# Patient Record
Sex: MALE | Race: WHITE | NOT HISPANIC OR LATINO | Employment: UNEMPLOYED | URBAN - NONMETROPOLITAN AREA
[De-identification: names, ages, dates, MRNs, and addresses within clinical notes are randomized per-mention and may not be internally consistent; named-entity substitution may affect disease eponyms.]

---

## 2017-01-05 ENCOUNTER — LAB (OUTPATIENT)
Dept: LAB | Facility: HOSPITAL | Age: 52
End: 2017-01-05
Attending: FAMILY MEDICINE

## 2017-01-05 ENCOUNTER — TRANSCRIBE ORDERS (OUTPATIENT)
Dept: ADMINISTRATIVE | Facility: HOSPITAL | Age: 52
End: 2017-01-05

## 2017-01-05 DIAGNOSIS — K57.92 DIVERTICULITIS WITH OBSTRUCTION (HCC): ICD-10-CM

## 2017-01-05 DIAGNOSIS — M54.40 ACUTE LOW BACK PAIN WITH SCIATICA, SCIATICA LATERALITY UNSPECIFIED, UNSPECIFIED BACK PAIN LATERALITY: ICD-10-CM

## 2017-01-05 DIAGNOSIS — K56.609 DIVERTICULITIS WITH OBSTRUCTION (HCC): ICD-10-CM

## 2017-01-05 DIAGNOSIS — R73.9 ELEVATED BLOOD SUGAR: ICD-10-CM

## 2017-01-05 DIAGNOSIS — K57.92 DIVERTICULITIS WITH OBSTRUCTION (HCC): Primary | ICD-10-CM

## 2017-01-05 DIAGNOSIS — K56.609 DIVERTICULITIS WITH OBSTRUCTION (HCC): Primary | ICD-10-CM

## 2017-01-05 LAB
ALBUMIN SERPL-MCNC: 4.5 G/DL (ref 3.5–5)
ALBUMIN/GLOB SERPL: 1.4 G/DL (ref 1.5–2.5)
ALP SERPL-CCNC: 79 U/L (ref 46–116)
ALT SERPL W P-5'-P-CCNC: 52 U/L (ref 10–44)
ANION GAP SERPL CALCULATED.3IONS-SCNC: 3.4 MMOL/L (ref 3.6–11.2)
AST SERPL-CCNC: 32 U/L (ref 10–34)
BASOPHILS # BLD AUTO: 0.03 10*3/MM3 (ref 0–0.3)
BASOPHILS NFR BLD AUTO: 0.5 % (ref 0–2)
BILIRUB SERPL-MCNC: 0.6 MG/DL (ref 0.2–1.8)
BUN BLD-MCNC: 10 MG/DL (ref 7–21)
BUN/CREAT SERPL: 9.3 (ref 7–25)
CALCIUM SPEC-SCNC: 9.8 MG/DL (ref 7.7–10)
CHLORIDE SERPL-SCNC: 103 MMOL/L (ref 99–112)
CO2 SERPL-SCNC: 32.6 MMOL/L (ref 24.3–31.9)
CREAT BLD-MCNC: 1.08 MG/DL (ref 0.43–1.29)
DEPRECATED RDW RBC AUTO: 41.2 FL (ref 37–54)
EOSINOPHIL # BLD AUTO: 0.17 10*3/MM3 (ref 0–0.7)
EOSINOPHIL NFR BLD AUTO: 2.7 % (ref 0–5)
ERYTHROCYTE [DISTWIDTH] IN BLOOD BY AUTOMATED COUNT: 13 % (ref 11.5–14.5)
GFR SERPL CREATININE-BSD FRML MDRD: 72 ML/MIN/1.73
GLOBULIN UR ELPH-MCNC: 3.2 GM/DL
GLUCOSE BLD-MCNC: 97 MG/DL (ref 70–110)
HBA1C MFR BLD: 5.3 % (ref 4.5–5.7)
HCT VFR BLD AUTO: 47.2 % (ref 42–52)
HGB BLD-MCNC: 16 G/DL (ref 14–18)
IMM GRANULOCYTES # BLD: 0.01 10*3/MM3 (ref 0–0.03)
IMM GRANULOCYTES NFR BLD: 0.2 % (ref 0–0.5)
LYMPHOCYTES # BLD AUTO: 1.93 10*3/MM3 (ref 1–3)
LYMPHOCYTES NFR BLD AUTO: 30.7 % (ref 21–51)
MCH RBC QN AUTO: 29.3 PG (ref 27–33)
MCHC RBC AUTO-ENTMCNC: 33.9 G/DL (ref 33–37)
MCV RBC AUTO: 86.4 FL (ref 80–94)
MONOCYTES # BLD AUTO: 0.63 10*3/MM3 (ref 0.1–0.9)
MONOCYTES NFR BLD AUTO: 10 % (ref 0–10)
NEUTROPHILS # BLD AUTO: 3.51 10*3/MM3 (ref 1.4–6.5)
NEUTROPHILS NFR BLD AUTO: 55.9 % (ref 30–70)
OSMOLALITY SERPL CALC.SUM OF ELEC: 276.5 MOSM/KG (ref 273–305)
PLATELET # BLD AUTO: 195 10*3/MM3 (ref 130–400)
PMV BLD AUTO: 9.1 FL (ref 6–10)
POTASSIUM BLD-SCNC: 4.3 MMOL/L (ref 3.5–5.3)
PROT SERPL-MCNC: 7.7 G/DL (ref 6–8)
RBC # BLD AUTO: 5.46 10*6/MM3 (ref 4.7–6.1)
SODIUM BLD-SCNC: 139 MMOL/L (ref 135–153)
WBC NRBC COR # BLD: 6.28 10*3/MM3 (ref 4.5–12.5)

## 2017-01-05 PROCEDURE — 80053 COMPREHEN METABOLIC PANEL: CPT | Performed by: FAMILY MEDICINE

## 2017-01-05 PROCEDURE — 85025 COMPLETE CBC W/AUTO DIFF WBC: CPT | Performed by: FAMILY MEDICINE

## 2017-01-05 PROCEDURE — 36415 COLL VENOUS BLD VENIPUNCTURE: CPT

## 2017-01-05 PROCEDURE — 83036 HEMOGLOBIN GLYCOSYLATED A1C: CPT | Performed by: FAMILY MEDICINE

## 2017-01-31 ENCOUNTER — TRANSCRIBE ORDERS (OUTPATIENT)
Dept: ADMINISTRATIVE | Facility: HOSPITAL | Age: 52
End: 2017-01-31

## 2017-01-31 ENCOUNTER — LAB (OUTPATIENT)
Dept: LAB | Facility: HOSPITAL | Age: 52
End: 2017-01-31

## 2017-01-31 DIAGNOSIS — R10.30 LOWER ABDOMINAL PAIN: ICD-10-CM

## 2017-01-31 DIAGNOSIS — R10.30 LOWER ABDOMINAL PAIN: Primary | ICD-10-CM

## 2017-01-31 LAB
ANION GAP SERPL CALCULATED.3IONS-SCNC: 2.2 MMOL/L (ref 3.6–11.2)
BASOPHILS # BLD AUTO: 0.02 10*3/MM3 (ref 0–0.3)
BASOPHILS NFR BLD AUTO: 0.4 % (ref 0–2)
BUN BLD-MCNC: 14 MG/DL (ref 7–21)
BUN/CREAT SERPL: 16.3 (ref 7–25)
CALCIUM SPEC-SCNC: 9.5 MG/DL (ref 7.7–10)
CHLORIDE SERPL-SCNC: 109 MMOL/L (ref 99–112)
CO2 SERPL-SCNC: 29.8 MMOL/L (ref 24.3–31.9)
CREAT BLD-MCNC: 0.86 MG/DL (ref 0.43–1.29)
CRP SERPL-MCNC: <0.5 MG/DL (ref 0–0.99)
DEPRECATED RDW RBC AUTO: 41.1 FL (ref 37–54)
EOSINOPHIL # BLD AUTO: 0.17 10*3/MM3 (ref 0–0.7)
EOSINOPHIL NFR BLD AUTO: 3.5 % (ref 0–5)
ERYTHROCYTE [DISTWIDTH] IN BLOOD BY AUTOMATED COUNT: 13.4 % (ref 11.5–14.5)
GFR SERPL CREATININE-BSD FRML MDRD: 94 ML/MIN/1.73
GLUCOSE BLD-MCNC: 98 MG/DL (ref 70–110)
HCT VFR BLD AUTO: 45.1 % (ref 42–52)
HGB BLD-MCNC: 15.2 G/DL (ref 14–18)
IMM GRANULOCYTES # BLD: 0 10*3/MM3 (ref 0–0.03)
IMM GRANULOCYTES NFR BLD: 0 % (ref 0–0.5)
LYMPHOCYTES # BLD AUTO: 1.74 10*3/MM3 (ref 1–3)
LYMPHOCYTES NFR BLD AUTO: 35.4 % (ref 21–51)
MCH RBC QN AUTO: 28.4 PG (ref 27–33)
MCHC RBC AUTO-ENTMCNC: 33.7 G/DL (ref 33–37)
MCV RBC AUTO: 84.3 FL (ref 80–94)
MONOCYTES # BLD AUTO: 0.52 10*3/MM3 (ref 0.1–0.9)
MONOCYTES NFR BLD AUTO: 10.6 % (ref 0–10)
NEUTROPHILS # BLD AUTO: 2.46 10*3/MM3 (ref 1.4–6.5)
NEUTROPHILS NFR BLD AUTO: 50.1 % (ref 30–70)
OSMOLALITY SERPL CALC.SUM OF ELEC: 281.7 MOSM/KG (ref 273–305)
PLATELET # BLD AUTO: 216 10*3/MM3 (ref 130–400)
PMV BLD AUTO: 9 FL (ref 6–10)
POTASSIUM BLD-SCNC: 4.4 MMOL/L (ref 3.5–5.3)
RBC # BLD AUTO: 5.35 10*6/MM3 (ref 4.7–6.1)
SODIUM BLD-SCNC: 141 MMOL/L (ref 135–153)
WBC NRBC COR # BLD: 4.91 10*3/MM3 (ref 4.5–12.5)

## 2017-01-31 PROCEDURE — 85025 COMPLETE CBC W/AUTO DIFF WBC: CPT | Performed by: NURSE PRACTITIONER

## 2017-01-31 PROCEDURE — 80048 BASIC METABOLIC PNL TOTAL CA: CPT | Performed by: NURSE PRACTITIONER

## 2017-01-31 PROCEDURE — 36415 COLL VENOUS BLD VENIPUNCTURE: CPT

## 2017-01-31 PROCEDURE — 86140 C-REACTIVE PROTEIN: CPT | Performed by: NURSE PRACTITIONER

## 2017-07-22 ENCOUNTER — TRANSCRIBE ORDERS (OUTPATIENT)
Dept: ADMINISTRATIVE | Facility: HOSPITAL | Age: 52
End: 2017-07-22

## 2017-07-22 ENCOUNTER — LAB (OUTPATIENT)
Dept: LAB | Facility: HOSPITAL | Age: 52
End: 2017-07-22
Attending: FAMILY MEDICINE

## 2017-07-22 DIAGNOSIS — M79.10 MUSCLE PAIN: ICD-10-CM

## 2017-07-22 DIAGNOSIS — M79.10 MUSCLE PAIN: Primary | ICD-10-CM

## 2017-07-22 LAB
ALBUMIN SERPL-MCNC: 4.7 G/DL (ref 3.5–5)
ALBUMIN/GLOB SERPL: 1.7 G/DL (ref 1.5–2.5)
ALP SERPL-CCNC: 83 U/L (ref 40–129)
ALT SERPL W P-5'-P-CCNC: 32 U/L (ref 10–44)
ANION GAP SERPL CALCULATED.3IONS-SCNC: 4.9 MMOL/L (ref 3.6–11.2)
AST SERPL-CCNC: 26 U/L (ref 10–34)
BASOPHILS # BLD AUTO: 0.02 10*3/MM3 (ref 0–0.3)
BASOPHILS NFR BLD AUTO: 0.3 % (ref 0–2)
BILIRUB SERPL-MCNC: 0.4 MG/DL (ref 0.2–1.8)
BUN BLD-MCNC: 12 MG/DL (ref 7–21)
BUN/CREAT SERPL: 12.8 (ref 7–25)
CALCIUM SPEC-SCNC: 9.2 MG/DL (ref 7.7–10)
CHLORIDE SERPL-SCNC: 110 MMOL/L (ref 99–112)
CK MB SERPL-CCNC: 2.92 NG/ML (ref 0–5)
CK MB SERPL-RTO: 1.3 % (ref 0–3)
CK SERPL-CCNC: 227 U/L (ref 24–204)
CO2 SERPL-SCNC: 25.1 MMOL/L (ref 24.3–31.9)
CREAT BLD-MCNC: 0.94 MG/DL (ref 0.43–1.29)
CRP SERPL-MCNC: <0.5 MG/DL (ref 0–0.99)
DEPRECATED RDW RBC AUTO: 40 FL (ref 37–54)
EOSINOPHIL # BLD AUTO: 0.2 10*3/MM3 (ref 0–0.7)
EOSINOPHIL NFR BLD AUTO: 3 % (ref 0–5)
ERYTHROCYTE [DISTWIDTH] IN BLOOD BY AUTOMATED COUNT: 13.3 % (ref 11.5–14.5)
ERYTHROCYTE [SEDIMENTATION RATE] IN BLOOD: 7 MM/HR (ref 0–20)
GFR SERPL CREATININE-BSD FRML MDRD: 85 ML/MIN/1.73
GLOBULIN UR ELPH-MCNC: 2.7 GM/DL
GLUCOSE BLD-MCNC: 118 MG/DL (ref 70–110)
HCT VFR BLD AUTO: 42.5 % (ref 42–52)
HGB BLD-MCNC: 14.7 G/DL (ref 14–18)
IMM GRANULOCYTES # BLD: 0.01 10*3/MM3 (ref 0–0.03)
IMM GRANULOCYTES NFR BLD: 0.2 % (ref 0–0.5)
LYMPHOCYTES # BLD AUTO: 1.88 10*3/MM3 (ref 1–3)
LYMPHOCYTES NFR BLD AUTO: 28.6 % (ref 21–51)
MCH RBC QN AUTO: 28.9 PG (ref 27–33)
MCHC RBC AUTO-ENTMCNC: 34.6 G/DL (ref 33–37)
MCV RBC AUTO: 83.5 FL (ref 80–94)
MONOCYTES # BLD AUTO: 0.53 10*3/MM3 (ref 0.1–0.9)
MONOCYTES NFR BLD AUTO: 8.1 % (ref 0–10)
NEUTROPHILS # BLD AUTO: 3.93 10*3/MM3 (ref 1.4–6.5)
NEUTROPHILS NFR BLD AUTO: 59.8 % (ref 30–70)
OSMOLALITY SERPL CALC.SUM OF ELEC: 280.2 MOSM/KG (ref 273–305)
PLATELET # BLD AUTO: 210 10*3/MM3 (ref 130–400)
PMV BLD AUTO: 9 FL (ref 6–10)
POTASSIUM BLD-SCNC: 3.8 MMOL/L (ref 3.5–5.3)
PROT SERPL-MCNC: 7.4 G/DL (ref 6–8)
RBC # BLD AUTO: 5.09 10*6/MM3 (ref 4.7–6.1)
SODIUM BLD-SCNC: 140 MMOL/L (ref 135–153)
WBC NRBC COR # BLD: 6.57 10*3/MM3 (ref 4.5–12.5)

## 2017-07-22 PROCEDURE — 86140 C-REACTIVE PROTEIN: CPT | Performed by: FAMILY MEDICINE

## 2017-07-22 PROCEDURE — 82553 CREATINE MB FRACTION: CPT | Performed by: FAMILY MEDICINE

## 2017-07-22 PROCEDURE — 85652 RBC SED RATE AUTOMATED: CPT | Performed by: FAMILY MEDICINE

## 2017-07-22 PROCEDURE — 82550 ASSAY OF CK (CPK): CPT | Performed by: FAMILY MEDICINE

## 2017-07-22 PROCEDURE — 85025 COMPLETE CBC W/AUTO DIFF WBC: CPT | Performed by: FAMILY MEDICINE

## 2017-07-22 PROCEDURE — 86038 ANTINUCLEAR ANTIBODIES: CPT | Performed by: FAMILY MEDICINE

## 2017-07-22 PROCEDURE — 36415 COLL VENOUS BLD VENIPUNCTURE: CPT

## 2017-07-22 PROCEDURE — 80053 COMPREHEN METABOLIC PANEL: CPT | Performed by: FAMILY MEDICINE

## 2017-07-24 LAB — ANA SER QL: NEGATIVE

## 2017-08-07 ENCOUNTER — LAB (OUTPATIENT)
Dept: LAB | Facility: HOSPITAL | Age: 52
End: 2017-08-07
Attending: FAMILY MEDICINE

## 2017-08-07 ENCOUNTER — TRANSCRIBE ORDERS (OUTPATIENT)
Dept: ADMINISTRATIVE | Facility: HOSPITAL | Age: 52
End: 2017-08-07

## 2017-08-07 DIAGNOSIS — M79.10 MUSCLE PAIN: ICD-10-CM

## 2017-08-07 DIAGNOSIS — M79.10 MUSCLE PAIN: Primary | ICD-10-CM

## 2017-08-07 LAB
ANION GAP SERPL CALCULATED.3IONS-SCNC: 2.9 MMOL/L (ref 3.6–11.2)
BASOPHILS # BLD AUTO: 0.03 10*3/MM3 (ref 0–0.3)
BASOPHILS NFR BLD AUTO: 0.4 % (ref 0–2)
BUN BLD-MCNC: 17 MG/DL (ref 7–21)
BUN/CREAT SERPL: 17 (ref 7–25)
CALCIUM SPEC-SCNC: 9.8 MG/DL (ref 7.7–10)
CHLORIDE SERPL-SCNC: 108 MMOL/L (ref 99–112)
CO2 SERPL-SCNC: 30.1 MMOL/L (ref 24.3–31.9)
CREAT BLD-MCNC: 1 MG/DL (ref 0.43–1.29)
DEPRECATED RDW RBC AUTO: 39.9 FL (ref 37–54)
EOSINOPHIL # BLD AUTO: 0.22 10*3/MM3 (ref 0–0.7)
EOSINOPHIL NFR BLD AUTO: 3.1 % (ref 0–5)
ERYTHROCYTE [DISTWIDTH] IN BLOOD BY AUTOMATED COUNT: 13.2 % (ref 11.5–14.5)
GFR SERPL CREATININE-BSD FRML MDRD: 78 ML/MIN/1.73
GLUCOSE BLD-MCNC: 103 MG/DL (ref 70–110)
HCT VFR BLD AUTO: 42.9 % (ref 42–52)
HGB BLD-MCNC: 14.6 G/DL (ref 14–18)
IMM GRANULOCYTES # BLD: 0.01 10*3/MM3 (ref 0–0.03)
IMM GRANULOCYTES NFR BLD: 0.1 % (ref 0–0.5)
LYMPHOCYTES # BLD AUTO: 2.6 10*3/MM3 (ref 1–3)
LYMPHOCYTES NFR BLD AUTO: 37.2 % (ref 21–51)
MCH RBC QN AUTO: 28.6 PG (ref 27–33)
MCHC RBC AUTO-ENTMCNC: 34 G/DL (ref 33–37)
MCV RBC AUTO: 84 FL (ref 80–94)
MONOCYTES # BLD AUTO: 0.67 10*3/MM3 (ref 0.1–0.9)
MONOCYTES NFR BLD AUTO: 9.6 % (ref 0–10)
NEUTROPHILS # BLD AUTO: 3.46 10*3/MM3 (ref 1.4–6.5)
NEUTROPHILS NFR BLD AUTO: 49.6 % (ref 30–70)
OSMOLALITY SERPL CALC.SUM OF ELEC: 283.1 MOSM/KG (ref 273–305)
PLATELET # BLD AUTO: 215 10*3/MM3 (ref 130–400)
PMV BLD AUTO: 9 FL (ref 6–10)
POTASSIUM BLD-SCNC: 4 MMOL/L (ref 3.5–5.3)
RBC # BLD AUTO: 5.11 10*6/MM3 (ref 4.7–6.1)
SODIUM BLD-SCNC: 141 MMOL/L (ref 135–153)
WBC NRBC COR # BLD: 6.99 10*3/MM3 (ref 4.5–12.5)

## 2017-08-07 PROCEDURE — 82550 ASSAY OF CK (CPK): CPT | Performed by: FAMILY MEDICINE

## 2017-08-07 PROCEDURE — 80048 BASIC METABOLIC PNL TOTAL CA: CPT | Performed by: FAMILY MEDICINE

## 2017-08-07 PROCEDURE — 85025 COMPLETE CBC W/AUTO DIFF WBC: CPT | Performed by: FAMILY MEDICINE

## 2017-08-07 PROCEDURE — 36415 COLL VENOUS BLD VENIPUNCTURE: CPT

## 2017-08-08 LAB — CK SERPL-CCNC: 256 U/L (ref 24–204)

## 2017-08-09 ENCOUNTER — TRANSCRIBE ORDERS (OUTPATIENT)
Dept: ADMINISTRATIVE | Facility: HOSPITAL | Age: 52
End: 2017-08-09

## 2017-08-09 ENCOUNTER — LAB (OUTPATIENT)
Dept: LAB | Facility: HOSPITAL | Age: 52
End: 2017-08-09
Attending: FAMILY MEDICINE

## 2017-08-09 ENCOUNTER — HOSPITAL ENCOUNTER (OUTPATIENT)
Dept: GENERAL RADIOLOGY | Facility: HOSPITAL | Age: 52
Discharge: HOME | End: 2017-08-09
Attending: FAMILY MEDICINE | Admitting: FAMILY MEDICINE

## 2017-08-09 DIAGNOSIS — R74.8 ELEVATED LIVER ENZYMES: ICD-10-CM

## 2017-08-09 DIAGNOSIS — M79.10 MUSCLE PAIN: Primary | ICD-10-CM

## 2017-08-09 DIAGNOSIS — M79.10 MUSCLE PAIN: ICD-10-CM

## 2017-08-09 DIAGNOSIS — R53.1 WEAKNESS: ICD-10-CM

## 2017-08-09 LAB
CHROMATIN AB SERPL-ACNC: 2 IU/ML (ref 0–14)
CRP SERPL-MCNC: <0.5 MG/DL (ref 0–0.99)
ERYTHROCYTE [SEDIMENTATION RATE] IN BLOOD: 8 MM/HR (ref 0–20)
URATE SERPL-MCNC: 5.6 MG/DL (ref 3.7–7)
VIT B12 BLD-MCNC: 445 PG/ML (ref 211–911)

## 2017-08-09 PROCEDURE — 85652 RBC SED RATE AUTOMATED: CPT | Performed by: FAMILY MEDICINE

## 2017-08-09 PROCEDURE — 86200 CCP ANTIBODY: CPT | Performed by: FAMILY MEDICINE

## 2017-08-09 PROCEDURE — 84550 ASSAY OF BLOOD/URIC ACID: CPT | Performed by: FAMILY MEDICINE

## 2017-08-09 PROCEDURE — 82085 ASSAY OF ALDOLASE: CPT | Performed by: FAMILY MEDICINE

## 2017-08-09 PROCEDURE — 86140 C-REACTIVE PROTEIN: CPT | Performed by: FAMILY MEDICINE

## 2017-08-09 PROCEDURE — 86431 RHEUMATOID FACTOR QUANT: CPT | Performed by: FAMILY MEDICINE

## 2017-08-09 PROCEDURE — 73030 X-RAY EXAM OF SHOULDER: CPT

## 2017-08-09 PROCEDURE — 86225 DNA ANTIBODY NATIVE: CPT | Performed by: FAMILY MEDICINE

## 2017-08-09 PROCEDURE — 86038 ANTINUCLEAR ANTIBODIES: CPT | Performed by: FAMILY MEDICINE

## 2017-08-09 PROCEDURE — 73030 X-RAY EXAM OF SHOULDER: CPT | Performed by: RADIOLOGY

## 2017-08-09 PROCEDURE — 82607 VITAMIN B-12: CPT | Performed by: FAMILY MEDICINE

## 2017-08-09 PROCEDURE — 36415 COLL VENOUS BLD VENIPUNCTURE: CPT

## 2017-08-11 LAB
ANA SER QL: NEGATIVE
DSDNA AB SER-ACNC: <1 IU/ML (ref 0–9)

## 2017-08-13 LAB
ALDOLASE SERPL-CCNC: 4.5 U/L (ref 3.3–10.3)
CCP IGA+IGG SERPL IA-ACNC: 7 UNITS (ref 0–19)

## 2017-10-30 ENCOUNTER — OFFICE VISIT (OUTPATIENT)
Dept: ORTHOPEDIC SURGERY | Facility: CLINIC | Age: 52
End: 2017-10-30

## 2017-10-30 ENCOUNTER — PREP FOR SURGERY (OUTPATIENT)
Dept: OTHER | Facility: HOSPITAL | Age: 52
End: 2017-10-30

## 2017-10-30 VITALS
HEIGHT: 72 IN | SYSTOLIC BLOOD PRESSURE: 130 MMHG | WEIGHT: 225 LBS | BODY MASS INDEX: 30.48 KG/M2 | HEART RATE: 94 BPM | DIASTOLIC BLOOD PRESSURE: 88 MMHG

## 2017-10-30 DIAGNOSIS — G56.02 CARPAL TUNNEL SYNDROME, LEFT: Primary | ICD-10-CM

## 2017-10-30 PROCEDURE — 99214 OFFICE O/P EST MOD 30 MIN: CPT | Performed by: ORTHOPAEDIC SURGERY

## 2017-10-30 NOTE — PROGRESS NOTES
History and Physical    Patient: Avery Watson  YOB: 1965  Date of encounter: 10/30/2017      History of Present Illness:   Avery Watson is a 52 y.o. male who presents today for evaluation of left hand and wrist pain.  He has had carpal tunnel release right hand August 2016.  He's had a good result with complete relief of his symptoms.  He is requesting surgery to his left hand.  He has pain with driving and pain at night.  He has treated with splints in the past without significant improvement.    PMH:   Patient Active Problem List   Diagnosis   • Benign prostatic hyperplasia with urinary obstruction   • Fatigue   • Multilevel degenerative disc disease   • Migraine   • Shoulder pain   • Lumbar facet arthropathy/lumbar spondylosis without myelopathy   • DDD (degenerative disc disease), lumbar   • Myofascial pain   • Bilateral carpal tunnel syndrome   • Displacement of intervertebral disc of mid-cervical region   • Neuroforaminal stenosis of spine, right C6-C7   • Carpal tunnel syndrome, left     Past Medical History:   Diagnosis Date   • Acid reflux    • Chronic pain disorder    • Extremity pain    • Joint pain    • Low back pain    • Lumbosacral disc disease    • Migraine    • Muscle spasm    • Neck pain    • Osteoarthritis    • PONV (postoperative nausea and vomiting)          PSH:  Past Surgical History:   Procedure Laterality Date   • CARPAL TUNNEL RELEASE Right 8/4/2016    Procedure: CARPAL TUNNEL RELEASE;  Surgeon: Yoel Lua MD;  Location: [facility];  Service:    • TOE SURGERY Right 03/04/2015       Allergies:   No Known Allergies    Medications:     Current Outpatient Prescriptions:   •  celecoxib (CELEBREX) 200 MG capsule, Take 1 capsule by mouth Daily., Disp: 30 capsule, Rfl: 3  •  escitalopram (LEXAPRO) 10 MG tablet, Take 1 tablet by mouth Daily., Disp: 30 tablet, Rfl: 2  •  pantoprazole (PROTONIX) 40 MG EC tablet, Take 1 tablet by mouth 2 (Two) Times a Day 15 minutes Before  "breakfast and Supper., Disp: 180 tablet, Rfl: 3  •  tamsulosin (FLOMAX) 0.4 MG capsule 24 hr capsule, Take 1 capsule by mouth daily., Disp: 30 capsule, Rfl: 11    Social History:     Social History     Occupational History   • Not on file.     Social History Main Topics   • Smoking status: Never Smoker   • Smokeless tobacco: Current User     Types: Chew      Comment: uses chewing tobacco   • Alcohol use No   • Drug use: No   • Sexual activity: Defer      Social History     Social History Narrative    ** Merged History Encounter **            Family History:     Family History   Problem Relation Age of Onset   • Lung cancer Mother    • Cancer Mother      ovarian   • Diabetes Sister    • Rheum arthritis Brother    • Cancer Sister      breast       Review of Systems:   Review of Systems   Constitutional: Negative.    Eyes: Negative.    Respiratory: Negative.    Cardiovascular: Negative.    Gastrointestinal: Negative.    Endocrine: Negative.    Genitourinary: Negative.    Musculoskeletal: Positive for arthralgias and myalgias.   Allergic/Immunologic: Negative.    Neurological: Positive for numbness (left hand).   Hematological: Negative.    Psychiatric/Behavioral: Negative.        Physical Exam:   General Appearance:    52 y.o. male  cooperative, in no acute distress.  Alert and oriented x 3,                   Vitals:    10/30/17 1301   BP: 130/88   Pulse: 94   Weight: 225 lb (102 kg)   Height: 72\" (182.9 cm)          HEENT: Unremarkable      Neck: Supple without lymphadenopathy.      Chest: Clear to ascultation bilaterally. Normal respiratory effort.      Heart: Regular rate and rhythm. No murmurs noted.      Skin:  Warm and dry without any rash.      Musculoskeletal:  Upper Extremities: Bilateral hand evaluation reveals minimal decrease in thenar tone compared to the right.  There is decreased sensation thumb, index, middle fingers on the left.  Phalen's sign negative, Tinel sign negative, good strength with pinch and " .  Lower Extremities: Unremarkable.    Assessment:   52-year-old male with long-standing carpal tunnel syndrome left hand and nerve conduction studies from 2016 confirming moderate to severe carpal tunnel syndrome left hand.  Discussed his options today and he wishes to proceed with carpal tunnel release left hand.    ICD-10-CM ICD-9-CM   1. Carpal tunnel syndrome, left G56.02 354.0       Plan:   Carpal tunnel release left hand scheduled [facility].    This document was signed by Yoel Lua MD.  10/30/26882:15 PM    Cc: Shamar Levine MD

## 2017-10-30 NOTE — H&P
History and Physical    Patient: Avery Watson  YOB: 1965  Date of encounter: 10/30/2017      History of Present Illness:   Avery Watson is a 52 y.o. male who presents today for evaluation of left hand and wrist pain.  He has had carpal tunnel release right hand August 2016.  He's had a good result with complete relief of his symptoms.  He is requesting surgery to his left hand.  He has pain with driving and pain at night.  He has treated with splints in the past without significant improvement.    PMH:   Patient Active Problem List   Diagnosis   • Benign prostatic hyperplasia with urinary obstruction   • Fatigue   • Multilevel degenerative disc disease   • Migraine   • Shoulder pain   • Lumbar facet arthropathy/lumbar spondylosis without myelopathy   • DDD (degenerative disc disease), lumbar   • Myofascial pain   • Bilateral carpal tunnel syndrome   • Displacement of intervertebral disc of mid-cervical region   • Neuroforaminal stenosis of spine, right C6-C7   • Carpal tunnel syndrome, left     Past Medical History:   Diagnosis Date   • Acid reflux    • Chronic pain disorder    • Extremity pain    • Joint pain    • Low back pain    • Lumbosacral disc disease    • Migraine    • Muscle spasm    • Neck pain    • Osteoarthritis    • PONV (postoperative nausea and vomiting)          PSH:  Past Surgical History:   Procedure Laterality Date   • CARPAL TUNNEL RELEASE Right 8/4/2016    Procedure: CARPAL TUNNEL RELEASE;  Surgeon: Yoel Lua MD;  Location: [facility];  Service:    • TOE SURGERY Right 03/04/2015       Allergies:   No Known Allergies    Medications:     Current Outpatient Prescriptions:   •  celecoxib (CELEBREX) 200 MG capsule, Take 1 capsule by mouth Daily., Disp: 30 capsule, Rfl: 3  •  escitalopram (LEXAPRO) 10 MG tablet, Take 1 tablet by mouth Daily., Disp: 30 tablet, Rfl: 2  •  pantoprazole (PROTONIX) 40 MG EC tablet, Take 1 tablet by mouth 2 (Two) Times a Day 15 minutes Before  "breakfast and Supper., Disp: 180 tablet, Rfl: 3  •  tamsulosin (FLOMAX) 0.4 MG capsule 24 hr capsule, Take 1 capsule by mouth daily., Disp: 30 capsule, Rfl: 11    Social History:     Social History     Occupational History   • Not on file.     Social History Main Topics   • Smoking status: Never Smoker   • Smokeless tobacco: Current User     Types: Chew      Comment: uses chewing tobacco   • Alcohol use No   • Drug use: No   • Sexual activity: Defer      Social History     Social History Narrative    ** Merged History Encounter **            Family History:     Family History   Problem Relation Age of Onset   • Lung cancer Mother    • Cancer Mother      ovarian   • Diabetes Sister    • Rheum arthritis Brother    • Cancer Sister      breast       Review of Systems:   Review of Systems   Constitutional: Negative.    Eyes: Negative.    Respiratory: Negative.    Cardiovascular: Negative.    Gastrointestinal: Negative.    Endocrine: Negative.    Genitourinary: Negative.    Musculoskeletal: Positive for arthralgias and myalgias.   Allergic/Immunologic: Negative.    Neurological: Positive for numbness (left hand).   Hematological: Negative.    Psychiatric/Behavioral: Negative.        Physical Exam:   General Appearance:    52 y.o. male  cooperative, in no acute distress.  Alert and oriented x 3,                   Vitals:    10/30/17 1301   BP: 130/88   Pulse: 94   Weight: 225 lb (102 kg)   Height: 72\" (182.9 cm)          HEENT: Unremarkable      Neck: Supple without lymphadenopathy.      Chest: Clear to ascultation bilaterally. Normal respiratory effort.      Heart: Regular rate and rhythm. No murmurs noted.      Skin:  Warm and dry without any rash.      Musculoskeletal:  Upper Extremities: Bilateral hand evaluation reveals minimal decrease in thenar tone compared to the right.  There is decreased sensation thumb, index, middle fingers on the left.  Phalen's sign negative, Tinel sign negative, good strength with pinch and " .  Lower Extremities: Unremarkable.    Assessment:   52-year-old male with long-standing carpal tunnel syndrome left hand and nerve conduction studies from 2016 confirming moderate to severe carpal tunnel syndrome left hand.  Discussed his options today and he wishes to proceed with carpal tunnel release left hand.    ICD-10-CM ICD-9-CM   1. Carpal tunnel syndrome, left G56.02 354.0       Plan:   Carpal tunnel release left hand scheduled [facility].    This document was signed by Yoel Lua MD.  10/30/27193:15 PM    Cc: Shamar Levine MD

## 2017-11-07 ENCOUNTER — APPOINTMENT (OUTPATIENT)
Dept: PREADMISSION TESTING | Facility: HOSPITAL | Age: 52
End: 2017-11-07

## 2017-11-07 LAB
DEPRECATED RDW RBC AUTO: 41.1 FL (ref 37–54)
ERYTHROCYTE [DISTWIDTH] IN BLOOD BY AUTOMATED COUNT: 13.4 % (ref 11.5–14.5)
HCT VFR BLD AUTO: 42.3 % (ref 42–52)
HGB BLD-MCNC: 14.6 G/DL (ref 14–18)
MCH RBC QN AUTO: 29.3 PG (ref 27–33)
MCHC RBC AUTO-ENTMCNC: 34.5 G/DL (ref 33–37)
MCV RBC AUTO: 84.9 FL (ref 80–94)
PLATELET # BLD AUTO: 214 10*3/MM3 (ref 130–400)
PMV BLD AUTO: 9.4 FL (ref 6–10)
RBC # BLD AUTO: 4.98 10*6/MM3 (ref 4.7–6.1)
WBC NRBC COR # BLD: 5.86 10*3/MM3 (ref 4.5–12.5)

## 2017-11-07 NOTE — DISCHARGE INSTRUCTIONS
TAKE the following medications the morning of surgery:  All heart or blood pressure medications    Please discontinue all blood thinners and anticoagulants (except aspirin) prior to surgery as per your surgeon and cardiologist instructions.  Aspirin may be continued up to the day prior to surgery.    HOLD all diabetic medications the morning of surgery as order by physician.    Please follow instructions on use of prep cloths provided by nurse. Return instruction sheet with stickers attached to pre-op nurse on day of surgery.    Arrival time for surgery on 11/9/2017 is 0630 am    General Instructions:  • Do NOT eat or drink after midnight 11/8/2017 which includes water, mints, or gum.  • You may brush your teeth. Dental appliances that are removable must be taken out day of surgery.  • Do NOT smoke, chew tobacco, or drink alcohol within 24 hours prior to surgery.  • Bring medications in original bottles, any inhalers and if applicable your C-PAP/BI-PAP machine  • Bring any papers given to you in the doctor’s office  • Wear clean, comfortable clothes and socks  • Do NOT wear contact lenses or make-up or dark nail polish.  Bring a case for your glasses if applicable.  • Bring crutches or walker if applicable  • Leave all other valuables and jewelry at home  • If you were given a blood bank armband, continue to wear it until discharged.    Preventing a Surgical Site Infection:  • Shower on the morning of surgery using a fresh bar of anti-bacterial soap (such as Dial) and clean washcloth.  Dry with a clean towel and dress in clean clothing.  • For 2 to 3 days before surgery, avoid shaving with a razor near where you will have surgery because the razor can irritate skin and make it easier to develop an infection.  Ask your surgeon if you will be receiving antibiotics prior to surgery.  • Make sure you, your family, and all healthcare providers clean their hands with soap and water or an alcohol-based hand   before caring for you or your wound.  • If at all possible, quit smoking as many days before surgery as you can.    Day of Surgery:  Upon arrival, a pre-op nurse and anesthesiologist will review your health history, obtain vital signs, and answer questions you may have.  The only belongings needed at this time will be your home medications and if applicable you C-PAP/BI-PAP machine.  If you are staying overnight, your family can leave the rest of your belongings in the car and bring them to your room later.  A pre-op nurse will start an IV and you may receive medication in preparation for surgery.  Due to patient privacy and limited space, only one member of your family will be able to accompany you in the pre-op area.  While you are in surgery your family should notify the waiting room  if they leave the waiting room area and provide a contact number.  Please be aware that surgery does come with discomfort.  We want to make every effort to control your discomfort so please discuss any uncontrolled symptoms with your nurse.  Your doctor will most likely have prescribed pain medications.  If you are going home after surgery you will receive individualized written care instructions before being discharged.  A responsible adult must drive you to and from the hospital on the day of surgery and stay with you for 24 hours.  If you are staying overnight following surgery, you will be transported to your hospital room following the recovery period.

## 2017-11-08 ENCOUNTER — TELEPHONE (OUTPATIENT)
Dept: ORTHOPEDIC SURGERY | Facility: CLINIC | Age: 52
End: 2017-11-08

## 2017-11-08 NOTE — TELEPHONE ENCOUNTER
Patient has been notified concerning surgery arrival time of 8:00a.m.11-9-17. Patient verbalized understanding.

## 2017-11-09 ENCOUNTER — HOSPITAL ENCOUNTER (OUTPATIENT)
Facility: HOSPITAL | Age: 52
Setting detail: HOSPITAL OUTPATIENT SURGERY
Discharge: HOME | End: 2017-11-09
Attending: ORTHOPAEDIC SURGERY | Admitting: ORTHOPAEDIC SURGERY

## 2017-11-09 ENCOUNTER — ANESTHESIA (OUTPATIENT)
Dept: PERIOP | Facility: HOSPITAL | Age: 52
End: 2017-11-09

## 2017-11-09 ENCOUNTER — ANESTHESIA EVENT (OUTPATIENT)
Dept: PERIOP | Facility: HOSPITAL | Age: 52
End: 2017-11-09

## 2017-11-09 VITALS
OXYGEN SATURATION: 98 % | DIASTOLIC BLOOD PRESSURE: 86 MMHG | HEART RATE: 76 BPM | RESPIRATION RATE: 16 BRPM | WEIGHT: 225 LBS | TEMPERATURE: 98.1 F | BODY MASS INDEX: 30.48 KG/M2 | HEIGHT: 72 IN | SYSTOLIC BLOOD PRESSURE: 122 MMHG

## 2017-11-09 PROCEDURE — 64721 CARPAL TUNNEL SURGERY: CPT | Performed by: ORTHOPAEDIC SURGERY

## 2017-11-09 PROCEDURE — 25010000002 PROPOFOL 10 MG/ML EMULSION: Performed by: NURSE ANESTHETIST, CERTIFIED REGISTERED

## 2017-11-09 PROCEDURE — 25010000002 FENTANYL CITRATE (PF) 100 MCG/2ML SOLUTION: Performed by: NURSE ANESTHETIST, CERTIFIED REGISTERED

## 2017-11-09 PROCEDURE — 25010000002 MIDAZOLAM PER 1 MG: Performed by: NURSE ANESTHETIST, CERTIFIED REGISTERED

## 2017-11-09 RX ORDER — MIDAZOLAM HYDROCHLORIDE 1 MG/ML
INJECTION INTRAMUSCULAR; INTRAVENOUS AS NEEDED
Status: DISCONTINUED | OUTPATIENT
Start: 2017-11-09 | End: 2017-11-09 | Stop reason: SURG

## 2017-11-09 RX ORDER — ONDANSETRON 2 MG/ML
4 INJECTION INTRAMUSCULAR; INTRAVENOUS ONCE AS NEEDED
Status: DISCONTINUED | OUTPATIENT
Start: 2017-11-09 | End: 2017-11-09 | Stop reason: HOSPADM

## 2017-11-09 RX ORDER — SODIUM CHLORIDE, SODIUM LACTATE, POTASSIUM CHLORIDE, CALCIUM CHLORIDE 600; 310; 30; 20 MG/100ML; MG/100ML; MG/100ML; MG/100ML
125 INJECTION, SOLUTION INTRAVENOUS CONTINUOUS
Status: DISCONTINUED | OUTPATIENT
Start: 2017-11-09 | End: 2017-11-09 | Stop reason: HOSPADM

## 2017-11-09 RX ORDER — BUPIVACAINE HYDROCHLORIDE 5 MG/ML
INJECTION, SOLUTION PERINEURAL AS NEEDED
Status: DISCONTINUED | OUTPATIENT
Start: 2017-11-09 | End: 2017-11-09 | Stop reason: HOSPADM

## 2017-11-09 RX ORDER — IPRATROPIUM BROMIDE AND ALBUTEROL SULFATE 2.5; .5 MG/3ML; MG/3ML
3 SOLUTION RESPIRATORY (INHALATION) ONCE AS NEEDED
Status: DISCONTINUED | OUTPATIENT
Start: 2017-11-09 | End: 2017-11-09 | Stop reason: HOSPADM

## 2017-11-09 RX ORDER — LIDOCAINE HYDROCHLORIDE 10 MG/ML
INJECTION, SOLUTION INFILTRATION; PERINEURAL AS NEEDED
Status: DISCONTINUED | OUTPATIENT
Start: 2017-11-09 | End: 2017-11-09 | Stop reason: HOSPADM

## 2017-11-09 RX ORDER — OXYCODONE HYDROCHLORIDE AND ACETAMINOPHEN 5; 325 MG/1; MG/1
1 TABLET ORAL ONCE AS NEEDED
Status: DISCONTINUED | OUTPATIENT
Start: 2017-11-09 | End: 2017-11-09 | Stop reason: HOSPADM

## 2017-11-09 RX ORDER — FENTANYL CITRATE 50 UG/ML
INJECTION, SOLUTION INTRAMUSCULAR; INTRAVENOUS AS NEEDED
Status: DISCONTINUED | OUTPATIENT
Start: 2017-11-09 | End: 2017-11-09 | Stop reason: SURG

## 2017-11-09 RX ORDER — OXYCODONE HYDROCHLORIDE AND ACETAMINOPHEN 5; 325 MG/1; MG/1
1-2 TABLET ORAL EVERY 4 HOURS PRN
Qty: 20 TABLET | Refills: 0 | Status: SHIPPED | OUTPATIENT
Start: 2017-11-09 | End: 2020-08-17

## 2017-11-09 RX ORDER — FENTANYL CITRATE 50 UG/ML
50 INJECTION, SOLUTION INTRAMUSCULAR; INTRAVENOUS
Status: DISCONTINUED | OUTPATIENT
Start: 2017-11-09 | End: 2017-11-09 | Stop reason: HOSPADM

## 2017-11-09 RX ORDER — PROPOFOL 10 MG/ML
VIAL (ML) INTRAVENOUS CONTINUOUS PRN
Status: DISCONTINUED | OUTPATIENT
Start: 2017-11-09 | End: 2017-11-09 | Stop reason: SURG

## 2017-11-09 RX ORDER — MAGNESIUM HYDROXIDE 1200 MG/15ML
LIQUID ORAL AS NEEDED
Status: DISCONTINUED | OUTPATIENT
Start: 2017-11-09 | End: 2017-11-09 | Stop reason: HOSPADM

## 2017-11-09 RX ORDER — SODIUM CHLORIDE 0.9 % (FLUSH) 0.9 %
1-10 SYRINGE (ML) INJECTION AS NEEDED
Status: DISCONTINUED | OUTPATIENT
Start: 2017-11-09 | End: 2017-11-09 | Stop reason: HOSPADM

## 2017-11-09 RX ORDER — MEPERIDINE HYDROCHLORIDE 25 MG/ML
12.5 INJECTION INTRAMUSCULAR; INTRAVENOUS; SUBCUTANEOUS
Status: DISCONTINUED | OUTPATIENT
Start: 2017-11-09 | End: 2017-11-09 | Stop reason: HOSPADM

## 2017-11-09 RX ADMIN — SODIUM CHLORIDE, POTASSIUM CHLORIDE, SODIUM LACTATE AND CALCIUM CHLORIDE: 600; 310; 30; 20 INJECTION, SOLUTION INTRAVENOUS at 10:45

## 2017-11-09 RX ADMIN — SODIUM CHLORIDE, POTASSIUM CHLORIDE, SODIUM LACTATE AND CALCIUM CHLORIDE: 600; 310; 30; 20 INJECTION, SOLUTION INTRAVENOUS at 10:08

## 2017-11-09 RX ADMIN — FENTANYL CITRATE 100 MCG: 50 INJECTION INTRAMUSCULAR; INTRAVENOUS at 10:08

## 2017-11-09 RX ADMIN — FENTANYL CITRATE 100 MCG: 50 INJECTION INTRAMUSCULAR; INTRAVENOUS at 10:13

## 2017-11-09 RX ADMIN — SODIUM CHLORIDE, POTASSIUM CHLORIDE, SODIUM LACTATE AND CALCIUM CHLORIDE 125 ML/HR: 600; 310; 30; 20 INJECTION, SOLUTION INTRAVENOUS at 08:37

## 2017-11-09 RX ADMIN — MIDAZOLAM HYDROCHLORIDE 2 MG: 1 INJECTION, SOLUTION INTRAMUSCULAR; INTRAVENOUS at 10:08

## 2017-11-09 RX ADMIN — PROPOFOL 100 MCG/KG/MIN: 10 INJECTION, EMULSION INTRAVENOUS at 10:16

## 2017-11-09 NOTE — OP NOTE
DATE OF SURGERY: 11/09/17    PREOPERATIVE DIAGNOSIS: Carpal tunnel syndrome left hand     POSTOPERATIVE DIAGNOSIS: Same      OPERATIONS PERFORMED: Carpal tunnel release left hand      SURGEON: Yoel Lua MD      ASSISTANT: Lisa Weiner     ANESTHESIA: Gen. plus local     ESTIMATED BLOOD LOSS: None     ANTIBIOTICS: None     DESCRIPTION OF PROCEDURE: With patient in operating theater once IV sedation was administered patient position supine on the operating table left hand and arm sterilely prepped and draped in usual manner with extremity exsanguinated tourniquet inflated to 250 mmHg.,  Aspect left hand infiltrated with 5 cc of 0.5 Marcaine.  Longitudinal incision made 5 cm in length directed toward fourth finger carried through skin sharply.  Palmar fascia was exposed and divided longitudinally exposing the underlying transverse carpal ligament.  Ligament was divided from distal proximal protecting the underlying median nerve.  With transverse carpal ligament completely divided the tourniquet was deflated median nerve noted to be severely hyperemic.  Tourniquet was deflated hemostasis obtained with electrocautery wound closed in a single layer 3-0 nylon vertical mattress suture sterile dressing applied and patient taken to recovery room in stable condition.       Dictator Signature:___________________________  RAMY Baer M.D.

## 2017-11-09 NOTE — H&P (VIEW-ONLY)
History and Physical    Patient: Avery Watson  YOB: 1965  Date of encounter: 10/30/2017      History of Present Illness:   Avery Watson is a 52 y.o. male who presents today for evaluation of left hand and wrist pain.  He has had carpal tunnel release right hand August 2016.  He's had a good result with complete relief of his symptoms.  He is requesting surgery to his left hand.  He has pain with driving and pain at night.  He has treated with splints in the past without significant improvement.    PMH:   Patient Active Problem List   Diagnosis   • Benign prostatic hyperplasia with urinary obstruction   • Fatigue   • Multilevel degenerative disc disease   • Migraine   • Shoulder pain   • Lumbar facet arthropathy/lumbar spondylosis without myelopathy   • DDD (degenerative disc disease), lumbar   • Myofascial pain   • Bilateral carpal tunnel syndrome   • Displacement of intervertebral disc of mid-cervical region   • Neuroforaminal stenosis of spine, right C6-C7   • Carpal tunnel syndrome, left     Past Medical History:   Diagnosis Date   • Acid reflux    • Chronic pain disorder    • Extremity pain    • Joint pain    • Low back pain    • Lumbosacral disc disease    • Migraine    • Muscle spasm    • Neck pain    • Osteoarthritis    • PONV (postoperative nausea and vomiting)          PSH:  Past Surgical History:   Procedure Laterality Date   • CARPAL TUNNEL RELEASE Right 8/4/2016    Procedure: CARPAL TUNNEL RELEASE;  Surgeon: Yoel Lua MD;  Location: [facility];  Service:    • TOE SURGERY Right 03/04/2015       Allergies:   No Known Allergies    Medications:     Current Outpatient Prescriptions:   •  celecoxib (CELEBREX) 200 MG capsule, Take 1 capsule by mouth Daily., Disp: 30 capsule, Rfl: 3  •  escitalopram (LEXAPRO) 10 MG tablet, Take 1 tablet by mouth Daily., Disp: 30 tablet, Rfl: 2  •  pantoprazole (PROTONIX) 40 MG EC tablet, Take 1 tablet by mouth 2 (Two) Times a Day 15 minutes Before  "breakfast and Supper., Disp: 180 tablet, Rfl: 3  •  tamsulosin (FLOMAX) 0.4 MG capsule 24 hr capsule, Take 1 capsule by mouth daily., Disp: 30 capsule, Rfl: 11    Social History:     Social History     Occupational History   • Not on file.     Social History Main Topics   • Smoking status: Never Smoker   • Smokeless tobacco: Current User     Types: Chew      Comment: uses chewing tobacco   • Alcohol use No   • Drug use: No   • Sexual activity: Defer      Social History     Social History Narrative    ** Merged History Encounter **            Family History:     Family History   Problem Relation Age of Onset   • Lung cancer Mother    • Cancer Mother      ovarian   • Diabetes Sister    • Rheum arthritis Brother    • Cancer Sister      breast       Review of Systems:   Review of Systems   Constitutional: Negative.    Eyes: Negative.    Respiratory: Negative.    Cardiovascular: Negative.    Gastrointestinal: Negative.    Endocrine: Negative.    Genitourinary: Negative.    Musculoskeletal: Positive for arthralgias and myalgias.   Allergic/Immunologic: Negative.    Neurological: Positive for numbness (left hand).   Hematological: Negative.    Psychiatric/Behavioral: Negative.        Physical Exam:   General Appearance:    52 y.o. male  cooperative, in no acute distress.  Alert and oriented x 3,                   Vitals:    10/30/17 1301   BP: 130/88   Pulse: 94   Weight: 225 lb (102 kg)   Height: 72\" (182.9 cm)          HEENT: Unremarkable      Neck: Supple without lymphadenopathy.      Chest: Clear to ascultation bilaterally. Normal respiratory effort.      Heart: Regular rate and rhythm. No murmurs noted.      Skin:  Warm and dry without any rash.      Musculoskeletal:  Upper Extremities: Bilateral hand evaluation reveals minimal decrease in thenar tone compared to the right.  There is decreased sensation thumb, index, middle fingers on the left.  Phalen's sign negative, Tinel sign negative, good strength with pinch and " .  Lower Extremities: Unremarkable.    Assessment:   52-year-old male with long-standing carpal tunnel syndrome left hand and nerve conduction studies from 2016 confirming moderate to severe carpal tunnel syndrome left hand.  Discussed his options today and he wishes to proceed with carpal tunnel release left hand.    ICD-10-CM ICD-9-CM   1. Carpal tunnel syndrome, left G56.02 354.0       Plan:   Carpal tunnel release left hand scheduled [facility].    This document was signed by Yoel Lua MD.  10/30/82519:15 PM    Cc: Shamar Levine MD

## 2017-11-09 NOTE — PLAN OF CARE
Problem: Patient Care Overview (Adult)  Goal: Discharge Needs Assessment  Outcome: Ongoing (interventions implemented as appropriate)    11/09/17 4849   Discharge Needs Assessment   Concerns To Be Addressed no discharge needs identified   Readmission Within The Last 30 Days no previous admission in last 30 days

## 2017-11-09 NOTE — BRIEF OP NOTE
CARPAL TUNNEL RELEASE  Progress Note    Avery JACKSON Walter  11/9/2017    Pre-op Diagnosis:   Carpal tunnel syndrome, left [G56.02]       Post-Op Diagnosis Codes:     * Carpal tunnel syndrome, left [G56.02]    Procedure/CPT® Codes:      Procedure(s):  CARPAL TUNNEL RELEASE LEFT    Surgeon(s):  Yoel Lua MD    Anesthesia: Monitor Anesthesia Care with LOCAL    Staff:   Circulator: sOwald Gresham RN  Scrub Person: Carrie Wagoner  Assistant: Lisa Weiner    Estimated Blood Loss:     Urine Voided: * No values recorded between 11/9/2017 10:08 AM and 11/9/2017 10:50 AM *    Specimens:                      Drains:           Findings:     Complications:       Yoel Lua MD     Date: 11/9/2017  Time: 10:51 AM

## 2017-11-09 NOTE — PLAN OF CARE
Problem: Patient Care Overview (Adult)  Goal: Plan of Care Review  Outcome: Ongoing (interventions implemented as appropriate)    11/09/17 0825   Coping/Psychosocial Response Interventions   Plan Of Care Reviewed With patient   Patient Care Overview   Progress progress toward functional goals as expected

## 2017-11-09 NOTE — ANESTHESIA POSTPROCEDURE EVALUATION
Patient: Avery Watson    Procedure Summary     Date Anesthesia Start Anesthesia Stop Room / Location    11/09/17 1009 1050 [facility] OR 03 / [facility]       Procedure Diagnosis Surgeon Provider    CARPAL TUNNEL RELEASE (Left Wrist) Carpal tunnel syndrome, left  (Carpal tunnel syndrome, left [G56.02]) MD Harmeet Baer MD          Anesthesia Type: MAC  Last vitals  BP   118/82 (11/09/17 1105)   Temp   98.1 °F (36.7 °C) (11/09/17 1051)   Pulse   70 (11/09/17 1105)   Resp   16 (11/09/17 1105)     SpO2   96 % (11/09/17 1105)     Post Anesthesia Care and Evaluation    Patient location during evaluation: bedside  Patient participation: complete - patient participated  Level of consciousness: awake and alert  Pain score: 1  Pain management: adequate  Airway patency: patent  Anesthetic complications: No anesthetic complications  PONV Status: none  Cardiovascular status: acceptable  Respiratory status: acceptable  Hydration status: acceptable

## 2017-11-09 NOTE — ANESTHESIA PREPROCEDURE EVALUATION
Anesthesia Evaluation     Patient summary reviewed and Nursing notes reviewed   history of anesthetic complications:  NPO Solid Status: > 8 hours  NPO Liquid Status: > 8 hours     Airway   Mallampati: I  TM distance: >3 FB  Neck ROM: full  no difficulty expected  Dental - normal exam     Pulmonary - negative pulmonary ROS and normal exam   (-) asthma, not a smoker  Cardiovascular - negative cardio ROS and normal exam  Exercise tolerance: good (4-7 METS)    NYHA Classification: I    (-) hypertension, past MI, dysrhythmias, angina, CHF, hyperlipidemia      Neuro/Psych  (+) headaches, numbness, psychiatric history Depression,    (-) seizures, CVA  GI/Hepatic/Renal/Endo    (+) obesity,  GERD,   (-) liver disease, no renal disease, diabetes, hypothyroidism    Musculoskeletal     (+) back pain, neck pain,   Abdominal  - normal exam    Bowel sounds: normal.   Substance History - negative use     OB/GYN negative ob/gyn ROS   (-) Preeclampsia        Other   (+) arthritis                                     Anesthesia Plan    ASA 2     MAC     intravenous induction   Anesthetic plan and risks discussed with patient and spouse/significant other.  Use of blood products discussed with patient and spouse/significant other  Consented to blood products.

## 2017-11-15 ENCOUNTER — OFFICE VISIT (OUTPATIENT)
Dept: ORTHOPEDIC SURGERY | Facility: CLINIC | Age: 52
End: 2017-11-15

## 2017-11-15 VITALS — BODY MASS INDEX: 30.48 KG/M2 | HEIGHT: 72 IN | WEIGHT: 225 LBS

## 2017-11-15 DIAGNOSIS — G56.02 CARPAL TUNNEL SYNDROME, LEFT: Primary | ICD-10-CM

## 2017-11-15 PROCEDURE — 99024 POSTOP FOLLOW-UP VISIT: CPT | Performed by: ORTHOPAEDIC SURGERY

## 2017-11-15 NOTE — PROGRESS NOTES
"Postoperative Follow Up Visit    Avery Watson   1965  11/15/2017  Date of Surgery: November 9, 2017.    Subjective: Avery Watson is a 52 y.o. male Follow-up status post left Carpal tunnel release. Patient returns to clinic today now 1 week out from surgery.  Doing well with wrist pain controlled. Improvement of overall numbness and tingling. Denies any other complications.     Objective:  Physical Exam: 52 y.o. male .  General Appearance:    Well appearing, cooperative, in no acute distress.       Patient is alert and oriented x 3.       Vitals:    11/15/17 1459   Weight: 225 lb (102 kg)   Height: 72\" (182.9 cm)            Musculoskeletal: left wrist- incision clean, dry, intact, healing well. Mild resolving ecchymosis no erythema or significant effusion Flexs and extends wrist with no complication. Full range of motion of the digits. Capillary refill intact. No tenderness upon palpation. Neurovascular status grossly intact     Assessment:       ICD-10-CM ICD-9-CM   1. Carpal tunnel syndrome, left G56.02 354.0       Plan   1.Progress activity as tolerated, return to work full duty no restrictions   2. Removal of Sutures Monday in office.    Discussion/Summary:    Written by Alvaro Thompson PA-C, acting as scribe for Dr. Lua      CC: Shamar Levine MD    "

## 2017-11-20 ENCOUNTER — OFFICE VISIT (OUTPATIENT)
Dept: ORTHOPEDIC SURGERY | Facility: CLINIC | Age: 52
End: 2017-11-20

## 2017-11-20 VITALS — WEIGHT: 225 LBS | HEIGHT: 72 IN | BODY MASS INDEX: 30.48 KG/M2

## 2017-11-20 DIAGNOSIS — G56.02 CARPAL TUNNEL SYNDROME, LEFT: Primary | ICD-10-CM

## 2017-11-20 PROCEDURE — 99024 POSTOP FOLLOW-UP VISIT: CPT | Performed by: ORTHOPAEDIC SURGERY

## 2017-11-20 NOTE — PROGRESS NOTES
"Avery Watson   :1965    Date of encounter:2017        HPI:  Avery Watson is a 52 y.o. male seen back in follow-up left carpal tunnel release.  He is 10 days postop and doing well.  His sensation is already improved.    PMH:   Patient Active Problem List   Diagnosis   • Benign prostatic hyperplasia with urinary obstruction   • Fatigue   • Multilevel degenerative disc disease   • Migraine   • Shoulder pain   • Lumbar facet arthropathy/lumbar spondylosis without myelopathy   • DDD (degenerative disc disease), lumbar   • Myofascial pain   • Bilateral carpal tunnel syndrome   • Displacement of intervertebral disc of mid-cervical region   • Neuroforaminal stenosis of spine, right C6-C7   • Carpal tunnel syndrome, left       Exam:  General Appearance:    52 y.o. male  cooperative, in no acute distress.  Alert and oriented x 3,     Left hand evaluation shows incision to be intact.  Neurovascular is grossly intact.                  Vitals:    17 1308   Weight: 225 lb (102 kg)   Height: 72\" (182.9 cm)            Assessment:   52-year-old male doing well with left carpal tunnel release.  He is instructed to return in 6 weeks if he is having symptoms otherwise he may follow up as needed.    ICD-10-CM ICD-9-CM   1. Carpal tunnel syndrome, left G56.02 354.0       Plan: Follow up in 6 weeks.     This document was signed by Yoel Lua MD.  20172:07 PM    Cc: Shamar Levine MD                             "

## 2018-03-26 ENCOUNTER — TRANSCRIBE ORDERS (OUTPATIENT)
Dept: ADMINISTRATIVE | Facility: HOSPITAL | Age: 53
End: 2018-03-26

## 2018-03-26 ENCOUNTER — HOSPITAL ENCOUNTER (OUTPATIENT)
Dept: GENERAL RADIOLOGY | Facility: HOSPITAL | Age: 53
Discharge: HOME | End: 2018-03-26
Attending: FAMILY MEDICINE | Admitting: FAMILY MEDICINE

## 2018-03-26 DIAGNOSIS — M54.16 LUMBAR RADICULOPATHY: Primary | ICD-10-CM

## 2018-03-26 DIAGNOSIS — M54.16 LUMBAR RADICULOPATHY: ICD-10-CM

## 2018-03-26 PROCEDURE — 72110 X-RAY EXAM L-2 SPINE 4/>VWS: CPT | Performed by: RADIOLOGY

## 2018-03-26 PROCEDURE — 72110 X-RAY EXAM L-2 SPINE 4/>VWS: CPT

## 2018-05-03 RX ORDER — ESCITALOPRAM OXALATE 10 MG/1
10 TABLET ORAL DAILY
Qty: 30 TABLET | Refills: 2 | Status: CANCELLED | OUTPATIENT
Start: 2018-05-03

## 2018-05-28 ENCOUNTER — APPOINTMENT (OUTPATIENT)
Dept: LAB | Facility: HOSPITAL | Age: 53
End: 2018-05-28

## 2018-05-28 ENCOUNTER — LAB (OUTPATIENT)
Dept: LAB | Facility: HOSPITAL | Age: 53
End: 2018-05-28
Attending: FAMILY MEDICINE

## 2018-05-28 ENCOUNTER — TRANSCRIBE ORDERS (OUTPATIENT)
Dept: ADMINISTRATIVE | Facility: HOSPITAL | Age: 53
End: 2018-05-28

## 2018-05-28 DIAGNOSIS — F42.9 OBSESSIVE-COMPULSIVE DISORDER, UNSPECIFIED TYPE: Primary | ICD-10-CM

## 2018-05-28 DIAGNOSIS — F42.9 OBSESSIVE-COMPULSIVE DISORDER, UNSPECIFIED TYPE: ICD-10-CM

## 2018-05-28 LAB
ALBUMIN SERPL-MCNC: 4.5 G/DL (ref 3.5–5)
ALBUMIN/GLOB SERPL: 1.5 G/DL (ref 1.5–2.5)
ALP SERPL-CCNC: 74 U/L (ref 40–129)
ALT SERPL W P-5'-P-CCNC: 35 U/L (ref 10–44)
ANION GAP SERPL CALCULATED.3IONS-SCNC: 1.5 MMOL/L (ref 3.6–11.2)
AST SERPL-CCNC: 21 U/L (ref 10–34)
BILIRUB SERPL-MCNC: 0.6 MG/DL (ref 0.2–1.8)
BUN BLD-MCNC: 16 MG/DL (ref 7–21)
BUN/CREAT SERPL: 16.5 (ref 7–25)
CALCIUM SPEC-SCNC: 9 MG/DL (ref 7.7–10)
CHLORIDE SERPL-SCNC: 107 MMOL/L (ref 99–112)
CK SERPL-CCNC: 116 U/L (ref 24–204)
CO2 SERPL-SCNC: 30.5 MMOL/L (ref 24.3–31.9)
CREAT BLD-MCNC: 0.97 MG/DL (ref 0.43–1.29)
CRP SERPL-MCNC: <0.5 MG/DL (ref 0–0.99)
DEPRECATED RDW RBC AUTO: 42.7 FL (ref 37–54)
ERYTHROCYTE [DISTWIDTH] IN BLOOD BY AUTOMATED COUNT: 14 % (ref 11.5–14.5)
GFR SERPL CREATININE-BSD FRML MDRD: 81 ML/MIN/1.73
GLOBULIN UR ELPH-MCNC: 3.1 GM/DL
GLUCOSE BLD-MCNC: 87 MG/DL (ref 70–110)
HCT VFR BLD AUTO: 46.6 % (ref 42–52)
HGB BLD-MCNC: 15.8 G/DL (ref 14–18)
MCH RBC QN AUTO: 28.6 PG (ref 27–33)
MCHC RBC AUTO-ENTMCNC: 33.9 G/DL (ref 33–37)
MCV RBC AUTO: 84.4 FL (ref 80–94)
OSMOLALITY SERPL CALC.SUM OF ELEC: 278.1 MOSM/KG (ref 273–305)
PLATELET # BLD AUTO: 223 10*3/MM3 (ref 130–400)
PMV BLD AUTO: 9.1 FL (ref 6–10)
POTASSIUM BLD-SCNC: 3.8 MMOL/L (ref 3.5–5.3)
PROT SERPL-MCNC: 7.6 G/DL (ref 6–8)
RBC # BLD AUTO: 5.52 10*6/MM3 (ref 4.7–6.1)
SODIUM BLD-SCNC: 139 MMOL/L (ref 135–153)
WBC NRBC COR # BLD: 9.39 10*3/MM3 (ref 4.5–12.5)

## 2018-05-28 PROCEDURE — 36415 COLL VENOUS BLD VENIPUNCTURE: CPT

## 2018-05-28 PROCEDURE — 85027 COMPLETE CBC AUTOMATED: CPT

## 2018-05-28 PROCEDURE — 86140 C-REACTIVE PROTEIN: CPT

## 2018-05-28 PROCEDURE — 82550 ASSAY OF CK (CPK): CPT

## 2018-05-28 PROCEDURE — 80053 COMPREHEN METABOLIC PANEL: CPT

## 2018-06-11 ENCOUNTER — HOSPITAL ENCOUNTER (OUTPATIENT)
Dept: GENERAL RADIOLOGY | Facility: HOSPITAL | Age: 53
Discharge: HOME | End: 2018-06-11
Attending: FAMILY MEDICINE | Admitting: FAMILY MEDICINE

## 2018-06-11 ENCOUNTER — TRANSCRIBE ORDERS (OUTPATIENT)
Dept: ADMINISTRATIVE | Facility: HOSPITAL | Age: 53
End: 2018-06-11

## 2018-06-11 DIAGNOSIS — R53.83 FATIGUE, UNSPECIFIED TYPE: ICD-10-CM

## 2018-06-11 DIAGNOSIS — R53.83 FATIGUE, UNSPECIFIED TYPE: Primary | ICD-10-CM

## 2018-06-11 PROCEDURE — 71046 X-RAY EXAM CHEST 2 VIEWS: CPT | Performed by: RADIOLOGY

## 2018-06-11 PROCEDURE — 71046 X-RAY EXAM CHEST 2 VIEWS: CPT

## 2018-06-14 ENCOUNTER — OFFICE VISIT (OUTPATIENT)
Dept: UROLOGY | Facility: CLINIC | Age: 53
End: 2018-06-14

## 2018-06-14 VITALS
HEIGHT: 72 IN | HEART RATE: 87 BPM | WEIGHT: 230 LBS | BODY MASS INDEX: 31.15 KG/M2 | DIASTOLIC BLOOD PRESSURE: 84 MMHG | SYSTOLIC BLOOD PRESSURE: 137 MMHG

## 2018-06-14 DIAGNOSIS — R35.0 FREQUENCY OF URINATION: Primary | ICD-10-CM

## 2018-06-14 DIAGNOSIS — N52.9 ERECTILE DYSFUNCTION, UNSPECIFIED ERECTILE DYSFUNCTION TYPE: ICD-10-CM

## 2018-06-14 DIAGNOSIS — N42.9 DISORDER OF PROSTATE: ICD-10-CM

## 2018-06-14 LAB
BILIRUB BLD-MCNC: NEGATIVE MG/DL
CLARITY, POC: CLEAR
COLOR UR: YELLOW
GLUCOSE UR STRIP-MCNC: NEGATIVE MG/DL
KETONES UR QL: NEGATIVE
LEUKOCYTE EST, POC: NEGATIVE
NITRITE UR-MCNC: NEGATIVE MG/ML
PH UR: 5.5 [PH] (ref 5–8)
PROT UR STRIP-MCNC: NEGATIVE MG/DL
PSA SERPL-MCNC: 1.67 NG/ML (ref 0–4)
RBC # UR STRIP: NEGATIVE /UL
SP GR UR: 1 (ref 1–1.03)
UROBILINOGEN UR QL: NORMAL

## 2018-06-14 PROCEDURE — 84153 ASSAY OF PSA TOTAL: CPT | Performed by: UROLOGY

## 2018-06-14 PROCEDURE — 99214 OFFICE O/P EST MOD 30 MIN: CPT | Performed by: UROLOGY

## 2018-06-14 PROCEDURE — 36415 COLL VENOUS BLD VENIPUNCTURE: CPT | Performed by: UROLOGY

## 2018-06-14 PROCEDURE — 81002 URINALYSIS NONAUTO W/O SCOPE: CPT | Performed by: UROLOGY

## 2018-06-14 PROCEDURE — 99406 BEHAV CHNG SMOKING 3-10 MIN: CPT | Performed by: UROLOGY

## 2018-06-14 RX ORDER — VENLAFAXINE 37.5 MG/1
37.5 TABLET ORAL 2 TIMES DAILY
COMMUNITY
End: 2020-08-17

## 2018-06-14 RX ORDER — SILDENAFIL CITRATE 20 MG/1
20 TABLET ORAL 3 TIMES DAILY
Qty: 50 TABLET | Refills: 11 | Status: SHIPPED | OUTPATIENT
Start: 2018-06-14 | End: 2020-08-17

## 2018-06-14 NOTE — PROGRESS NOTES
Chief Complaint:          Prostate check up.    HPI:   52 y.o. male.  Pt voids well.  Pt does have some problem with early detumesence.  Pt does not take any nitrogycerin.  He does not have any cardiac history.  HPI      Past Medical History:        Past Medical History:   Diagnosis Date   • Acid reflux    • Chronic pain disorder    • Extremity pain    • Joint pain    • Low back pain    • Lumbosacral disc disease    • Migraine    • Muscle spasm    • Neck pain    • Osteoarthritis    • PONV (postoperative nausea and vomiting)          Current Meds:     Current Outpatient Prescriptions   Medication Sig Dispense Refill   • celecoxib (CELEBREX) 200 MG capsule Take 1 capsule by mouth Daily. 30 capsule 3   • dexlansoprazole (DEXILANT) 60 MG capsule 1 capsule by mouth daily 30 capsule 5   • escitalopram (LEXAPRO) 10 MG tablet Take 1 tablet by mouth Daily. 30 tablet 3   • tamsulosin (FLOMAX) 0.4 MG capsule 24 hr capsule Take 1 capsule by mouth daily. 30 capsule 11   • venlafaxine (EFFEXOR) 37.5 MG tablet Take 37.5 mg by mouth 2 (Two) Times a Day.     • Blood Glucose Monitoring Suppl (FREESTYLE LITE) device Use as directed. 1 each 0   • doxycycline (MONODOX) 100 MG capsule Take 1 capsule by mouth Daily. 30 capsule 0   • glucose blood test strip Use to test once daily as directed. 100 each 0   • Lancets (FREESTYLE) lancets Use to test once daily as directed. 100 each 0   • MethylPREDNISolone (MEDROL, JUANA,) 4 MG tablet Take by mouth as directed on package 21 tablet 0   • oxyCODONE-acetaminophen (PERCOCET) 5-325 MG per tablet Take 1-2 tablets by mouth Every 4 (Four) Hours As Needed (Pain). 20 tablet 0   • pantoprazole (PROTONIX) 40 MG EC tablet Take 1 tablet by mouth 2 (Two) Times a Day 15 minutes Before breakfast and Supper. 180 tablet 3   • rifaximin (XIFAXAN) 550 MG tablet Take 1 tablet by mouth 3 (Three) Times a Day. 42 tablet 0   • tamsulosin (FLOMAX) 0.4 MG capsule 24 hr capsule Take 2 capsules by mouth daily 60 capsule 6    • tamsulosin (FLOMAX) 0.4 MG capsule 24 hr capsule Take 2 capsules by mouth daily 60 capsule 6   • venlafaxine XR (EFFEXOR-XR) 37.5 MG 24 hr capsule Take 1 capsule by mouth Daily. 30 capsule 3     No current facility-administered medications for this visit.         Allergies:      No Known Allergies     Past Surgical History:     Past Surgical History:   Procedure Laterality Date   • CARPAL TUNNEL RELEASE Right 8/4/2016    Procedure: CARPAL TUNNEL RELEASE;  Surgeon: Yoel Lua MD;  Location: [facility] OR;  Service:    • CARPAL TUNNEL RELEASE Left 11/9/2017    Procedure: CARPAL TUNNEL RELEASE;  Surgeon: Yoel Lua MD;  Location: [facility] OR;  Service:    • COLONOSCOPY     • LUMBAR FACET INJECTION     • TOE SURGERY Right 03/04/2015         Social History:     Social History     Social History   • Marital status:      Spouse name: N/A   • Number of children: N/A   • Years of education: N/A     Occupational History   • Not on file.     Social History Main Topics   • Smoking status: Never Smoker   • Smokeless tobacco: Current User     Types: Chew      Comment: uses chewing tobacco   • Alcohol use No   • Drug use: No   • Sexual activity: Defer     Other Topics Concern   • Not on file     Social History Narrative    ** Merged History Encounter **            Family History:     Family History   Problem Relation Age of Onset   • Lung cancer Mother    • Cancer Mother         ovarian   • Diabetes Sister    • Rheum arthritis Brother    • Cancer Sister         breast       Review of Systems:     Review of Systems   Constitutional: Positive for fatigue.   HENT: Negative for sinus pain.    Eyes: Positive for pain.   Respiratory: Negative for chest tightness.    Cardiovascular: Positive for chest pain.   Gastrointestinal: Negative for abdominal pain and constipation.   Endocrine: Negative for heat intolerance.   Genitourinary: Negative for frequency.   Musculoskeletal: Negative for back pain.   Skin:  "Negative for rash.   Allergic/Immunologic: Negative for food allergies.   Neurological: Positive for headaches.   Hematological: Bruises/bleeds easily.   Psychiatric/Behavioral: The patient is nervous/anxious.          I have reviewed the follow portions of the patient's history and confirmed they are accurate today:  allergies, current medications, past family history, past medical history, past social history, past surgical history, problem list and ROS  Physical Exam:     Physical Exam    /84   Pulse 87   Ht 182.9 cm (72\")   Wt 104 kg (230 lb)   BMI 31.19 kg/m²    Procedure:         Assessment:     Encounter Diagnosis   Name Primary?   • Frequency of urination Yes     Orders Placed This Encounter   Procedures   • POC Urinalysis Dipstick       Plan:   Discussed psa and sildenafil    Patient's Body mass index is 31.19 kg/m². BMI is within normal parameters. No follow-up required.    I advised Avery of the risks of continuing to use tobacco, and I provided him with tobacco cessation educational materials in the After Visit Summary.     During this visit, I spent 10 minutes counseling the patient regarding tobacco cessation.  Counseling was given to patient for the following topics risks and benefits of treatment options including: viagra and chewing tobacco. The interim medical history and current results were reviewed.  A treatment plan with follow-up was made for Frequency of urination [R35.0].  This document has been electronically signed by Cristobal Cortes MD June 14, 2018 3:26 PM  "

## 2018-06-20 ENCOUNTER — TRANSCRIBE ORDERS (OUTPATIENT)
Dept: ADMINISTRATIVE | Facility: HOSPITAL | Age: 53
End: 2018-06-20

## 2018-06-20 DIAGNOSIS — R07.9 CHEST PAIN, UNSPECIFIED TYPE: Primary | ICD-10-CM

## 2018-07-16 ENCOUNTER — HOSPITAL ENCOUNTER (OUTPATIENT)
Dept: NUCLEAR MEDICINE | Facility: HOSPITAL | Age: 53
Discharge: HOME | End: 2018-07-16
Attending: FAMILY MEDICINE

## 2018-07-16 ENCOUNTER — HOSPITAL ENCOUNTER (OUTPATIENT)
Dept: CARDIOLOGY | Facility: HOSPITAL | Age: 53
Discharge: HOME | End: 2018-07-16
Attending: FAMILY MEDICINE

## 2018-07-16 DIAGNOSIS — R07.9 CHEST PAIN, UNSPECIFIED TYPE: ICD-10-CM

## 2018-07-16 LAB
BH CV NUCLEAR PRIOR STUDY: 3
BH CV STRESS BP STAGE 1: NORMAL
BH CV STRESS BP STAGE 2: NORMAL
BH CV STRESS COMMENTS STAGE 1: NORMAL
BH CV STRESS COMMENTS STAGE 2: NORMAL
BH CV STRESS DOSE REGADENOSON STAGE 1: 0.4
BH CV STRESS DURATION MIN STAGE 1: 0
BH CV STRESS DURATION MIN STAGE 2: 4
BH CV STRESS DURATION SEC STAGE 1: 10
BH CV STRESS DURATION SEC STAGE 2: 0
BH CV STRESS HR STAGE 1: 78
BH CV STRESS HR STAGE 2: 79
BH CV STRESS PROTOCOL 1: NORMAL
BH CV STRESS RECOVERY BP: NORMAL MMHG
BH CV STRESS RECOVERY HR: 68 BPM
BH CV STRESS STAGE 1: 1
BH CV STRESS STAGE 2: 2
LV EF NUC BP: 60 %
MAXIMAL PREDICTED HEART RATE: 168 BPM
PERCENT MAX PREDICTED HR: 48.81 %
STRESS BASELINE BP: NORMAL MMHG
STRESS BASELINE HR: 58 BPM
STRESS PERCENT HR: 57 %
STRESS POST PEAK BP: NORMAL MMHG
STRESS POST PEAK HR: 82 BPM
STRESS TARGET HR: 143 BPM

## 2018-07-16 PROCEDURE — 78452 HT MUSCLE IMAGE SPECT MULT: CPT | Performed by: INTERNAL MEDICINE

## 2018-07-16 PROCEDURE — 93018 CV STRESS TEST I&R ONLY: CPT | Performed by: INTERNAL MEDICINE

## 2018-07-16 PROCEDURE — A9500 TC99M SESTAMIBI: HCPCS | Performed by: FAMILY MEDICINE

## 2018-07-16 PROCEDURE — 0 TECHNETIUM SESTAMIBI: Performed by: FAMILY MEDICINE

## 2018-07-16 PROCEDURE — 93017 CV STRESS TEST TRACING ONLY: CPT

## 2018-07-16 PROCEDURE — 25010000002 REGADENOSON 0.4 MG/5ML SOLUTION: Performed by: FAMILY MEDICINE

## 2018-07-16 PROCEDURE — 78452 HT MUSCLE IMAGE SPECT MULT: CPT

## 2018-07-16 RX ADMIN — REGADENOSON 0.4 MG: 0.08 INJECTION, SOLUTION INTRAVENOUS at 09:30

## 2018-07-16 RX ADMIN — TECHNETIUM TC 99M SESTAMIBI 1 DOSE: 1 INJECTION INTRAVENOUS at 09:30

## 2018-07-16 RX ADMIN — TECHNETIUM TC 99M SESTAMIBI 1 DOSE: 1 INJECTION INTRAVENOUS at 08:15

## 2018-12-01 ENCOUNTER — TRANSCRIBE ORDERS (OUTPATIENT)
Dept: ADMINISTRATIVE | Facility: HOSPITAL | Age: 53
End: 2018-12-01

## 2018-12-01 ENCOUNTER — APPOINTMENT (OUTPATIENT)
Dept: LAB | Facility: HOSPITAL | Age: 53
End: 2018-12-01

## 2018-12-01 DIAGNOSIS — Z13.220 SCREENING FOR LIPOID DISORDERS: ICD-10-CM

## 2018-12-01 DIAGNOSIS — Z13.1 SCREENING FOR DIABETES MELLITUS: Primary | ICD-10-CM

## 2018-12-01 DIAGNOSIS — Z13.9 SCREENING FOR CONDITION: ICD-10-CM

## 2018-12-01 LAB
ALBUMIN SERPL-MCNC: 4.4 G/DL (ref 3.5–5)
ALBUMIN/GLOB SERPL: 1.6 G/DL (ref 1.5–2.5)
ALP SERPL-CCNC: 89 U/L (ref 40–129)
ALT SERPL W P-5'-P-CCNC: 37 U/L (ref 10–44)
ANION GAP SERPL CALCULATED.3IONS-SCNC: 5.2 MMOL/L (ref 3.6–11.2)
AST SERPL-CCNC: 22 U/L (ref 10–34)
BASOPHILS # BLD AUTO: 0.02 10*3/MM3 (ref 0–0.3)
BASOPHILS NFR BLD AUTO: 0.3 % (ref 0–2)
BILIRUB SERPL-MCNC: 0.5 MG/DL (ref 0.2–1.8)
BUN BLD-MCNC: 15 MG/DL (ref 7–21)
BUN/CREAT SERPL: 15.8 (ref 7–25)
CALCIUM SPEC-SCNC: 9.2 MG/DL (ref 7.7–10)
CHLORIDE SERPL-SCNC: 105 MMOL/L (ref 99–112)
CHOLEST SERPL-MCNC: 217 MG/DL (ref 0–200)
CO2 SERPL-SCNC: 27.8 MMOL/L (ref 24.3–31.9)
CREAT BLD-MCNC: 0.95 MG/DL (ref 0.43–1.29)
DEPRECATED RDW RBC AUTO: 40.1 FL (ref 37–54)
EOSINOPHIL # BLD AUTO: 0.2 10*3/MM3 (ref 0–0.7)
EOSINOPHIL NFR BLD AUTO: 3.4 % (ref 0–5)
ERYTHROCYTE [DISTWIDTH] IN BLOOD BY AUTOMATED COUNT: 13 % (ref 11.5–14.5)
GFR SERPL CREATININE-BSD FRML MDRD: 83 ML/MIN/1.73
GLOBULIN UR ELPH-MCNC: 2.8 GM/DL
GLUCOSE BLD-MCNC: 95 MG/DL (ref 70–110)
HCT VFR BLD AUTO: 45 % (ref 42–52)
HDLC SERPL-MCNC: 34 MG/DL (ref 60–100)
HGB BLD-MCNC: 15.3 G/DL (ref 14–18)
IMM GRANULOCYTES # BLD: 0.02 10*3/MM3 (ref 0–0.03)
IMM GRANULOCYTES NFR BLD: 0.3 % (ref 0–0.5)
LDLC SERPL CALC-MCNC: 130 MG/DL (ref 0–100)
LDLC/HDLC SERPL: 3.82 {RATIO}
LYMPHOCYTES # BLD AUTO: 1.9 10*3/MM3 (ref 1–3)
LYMPHOCYTES NFR BLD AUTO: 32.3 % (ref 21–51)
MCH RBC QN AUTO: 28.9 PG (ref 27–33)
MCHC RBC AUTO-ENTMCNC: 34 G/DL (ref 33–37)
MCV RBC AUTO: 85.1 FL (ref 80–94)
MONOCYTES # BLD AUTO: 0.51 10*3/MM3 (ref 0.1–0.9)
MONOCYTES NFR BLD AUTO: 8.7 % (ref 0–10)
NEUTROPHILS # BLD AUTO: 3.24 10*3/MM3 (ref 1.4–6.5)
NEUTROPHILS NFR BLD AUTO: 55 % (ref 30–70)
OSMOLALITY SERPL CALC.SUM OF ELEC: 276.3 MOSM/KG (ref 273–305)
PLATELET # BLD AUTO: 209 10*3/MM3 (ref 130–400)
PMV BLD AUTO: 9.1 FL (ref 6–10)
POTASSIUM BLD-SCNC: 4 MMOL/L (ref 3.5–5.3)
PROT SERPL-MCNC: 7.2 G/DL (ref 6–8)
RBC # BLD AUTO: 5.29 10*6/MM3 (ref 4.7–6.1)
SODIUM BLD-SCNC: 138 MMOL/L (ref 135–153)
TRIGL SERPL-MCNC: 266 MG/DL (ref 0–150)
VLDLC SERPL-MCNC: 53.2 MG/DL
WBC NRBC COR # BLD: 5.89 10*3/MM3 (ref 4.5–12.5)

## 2018-12-01 PROCEDURE — 80053 COMPREHEN METABOLIC PANEL: CPT | Performed by: PHYSICIAN ASSISTANT

## 2018-12-01 PROCEDURE — 85025 COMPLETE CBC W/AUTO DIFF WBC: CPT | Performed by: PHYSICIAN ASSISTANT

## 2018-12-01 PROCEDURE — 36415 COLL VENOUS BLD VENIPUNCTURE: CPT | Performed by: PHYSICIAN ASSISTANT

## 2018-12-01 PROCEDURE — 80061 LIPID PANEL: CPT | Performed by: PHYSICIAN ASSISTANT

## 2019-01-11 ENCOUNTER — TRANSCRIBE ORDERS (OUTPATIENT)
Dept: ADMINISTRATIVE | Facility: HOSPITAL | Age: 54
End: 2019-01-11

## 2019-01-11 ENCOUNTER — LAB (OUTPATIENT)
Dept: LAB | Facility: HOSPITAL | Age: 54
End: 2019-01-11

## 2019-01-11 DIAGNOSIS — R10.30 LOWER ABDOMINAL PAIN: ICD-10-CM

## 2019-01-11 DIAGNOSIS — R10.9 ABDOMINAL PAIN, UNSPECIFIED ABDOMINAL LOCATION: Primary | ICD-10-CM

## 2019-01-11 DIAGNOSIS — R10.30 LOWER ABDOMINAL PAIN: Primary | ICD-10-CM

## 2019-01-11 LAB
ALBUMIN SERPL-MCNC: 4.7 G/DL (ref 3.5–5)
ALBUMIN/GLOB SERPL: 1.8 G/DL (ref 1.5–2.5)
ALP SERPL-CCNC: 79 U/L (ref 40–129)
ALT SERPL W P-5'-P-CCNC: 31 U/L (ref 10–44)
ANION GAP SERPL CALCULATED.3IONS-SCNC: 8.1 MMOL/L (ref 3.6–11.2)
AST SERPL-CCNC: 24 U/L (ref 10–34)
BASOPHILS # BLD AUTO: 0.02 10*3/MM3 (ref 0–0.3)
BASOPHILS NFR BLD AUTO: 0.4 % (ref 0–2)
BILIRUB SERPL-MCNC: 0.7 MG/DL (ref 0.2–1.8)
BUN BLD-MCNC: 15 MG/DL (ref 7–21)
BUN/CREAT SERPL: 14.7 (ref 7–25)
CALCIUM SPEC-SCNC: 9.3 MG/DL (ref 7.7–10)
CHLORIDE SERPL-SCNC: 103 MMOL/L (ref 99–112)
CO2 SERPL-SCNC: 27.9 MMOL/L (ref 24.3–31.9)
CREAT BLD-MCNC: 1.02 MG/DL (ref 0.43–1.29)
CRP SERPL-MCNC: <0.5 MG/DL (ref 0–0.99)
DEPRECATED RDW RBC AUTO: 40.4 FL (ref 37–54)
EOSINOPHIL # BLD AUTO: 0.17 10*3/MM3 (ref 0–0.7)
EOSINOPHIL NFR BLD AUTO: 3.1 % (ref 0–5)
ERYTHROCYTE [DISTWIDTH] IN BLOOD BY AUTOMATED COUNT: 13.1 % (ref 11.5–14.5)
GFR SERPL CREATININE-BSD FRML MDRD: 76 ML/MIN/1.73
GLOBULIN UR ELPH-MCNC: 2.6 GM/DL
GLUCOSE BLD-MCNC: 109 MG/DL (ref 70–110)
HCT VFR BLD AUTO: 44.7 % (ref 42–52)
HGB BLD-MCNC: 15.3 G/DL (ref 14–18)
IMM GRANULOCYTES # BLD AUTO: 0.01 10*3/MM3 (ref 0–0.03)
IMM GRANULOCYTES NFR BLD AUTO: 0.2 % (ref 0–0.5)
LYMPHOCYTES # BLD AUTO: 1.91 10*3/MM3 (ref 1–3)
LYMPHOCYTES NFR BLD AUTO: 35.1 % (ref 21–51)
MCH RBC QN AUTO: 28.9 PG (ref 27–33)
MCHC RBC AUTO-ENTMCNC: 34.2 G/DL (ref 33–37)
MCV RBC AUTO: 84.5 FL (ref 80–94)
MONOCYTES # BLD AUTO: 0.31 10*3/MM3 (ref 0.1–0.9)
MONOCYTES NFR BLD AUTO: 5.7 % (ref 0–10)
NEUTROPHILS # BLD AUTO: 3.02 10*3/MM3 (ref 1.4–6.5)
NEUTROPHILS NFR BLD AUTO: 55.5 % (ref 30–70)
OSMOLALITY SERPL CALC.SUM OF ELEC: 279 MOSM/KG (ref 273–305)
PLATELET # BLD AUTO: 188 10*3/MM3 (ref 130–400)
PMV BLD AUTO: 9.1 FL (ref 6–10)
POTASSIUM BLD-SCNC: 3.8 MMOL/L (ref 3.5–5.3)
PROT SERPL-MCNC: 7.3 G/DL (ref 6–8)
RBC # BLD AUTO: 5.29 10*6/MM3 (ref 4.7–6.1)
SODIUM BLD-SCNC: 139 MMOL/L (ref 135–153)
WBC NRBC COR # BLD: 5.44 10*3/MM3 (ref 4.5–12.5)

## 2019-01-11 PROCEDURE — 36415 COLL VENOUS BLD VENIPUNCTURE: CPT

## 2019-01-11 PROCEDURE — 80053 COMPREHEN METABOLIC PANEL: CPT

## 2019-01-11 PROCEDURE — 85025 COMPLETE CBC W/AUTO DIFF WBC: CPT

## 2019-01-11 PROCEDURE — 86140 C-REACTIVE PROTEIN: CPT

## 2019-01-21 ENCOUNTER — HOSPITAL ENCOUNTER (OUTPATIENT)
Dept: CT IMAGING | Facility: HOSPITAL | Age: 54
Discharge: HOME | End: 2019-01-21
Attending: INTERNAL MEDICINE | Admitting: INTERNAL MEDICINE

## 2019-01-21 DIAGNOSIS — R10.9 ABDOMINAL PAIN, UNSPECIFIED ABDOMINAL LOCATION: ICD-10-CM

## 2019-01-21 PROCEDURE — 74177 CT ABD & PELVIS W/CONTRAST: CPT | Performed by: RADIOLOGY

## 2019-01-21 PROCEDURE — 25010000002 IOPAMIDOL 61 % SOLUTION: Performed by: INTERNAL MEDICINE

## 2019-01-21 PROCEDURE — 74177 CT ABD & PELVIS W/CONTRAST: CPT

## 2019-01-21 RX ADMIN — IOPAMIDOL 100 ML: 612 INJECTION, SOLUTION INTRAVENOUS at 13:10

## 2019-03-12 ENCOUNTER — OFFICE VISIT (OUTPATIENT)
Dept: UROLOGY | Facility: CLINIC | Age: 54
End: 2019-03-12

## 2019-03-12 VITALS — HEIGHT: 72 IN | BODY MASS INDEX: 29.12 KG/M2 | WEIGHT: 215 LBS

## 2019-03-12 DIAGNOSIS — R35.1 BENIGN PROSTATIC HYPERPLASIA WITH NOCTURIA: Primary | ICD-10-CM

## 2019-03-12 DIAGNOSIS — N40.1 BENIGN PROSTATIC HYPERPLASIA WITH NOCTURIA: Primary | ICD-10-CM

## 2019-03-12 PROCEDURE — 99213 OFFICE O/P EST LOW 20 MIN: CPT | Performed by: UROLOGY

## 2019-03-12 RX ORDER — FINASTERIDE 5 MG/1
5 TABLET, FILM COATED ORAL DAILY
Qty: 30 TABLET | Refills: 11 | Status: SHIPPED | OUTPATIENT
Start: 2019-03-12 | End: 2020-03-10 | Stop reason: SDUPTHER

## 2019-03-12 NOTE — PROGRESS NOTES
Chief Complaint:          BPH    HPI:   53 y.o. male.  Pt has a slower stream and flomax is not working as well as it used to.  Nocturia 2 to 3.  His IPSS confirms this his score is 25 see urological symptoms questionnaire it is below .  However he is not bothered.  He expressed a desire to try and treat prostate enlargement early.  His psa is 1.67 which also goes along with prostate enlargement.  HPI      Past Medical History:        Past Medical History:   Diagnosis Date   • Acid reflux    • Chronic pain disorder    • Extremity pain    • Joint pain    • Low back pain    • Lumbosacral disc disease    • Migraine    • Muscle spasm    • Neck pain    • Osteoarthritis    • PONV (postoperative nausea and vomiting)          Current Meds:     Current Outpatient Medications   Medication Sig Dispense Refill   • Blood Glucose Monitoring Suppl (FREESTYLE LITE) device Use as directed. 1 each 0   • celecoxib (CeleBREX) 200 MG capsule Take 1 capsule by mouth Daily. 30 capsule 4   • dexlansoprazole (DEXILANT) 60 MG capsule Take 1 capsule by mouth Daily. 30 capsule 5   • doxycycline (MONODOX) 100 MG capsule Take 1 capsule by mouth Daily. 30 capsule 0   • doxycycline (MONODOX) 100 MG capsule Take 1 capsule by mouth Daily. 30 capsule 1   • escitalopram (LEXAPRO) 10 MG tablet Take 1 tablet by mouth Daily. 30 tablet 5   • etodolac (LODINE) 400 MG tablet Take 1 tablet by mouth 2 (Two) Times a Day As Needed for pain. 60 tablet 2   • fexofenadine-pseudoephedrine (ALLEGRA-D 24) 180-240 MG per 24 hr tablet Take 1 tablet by mouth Daily. 30 tablet 0   • glucose blood test strip Use to test once daily as directed. 100 each 0   • Lancets (FREESTYLE) lancets Use to test once daily as directed. 100 each 0   • methocarbamol (ROBAXIN) 500 MG tablet Take 1 tablet by mouth 2 (Two) Times a Day. 60 tablet 0   • methocarbamol (ROBAXIN) 750 MG tablet Take 1 to 2 tablets by mouth 2 (Two) Times a Day As Needed for spasms 120 tablet 2   • MethylPREDNISolone  (MEDROL, JUANA,) 4 MG tablet Take by mouth as directed on package 21 tablet 0   • oxyCODONE-acetaminophen (PERCOCET) 5-325 MG per tablet Take 1-2 tablets by mouth Every 4 (Four) Hours As Needed (Pain). 20 tablet 0   • pantoprazole (PROTONIX) 40 MG EC tablet Take 1 tablet by mouth 2 (Two) Times a Day 15 minutes Before breakfast and Supper. 180 tablet 3   • predniSONE (DELTASONE) 10 MG tablet Take 4 tabs by mouth daily x 3 days, then 2 tabs x 3 days, then 1 tab x 6 days 24 tablet 0   • rifaximin (XIFAXAN) 550 MG tablet Take 1 tablet by mouth 3 (Three) Times a Day. 42 tablet 0   • sildenafil (REVATIO) 20 MG tablet Take 1 tablet by mouth 3 (Three) Times a Day. 50 tablet 11   • tamsulosin (FLOMAX) 0.4 MG capsule 24 hr capsule Take 1 capsule by mouth daily. 30 capsule 11   • tamsulosin (FLOMAX) 0.4 MG capsule 24 hr capsule Take 2 capsules by mouth daily 60 capsule 6   • tamsulosin (FLOMAX) 0.4 MG capsule 24 hr capsule Take 2 capsules by mouth daily 60 capsule 6   • venlafaxine (EFFEXOR) 37.5 MG tablet Take 37.5 mg by mouth 2 (Two) Times a Day.     • venlafaxine XR (EFFEXOR-XR) 37.5 MG 24 hr capsule Take 1 capsule by mouth Daily. 30 capsule 3   • amoxicillin (AMOXIL) 875 MG tablet Take 1 tablet by mouth 2 (Two) Times a Day. 20 tablet 0   • dexlansoprazole (DEXILANT) 60 MG capsule Take 1 capsule by mouth daily 30 capsule 5   • finasteride (PROSCAR) 5 MG tablet Take 1 tablet by mouth Daily. 30 tablet 11   • oseltamivir (TAMIFLU) 75 MG capsule Take 1 capsule by mouth 2 (Two) Times a Day. 10 capsule 0     No current facility-administered medications for this visit.         Allergies:      No Known Allergies     Past Surgical History:     Past Surgical History:   Procedure Laterality Date   • CARPAL TUNNEL RELEASE Right 8/4/2016    Procedure: CARPAL TUNNEL RELEASE;  Surgeon: Yoel Lua MD;  Location: [facility];  Service:    • CARPAL TUNNEL RELEASE Left 11/9/2017    Procedure: CARPAL TUNNEL RELEASE;  Surgeon: Yoel  Terry Lua MD;  Location: [facility] OR;  Service:    • COLONOSCOPY     • LUMBAR FACET INJECTION     • TOE SURGERY Right 03/04/2015         Social History:     Social History     Socioeconomic History   • Marital status:      Spouse name: Not on file   • Number of children: Not on file   • Years of education: Not on file   • Highest education level: Not on file   Social Needs   • Financial resource strain: Not on file   • Food insecurity - worry: Not on file   • Food insecurity - inability: Not on file   • Transportation needs - medical: Not on file   • Transportation needs - non-medical: Not on file   Occupational History   • Not on file   Tobacco Use   • Smoking status: Never Smoker   • Smokeless tobacco: Current User     Types: Chew   • Tobacco comment: uses chewing tobacco   Substance and Sexual Activity   • Alcohol use: No   • Drug use: No   • Sexual activity: Defer   Other Topics Concern   • Not on file   Social History Narrative    ** Merged History Encounter **            Family History:     Family History   Problem Relation Age of Onset   • Lung cancer Mother    • Cancer Mother         ovarian   • Diabetes Sister    • Rheum arthritis Brother    • Cancer Sister         breast       Review of Systems:     Review of Systems   Constitutional: Positive for fatigue.   HENT: Negative for sinus pressure and sinus pain.    Eyes: Negative for pain and redness.   Respiratory: Negative for chest tightness.    Cardiovascular: Negative for chest pain.   Gastrointestinal: Positive for abdominal pain. Negative for constipation and diarrhea.   Endocrine: Negative for heat intolerance.   Genitourinary: Negative for frequency and hematuria.   Musculoskeletal: Negative for back pain.   Skin: Negative for rash.   Allergic/Immunologic: Negative for food allergies.   Neurological: Negative for headaches.   Hematological: Does not bruise/bleed easily.   Psychiatric/Behavioral: The patient is not nervous/anxious.         IPSS Questionnaire (AUA-7):  Over the past month…    1)  Incomplete Emptying  How often have you had a sensation of not emptying your bladder?  5 - Almost always   2)  Frequency  How often have you had to urinate less than every two hours? 0 - Not at all   3)  Intermittency  How often have you found you stopped and started again several times when you urinated?  5 - Almost always   4) Urgency  How often have you found it difficult to postpone urination?  4 - More than half the time   5) Weak Stream  How often have you had a weak urinary stream?  3 - About half the time   6) Straining  How often have you had to push or strain to begin urination?  5 - Almost always   7) Nocturia  How many times did you typically get up at night to urinate?  3 - 3 times   Total Score:  25       Quality of life due to urinary symptoms:  If you were to spend the rest of your life with your urinary condition the way it is now, how would you feel about that? 2-Mostly Satisfied   Urine Leakage (Incontinence) 0-No Leakage       I have reviewed the follow portions of the patient's history and confirmed they are accurate today:  allergies, current medications, past family history, past medical history, past social history, past surgical history, problem list and ROS  Physical Exam:     Physical Exam   Constitutional: He is oriented to person, place, and time.   HENT:   Head: Normocephalic and atraumatic.   Right Ear: External ear normal.   Left Ear: External ear normal.   Nose: Nose normal.   Mouth/Throat: Oropharynx is clear and moist.   Eyes: Conjunctivae and EOM are normal. Pupils are equal, round, and reactive to light.   Neck: Normal range of motion. Neck supple. No thyromegaly present.   Cardiovascular: Normal rate, regular rhythm, normal heart sounds and intact distal pulses.   No murmur heard.  Pulmonary/Chest: Effort normal and breath sounds normal. No respiratory distress. He has no wheezes. He has no rales. He exhibits no  "tenderness.   Abdominal: Soft. Bowel sounds are normal. He exhibits no distension and no mass. There is no tenderness. No hernia.   Genitourinary: Rectum normal, prostate normal and penis normal.   Genitourinary Comments: Enlarged without nodules   Musculoskeletal: Normal range of motion. He exhibits no edema or tenderness.   Lymphadenopathy:     He has no cervical adenopathy.   Neurological: He is alert and oriented to person, place, and time. No cranial nerve deficit. He exhibits normal muscle tone. Coordination normal.   Skin: Skin is warm. No rash noted.   Psychiatric: He has a normal mood and affect. His behavior is normal. Judgment and thought content normal.   Nursing note and vitals reviewed.      Ht 182.9 cm (72\")   Wt 97.5 kg (215 lb)   BMI 29.16 kg/m²    Procedure:         Assessment:     Encounter Diagnosis   Name Primary?   • Benign prostatic hyperplasia with nocturia Yes     No orders of the defined types were placed in this encounter.      Plan:   Will see pt back in a year with psa prior.  After we talked about the risks and benefits of proscar, wants to try it to help reduce his nocturia    Patient's Body mass index is 29.16 kg/m². BMI is within normal parameters. No follow-up required..      Counseling was given to patient for the following topics risks and benefits of treatment options including: psa. The interim medical history and current results were reviewed.  A treatment plan with follow-up was made for Benign prostatic hyperplasia with nocturia [N40.1, R35.1].  This document has been electronically signed by Cristobal Cortes MD March 12, 2019 9:02 AM  "

## 2019-10-16 ENCOUNTER — TRANSCRIBE ORDERS (OUTPATIENT)
Dept: ADMINISTRATIVE | Facility: HOSPITAL | Age: 54
End: 2019-10-16

## 2019-10-16 ENCOUNTER — HOSPITAL ENCOUNTER (OUTPATIENT)
Dept: GENERAL RADIOLOGY | Facility: HOSPITAL | Age: 54
Discharge: HOME | End: 2019-10-16

## 2019-10-16 ENCOUNTER — HOSPITAL ENCOUNTER (OUTPATIENT)
Dept: GENERAL RADIOLOGY | Facility: HOSPITAL | Age: 54
Discharge: HOME | End: 2019-10-16
Admitting: PHYSICIAN ASSISTANT

## 2019-10-16 DIAGNOSIS — M54.2 NECK PAIN: Primary | ICD-10-CM

## 2019-10-16 DIAGNOSIS — M54.2 NECK PAIN: ICD-10-CM

## 2019-10-16 PROCEDURE — 72072 X-RAY EXAM THORAC SPINE 3VWS: CPT

## 2019-10-16 PROCEDURE — 72050 X-RAY EXAM NECK SPINE 4/5VWS: CPT | Performed by: RADIOLOGY

## 2019-10-16 PROCEDURE — 72050 X-RAY EXAM NECK SPINE 4/5VWS: CPT

## 2019-10-16 PROCEDURE — 72072 X-RAY EXAM THORAC SPINE 3VWS: CPT | Performed by: RADIOLOGY

## 2019-11-02 ENCOUNTER — TRANSCRIBE ORDERS (OUTPATIENT)
Dept: ADMINISTRATIVE | Facility: HOSPITAL | Age: 54
End: 2019-11-02

## 2019-11-02 ENCOUNTER — APPOINTMENT (OUTPATIENT)
Dept: LAB | Facility: HOSPITAL | Age: 54
End: 2019-11-02

## 2019-11-02 DIAGNOSIS — E78.2 MIXED HYPERLIPIDEMIA: Primary | ICD-10-CM

## 2019-11-02 LAB
ALBUMIN SERPL-MCNC: 4.5 G/DL (ref 3.5–5.2)
ALBUMIN/GLOB SERPL: 1.7 G/DL
ALP SERPL-CCNC: 73 U/L (ref 39–117)
ALT SERPL W P-5'-P-CCNC: 25 U/L (ref 1–41)
ANION GAP SERPL CALCULATED.3IONS-SCNC: 9.2 MMOL/L (ref 5–15)
AST SERPL-CCNC: 17 U/L (ref 1–40)
BASOPHILS # BLD AUTO: 0.02 10*3/MM3 (ref 0–0.2)
BASOPHILS NFR BLD AUTO: 0.4 % (ref 0–1.5)
BILIRUB SERPL-MCNC: 0.3 MG/DL (ref 0.2–1.2)
BUN BLD-MCNC: 15 MG/DL (ref 6–20)
BUN/CREAT SERPL: 15.6 (ref 7–25)
CALCIUM SPEC-SCNC: 8.9 MG/DL (ref 8.6–10.5)
CHLORIDE SERPL-SCNC: 101 MMOL/L (ref 98–107)
CHOLEST SERPL-MCNC: 261 MG/DL (ref 0–200)
CO2 SERPL-SCNC: 26.8 MMOL/L (ref 22–29)
CREAT BLD-MCNC: 0.96 MG/DL (ref 0.76–1.27)
DEPRECATED RDW RBC AUTO: 40.8 FL (ref 37–54)
EOSINOPHIL # BLD AUTO: 0.16 10*3/MM3 (ref 0–0.4)
EOSINOPHIL NFR BLD AUTO: 3.4 % (ref 0.3–6.2)
ERYTHROCYTE [DISTWIDTH] IN BLOOD BY AUTOMATED COUNT: 13.2 % (ref 12.3–15.4)
GFR SERPL CREATININE-BSD FRML MDRD: 82 ML/MIN/1.73
GLOBULIN UR ELPH-MCNC: 2.6 GM/DL
GLUCOSE BLD-MCNC: 96 MG/DL (ref 65–99)
HCT VFR BLD AUTO: 43.5 % (ref 37.5–51)
HDLC SERPL-MCNC: 35 MG/DL (ref 40–60)
HGB BLD-MCNC: 15 G/DL (ref 13–17.7)
IMM GRANULOCYTES # BLD AUTO: 0.01 10*3/MM3 (ref 0–0.05)
IMM GRANULOCYTES NFR BLD AUTO: 0.2 % (ref 0–0.5)
LDLC SERPL CALC-MCNC: 184 MG/DL (ref 0–100)
LDLC/HDLC SERPL: 5.25 {RATIO}
LYMPHOCYTES # BLD AUTO: 1.34 10*3/MM3 (ref 0.7–3.1)
LYMPHOCYTES NFR BLD AUTO: 28.9 % (ref 19.6–45.3)
MCH RBC QN AUTO: 29.2 PG (ref 26.6–33)
MCHC RBC AUTO-ENTMCNC: 34.5 G/DL (ref 31.5–35.7)
MCV RBC AUTO: 84.8 FL (ref 79–97)
MONOCYTES # BLD AUTO: 0.38 10*3/MM3 (ref 0.1–0.9)
MONOCYTES NFR BLD AUTO: 8.2 % (ref 5–12)
NEUTROPHILS # BLD AUTO: 2.73 10*3/MM3 (ref 1.7–7)
NEUTROPHILS NFR BLD AUTO: 58.9 % (ref 42.7–76)
NRBC BLD AUTO-RTO: 0 /100 WBC (ref 0–0.2)
PLATELET # BLD AUTO: 219 10*3/MM3 (ref 140–450)
PMV BLD AUTO: 9.1 FL (ref 6–12)
POTASSIUM BLD-SCNC: 4.2 MMOL/L (ref 3.5–5.2)
PROT SERPL-MCNC: 7.1 G/DL (ref 6–8.5)
RBC # BLD AUTO: 5.13 10*6/MM3 (ref 4.14–5.8)
SODIUM BLD-SCNC: 137 MMOL/L (ref 136–145)
TRIGL SERPL-MCNC: 211 MG/DL (ref 0–150)
TSH SERPL DL<=0.05 MIU/L-ACNC: 1.43 UIU/ML (ref 0.27–4.2)
VLDLC SERPL-MCNC: 42.2 MG/DL (ref 5–40)
WBC NRBC COR # BLD: 4.64 10*3/MM3 (ref 3.4–10.8)

## 2019-11-02 PROCEDURE — 80053 COMPREHEN METABOLIC PANEL: CPT | Performed by: PHYSICIAN ASSISTANT

## 2019-11-02 PROCEDURE — 36415 COLL VENOUS BLD VENIPUNCTURE: CPT | Performed by: PHYSICIAN ASSISTANT

## 2019-11-02 PROCEDURE — 85025 COMPLETE CBC W/AUTO DIFF WBC: CPT | Performed by: PHYSICIAN ASSISTANT

## 2019-11-02 PROCEDURE — 84443 ASSAY THYROID STIM HORMONE: CPT | Performed by: PHYSICIAN ASSISTANT

## 2019-11-02 PROCEDURE — 80061 LIPID PANEL: CPT | Performed by: PHYSICIAN ASSISTANT

## 2020-02-25 ENCOUNTER — TRANSCRIBE ORDERS (OUTPATIENT)
Dept: ADMINISTRATIVE | Facility: HOSPITAL | Age: 55
End: 2020-02-25

## 2020-02-25 DIAGNOSIS — M54.50 CHRONIC BILATERAL LOW BACK PAIN WITHOUT SCIATICA: Primary | ICD-10-CM

## 2020-02-25 DIAGNOSIS — G89.29 CHRONIC BILATERAL LOW BACK PAIN WITHOUT SCIATICA: Primary | ICD-10-CM

## 2020-02-27 ENCOUNTER — HOSPITAL ENCOUNTER (OUTPATIENT)
Dept: MRI IMAGING | Facility: HOSPITAL | Age: 55
Discharge: HOME | End: 2020-02-27
Admitting: PHYSICIAN ASSISTANT

## 2020-02-27 DIAGNOSIS — M54.50 CHRONIC BILATERAL LOW BACK PAIN WITHOUT SCIATICA: ICD-10-CM

## 2020-02-27 DIAGNOSIS — G89.29 CHRONIC BILATERAL LOW BACK PAIN WITHOUT SCIATICA: ICD-10-CM

## 2020-02-27 PROCEDURE — 72148 MRI LUMBAR SPINE W/O DYE: CPT

## 2020-02-27 PROCEDURE — 72148 MRI LUMBAR SPINE W/O DYE: CPT | Performed by: RADIOLOGY

## 2020-03-10 ENCOUNTER — TELEPHONE (OUTPATIENT)
Dept: GASTROENTEROLOGY | Facility: CLINIC | Age: 55
End: 2020-03-10

## 2020-03-10 DIAGNOSIS — R35.1 BENIGN PROSTATIC HYPERPLASIA WITH NOCTURIA: ICD-10-CM

## 2020-03-10 DIAGNOSIS — N40.1 BENIGN PROSTATIC HYPERPLASIA WITH NOCTURIA: ICD-10-CM

## 2020-03-10 RX ORDER — FINASTERIDE 5 MG/1
5 TABLET, FILM COATED ORAL DAILY
Qty: 30 TABLET | Refills: 11 | Status: SHIPPED | OUTPATIENT
Start: 2020-03-10 | End: 2022-01-11 | Stop reason: SDUPTHER

## 2020-03-10 NOTE — TELEPHONE ENCOUNTER
Patient's wife called and said that Avery needed a refill on his Finasteride.    Please and Thank you

## 2020-03-23 RX ORDER — TAMSULOSIN HYDROCHLORIDE 0.4 MG/1
0.8 CAPSULE ORAL DAILY
Qty: 60 CAPSULE | Refills: 1 | Status: CANCELLED | OUTPATIENT
Start: 2020-03-23

## 2020-08-13 ENCOUNTER — TRANSCRIBE ORDERS (OUTPATIENT)
Dept: ADMINISTRATIVE | Facility: HOSPITAL | Age: 55
End: 2020-08-13

## 2020-08-13 ENCOUNTER — HOSPITAL ENCOUNTER (OUTPATIENT)
Dept: GENERAL RADIOLOGY | Facility: HOSPITAL | Age: 55
Discharge: HOME | End: 2020-08-13
Admitting: FAMILY MEDICINE

## 2020-08-13 ENCOUNTER — LAB (OUTPATIENT)
Dept: LAB | Facility: HOSPITAL | Age: 55
End: 2020-08-13

## 2020-08-13 DIAGNOSIS — R07.9 CHEST PAIN, UNSPECIFIED TYPE: ICD-10-CM

## 2020-08-13 DIAGNOSIS — R00.2 PALPITATIONS: ICD-10-CM

## 2020-08-13 DIAGNOSIS — R51.9 NONINTRACTABLE HEADACHE, UNSPECIFIED CHRONICITY PATTERN, UNSPECIFIED HEADACHE TYPE: ICD-10-CM

## 2020-08-13 DIAGNOSIS — R51.9 NONINTRACTABLE HEADACHE, UNSPECIFIED CHRONICITY PATTERN, UNSPECIFIED HEADACHE TYPE: Primary | ICD-10-CM

## 2020-08-13 DIAGNOSIS — R07.9 CHEST PAIN, UNSPECIFIED TYPE: Primary | ICD-10-CM

## 2020-08-13 LAB
25(OH)D3 SERPL-MCNC: 30.4 NG/ML (ref 30–100)
ALBUMIN SERPL-MCNC: 4.2 G/DL (ref 3.5–5.2)
ALBUMIN/GLOB SERPL: 1.5 G/DL
ALP SERPL-CCNC: 70 U/L (ref 39–117)
ALT SERPL W P-5'-P-CCNC: 29 U/L (ref 1–41)
ANION GAP SERPL CALCULATED.3IONS-SCNC: 11.2 MMOL/L (ref 5–15)
AST SERPL-CCNC: 16 U/L (ref 1–40)
BASOPHILS # BLD AUTO: 0.03 10*3/MM3 (ref 0–0.2)
BASOPHILS NFR BLD AUTO: 0.5 % (ref 0–1.5)
BILIRUB SERPL-MCNC: 0.4 MG/DL (ref 0–1.2)
BUN SERPL-MCNC: 17 MG/DL (ref 6–20)
BUN/CREAT SERPL: 16.5 (ref 7–25)
CALCIUM SPEC-SCNC: 9.7 MG/DL (ref 8.6–10.5)
CHLORIDE SERPL-SCNC: 103 MMOL/L (ref 98–107)
CHOLEST SERPL-MCNC: 221 MG/DL (ref 0–200)
CO2 SERPL-SCNC: 26.8 MMOL/L (ref 22–29)
CREAT SERPL-MCNC: 1.03 MG/DL (ref 0.76–1.27)
CRP SERPL-MCNC: 0.25 MG/DL (ref 0–0.5)
DEPRECATED RDW RBC AUTO: 41.9 FL (ref 37–54)
EOSINOPHIL # BLD AUTO: 0.22 10*3/MM3 (ref 0–0.4)
EOSINOPHIL NFR BLD AUTO: 4 % (ref 0.3–6.2)
ERYTHROCYTE [DISTWIDTH] IN BLOOD BY AUTOMATED COUNT: 13.5 % (ref 12.3–15.4)
ERYTHROCYTE [SEDIMENTATION RATE] IN BLOOD: 6 MM/HR (ref 0–20)
GFR SERPL CREATININE-BSD FRML MDRD: 75 ML/MIN/1.73
GLOBULIN UR ELPH-MCNC: 2.8 GM/DL
GLUCOSE SERPL-MCNC: 83 MG/DL (ref 65–99)
HBA1C MFR BLD: 5.83 % (ref 4.8–5.6)
HCT VFR BLD AUTO: 44.4 % (ref 37.5–51)
HDLC SERPL-MCNC: 31 MG/DL (ref 40–60)
HGB BLD-MCNC: 15.1 G/DL (ref 13–17.7)
IMM GRANULOCYTES # BLD AUTO: 0.01 10*3/MM3 (ref 0–0.05)
IMM GRANULOCYTES NFR BLD AUTO: 0.2 % (ref 0–0.5)
LDLC SERPL CALC-MCNC: 144 MG/DL (ref 0–100)
LDLC/HDLC SERPL: 4.65 {RATIO}
LYMPHOCYTES # BLD AUTO: 1.76 10*3/MM3 (ref 0.7–3.1)
LYMPHOCYTES NFR BLD AUTO: 31.7 % (ref 19.6–45.3)
MAGNESIUM SERPL-MCNC: 2.3 MG/DL (ref 1.6–2.6)
MCH RBC QN AUTO: 29.3 PG (ref 26.6–33)
MCHC RBC AUTO-ENTMCNC: 34 G/DL (ref 31.5–35.7)
MCV RBC AUTO: 86 FL (ref 79–97)
MONOCYTES # BLD AUTO: 0.52 10*3/MM3 (ref 0.1–0.9)
MONOCYTES NFR BLD AUTO: 9.4 % (ref 5–12)
NEUTROPHILS NFR BLD AUTO: 3.01 10*3/MM3 (ref 1.7–7)
NEUTROPHILS NFR BLD AUTO: 54.2 % (ref 42.7–76)
NRBC BLD AUTO-RTO: 0 /100 WBC (ref 0–0.2)
PLATELET # BLD AUTO: 187 10*3/MM3 (ref 140–450)
PMV BLD AUTO: 9.2 FL (ref 6–12)
POTASSIUM SERPL-SCNC: 4.7 MMOL/L (ref 3.5–5.2)
PROT SERPL-MCNC: 7 G/DL (ref 6–8.5)
RBC # BLD AUTO: 5.16 10*6/MM3 (ref 4.14–5.8)
SODIUM SERPL-SCNC: 141 MMOL/L (ref 136–145)
T4 FREE SERPL-MCNC: 1.33 NG/DL (ref 0.93–1.7)
TRIGL SERPL-MCNC: 230 MG/DL (ref 0–150)
TSH SERPL DL<=0.05 MIU/L-ACNC: 1.73 UIU/ML (ref 0.27–4.2)
VIT B12 BLD-MCNC: 499 PG/ML (ref 211–946)
VLDLC SERPL-MCNC: 46 MG/DL (ref 5–40)
WBC # BLD AUTO: 5.55 10*3/MM3 (ref 3.4–10.8)

## 2020-08-13 PROCEDURE — 82607 VITAMIN B-12: CPT

## 2020-08-13 PROCEDURE — 83735 ASSAY OF MAGNESIUM: CPT

## 2020-08-13 PROCEDURE — 82306 VITAMIN D 25 HYDROXY: CPT

## 2020-08-13 PROCEDURE — 85025 COMPLETE CBC W/AUTO DIFF WBC: CPT

## 2020-08-13 PROCEDURE — 71046 X-RAY EXAM CHEST 2 VIEWS: CPT

## 2020-08-13 PROCEDURE — 84439 ASSAY OF FREE THYROXINE: CPT

## 2020-08-13 PROCEDURE — 71046 X-RAY EXAM CHEST 2 VIEWS: CPT | Performed by: RADIOLOGY

## 2020-08-13 PROCEDURE — 83036 HEMOGLOBIN GLYCOSYLATED A1C: CPT

## 2020-08-13 PROCEDURE — 36415 COLL VENOUS BLD VENIPUNCTURE: CPT

## 2020-08-13 PROCEDURE — 86140 C-REACTIVE PROTEIN: CPT

## 2020-08-13 PROCEDURE — 85652 RBC SED RATE AUTOMATED: CPT

## 2020-08-13 PROCEDURE — 80061 LIPID PANEL: CPT

## 2020-08-13 PROCEDURE — 80053 COMPREHEN METABOLIC PANEL: CPT

## 2020-08-13 PROCEDURE — 84443 ASSAY THYROID STIM HORMONE: CPT

## 2020-08-14 NOTE — PROGRESS NOTES
[facility]  [facility]      08/17/2020         Avery Watson  [address] [address]  [unfilled]    1965    Kreis, Samuel Duane, MD    Avery Watson is a 55 y.o. male.      Subjective:     Chief Complaint:  Palpitations and Establish Care       HPI   55-year-old male with history of hyperlipidemia, chronic pain, GERD who presents today for evaluation of chest pain at the request of Dr. Shukla.  He reports intermittent left-sided chest pressure over the last few weeks associated with headache and feeling shaky after he eats.  He notes associated fluttering in his chest and feeling weak.  He does note some associated pain in his left arm as well as some feeling of his heart racing.  He does note exertional shortness of breath but denies exertional chest pain. He reports that he is typically active but over the past couple of weeks he has been too fatigued to do anything    History of normal nuclear stress test in 2018    Cardiac risks: Hyperlipidemia, prediabetes, age, gender  Patient Active Problem List   Diagnosis   • Benign prostatic hyperplasia with urinary obstruction   • Fatigue   • Multilevel degenerative disc disease   • Migraine   • Shoulder pain   • Lumbar facet arthropathy/lumbar spondylosis without myelopathy   • DDD (degenerative disc disease), lumbar   • Myofascial pain   • Bilateral carpal tunnel syndrome   • Displacement of intervertebral disc of mid-cervical region   • Neuroforaminal stenosis of spine, right C6-C7   • Carpal tunnel syndrome, left       Past Medical History:   Diagnosis Date   • Acid reflux    • Chronic pain disorder    • Extremity pain    • Joint pain    • Low back pain    • Lumbosacral disc disease    • Migraine    • Muscle spasm    • Neck pain    • Osteoarthritis    • PONV (postoperative nausea and vomiting)        Past Surgical History:   Procedure Laterality Date   • CARPAL TUNNEL RELEASE Right 8/4/2016    Procedure: CARPAL TUNNEL  RELEASE;  Surgeon: Yoel Lua MD;  Location:  [facility] OR;  Service:    • CARPAL TUNNEL RELEASE Left 11/9/2017    Procedure: CARPAL TUNNEL RELEASE;  Surgeon: Yoel Lua MD;  Location:  [facility] OR;  Service:    • COLONOSCOPY     • LUMBAR FACET INJECTION     • TOE SURGERY Right 03/04/2015       Family History   Problem Relation Age of Onset   • Lung cancer Mother    • Cancer Mother         ovarian   • Diabetes Sister    • Rheum arthritis Brother    • Cancer Sister         breast       Social History     Socioeconomic History   • Marital status:      Spouse name: Not on file   • Number of children: Not on file   • Years of education: Not on file   • Highest education level: Not on file   Tobacco Use   • Smoking status: Never Smoker   • Smokeless tobacco: Current User     Types: Chew   • Tobacco comment: uses chewing tobacco   Substance and Sexual Activity   • Alcohol use: No   • Drug use: No   • Sexual activity: Defer   Social History Narrative    ** Merged History Encounter **    Caffeine 4.5  servings of coffee,  4 servings dt pepsi            No Known Allergies      Current Outpatient Medications:   •  Blood Glucose Monitoring Suppl (FREESTYLE LITE) device, Use as directed., Disp: 1 each, Rfl: 0  •  dexlansoprazole (DEXILANT) 60 MG capsule, Take 1 capsule by mouth daily, Disp: 30 capsule, Rfl: 5  •  escitalopram (LEXAPRO) 10 MG tablet, Take 1 tablet by mouth Daily., Disp: 30 tablet, Rfl: 5  •  finasteride (Proscar) 5 MG tablet, Take 1 tablet by mouth Daily., Disp: 30 tablet, Rfl: 11  •  meclizine (ANTIVERT) 25 MG tablet, Take 1 tablet by mouth 3 (Three) Times a Day As Needed., Disp: 30 tablet, Rfl: 0  •  ondansetron (ZOFRAN) 4 MG tablet, Take 1 tablet by mouth 4 (Four) Times a Day., Disp: 10 tablet, Rfl: 0  •  tamsulosin (FLOMAX) 0.4 MG capsule 24 hr capsule, Take 2 capsules by mouth Daily., Disp: 180 capsule, Rfl: 0  •  tiZANidine (ZANAFLEX) 4 MG tablet, Take 1 tablet by mouth Every 6-8  Hours As Needed. Not to exceed 3 doses in 24 hours, Disp: 90 tablet, Rfl: 1  •  aspirin 81 MG EC tablet, Take 1 tablet by mouth Daily., Disp: 30 tablet, Rfl:   •  bisoprolol (ZEBeta) 5 MG tablet, Take 1 tablet by mouth Daily., Disp: 30 tablet, Rfl: 2  •  glucose blood test strip, Use to test once daily as directed., Disp: 100 each, Rfl: 0  •  Lancets (FREESTYLE) lancets, Use to test once daily as directed., Disp: 100 each, Rfl: 0  •  rosuvastatin (CRESTOR) 10 MG tablet, Take 1 tablet by mouth Daily., Disp: 30 tablet, Rfl: 1    The following portions of the patient's history were reviewed today and updated as appropriate: allergies, current medications, past family history, past medical history, past social history, past surgical history and problem list     Review of Systems   Constitution: Positive for malaise/fatigue. Negative for chills and fever.   Eyes: Negative.    Cardiovascular: Positive for chest pain, dyspnea on exertion and palpitations. Negative for claudication, cyanosis, irregular heartbeat, leg swelling, near-syncope, orthopnea, paroxysmal nocturnal dyspnea and syncope.   Respiratory: Negative for cough, shortness of breath and snoring.    Endocrine: Negative.    Hematologic/Lymphatic: Does not bruise/bleed easily.   Skin: Negative for poor wound healing.   Musculoskeletal: Negative.    Gastrointestinal: Negative for abdominal pain, heartburn, hematemesis, melena, nausea and vomiting.   Genitourinary: Negative.  Negative for hematuria.   Neurological: Positive for headaches.   Psychiatric/Behavioral: Negative.    Allergic/Immunologic: Negative.        Objective:     Vitals:    08/17/20 1520 08/17/20 1522 08/17/20 1523   BP: 138/79 145/91 130/83   BP Location: Right arm Left arm Left arm   Patient Position: Sitting Standing Sitting   Cuff Size: Adult Adult Adult   Pulse: 93 95 97   Resp:   23   Temp:   97.5 °F (36.4 °C)   TempSrc:   Temporal   SpO2: 95% 95% 96%   Weight:   108 kg (238 lb 2 oz)  "  Height:   182.9 cm (72\")       Body mass index is 32.3 kg/m².    Physical Exam   Constitutional: He is oriented to person, place, and time. He appears well-developed and well-nourished. No distress.   HENT:   Head: Normocephalic.   Eyes: Pupils are equal, round, and reactive to light. Conjunctivae are normal.   Neck: Neck supple. No JVD present. No thyromegaly present.   Cardiovascular: Normal rate, regular rhythm, normal heart sounds and intact distal pulses. Exam reveals no gallop and no friction rub.   No murmur heard.  Pulmonary/Chest: Effort normal and breath sounds normal. No respiratory distress. He has no wheezes. He has no rales. He exhibits no tenderness.   Abdominal: Soft. Bowel sounds are normal.   Musculoskeletal: Normal range of motion. He exhibits no edema.   Neurological: He is alert and oriented to person, place, and time.   Skin: Skin is warm and dry.   Psychiatric: He has a normal mood and affect. His behavior is normal. Thought content normal.   Vitals reviewed.      Lab and Diagnostic Review:  Results for orders placed or performed in visit on 08/13/20   Hemoglobin A1c   Result Value Ref Range    Hemoglobin A1C 5.83 (H) 4.80 - 5.60 %   Vitamin B12   Result Value Ref Range    Vitamin B-12 499 211 - 946 pg/mL   Magnesium   Result Value Ref Range    Magnesium 2.3 1.6 - 2.6 mg/dL   Sedimentation Rate   Result Value Ref Range    Sed Rate 6 0 - 20 mm/hr   C-reactive Protein   Result Value Ref Range    C-Reactive Protein 0.25 0.00 - 0.50 mg/dL   Vitamin D 25 Hydroxy   Result Value Ref Range    25 Hydroxy, Vitamin D 30.4 30.0 - 100.0 ng/ml   TSH   Result Value Ref Range    TSH 1.730 0.270 - 4.200 uIU/mL   T4, Free   Result Value Ref Range    Free T4 1.33 0.93 - 1.70 ng/dL   Lipid Panel   Result Value Ref Range    Total Cholesterol 221 (H) 0 - 200 mg/dL    Triglycerides 230 (H) 0 - 150 mg/dL    HDL Cholesterol 31 (L) 40 - 60 mg/dL    LDL Cholesterol  144 (H) 0 - 100 mg/dL    VLDL Cholesterol 46 (H) 5 - " 40 mg/dL    LDL/HDL Ratio 4.65    Comprehensive Metabolic Panel   Result Value Ref Range    Glucose 83 65 - 99 mg/dL    BUN 17 6 - 20 mg/dL    Creatinine 1.03 0.76 - 1.27 mg/dL    Sodium 141 136 - 145 mmol/L    Potassium 4.7 3.5 - 5.2 mmol/L    Chloride 103 98 - 107 mmol/L    CO2 26.8 22.0 - 29.0 mmol/L    Calcium 9.7 8.6 - 10.5 mg/dL    Total Protein 7.0 6.0 - 8.5 g/dL    Albumin 4.20 3.50 - 5.20 g/dL    ALT (SGPT) 29 1 - 41 U/L    AST (SGOT) 16 1 - 40 U/L    Alkaline Phosphatase 70 39 - 117 U/L    Total Bilirubin 0.4 0.0 - 1.2 mg/dL    eGFR Non African Amer 75 >60 mL/min/1.73    Globulin 2.8 gm/dL    A/G Ratio 1.5 g/dL    BUN/Creatinine Ratio 16.5 7.0 - 25.0    Anion Gap 11.2 5.0 - 15.0 mmol/L   CBC Auto Differential   Result Value Ref Range    WBC 5.55 3.40 - 10.80 10*3/mm3    RBC 5.16 4.14 - 5.80 10*6/mm3    Hemoglobin 15.1 13.0 - 17.7 g/dL    Hematocrit 44.4 37.5 - 51.0 %    MCV 86.0 79.0 - 97.0 fL    MCH 29.3 26.6 - 33.0 pg    MCHC 34.0 31.5 - 35.7 g/dL    RDW 13.5 12.3 - 15.4 %    RDW-SD 41.9 37.0 - 54.0 fl    MPV 9.2 6.0 - 12.0 fL    Platelets 187 140 - 450 10*3/mm3    Neutrophil % 54.2 42.7 - 76.0 %    Lymphocyte % 31.7 19.6 - 45.3 %    Monocyte % 9.4 5.0 - 12.0 %    Eosinophil % 4.0 0.3 - 6.2 %    Basophil % 0.5 0.0 - 1.5 %    Immature Grans % 0.2 0.0 - 0.5 %    Neutrophils, Absolute 3.01 1.70 - 7.00 10*3/mm3    Lymphocytes, Absolute 1.76 0.70 - 3.10 10*3/mm3    Monocytes, Absolute 0.52 0.10 - 0.90 10*3/mm3    Eosinophils, Absolute 0.22 0.00 - 0.40 10*3/mm3    Basophils, Absolute 0.03 0.00 - 0.20 10*3/mm3    Immature Grans, Absolute 0.01 0.00 - 0.05 10*3/mm3    nRBC 0.0 0.0 - 0.2 /100 WBC     EKG: Normal sinus rhythm  Assessment and Plan:   1. Chest pain, unspecified type  LAURA risk score 0 but has concerning symptoms with ASCVD risks  Start aspirin 81 mg daily, start bisoprolol 5 mg daily  - ECG 12 Lead; Future  - Stress Test With Myocardial Perfusion (1 Day); Future  - Adult Transthoracic Echo Complete  W/ Cont if Necessary Per Protocol; Future    2. Elevated blood-pressure reading, without diagnosis of hypertension  Mildly elevated. Start bisoprolol 5 mg daily  - Stress Test With Myocardial Perfusion (1 Day); Future  - Adult Transthoracic Echo Complete W/ Cont if Necessary Per Protocol; Future    3. Hyperlipidemia, unspecified hyperlipidemia type  The 10-year ASCVD risk score (Srinivasan DIANE Jr., et al., 2013) is: 9.7%    Values used to calculate the score:      Age: 55 years      Sex: Male      Is Non- : No      Diabetic: No      Tobacco smoker: No      Systolic Blood Pressure: 130 mmHg      Is BP treated: No      HDL Cholesterol: 31 mg/dL      Total Cholesterol: 221 mg/dL  Start Crestor 10 mg daily.  Repeat LFTs and lipid panel in 6 weeks  - Stress Test With Myocardial Perfusion (1 Day); Future    4. DENTON (dyspnea on exertion)  - Stress Test With Myocardial Perfusion (1 Day); Future  - Adult Transthoracic Echo Complete W/ Cont if Necessary Per Protocol; Future    5. Palpitations  Symptoms do not sound like an arrhythmia and seemed to be more triggered by the stress of having chest pain.  However, if symptoms continue will put him in a Holter monitor  Start bisoprolol 5 mg daily    Further plans pending testing    It has been a pleasure to participate in the care of this patient.  Patient was instructed to call the [facility] with any questions, concerns, or worsening symptoms.    *Please note that portions of this note were completed with a voice recognition program. Efforts were made to edit the dictations, but occasionally words are mistranscribed.

## 2020-08-17 ENCOUNTER — OFFICE VISIT (OUTPATIENT)
Dept: CARDIOLOGY | Facility: HOSPITAL | Age: 55
End: 2020-08-17

## 2020-08-17 ENCOUNTER — HOSPITAL ENCOUNTER (OUTPATIENT)
Dept: CARDIOLOGY | Facility: HOSPITAL | Age: 55
Discharge: HOME | End: 2020-08-17
Admitting: NURSE PRACTITIONER

## 2020-08-17 VITALS
DIASTOLIC BLOOD PRESSURE: 83 MMHG | RESPIRATION RATE: 23 BRPM | SYSTOLIC BLOOD PRESSURE: 130 MMHG | WEIGHT: 238.13 LBS | HEART RATE: 97 BPM | TEMPERATURE: 97.5 F | HEIGHT: 72 IN | BODY MASS INDEX: 32.25 KG/M2 | OXYGEN SATURATION: 96 %

## 2020-08-17 DIAGNOSIS — R06.09 DOE (DYSPNEA ON EXERTION): ICD-10-CM

## 2020-08-17 DIAGNOSIS — R07.9 CHEST PAIN, UNSPECIFIED TYPE: ICD-10-CM

## 2020-08-17 DIAGNOSIS — R03.0 ELEVATED BLOOD-PRESSURE READING, WITHOUT DIAGNOSIS OF HYPERTENSION: ICD-10-CM

## 2020-08-17 DIAGNOSIS — R00.2 PALPITATIONS: ICD-10-CM

## 2020-08-17 DIAGNOSIS — R07.9 CHEST PAIN, UNSPECIFIED TYPE: Primary | ICD-10-CM

## 2020-08-17 DIAGNOSIS — E78.5 HYPERLIPIDEMIA, UNSPECIFIED HYPERLIPIDEMIA TYPE: ICD-10-CM

## 2020-08-17 PROCEDURE — 93010 ELECTROCARDIOGRAM REPORT: CPT | Performed by: INTERNAL MEDICINE

## 2020-08-17 PROCEDURE — 99204 OFFICE O/P NEW MOD 45 MIN: CPT | Performed by: NURSE PRACTITIONER

## 2020-08-17 PROCEDURE — 93005 ELECTROCARDIOGRAM TRACING: CPT | Performed by: NURSE PRACTITIONER

## 2020-08-17 RX ORDER — BISOPROLOL FUMARATE 5 MG/1
5 TABLET, FILM COATED ORAL DAILY
Qty: 30 TABLET | Refills: 2 | Status: ON HOLD | OUTPATIENT
Start: 2020-08-17 | End: 2021-05-03

## 2020-08-17 RX ORDER — ASPIRIN 81 MG/1
81 TABLET ORAL DAILY
Qty: 30 TABLET
Start: 2020-08-17 | End: 2020-09-02 | Stop reason: SDUPTHER

## 2020-08-17 RX ORDER — ROSUVASTATIN CALCIUM 10 MG/1
10 TABLET, COATED ORAL DAILY
Qty: 30 TABLET | Refills: 1 | Status: SHIPPED | OUTPATIENT
Start: 2020-08-17 | End: 2020-10-15 | Stop reason: SDUPTHER

## 2020-08-24 ENCOUNTER — HOSPITAL ENCOUNTER (OUTPATIENT)
Dept: MRI IMAGING | Facility: HOSPITAL | Age: 55
Discharge: HOME | End: 2020-08-24
Admitting: FAMILY MEDICINE

## 2020-08-24 DIAGNOSIS — R51.9 NONINTRACTABLE HEADACHE, UNSPECIFIED CHRONICITY PATTERN, UNSPECIFIED HEADACHE TYPE: ICD-10-CM

## 2020-08-24 PROCEDURE — 70551 MRI BRAIN STEM W/O DYE: CPT

## 2020-08-24 PROCEDURE — 70551 MRI BRAIN STEM W/O DYE: CPT | Performed by: RADIOLOGY

## 2020-09-03 RX ORDER — ASPIRIN 81 MG/1
81 TABLET ORAL DAILY
Qty: 30 TABLET | Refills: 11 | Status: ON HOLD | OUTPATIENT
Start: 2020-09-03 | End: 2021-05-03

## 2020-09-03 NOTE — TELEPHONE ENCOUNTER
Last seen on 8/17/20; sent refills for aspirin 81 mg daily to [facility].     Sharee Rolle, AsuncionD

## 2020-09-16 ENCOUNTER — HOSPITAL ENCOUNTER (OUTPATIENT)
Dept: CARDIOLOGY | Facility: HOSPITAL | Age: 55
Discharge: HOME | End: 2020-09-16

## 2020-09-16 ENCOUNTER — HOSPITAL ENCOUNTER (OUTPATIENT)
Dept: NUCLEAR MEDICINE | Facility: HOSPITAL | Age: 55
Discharge: HOME | End: 2020-09-16

## 2020-09-16 DIAGNOSIS — R03.0 ELEVATED BLOOD-PRESSURE READING, WITHOUT DIAGNOSIS OF HYPERTENSION: ICD-10-CM

## 2020-09-16 DIAGNOSIS — R07.9 CHEST PAIN, UNSPECIFIED TYPE: ICD-10-CM

## 2020-09-16 DIAGNOSIS — E78.5 HYPERLIPIDEMIA, UNSPECIFIED HYPERLIPIDEMIA TYPE: ICD-10-CM

## 2020-09-16 DIAGNOSIS — R06.09 DOE (DYSPNEA ON EXERTION): ICD-10-CM

## 2020-09-16 LAB
BH CV ECHO MEAS - % IVS THICK: -1.3 %
BH CV ECHO MEAS - % LVPW THICK: 74.5 %
BH CV ECHO MEAS - ACS: 2.5 CM
BH CV ECHO MEAS - AO MAX PG: 7.1 MMHG
BH CV ECHO MEAS - AO MEAN PG: 3 MMHG
BH CV ECHO MEAS - AO ROOT AREA (BSA CORRECTED): 1.4
BH CV ECHO MEAS - AO ROOT AREA: 8 CM^2
BH CV ECHO MEAS - AO ROOT DIAM: 3.2 CM
BH CV ECHO MEAS - AO V2 MAX: 133 CM/SEC
BH CV ECHO MEAS - AO V2 MEAN: 82.7 CM/SEC
BH CV ECHO MEAS - AO V2 VTI: 27 CM
BH CV ECHO MEAS - BSA(HAYCOCK): 2.4 M^2
BH CV ECHO MEAS - BSA: 2.3 M^2
BH CV ECHO MEAS - BZI_BMI: 32.3 KILOGRAMS/M^2
BH CV ECHO MEAS - BZI_METRIC_HEIGHT: 182.9 CM
BH CV ECHO MEAS - BZI_METRIC_WEIGHT: 108 KG
BH CV ECHO MEAS - EDV(CUBED): 160.1 ML
BH CV ECHO MEAS - EDV(MOD-SP4): 73.7 ML
BH CV ECHO MEAS - EDV(TEICH): 143.1 ML
BH CV ECHO MEAS - EF(CUBED): 53.9 %
BH CV ECHO MEAS - EF(MOD-SP4): 57.8 %
BH CV ECHO MEAS - EF(TEICH): 45.3 %
BH CV ECHO MEAS - ESV(CUBED): 73.8 ML
BH CV ECHO MEAS - ESV(MOD-SP4): 31.1 ML
BH CV ECHO MEAS - ESV(TEICH): 78.4 ML
BH CV ECHO MEAS - FS: 22.7 %
BH CV ECHO MEAS - IVS/LVPW: 1.2
BH CV ECHO MEAS - IVSD: 1.1 CM
BH CV ECHO MEAS - IVSS: 1.1 CM
BH CV ECHO MEAS - LA DIMENSION: 3.1 CM
BH CV ECHO MEAS - LA/AO: 0.97
BH CV ECHO MEAS - LV DIASTOLIC VOL/BSA (35-75): 32.1 ML/M^2
BH CV ECHO MEAS - LV MASS(C)D: 215.2 GRAMS
BH CV ECHO MEAS - LV MASS(C)DI: 93.8 GRAMS/M^2
BH CV ECHO MEAS - LV MASS(C)S: 214.3 GRAMS
BH CV ECHO MEAS - LV MASS(C)SI: 93.4 GRAMS/M^2
BH CV ECHO MEAS - LV SYSTOLIC VOL/BSA (12-30): 13.6 ML/M^2
BH CV ECHO MEAS - LVIDD: 5.4 CM
BH CV ECHO MEAS - LVIDS: 4.2 CM
BH CV ECHO MEAS - LVLD AP4: 7.5 CM
BH CV ECHO MEAS - LVLS AP4: 5.9 CM
BH CV ECHO MEAS - LVOT AREA (M): 3.5 CM^2
BH CV ECHO MEAS - LVOT AREA: 3.5 CM^2
BH CV ECHO MEAS - LVOT DIAM: 2.1 CM
BH CV ECHO MEAS - LVPWD: 0.92 CM
BH CV ECHO MEAS - LVPWS: 1.6 CM
BH CV ECHO MEAS - MV A MAX VEL: 52.5 CM/SEC
BH CV ECHO MEAS - MV E MAX VEL: 99 CM/SEC
BH CV ECHO MEAS - MV E/A: 1.9
BH CV ECHO MEAS - PA ACC TIME: 0.12 SEC
BH CV ECHO MEAS - PA PR(ACCEL): 26.8 MMHG
BH CV ECHO MEAS - RAP SYSTOLE: 10 MMHG
BH CV ECHO MEAS - RVSP: 34.3 MMHG
BH CV ECHO MEAS - SI(AO): 94.7 ML/M^2
BH CV ECHO MEAS - SI(CUBED): 37.6 ML/M^2
BH CV ECHO MEAS - SI(MOD-SP4): 18.6 ML/M^2
BH CV ECHO MEAS - SI(TEICH): 28.2 ML/M^2
BH CV ECHO MEAS - SV(AO): 217.1 ML
BH CV ECHO MEAS - SV(CUBED): 86.3 ML
BH CV ECHO MEAS - SV(MOD-SP4): 42.6 ML
BH CV ECHO MEAS - SV(TEICH): 64.8 ML
BH CV ECHO MEAS - TR MAX VEL: 246.3 CM/SEC
BH CV STRESS BP STAGE 1: NORMAL
BH CV STRESS BP STAGE 2: NORMAL
BH CV STRESS BP STAGE 3: NORMAL
BH CV STRESS DURATION MIN STAGE 1: 3
BH CV STRESS DURATION MIN STAGE 2: 3
BH CV STRESS DURATION MIN STAGE 3: 2
BH CV STRESS DURATION SEC STAGE 1: 0
BH CV STRESS DURATION SEC STAGE 2: 0
BH CV STRESS DURATION SEC STAGE 3: 1
BH CV STRESS GRADE STAGE 1: 10
BH CV STRESS GRADE STAGE 2: 12
BH CV STRESS GRADE STAGE 3: 14
BH CV STRESS HR STAGE 1: 106
BH CV STRESS HR STAGE 2: 129
BH CV STRESS HR STAGE 3: 144
BH CV STRESS METS STAGE 1: 5
BH CV STRESS METS STAGE 2: 7.5
BH CV STRESS METS STAGE 3: 10
BH CV STRESS PROTOCOL 1: NORMAL
BH CV STRESS RECOVERY BP: NORMAL MMHG
BH CV STRESS RECOVERY HR: 95 BPM
BH CV STRESS SPEED STAGE 1: 1.7
BH CV STRESS SPEED STAGE 2: 2.5
BH CV STRESS SPEED STAGE 3: 3.4
BH CV STRESS STAGE 1: 1
BH CV STRESS STAGE 2: 2
BH CV STRESS STAGE 3: 3
LV EF NUC BP: 61 %
MAXIMAL PREDICTED HEART RATE: 165 BPM
MAXIMAL PREDICTED HEART RATE: 165 BPM
PERCENT MAX PREDICTED HR: 87.27 %
STRESS BASELINE BP: NORMAL MMHG
STRESS BASELINE HR: 65 BPM
STRESS PERCENT HR: 103 %
STRESS POST ESTIMATED WORKLOAD: 10.1 METS
STRESS POST EXERCISE DUR MIN: 8 MIN
STRESS POST EXERCISE DUR SEC: 1 SEC
STRESS POST PEAK BP: NORMAL MMHG
STRESS POST PEAK HR: 144 BPM
STRESS TARGET HR: 140 BPM
STRESS TARGET HR: 140 BPM

## 2020-09-16 PROCEDURE — 0 TECHNETIUM SESTAMIBI: Performed by: NURSE PRACTITIONER

## 2020-09-16 PROCEDURE — 78452 HT MUSCLE IMAGE SPECT MULT: CPT

## 2020-09-16 PROCEDURE — 93306 TTE W/DOPPLER COMPLETE: CPT

## 2020-09-16 PROCEDURE — 78452 HT MUSCLE IMAGE SPECT MULT: CPT | Performed by: INTERNAL MEDICINE

## 2020-09-16 PROCEDURE — A9500 TC99M SESTAMIBI: HCPCS | Performed by: NURSE PRACTITIONER

## 2020-09-16 PROCEDURE — 93017 CV STRESS TEST TRACING ONLY: CPT

## 2020-09-16 PROCEDURE — 93018 CV STRESS TEST I&R ONLY: CPT | Performed by: INTERNAL MEDICINE

## 2020-09-16 PROCEDURE — 93306 TTE W/DOPPLER COMPLETE: CPT | Performed by: INTERNAL MEDICINE

## 2020-09-16 RX ADMIN — TECHNETIUM TC 99M SESTAMIBI 1 DOSE: 1 INJECTION INTRAVENOUS at 09:16

## 2020-09-16 RX ADMIN — TECHNETIUM TC 99M SESTAMIBI 1 DOSE: 1 INJECTION INTRAVENOUS at 10:33

## 2020-10-14 ENCOUNTER — TELEPHONE (OUTPATIENT)
Dept: CARDIOLOGY | Facility: HOSPITAL | Age: 55
End: 2020-10-14

## 2020-10-15 RX ORDER — ROSUVASTATIN CALCIUM 10 MG/1
10 TABLET, COATED ORAL DAILY
Qty: 30 TABLET | Refills: 5 | Status: ON HOLD | OUTPATIENT
Start: 2020-10-15 | End: 2021-05-03

## 2021-03-18 ENCOUNTER — BULK ORDERING (OUTPATIENT)
Dept: CASE MANAGEMENT | Facility: OTHER | Age: 56
End: 2021-03-18

## 2021-03-18 DIAGNOSIS — Z23 IMMUNIZATION DUE: ICD-10-CM

## 2021-05-03 ENCOUNTER — APPOINTMENT (OUTPATIENT)
Dept: CT IMAGING | Facility: HOSPITAL | Age: 56
End: 2021-05-03

## 2021-05-03 ENCOUNTER — HOSPITAL ENCOUNTER (OUTPATIENT)
Facility: HOSPITAL | Age: 56
Setting detail: OBSERVATION
Discharge: HOME | End: 2021-05-04
Attending: EMERGENCY MEDICINE | Admitting: STUDENT IN AN ORGANIZED HEALTH CARE EDUCATION/TRAINING PROGRAM

## 2021-05-03 ENCOUNTER — APPOINTMENT (OUTPATIENT)
Dept: GENERAL RADIOLOGY | Facility: HOSPITAL | Age: 56
End: 2021-05-03

## 2021-05-03 DIAGNOSIS — R07.9 CHEST PAIN, UNSPECIFIED TYPE: Primary | ICD-10-CM

## 2021-05-03 LAB
ALBUMIN SERPL-MCNC: 4.13 G/DL (ref 3.5–5.2)
ALBUMIN/GLOB SERPL: 1.4 G/DL
ALP SERPL-CCNC: 78 U/L (ref 39–117)
ALT SERPL W P-5'-P-CCNC: 41 U/L (ref 1–41)
ANION GAP SERPL CALCULATED.3IONS-SCNC: 7.5 MMOL/L (ref 5–15)
AST SERPL-CCNC: 21 U/L (ref 1–40)
BASOPHILS # BLD AUTO: 0.05 10*3/MM3 (ref 0–0.2)
BASOPHILS NFR BLD AUTO: 0.8 % (ref 0–1.5)
BILIRUB SERPL-MCNC: 0.3 MG/DL (ref 0–1.2)
BUN SERPL-MCNC: 17 MG/DL (ref 6–20)
BUN/CREAT SERPL: 15.7 (ref 7–25)
CALCIUM SPEC-SCNC: 9.4 MG/DL (ref 8.6–10.5)
CHLORIDE SERPL-SCNC: 102 MMOL/L (ref 98–107)
CO2 SERPL-SCNC: 27.5 MMOL/L (ref 22–29)
CREAT SERPL-MCNC: 1.08 MG/DL (ref 0.76–1.27)
DEPRECATED RDW RBC AUTO: 42.7 FL (ref 37–54)
EOSINOPHIL # BLD AUTO: 0.3 10*3/MM3 (ref 0–0.4)
EOSINOPHIL NFR BLD AUTO: 4.5 % (ref 0.3–6.2)
ERYTHROCYTE [DISTWIDTH] IN BLOOD BY AUTOMATED COUNT: 13.6 % (ref 12.3–15.4)
FLUAV RNA RESP QL NAA+PROBE: NOT DETECTED
FLUBV RNA RESP QL NAA+PROBE: NOT DETECTED
GFR SERPL CREATININE-BSD FRML MDRD: 71 ML/MIN/1.73
GLOBULIN UR ELPH-MCNC: 3 GM/DL
GLUCOSE SERPL-MCNC: 100 MG/DL (ref 65–99)
HCT VFR BLD AUTO: 45.1 % (ref 37.5–51)
HGB BLD-MCNC: 14.9 G/DL (ref 13–17.7)
IMM GRANULOCYTES # BLD AUTO: 0.03 10*3/MM3 (ref 0–0.05)
IMM GRANULOCYTES NFR BLD AUTO: 0.5 % (ref 0–0.5)
LIPASE SERPL-CCNC: 34 U/L (ref 13–60)
LYMPHOCYTES # BLD AUTO: 1.83 10*3/MM3 (ref 0.7–3.1)
LYMPHOCYTES NFR BLD AUTO: 27.7 % (ref 19.6–45.3)
MCH RBC QN AUTO: 28.4 PG (ref 26.6–33)
MCHC RBC AUTO-ENTMCNC: 33 G/DL (ref 31.5–35.7)
MCV RBC AUTO: 85.9 FL (ref 79–97)
MONOCYTES # BLD AUTO: 0.61 10*3/MM3 (ref 0.1–0.9)
MONOCYTES NFR BLD AUTO: 9.2 % (ref 5–12)
NEUTROPHILS NFR BLD AUTO: 3.79 10*3/MM3 (ref 1.7–7)
NEUTROPHILS NFR BLD AUTO: 57.3 % (ref 42.7–76)
NRBC BLD AUTO-RTO: 0 /100 WBC (ref 0–0.2)
PLATELET # BLD AUTO: 214 10*3/MM3 (ref 140–450)
PMV BLD AUTO: 8.5 FL (ref 6–12)
POTASSIUM SERPL-SCNC: 4.5 MMOL/L (ref 3.5–5.2)
PROT SERPL-MCNC: 7.1 G/DL (ref 6–8.5)
RBC # BLD AUTO: 5.25 10*6/MM3 (ref 4.14–5.8)
SARS-COV-2 RNA RESP QL NAA+PROBE: NOT DETECTED
SODIUM SERPL-SCNC: 137 MMOL/L (ref 136–145)
TROPONIN T SERPL-MCNC: <0.01 NG/ML (ref 0–0.03)
TROPONIN T SERPL-MCNC: <0.01 NG/ML (ref 0–0.03)
WBC # BLD AUTO: 6.61 10*3/MM3 (ref 3.4–10.8)

## 2021-05-03 PROCEDURE — 70450 CT HEAD/BRAIN W/O DYE: CPT | Performed by: RADIOLOGY

## 2021-05-03 PROCEDURE — 87636 SARSCOV2 & INF A&B AMP PRB: CPT | Performed by: EMERGENCY MEDICINE

## 2021-05-03 PROCEDURE — 71045 X-RAY EXAM CHEST 1 VIEW: CPT | Performed by: RADIOLOGY

## 2021-05-03 PROCEDURE — 99220 PR INITIAL OBSERVATION CARE/DAY 70 MINUTES: CPT | Performed by: PHYSICIAN ASSISTANT

## 2021-05-03 PROCEDURE — G0378 HOSPITAL OBSERVATION PER HR: HCPCS

## 2021-05-03 PROCEDURE — 83690 ASSAY OF LIPASE: CPT | Performed by: PHYSICIAN ASSISTANT

## 2021-05-03 PROCEDURE — 85025 COMPLETE CBC W/AUTO DIFF WBC: CPT | Performed by: PHYSICIAN ASSISTANT

## 2021-05-03 PROCEDURE — 25010000002 ENOXAPARIN PER 10 MG: Performed by: STUDENT IN AN ORGANIZED HEALTH CARE EDUCATION/TRAINING PROGRAM

## 2021-05-03 PROCEDURE — 71045 X-RAY EXAM CHEST 1 VIEW: CPT

## 2021-05-03 PROCEDURE — 96372 THER/PROPH/DIAG INJ SC/IM: CPT

## 2021-05-03 PROCEDURE — 25010000002 MORPHINE PER 10 MG: Performed by: EMERGENCY MEDICINE

## 2021-05-03 PROCEDURE — 84484 ASSAY OF TROPONIN QUANT: CPT | Performed by: PHYSICIAN ASSISTANT

## 2021-05-03 PROCEDURE — 93005 ELECTROCARDIOGRAM TRACING: CPT | Performed by: EMERGENCY MEDICINE

## 2021-05-03 PROCEDURE — 96374 THER/PROPH/DIAG INJ IV PUSH: CPT

## 2021-05-03 PROCEDURE — 96375 TX/PRO/DX INJ NEW DRUG ADDON: CPT

## 2021-05-03 PROCEDURE — 99284 EMERGENCY DEPT VISIT MOD MDM: CPT

## 2021-05-03 PROCEDURE — 80053 COMPREHEN METABOLIC PANEL: CPT | Performed by: PHYSICIAN ASSISTANT

## 2021-05-03 PROCEDURE — 70450 CT HEAD/BRAIN W/O DYE: CPT

## 2021-05-03 PROCEDURE — 25010000002 ONDANSETRON PER 1 MG: Performed by: PHYSICIAN ASSISTANT

## 2021-05-03 RX ORDER — DEXLANSOPRAZOLE 60 MG/1
60 CAPSULE, DELAYED RELEASE ORAL EVERY MORNING
COMMUNITY
End: 2022-08-23

## 2021-05-03 RX ORDER — TAMSULOSIN HYDROCHLORIDE 0.4 MG/1
0.8 CAPSULE ORAL NIGHTLY
Status: CANCELLED | OUTPATIENT
Start: 2021-05-03

## 2021-05-03 RX ORDER — NITROGLYCERIN 0.4 MG/1
0.4 TABLET SUBLINGUAL
Status: DISCONTINUED | OUTPATIENT
Start: 2021-05-03 | End: 2021-05-04 | Stop reason: HOSPADM

## 2021-05-03 RX ORDER — SODIUM CHLORIDE 0.9 % (FLUSH) 0.9 %
10 SYRINGE (ML) INJECTION EVERY 12 HOURS SCHEDULED
Status: DISCONTINUED | OUTPATIENT
Start: 2021-05-03 | End: 2021-05-04 | Stop reason: HOSPADM

## 2021-05-03 RX ORDER — ONDANSETRON 2 MG/ML
4 INJECTION INTRAMUSCULAR; INTRAVENOUS ONCE
Status: COMPLETED | OUTPATIENT
Start: 2021-05-03 | End: 2021-05-03

## 2021-05-03 RX ORDER — FINASTERIDE 5 MG/1
5 TABLET, FILM COATED ORAL DAILY
Status: CANCELLED | OUTPATIENT
Start: 2021-05-03

## 2021-05-03 RX ORDER — PANTOPRAZOLE SODIUM 40 MG/1
40 TABLET, DELAYED RELEASE ORAL
Status: CANCELLED | OUTPATIENT
Start: 2021-05-04

## 2021-05-03 RX ORDER — ESCITALOPRAM OXALATE 10 MG/1
10 TABLET ORAL DAILY
Status: DISCONTINUED | OUTPATIENT
Start: 2021-05-04 | End: 2021-05-04 | Stop reason: HOSPADM

## 2021-05-03 RX ORDER — SODIUM CHLORIDE 0.9 % (FLUSH) 0.9 %
10 SYRINGE (ML) INJECTION AS NEEDED
Status: DISCONTINUED | OUTPATIENT
Start: 2021-05-03 | End: 2021-05-04 | Stop reason: HOSPADM

## 2021-05-03 RX ORDER — ASPIRIN 81 MG/1
81 TABLET ORAL DAILY
Status: DISCONTINUED | OUTPATIENT
Start: 2021-05-04 | End: 2021-05-04 | Stop reason: HOSPADM

## 2021-05-03 RX ORDER — FINASTERIDE 5 MG/1
5 TABLET, FILM COATED ORAL DAILY
Status: DISCONTINUED | OUTPATIENT
Start: 2021-05-03 | End: 2021-05-04 | Stop reason: HOSPADM

## 2021-05-03 RX ORDER — TIZANIDINE 4 MG/1
4 TABLET ORAL NIGHTLY
Status: CANCELLED | OUTPATIENT
Start: 2021-05-03

## 2021-05-03 RX ORDER — TIZANIDINE 4 MG/1
4 TABLET ORAL
COMMUNITY
End: 2021-05-04 | Stop reason: HOSPADM

## 2021-05-03 RX ORDER — ESCITALOPRAM OXALATE 10 MG/1
10 TABLET ORAL DAILY
Status: CANCELLED | OUTPATIENT
Start: 2021-05-04

## 2021-05-03 RX ORDER — PANTOPRAZOLE SODIUM 40 MG/1
40 TABLET, DELAYED RELEASE ORAL
Status: DISCONTINUED | OUTPATIENT
Start: 2021-05-04 | End: 2021-05-04 | Stop reason: HOSPADM

## 2021-05-03 RX ORDER — TAMSULOSIN HYDROCHLORIDE 0.4 MG/1
2 CAPSULE ORAL
COMMUNITY
End: 2022-01-11 | Stop reason: SDUPTHER

## 2021-05-03 RX ORDER — ASPIRIN 325 MG
325 TABLET, DELAYED RELEASE (ENTERIC COATED) ORAL ONCE
Status: COMPLETED | OUTPATIENT
Start: 2021-05-03 | End: 2021-05-03

## 2021-05-03 RX ORDER — TAMSULOSIN HYDROCHLORIDE 0.4 MG/1
0.8 CAPSULE ORAL DAILY
Status: DISCONTINUED | OUTPATIENT
Start: 2021-05-03 | End: 2021-05-04 | Stop reason: HOSPADM

## 2021-05-03 RX ADMIN — MORPHINE SULFATE 4 MG: 4 INJECTION, SOLUTION INTRAMUSCULAR; INTRAVENOUS at 15:53

## 2021-05-03 RX ADMIN — SODIUM CHLORIDE, PRESERVATIVE FREE 10 ML: 5 INJECTION INTRAVENOUS at 21:14

## 2021-05-03 RX ADMIN — ENOXAPARIN SODIUM 40 MG: 40 INJECTION SUBCUTANEOUS at 21:14

## 2021-05-03 RX ADMIN — FINASTERIDE 5 MG: 5 TABLET, FILM COATED ORAL at 23:23

## 2021-05-03 RX ADMIN — NITROGLYCERIN 1 INCH: 20 OINTMENT TOPICAL at 10:46

## 2021-05-03 RX ADMIN — ASPIRIN 325 MG: 325 TABLET, COATED ORAL at 10:48

## 2021-05-03 RX ADMIN — TAMSULOSIN HYDROCHLORIDE 0.8 MG: 0.4 CAPSULE ORAL at 23:23

## 2021-05-03 RX ADMIN — ONDANSETRON 4 MG: 2 INJECTION INTRAMUSCULAR; INTRAVENOUS at 15:53

## 2021-05-04 ENCOUNTER — APPOINTMENT (OUTPATIENT)
Dept: CARDIOLOGY | Facility: HOSPITAL | Age: 56
End: 2021-05-04

## 2021-05-04 ENCOUNTER — APPOINTMENT (OUTPATIENT)
Dept: CT IMAGING | Facility: HOSPITAL | Age: 56
End: 2021-05-04

## 2021-05-04 VITALS
TEMPERATURE: 98 F | SYSTOLIC BLOOD PRESSURE: 111 MMHG | WEIGHT: 234.4 LBS | RESPIRATION RATE: 18 BRPM | DIASTOLIC BLOOD PRESSURE: 66 MMHG | HEART RATE: 69 BPM | BODY MASS INDEX: 31.75 KG/M2 | HEIGHT: 72 IN | OXYGEN SATURATION: 97 %

## 2021-05-04 LAB
BH CV ECHO MEAS - % IVS THICK: 21.9 %
BH CV ECHO MEAS - % LVPW THICK: 47.6 %
BH CV ECHO MEAS - ACS: 2.3 CM
BH CV ECHO MEAS - AO ROOT AREA (BSA CORRECTED): 1.5
BH CV ECHO MEAS - AO ROOT AREA: 9.6 CM^2
BH CV ECHO MEAS - AO ROOT DIAM: 3.5 CM
BH CV ECHO MEAS - BSA(HAYCOCK): 2.4 M^2
BH CV ECHO MEAS - BSA: 2.3 M^2
BH CV ECHO MEAS - BZI_BMI: 31.7 KILOGRAMS/M^2
BH CV ECHO MEAS - BZI_METRIC_HEIGHT: 182.9 CM
BH CV ECHO MEAS - BZI_METRIC_WEIGHT: 106.1 KG
BH CV ECHO MEAS - EDV(CUBED): 128.8 ML
BH CV ECHO MEAS - EDV(MOD-SP4): 73.8 ML
BH CV ECHO MEAS - EDV(TEICH): 121 ML
BH CV ECHO MEAS - EF(CUBED): 67.7 %
BH CV ECHO MEAS - EF(MOD-SP4): 58.5 %
BH CV ECHO MEAS - EF(TEICH): 59 %
BH CV ECHO MEAS - ESV(CUBED): 41.6 ML
BH CV ECHO MEAS - ESV(MOD-SP4): 30.6 ML
BH CV ECHO MEAS - ESV(TEICH): 49.7 ML
BH CV ECHO MEAS - FS: 31.4 %
BH CV ECHO MEAS - IVS/LVPW: 0.92
BH CV ECHO MEAS - IVSD: 1.1 CM
BH CV ECHO MEAS - IVSS: 1.3 CM
BH CV ECHO MEAS - LA DIMENSION: 3.6 CM
BH CV ECHO MEAS - LA/AO: 1
BH CV ECHO MEAS - LV DIASTOLIC VOL/BSA (35-75): 32.4 ML/M^2
BH CV ECHO MEAS - LV MASS(C)D: 223.7 GRAMS
BH CV ECHO MEAS - LV MASS(C)DI: 98.2 GRAMS/M^2
BH CV ECHO MEAS - LV MASS(C)S: 202.5 GRAMS
BH CV ECHO MEAS - LV MASS(C)SI: 88.9 GRAMS/M^2
BH CV ECHO MEAS - LV SYSTOLIC VOL/BSA (12-30): 13.4 ML/M^2
BH CV ECHO MEAS - LVIDD: 5.1 CM
BH CV ECHO MEAS - LVIDS: 3.5 CM
BH CV ECHO MEAS - LVLD AP4: 6.9 CM
BH CV ECHO MEAS - LVLS AP4: 6.5 CM
BH CV ECHO MEAS - LVOT AREA (M): 4.2 CM^2
BH CV ECHO MEAS - LVOT AREA: 4.2 CM^2
BH CV ECHO MEAS - LVOT DIAM: 2.3 CM
BH CV ECHO MEAS - LVPWD: 1.2 CM
BH CV ECHO MEAS - LVPWS: 1.8 CM
BH CV ECHO MEAS - MV A MAX VEL: 58.7 CM/SEC
BH CV ECHO MEAS - MV E MAX VEL: 57.4 CM/SEC
BH CV ECHO MEAS - MV E/A: 0.98
BH CV ECHO MEAS - PA ACC TIME: 0.12 SEC
BH CV ECHO MEAS - PA PR(ACCEL): 26.8 MMHG
BH CV ECHO MEAS - RVDD: 1.6 CM
BH CV ECHO MEAS - SI(CUBED): 38.3 ML/M^2
BH CV ECHO MEAS - SI(MOD-SP4): 19 ML/M^2
BH CV ECHO MEAS - SI(TEICH): 31.3 ML/M^2
BH CV ECHO MEAS - SV(CUBED): 87.2 ML
BH CV ECHO MEAS - SV(MOD-SP4): 43.2 ML
BH CV ECHO MEAS - SV(TEICH): 71.4 ML
CHOLEST SERPL-MCNC: 237 MG/DL (ref 0–200)
HDLC SERPL-MCNC: 31 MG/DL (ref 40–60)
LDLC SERPL CALC-MCNC: 150 MG/DL (ref 0–100)
LDLC/HDLC SERPL: 4.71 {RATIO}
MAXIMAL PREDICTED HEART RATE: 165 BPM
STRESS TARGET HR: 140 BPM
TRIGL SERPL-MCNC: 300 MG/DL (ref 0–150)
VLDLC SERPL-MCNC: 56 MG/DL (ref 5–40)

## 2021-05-04 PROCEDURE — G0378 HOSPITAL OBSERVATION PER HR: HCPCS

## 2021-05-04 PROCEDURE — 99214 OFFICE O/P EST MOD 30 MIN: CPT | Performed by: INTERNAL MEDICINE

## 2021-05-04 PROCEDURE — 99217 PR OBSERVATION CARE DISCHARGE MANAGEMENT: CPT | Performed by: STUDENT IN AN ORGANIZED HEALTH CARE EDUCATION/TRAINING PROGRAM

## 2021-05-04 PROCEDURE — 0 IOPAMIDOL PER 1 ML: Performed by: STUDENT IN AN ORGANIZED HEALTH CARE EDUCATION/TRAINING PROGRAM

## 2021-05-04 PROCEDURE — 80061 LIPID PANEL: CPT | Performed by: INTERNAL MEDICINE

## 2021-05-04 PROCEDURE — 93306 TTE W/DOPPLER COMPLETE: CPT | Performed by: INTERNAL MEDICINE

## 2021-05-04 PROCEDURE — 93306 TTE W/DOPPLER COMPLETE: CPT

## 2021-05-04 PROCEDURE — 75574 CT ANGIO HRT W/3D IMAGE: CPT | Performed by: RADIOLOGY

## 2021-05-04 PROCEDURE — 75574 CT ANGIO HRT W/3D IMAGE: CPT

## 2021-05-04 PROCEDURE — 72131 CT LUMBAR SPINE W/O DYE: CPT

## 2021-05-04 PROCEDURE — 72131 CT LUMBAR SPINE W/O DYE: CPT | Performed by: RADIOLOGY

## 2021-05-04 RX ORDER — METOPROLOL TARTRATE 100 MG/1
100 TABLET ORAL ONCE AS NEEDED
Status: DISCONTINUED | OUTPATIENT
Start: 2021-05-04 | End: 2021-05-04 | Stop reason: HOSPADM

## 2021-05-04 RX ORDER — NAPROXEN 250 MG/1
500 TABLET ORAL ONCE
Status: COMPLETED | OUTPATIENT
Start: 2021-05-04 | End: 2021-05-04

## 2021-05-04 RX ORDER — ATORVASTATIN CALCIUM 40 MG/1
40 TABLET, FILM COATED ORAL NIGHTLY
Status: DISCONTINUED | OUTPATIENT
Start: 2021-05-04 | End: 2021-05-04 | Stop reason: HOSPADM

## 2021-05-04 RX ORDER — METOPROLOL TARTRATE 50 MG/1
50 TABLET, FILM COATED ORAL ONCE AS NEEDED
Status: DISCONTINUED | OUTPATIENT
Start: 2021-05-04 | End: 2021-05-04 | Stop reason: HOSPADM

## 2021-05-04 RX ORDER — LIDOCAINE HYDROCHLORIDE 10 MG/ML
5 INJECTION, SOLUTION EPIDURAL; INFILTRATION; INTRACAUDAL; PERINEURAL AS NEEDED
Status: DISCONTINUED | OUTPATIENT
Start: 2021-05-04 | End: 2021-05-04 | Stop reason: HOSPADM

## 2021-05-04 RX ORDER — ASPIRIN 81 MG/1
81 TABLET ORAL DAILY
Qty: 30 TABLET | Refills: 0 | Status: SHIPPED | OUTPATIENT
Start: 2021-05-05 | End: 2021-06-04

## 2021-05-04 RX ORDER — NITROGLYCERIN 0.4 MG/1
0.4 TABLET SUBLINGUAL
Qty: 30 TABLET | Refills: 0 | Status: SHIPPED | OUTPATIENT
Start: 2021-05-04 | End: 2022-08-23

## 2021-05-04 RX ORDER — SODIUM CHLORIDE 0.9 % (FLUSH) 0.9 %
3 SYRINGE (ML) INJECTION EVERY 12 HOURS SCHEDULED
Status: DISCONTINUED | OUTPATIENT
Start: 2021-05-04 | End: 2021-05-04 | Stop reason: HOSPADM

## 2021-05-04 RX ORDER — ATORVASTATIN CALCIUM 40 MG/1
40 TABLET, FILM COATED ORAL NIGHTLY
Qty: 30 TABLET | Refills: 0 | Status: SHIPPED | OUTPATIENT
Start: 2021-05-04 | End: 2021-06-03

## 2021-05-04 RX ORDER — NITROGLYCERIN 0.4 MG/1
0.4 TABLET SUBLINGUAL
Status: DISCONTINUED | OUTPATIENT
Start: 2021-05-04 | End: 2021-05-04 | Stop reason: HOSPADM

## 2021-05-04 RX ORDER — SODIUM CHLORIDE 0.9 % (FLUSH) 0.9 %
10 SYRINGE (ML) INJECTION AS NEEDED
Status: DISCONTINUED | OUTPATIENT
Start: 2021-05-04 | End: 2021-05-04 | Stop reason: HOSPADM

## 2021-05-04 RX ADMIN — METOPROLOL TARTRATE 5 MG: 5 INJECTION, SOLUTION INTRAVENOUS at 14:39

## 2021-05-04 RX ADMIN — NITROGLYCERIN 0.4 MG: 0.4 TABLET SUBLINGUAL at 14:47

## 2021-05-04 RX ADMIN — PANTOPRAZOLE SODIUM 40 MG: 40 TABLET, DELAYED RELEASE ORAL at 16:30

## 2021-05-04 RX ADMIN — METOPROLOL TARTRATE 5 MG: 5 INJECTION, SOLUTION INTRAVENOUS at 14:45

## 2021-05-04 RX ADMIN — SODIUM CHLORIDE, PRESERVATIVE FREE 10 ML: 5 INJECTION INTRAVENOUS at 08:07

## 2021-05-04 RX ADMIN — NAPROXEN 500 MG: 250 TABLET ORAL at 14:00

## 2021-05-04 RX ADMIN — METOPROLOL TARTRATE 5 MG: 5 INJECTION, SOLUTION INTRAVENOUS at 14:34

## 2021-05-04 RX ADMIN — METOPROLOL TARTRATE 25 MG: 25 TABLET, FILM COATED ORAL at 11:13

## 2021-05-04 RX ADMIN — IOPAMIDOL 80 ML: 755 INJECTION, SOLUTION INTRAVENOUS at 15:03

## 2021-05-05 LAB
QT INTERVAL: 340 MS
QTC INTERVAL: 443 MS

## 2021-05-10 NOTE — DISCHARGE SUMMARY
[facility] HOSPITALISTS DISCHARGE SUMMARY    Patient Identification:  Name:  Avery Watson  Age:  55 y.o.  Sex:  male  :  1965  MRN:  [id number]  Visit Number:  [id number]    Date of Admission: 5/3/2021  Date of Discharge: 2021    PCP: Kreis, Samuel Duane, MD    DISCHARGE DIAGNOSIS    Chest pain  Hyperlipidemia  GERD  Chronic low back pain  Lumbar spine osteoarthritis  CONSULTS     Cardiology  PROCEDURES PERFORMED    CT angiogram of the heart  HOSPITAL COURSE  Patient is a 55 y.o. male presented to [facility] complaining of chest pain and fatigue.  Please see the admitting history and physical for further details.      Patient was admitted to the hospitalist service for assessment of chest pain to rule out underlying coronary artery disease.  Patient had previously had stress test done in 2020 without evidence of ischemia and echocardiogram obtained at the time showed only mild tricuspid valve regurg.  Due to patient's recurrent chest pain despite previous stress testing and echo cardiology was consulted and scheduled the patient for CT angiogram of the heart.  Patient was initially placed on aspirin and statin while awaiting further work-up.  Patient had also complained of lower extremity numbness and weakness had resolved by time of his presentation to the hospital and a CT of the lumbar spine was obtained.  Patient underwent successful CT angiogram of the heart with preliminary read showing no acute findings.  Discussed with cardiology and the patient pulmonary findings being negative and the patient was medically stable and cleared for discharge.  Patient was okay with this plan of care as he was no longer having any further episodes of chest pains and agreed to follow-up with cardiology in the office regarding the final results of the CT angiogram.  A CT of the lumbar spine did show mild arthritic changes but otherwise no spinal stenosis or acute bony  abnormalities.  Cardiology recommended that the patient be started on aspirin 81 mg, atorvastatin 40 mg and metoprolol tartrate 25 mg and will see the patient in 1 week in the office.  Follow-up of with cardiology was scheduled and patient will also see his PCP in 1 week for hospital follow-up.    VITAL SIGNS:        on   ;        Body mass index is 31.79 kg/m².  Wt Readings from Last 3 Encounters:   05/04/21 106 kg (234 lb 6.4 oz)   08/17/20 108 kg (238 lb 2 oz)   03/12/19 97.5 kg (215 lb)       PHYSICAL EXAM:  Constitutional:  Well-developed and well-nourished.  No respiratory distress.      HENT:  Head:  Normocephalic and atraumatic.  Mouth:  Moist mucous membranes.    Eyes:  Conjunctivae and EOM are normal.  No scleral icterus.    Neck:  Neck supple.  No JVD present.    Cardiovascular:  Normal rate, regular rhythm and normal heart sounds with no murmur.  Pulmonary/Chest:  No respiratory distress, no wheezes, no crackles, with normal breath sounds and good air movement.  Abdominal:  Soft.  Bowel sounds are normal.  No distension and no tenderness.   Musculoskeletal:  No edema, no tenderness, and no deformity.  No red or swollen joints anywhere.    Neurological:  Alert and oriented to person, place, and time.  No cranial nerve deficit.  No tongue deviation.  No facial droop.  No slurred speech.   Skin:  Skin is warm and dry. No rash noted. No pallor.   Peripheral vascular: Pulses in all 4 extremities with no clubbing, no cyanosis, no edema.    DISCHARGE DISPOSITION   Stable    DISCHARGE MEDICATIONS:     Discharge Medications      New Medications      Instructions Start Date   Aspirin Low Dose 81 MG EC tablet  Generic drug: aspirin   81 mg, Oral, Daily      atorvastatin 40 MG tablet  Commonly known as: LIPITOR   40 mg, Oral, Nightly      metoprolol tartrate 25 MG tablet  Commonly known as: LOPRESSOR   25 mg, Oral, 2 Times Daily      nitroglycerin 0.4 MG SL tablet  Commonly known as: NITROSTAT   0.4 mg, Sublingual,  Every 5 Minutes PRN, No more than 3 in 15 minutes.         Changes to Medications      Instructions Start Date   finasteride 5 MG tablet  Commonly known as: Proscar  What changed: when to take this   5 mg, Oral, Daily         Continue These Medications      Instructions Start Date   dexlansoprazole 60 MG capsule  Commonly known as: DEXILANT   60 mg, Oral, Every Morning      escitalopram 10 MG tablet  Commonly known as: LEXAPRO   10 mg, Oral, Daily      tamsulosin 0.4 MG capsule 24 hr capsule  Commonly known as: FLOMAX   2 capsules, Oral, Every Night at Bedtime         Stop These Medications    tiZANidine 4 MG tablet  Commonly known as: ZANAFLEX            Diet Instructions     REGULAR          Activity Instructions     AS TOLERATED          Additional Instructions for the Follow-ups that You Need to Schedule     Discharge Follow-up with PCP   As directed       Currently Documented PCP:    Kreis, Samuel Duane, MD    PCP Phone Number:    [phone]     Follow Up Details: 1 week hospital follow up         Discharge Follow-up with Specialty: Cardiology; 1 Week   As directed      Specialty: Cardiology    Follow Up: 1 Week    Follow Up Details: Dr. Corona in one week for follow up           Follow-up Information     Kreis, Samuel Duane, MD Follow up in 1 week(s).    Specialty: Family Medicine  Why: MAY12 @10:30AM  Contact information:  [address] [place] [address]  [phone]             Leonel Corona MD Follow up in 3 week(s).    Specialty: Cardiology  Why: PATIENT WILL BE NOTIFIED ABOUT APPT ON WED 5/5AM  Contact information:  [address] [place] [address]  [phone]             Kreis, Samuel Duane, MD Follow up.    Specialty: Family Medicine  Why: pt  has  an  apt  with  dr ho  for june 22  at 11:15  Contact information:  [address] [place] [address]  [phone]                    TEST  RESULTS PENDING AT DISCHARGE       CODE STATUS  Code Status and Medical  Interventions:   Ordered at: 05/03/21 1559     Code Status:    CPR     Medical Interventions (Level of Support Prior to Arrest):    Full       Jose Patel DO  [place] [facility]ist  05/10/21  11:49 EDT    Please note that this discharge summary required more than 30 minutes to complete.

## 2021-07-30 ENCOUNTER — TRANSCRIBE ORDERS (OUTPATIENT)
Dept: ADMINISTRATIVE | Facility: HOSPITAL | Age: 56
End: 2021-07-30

## 2021-12-01 DIAGNOSIS — R35.1 BENIGN PROSTATIC HYPERPLASIA WITH NOCTURIA: ICD-10-CM

## 2021-12-01 DIAGNOSIS — N40.1 BENIGN PROSTATIC HYPERPLASIA WITH NOCTURIA: ICD-10-CM

## 2021-12-01 RX ORDER — FINASTERIDE 5 MG/1
5 TABLET, FILM COATED ORAL DAILY
Qty: 30 TABLET | Refills: 11 | Status: CANCELLED | OUTPATIENT
Start: 2021-12-01

## 2021-12-02 NOTE — TELEPHONE ENCOUNTER
Called patient to schedule an appointment, patient stated that the medication wasn't through us. He will call back if needed.

## 2022-01-11 ENCOUNTER — PROCEDURE VISIT (OUTPATIENT)
Dept: NEUROLOGY | Facility: CLINIC | Age: 57
End: 2022-01-11

## 2022-01-11 DIAGNOSIS — M47.816 LUMBAR FACET ARTHROPATHY: Primary | ICD-10-CM

## 2022-01-11 PROCEDURE — 95909 NRV CNDJ TST 5-6 STUDIES: CPT | Performed by: PSYCHIATRY & NEUROLOGY

## 2022-01-11 PROCEDURE — 95886 MUSC TEST DONE W/N TEST COMP: CPT | Performed by: PSYCHIATRY & NEUROLOGY

## 2022-01-11 RX ORDER — FINASTERIDE 5 MG/1
TABLET, FILM COATED ORAL EVERY 24 HOURS
COMMUNITY
Start: 2021-06-03 | End: 2022-08-23

## 2022-01-11 NOTE — PROGRESS NOTES
[facility] [place]   Electrodiagnostic Laboratory    Nerve Conduction & EMG Report        Patient:  Avery Watson   Patient ID: [id number]   YOB: 1965  Sex:  male      Exam Physician:  Isiah Obrien MD  Refer Physician:  Jr Jesus Barrett MD    Electromyogram and Nerve Conduction Velocity Procedure Note    Hx: 56 y.o. right handed male with complaint of pain involving the low back.  Symptoms have been present for several months and were provoked by activity. Significant past medical history includes lumbar stenosis.  Family history no family history of nerve or muscle disease.    Exam: Motor power is normal. There is no atrophy. There are no fasciculations. Deep tendon reflexes are present and symmetrical. Sensory exam is normal.      Edx studies of the L LE were performed to evaluate for lumbar radiculopathy.     NCS Examination   For sensory nerve conduction studies, the amplitude is measured peak-to-peak, the latency reported is the distal peak latency, and the conduction velocity, if measured, is determined from onset latencies and is over the forearm.   For motor nerve conduction studies, the amplitude is measured baseline-to-peak, the latency reported is the distal onset latency, the conduction velocity is calculated over the forearm, and the F wave latency is the minimum latency.   Unless otherwise noted, the hand temperature was monitored continuously and remained between 32°C and 36°C during the performance of the NCSs.        Sensory NCS      Nerve / Sites Rec. Site Onset Lat Peak Lat NP Amp PP Amp Segments Distance Velocity     ms ms µV µV  cm m/s   L Sural - Ankle (Calf)      Calf Ankle 1.46 2.34 22.4 36.3 Calf - Ankle 14 96   L Superficial peroneal - Ankle      Lat leg Ankle 2.97 3.59 8.1 2.5 Lat leg - Ankle 14 47           Motor NCS      Nerve / Sites Muscle Latency Amplitude Amp % Duration Segments Distance Lat Diff Velocity     ms mV % ms  cm ms m/s   L Peroneal - EDB       Ankle EDB 3.23 10.7 100 6.09 Ankle - EDB 8        Fib head EDB 11.09 9.9 92.7 6.82 Fib head - Ankle 36 7.86 46      Pop fossa EDB 12.55 9.8 91 6.77 Pop fossa - Fib head 10 1.46 69   L Tibial - AH      Ankle AH 4.06 10.1 100 5.57 Ankle - AH 8        Pop fossa AH 14.32 8.8 87.3 6.15 Pop fossa - Ankle 42 10.26 41           F  Wave      Nerve F Lat M Lat F-M Lat    ms ms ms   L Peroneal - EDB 54.0 3.1 50.9   L Tibial - AH 53.9 4.1 49.8           H Reflex      Nerve H Lat    ms   L Tibial - Soleus 32.8         EMG         EMG Summary Table     Spontaneous MUAP Recruitment   Muscle Nerve Roots IA Fib PSW Fasc H.F. Amp Dur. PPP Pattern   L. Tibialis anterior Deep peroneal (Fibular) L4-L5 N None None None None N N N N   L. Tibialis posterior Tibial L4-L5 N None None None None N N N N   L. Gastrocnemius (Lateral head) Tibial S1-S2 N None None None None N N N N   L. Biceps femoris (long head) Sciatic (tibial division) L5-S2 N None None None None N N N N   L. Lumbar paraspinals (low)  - N None None None None N N N N   L. Iliopsoas Femoral L2-L3 N None None None None N N N N   L. Quadriceps Femoral L2-L4 N None None None None N N N N       Summary    The motor conduction test was normal in all 2 of the tested nerves: L Peroneal - EDB, L Tibial - AH.    The sensory conduction test was normal in all 2 of the tested nerves: L Sural - Ankle (Calf), L Superficial peroneal - Ankle.    The F wave study was normal in all 2 of the tested nerves: L Peroneal - EDB, L Tibial - AH.    The H reflex study was normal in all 1 of the tested nerves: L Tibial - Soleus.    The needle EMG study was normal in all 7 tested muscles: L. Tibialis anterior, L. Tibialis posterior, L. Gastrocnemius (Lateral head), L. Biceps femoris (long head), L. Lumbar paraspinals (low), L. Iliopsoas, L. Quadriceps.            Conclusion: Normal NCV and EMG of the left lower extremity.           Instrument used:  [employer] Synergy        Performed by:          Isiah MCMAHON  MD Micah

## 2022-01-20 RX ORDER — DEXLANSOPRAZOLE 60 MG/1
60 CAPSULE, DELAYED RELEASE ORAL DAILY
Qty: 30 CAPSULE | Refills: 1 | Status: CANCELLED | OUTPATIENT
Start: 2022-01-20

## 2022-03-24 ENCOUNTER — TRANSCRIBE ORDERS (OUTPATIENT)
Dept: ADMINISTRATIVE | Facility: HOSPITAL | Age: 57
End: 2022-03-24

## 2022-03-24 DIAGNOSIS — M48.061 SPINAL STENOSIS, LUMBAR REGION, WITHOUT NEUROGENIC CLAUDICATION: Primary | ICD-10-CM

## 2022-03-25 ENCOUNTER — TRANSCRIBE ORDERS (OUTPATIENT)
Dept: OTHER | Facility: OTHER | Age: 57
End: 2022-03-25

## 2022-03-25 ENCOUNTER — LAB (OUTPATIENT)
Dept: LAB | Facility: HOSPITAL | Age: 57
End: 2022-03-25

## 2022-03-25 DIAGNOSIS — M54.50 LOW BACK PAIN, UNSPECIFIED BACK PAIN LATERALITY, UNSPECIFIED CHRONICITY, UNSPECIFIED WHETHER SCIATICA PRESENT: ICD-10-CM

## 2022-03-25 DIAGNOSIS — M51.37 OTHER INTERVERTEBRAL DISC DEGENERATION, LUMBOSACRAL REGION: ICD-10-CM

## 2022-03-25 DIAGNOSIS — F34.1 DYSTHYMIC DISORDER: Primary | ICD-10-CM

## 2022-03-25 DIAGNOSIS — M51.26 OTHER INTERVERTEBRAL DISC DISPLACEMENT, LUMBAR REGION: ICD-10-CM

## 2022-03-25 DIAGNOSIS — F34.1 DYSTHYMIC DISORDER: ICD-10-CM

## 2022-03-25 DIAGNOSIS — M25.50 ARTHRALGIA, UNSPECIFIED JOINT: ICD-10-CM

## 2022-03-25 LAB
25(OH)D3 SERPL-MCNC: 23.4 NG/ML (ref 30–100)
ALBUMIN SERPL-MCNC: 4.3 G/DL (ref 3.5–5.2)
ALBUMIN/GLOB SERPL: 1.6 G/DL
ALP SERPL-CCNC: 82 U/L (ref 39–117)
ALT SERPL W P-5'-P-CCNC: 29 U/L (ref 1–41)
ANION GAP SERPL CALCULATED.3IONS-SCNC: 12.4 MMOL/L (ref 5–15)
AST SERPL-CCNC: 15 U/L (ref 1–40)
BASOPHILS # BLD AUTO: 0.04 10*3/MM3 (ref 0–0.2)
BASOPHILS NFR BLD AUTO: 0.6 % (ref 0–1.5)
BILIRUB SERPL-MCNC: 0.4 MG/DL (ref 0–1.2)
BUN SERPL-MCNC: 14 MG/DL (ref 6–20)
BUN/CREAT SERPL: 15.9 (ref 7–25)
CALCIUM SPEC-SCNC: 9.2 MG/DL (ref 8.6–10.5)
CHLORIDE SERPL-SCNC: 104 MMOL/L (ref 98–107)
CHOLEST SERPL-MCNC: 235 MG/DL (ref 0–200)
CHROMATIN AB SERPL-ACNC: <10 IU/ML (ref 0–14)
CO2 SERPL-SCNC: 23.6 MMOL/L (ref 22–29)
CREAT SERPL-MCNC: 0.88 MG/DL (ref 0.76–1.27)
CRP SERPL-MCNC: 0.33 MG/DL (ref 0–0.5)
DEPRECATED RDW RBC AUTO: 40.8 FL (ref 37–54)
EGFRCR SERPLBLD CKD-EPI 2021: 100.9 ML/MIN/1.73
EOSINOPHIL # BLD AUTO: 0.17 10*3/MM3 (ref 0–0.4)
EOSINOPHIL NFR BLD AUTO: 2.5 % (ref 0.3–6.2)
ERYTHROCYTE [DISTWIDTH] IN BLOOD BY AUTOMATED COUNT: 13.3 % (ref 12.3–15.4)
ERYTHROCYTE [SEDIMENTATION RATE] IN BLOOD: 18 MM/HR (ref 0–20)
FOLATE SERPL-MCNC: >20 NG/ML (ref 4.78–24.2)
GLOBULIN UR ELPH-MCNC: 2.7 GM/DL
GLUCOSE SERPL-MCNC: 93 MG/DL (ref 65–99)
HCT VFR BLD AUTO: 44.9 % (ref 37.5–51)
HDLC SERPL-MCNC: 37 MG/DL (ref 40–60)
HGB BLD-MCNC: 15.5 G/DL (ref 13–17.7)
IMM GRANULOCYTES # BLD AUTO: 0.03 10*3/MM3 (ref 0–0.05)
IMM GRANULOCYTES NFR BLD AUTO: 0.4 % (ref 0–0.5)
LDLC SERPL CALC-MCNC: 169 MG/DL (ref 0–100)
LDLC/HDLC SERPL: 4.49 {RATIO}
LYMPHOCYTES # BLD AUTO: 1.98 10*3/MM3 (ref 0.7–3.1)
LYMPHOCYTES NFR BLD AUTO: 28.8 % (ref 19.6–45.3)
MCH RBC QN AUTO: 29.1 PG (ref 26.6–33)
MCHC RBC AUTO-ENTMCNC: 34.5 G/DL (ref 31.5–35.7)
MCV RBC AUTO: 84.2 FL (ref 79–97)
MONOCYTES # BLD AUTO: 0.5 10*3/MM3 (ref 0.1–0.9)
MONOCYTES NFR BLD AUTO: 7.3 % (ref 5–12)
NEUTROPHILS NFR BLD AUTO: 4.15 10*3/MM3 (ref 1.7–7)
NEUTROPHILS NFR BLD AUTO: 60.4 % (ref 42.7–76)
NRBC BLD AUTO-RTO: 0 /100 WBC (ref 0–0.2)
PLATELET # BLD AUTO: 221 10*3/MM3 (ref 140–450)
PMV BLD AUTO: 9.2 FL (ref 6–12)
POTASSIUM SERPL-SCNC: 4.2 MMOL/L (ref 3.5–5.2)
PROT SERPL-MCNC: 7 G/DL (ref 6–8.5)
RBC # BLD AUTO: 5.33 10*6/MM3 (ref 4.14–5.8)
SODIUM SERPL-SCNC: 140 MMOL/L (ref 136–145)
T4 FREE SERPL-MCNC: 1.36 NG/DL (ref 0.93–1.7)
TRIGL SERPL-MCNC: 159 MG/DL (ref 0–150)
TSH SERPL DL<=0.05 MIU/L-ACNC: 1.34 UIU/ML (ref 0.27–4.2)
VIT B12 BLD-MCNC: 660 PG/ML (ref 211–946)
VLDLC SERPL-MCNC: 29 MG/DL (ref 5–40)
WBC NRBC COR # BLD: 6.87 10*3/MM3 (ref 3.4–10.8)

## 2022-03-25 PROCEDURE — 86431 RHEUMATOID FACTOR QUANT: CPT

## 2022-03-25 PROCEDURE — 80061 LIPID PANEL: CPT

## 2022-03-25 PROCEDURE — 82746 ASSAY OF FOLIC ACID SERUM: CPT

## 2022-03-25 PROCEDURE — 80050 GENERAL HEALTH PANEL: CPT

## 2022-03-25 PROCEDURE — 36415 COLL VENOUS BLD VENIPUNCTURE: CPT

## 2022-03-25 PROCEDURE — 85652 RBC SED RATE AUTOMATED: CPT

## 2022-03-25 PROCEDURE — 84439 ASSAY OF FREE THYROXINE: CPT

## 2022-03-25 PROCEDURE — 82306 VITAMIN D 25 HYDROXY: CPT

## 2022-03-25 PROCEDURE — 86140 C-REACTIVE PROTEIN: CPT

## 2022-03-25 PROCEDURE — 82607 VITAMIN B-12: CPT

## 2022-03-25 PROCEDURE — 86038 ANTINUCLEAR ANTIBODIES: CPT

## 2022-03-26 LAB — ANA SER QL: NEGATIVE

## 2022-03-28 ENCOUNTER — HOSPITAL ENCOUNTER (OUTPATIENT)
Dept: MRI IMAGING | Facility: HOSPITAL | Age: 57
Discharge: HOME | End: 2022-03-28
Admitting: NURSE PRACTITIONER

## 2022-03-28 DIAGNOSIS — M48.061 SPINAL STENOSIS, LUMBAR REGION, WITHOUT NEUROGENIC CLAUDICATION: ICD-10-CM

## 2022-03-28 PROCEDURE — 72148 MRI LUMBAR SPINE W/O DYE: CPT

## 2022-03-28 PROCEDURE — 72148 MRI LUMBAR SPINE W/O DYE: CPT | Performed by: RADIOLOGY

## 2022-05-11 ENCOUNTER — OFFICE VISIT (OUTPATIENT)
Dept: NEUROSURGERY | Facility: CLINIC | Age: 57
End: 2022-05-11

## 2022-05-11 VITALS
HEIGHT: 72 IN | BODY MASS INDEX: 33.32 KG/M2 | WEIGHT: 246 LBS | TEMPERATURE: 97.7 F | DIASTOLIC BLOOD PRESSURE: 74 MMHG | SYSTOLIC BLOOD PRESSURE: 120 MMHG

## 2022-05-11 DIAGNOSIS — M47.816 LUMBAR FACET ARTHROPATHY: ICD-10-CM

## 2022-05-11 DIAGNOSIS — M53.9 MULTILEVEL DEGENERATIVE DISC DISEASE: Primary | ICD-10-CM

## 2022-05-11 DIAGNOSIS — M51.36 DDD (DEGENERATIVE DISC DISEASE), LUMBAR: ICD-10-CM

## 2022-05-11 PROCEDURE — 99204 OFFICE O/P NEW MOD 45 MIN: CPT | Performed by: PHYSICIAN ASSISTANT

## 2022-05-11 RX ORDER — ERGOCALCIFEROL 1.25 MG/1
1 CAPSULE ORAL
COMMUNITY
Start: 2022-03-28 | End: 2022-09-22 | Stop reason: HOSPADM

## 2022-05-11 NOTE — PROGRESS NOTES
Patient: Avery Watson  : 1965  Chart #: [id number]    Date of Service: 2022    Chief Complaint   Patient presents with   • Back Pain     Lower Back       HPI  This is a 56-year-old gentleman with chronic low back pain on and off for the last 30 years.  Over the last few months his back pain has gotten worse, he occasionally will get pain that radiates into the right anterior thigh to the knee.  He has been to physical therapy this past year without improvement in his symptoms.  Bending and twisting makes his pain in the back worse laying flat will alleviate his pain.  The patient has been to neurology and had EMG and nerve conduction studies, he also has an MRI for review today.    Chronic Illnesses:  Chronic low back pain  Osteoarthritis  Degenerative disc disease  Past Medical History:   Diagnosis Date   • Acid reflux    • Chronic pain disorder    • Extremity pain    • Joint pain    • Low back pain    • Lumbosacral disc disease    • Migraine    • Muscle spasm    • Neck pain    • Osteoarthritis    • PONV (postoperative nausea and vomiting)          Past Surgical History:   Procedure Laterality Date   • CARPAL TUNNEL RELEASE Right 2016    Procedure: CARPAL TUNNEL RELEASE;  Surgeon: Yoel Lua MD;  Location: [facility];  Service:    • CARPAL TUNNEL RELEASE Left 2017    Procedure: CARPAL TUNNEL RELEASE;  Surgeon: Yoel Lua MD;  Location: [facility];  Service:    • COLONOSCOPY     • LUMBAR FACET INJECTION     • TOE SURGERY Right 2015       No Known Allergies      Current Outpatient Medications:   •  atorvastatin (LIPITOR) 10 MG tablet, Take 1 tablet by mouth every day., Disp: 30 tablet, Rfl: 2  •  azithromycin (ZITHROMAX) 250 MG tablet, Take 2 tablets by mouth daily for 1 day, then 1 tablet once daily for 4 days (Patient taking differently: Take 2 tablets by mouth daily for 1 day, then 1 tablet once daily for 4 days), Disp: 6 tablet, Rfl: 0  •  dexlansoprazole  (DEXILANT) 60 MG capsule, Take 60 mg by mouth Every Morning., Disp: , Rfl:   •  dexlansoprazole (DEXILANT) 60 MG capsule, Take 1 capsule by mouth Daily., Disp: 90 capsule, Rfl: 1  •  DULoxetine (CYMBALTA) 60 MG capsule, Take 1 capsule by mouth every day., Disp: 90 capsule, Rfl: 0  •  ergocalciferol (ERGOCALCIFEROL) 1.25 MG (63020 UT) capsule, Take 1 capsule by mouth., Disp: , Rfl:   •  escitalopram (Lexapro) 10 MG tablet, Take 1 tablet by mouth Daily., Disp: 90 tablet, Rfl: 0  •  finasteride (PROSCAR) 5 MG tablet, Daily., Disp: , Rfl:   •  FLUoxetine (PROzac) 20 MG capsule, Take 1 capsule by mouth every day in the morning., Disp: 90 capsule, Rfl: 0  •  pantoprazole (PROTONIX) 40 MG EC tablet, Take 1 tablet by mouth Daily., Disp: 90 tablet, Rfl: 0  •  tamsulosin (FLOMAX) 0.4 MG capsule 24 hr capsule, Take 2 capsules by mouth Daily for prostate., Disp: 180 capsule, Rfl: 1  •  tamsulosin (FLOMAX) 0.4 MG capsule 24 hr capsule, Take 2 capsules by mouth Daily for Prostate., Disp: 60 capsule, Rfl: 0  •  tiZANidine (ZANAFLEX) 4 MG tablet, Take 1 tablet by mouth every 6 to 8 hours as needed. do not exceed 3 does in 24 hours, Disp: 30 tablet, Rfl: 1  •  nitroglycerin (NITROSTAT) 0.4 MG SL tablet, Place 1 tablet under the tongue Every 5 (Five) Minutes As Needed for Chest Pain (Only if SBP Greater Than 100) for up to 30 days. No more than 3 in 15 minutes., Disp: 30 tablet, Rfl: 0    Social History     Socioeconomic History   • Marital status:    Tobacco Use   • Smoking status: Never Smoker   • Smokeless tobacco: Current User     Types: Chew   • Tobacco comment: uses chewing tobacco   Vaping Use   • Vaping Use: Never used   Substance and Sexual Activity   • Alcohol use: No   • Drug use: No   • Sexual activity: Defer       Family History   Problem Relation Age of Onset   • Lung cancer Mother    • Cancer Mother         ovarian   • Diabetes Sister    • Rheum arthritis Brother    • Cancer Sister         breast       BMI is >=  "30 and <= 34.9 (Class 1 obesity). The following options were offered after discussion: referral to primary care       Social History    Tobacco Use      Smoking status: Never Smoker      Smokeless tobacco: Current User        Types: Chew      Tobacco comment: uses chewing tobacco       Physical examination:  Blood pressure 120/74, temperature 97.7 °F (36.5 °C), temperature source Infrared, height 182.9 cm (72\"), weight 112 kg (246 lb).  HEENT- normocephalic, atraumatic, sclera clear  Lungs-normal expansion, no wheezing  Heart-regular rate and rhythm  Extremities-positive pulses, no edema    Neurologic Exam  WDWN WM  A/A/C, speech clear, attention normal, conversant, answers questions appropriately, good historian.  Cranial nerves II through XII are intact.  Motor examination does not reveal weakness in the , upper or lower extremities.   Sensation is intact.  Gait is normal, balance is normal.   No tremors are noted.  Reflexes are intact.     Palpation of the lumbar paraspinal muscles is mildly tender.    Radiographic Imaging:  For my review is a lumbar MRI, 2/27/2020, that reveals mild left neuroforaminal narrowing at L5-S1 with mild facet arthropathy, there is mild multilevel degenerative disc disease throughout the lumbar spine without canal or nerve root compression.            EMG/NCV reveals a normal left lower extremity study.    Medical Decision Making  Assessment and Plan:    Diagnoses and all orders for this visit:    1. Multilevel degenerative disc disease (Primary)    2. Lumbar facet arthropathy/lumbar spondylosis without myelopathy    3. DDD (degenerative disc disease), lumbar    I currently do not fine evidence of nerve root compression that would require surgical intervention. I am waiting to review a lumbar xray to evaluate for instability.        Kay Alcaraz, PAC    Patient Care Team:  Mei Alcaraz APRN as PCP - General (Nurse Practitioner)        "

## 2022-06-02 ENCOUNTER — TELEPHONE (OUTPATIENT)
Dept: NEUROSURGERY | Facility: CLINIC | Age: 57
End: 2022-06-02

## 2022-06-02 DIAGNOSIS — M51.36 DDD (DEGENERATIVE DISC DISEASE), LUMBAR: Primary | ICD-10-CM

## 2022-06-02 DIAGNOSIS — M47.816 LUMBAR FACET ARTHROPATHY: ICD-10-CM

## 2022-06-19 ENCOUNTER — APPOINTMENT (OUTPATIENT)
Dept: CT IMAGING | Facility: HOSPITAL | Age: 57
End: 2022-06-19

## 2022-06-19 ENCOUNTER — APPOINTMENT (OUTPATIENT)
Dept: ULTRASOUND IMAGING | Facility: HOSPITAL | Age: 57
End: 2022-06-19

## 2022-06-19 ENCOUNTER — HOSPITAL ENCOUNTER (EMERGENCY)
Facility: HOSPITAL | Age: 57
Discharge: HOME | End: 2022-06-19
Attending: EMERGENCY MEDICINE | Admitting: EMERGENCY MEDICINE

## 2022-06-19 VITALS
OXYGEN SATURATION: 96 % | HEART RATE: 115 BPM | HEIGHT: 72 IN | TEMPERATURE: 98.2 F | RESPIRATION RATE: 18 BRPM | DIASTOLIC BLOOD PRESSURE: 81 MMHG | SYSTOLIC BLOOD PRESSURE: 127 MMHG | BODY MASS INDEX: 33.59 KG/M2 | WEIGHT: 248 LBS

## 2022-06-19 DIAGNOSIS — G89.29 CHRONIC MIDLINE LOW BACK PAIN, UNSPECIFIED WHETHER SCIATICA PRESENT: Primary | ICD-10-CM

## 2022-06-19 DIAGNOSIS — R10.84 GENERALIZED ABDOMINAL PAIN: ICD-10-CM

## 2022-06-19 DIAGNOSIS — M54.50 CHRONIC MIDLINE LOW BACK PAIN, UNSPECIFIED WHETHER SCIATICA PRESENT: Primary | ICD-10-CM

## 2022-06-19 LAB
ALBUMIN SERPL-MCNC: 4.4 G/DL (ref 3.5–5.2)
ALBUMIN/GLOB SERPL: 1.8 G/DL
ALP SERPL-CCNC: 96 U/L (ref 39–117)
ALT SERPL W P-5'-P-CCNC: 29 U/L (ref 1–41)
ANION GAP SERPL CALCULATED.3IONS-SCNC: 9 MMOL/L (ref 5–15)
AST SERPL-CCNC: 19 U/L (ref 1–40)
BASOPHILS # BLD AUTO: 0.03 10*3/MM3 (ref 0–0.2)
BASOPHILS NFR BLD AUTO: 0.4 % (ref 0–1.5)
BILIRUB SERPL-MCNC: 0.4 MG/DL (ref 0–1.2)
BILIRUB UR QL STRIP: NEGATIVE
BUN SERPL-MCNC: 14 MG/DL (ref 6–20)
BUN/CREAT SERPL: 16.1 (ref 7–25)
CALCIUM SPEC-SCNC: 9.2 MG/DL (ref 8.6–10.5)
CHLORIDE SERPL-SCNC: 102 MMOL/L (ref 98–107)
CLARITY UR: CLEAR
CO2 SERPL-SCNC: 25 MMOL/L (ref 22–29)
COLOR UR: YELLOW
CREAT SERPL-MCNC: 0.87 MG/DL (ref 0.76–1.27)
DEPRECATED RDW RBC AUTO: 39.8 FL (ref 37–54)
EGFRCR SERPLBLD CKD-EPI 2021: 101.3 ML/MIN/1.73
EOSINOPHIL # BLD AUTO: 0.19 10*3/MM3 (ref 0–0.4)
EOSINOPHIL NFR BLD AUTO: 2.8 % (ref 0.3–6.2)
ERYTHROCYTE [DISTWIDTH] IN BLOOD BY AUTOMATED COUNT: 12.9 % (ref 12.3–15.4)
GLOBULIN UR ELPH-MCNC: 2.5 GM/DL
GLUCOSE SERPL-MCNC: 97 MG/DL (ref 65–99)
GLUCOSE UR STRIP-MCNC: NEGATIVE MG/DL
HCT VFR BLD AUTO: 45.3 % (ref 37.5–51)
HGB BLD-MCNC: 15.3 G/DL (ref 13–17.7)
HGB UR QL STRIP.AUTO: NEGATIVE
IMM GRANULOCYTES # BLD AUTO: 0.02 10*3/MM3 (ref 0–0.05)
IMM GRANULOCYTES NFR BLD AUTO: 0.3 % (ref 0–0.5)
KETONES UR QL STRIP: NEGATIVE
LEUKOCYTE ESTERASE UR QL STRIP.AUTO: NEGATIVE
LIPASE SERPL-CCNC: 45 U/L (ref 13–60)
LYMPHOCYTES # BLD AUTO: 2.03 10*3/MM3 (ref 0.7–3.1)
LYMPHOCYTES NFR BLD AUTO: 30.2 % (ref 19.6–45.3)
MCH RBC QN AUTO: 28.7 PG (ref 26.6–33)
MCHC RBC AUTO-ENTMCNC: 33.8 G/DL (ref 31.5–35.7)
MCV RBC AUTO: 85 FL (ref 79–97)
MONOCYTES # BLD AUTO: 0.55 10*3/MM3 (ref 0.1–0.9)
MONOCYTES NFR BLD AUTO: 8.2 % (ref 5–12)
NEUTROPHILS NFR BLD AUTO: 3.91 10*3/MM3 (ref 1.7–7)
NEUTROPHILS NFR BLD AUTO: 58.1 % (ref 42.7–76)
NITRITE UR QL STRIP: NEGATIVE
NRBC BLD AUTO-RTO: 0 /100 WBC (ref 0–0.2)
PH UR STRIP.AUTO: 6.5 [PH] (ref 5–8)
PLATELET # BLD AUTO: 197 10*3/MM3 (ref 140–450)
PMV BLD AUTO: 8.7 FL (ref 6–12)
POTASSIUM SERPL-SCNC: 4.1 MMOL/L (ref 3.5–5.2)
PROT SERPL-MCNC: 6.9 G/DL (ref 6–8.5)
PROT UR QL STRIP: NEGATIVE
RBC # BLD AUTO: 5.33 10*6/MM3 (ref 4.14–5.8)
SODIUM SERPL-SCNC: 136 MMOL/L (ref 136–145)
SP GR UR STRIP: 1.01 (ref 1–1.03)
UROBILINOGEN UR QL STRIP: NORMAL
WBC NRBC COR # BLD: 6.73 10*3/MM3 (ref 3.4–10.8)

## 2022-06-19 PROCEDURE — 25010000002 HYDROMORPHONE PER 4 MG: Performed by: EMERGENCY MEDICINE

## 2022-06-19 PROCEDURE — 74176 CT ABD & PELVIS W/O CONTRAST: CPT

## 2022-06-19 PROCEDURE — 85025 COMPLETE CBC W/AUTO DIFF WBC: CPT | Performed by: EMERGENCY MEDICINE

## 2022-06-19 PROCEDURE — 99283 EMERGENCY DEPT VISIT LOW MDM: CPT

## 2022-06-19 PROCEDURE — 93976 VASCULAR STUDY: CPT

## 2022-06-19 PROCEDURE — 96375 TX/PRO/DX INJ NEW DRUG ADDON: CPT

## 2022-06-19 PROCEDURE — 96376 TX/PRO/DX INJ SAME DRUG ADON: CPT

## 2022-06-19 PROCEDURE — 76870 US EXAM SCROTUM: CPT

## 2022-06-19 PROCEDURE — 25010000002 ONDANSETRON PER 1 MG: Performed by: EMERGENCY MEDICINE

## 2022-06-19 PROCEDURE — 25010000002 KETOROLAC TROMETHAMINE PER 15 MG: Performed by: EMERGENCY MEDICINE

## 2022-06-19 PROCEDURE — 81003 URINALYSIS AUTO W/O SCOPE: CPT | Performed by: EMERGENCY MEDICINE

## 2022-06-19 PROCEDURE — 96374 THER/PROPH/DIAG INJ IV PUSH: CPT

## 2022-06-19 PROCEDURE — 80053 COMPREHEN METABOLIC PANEL: CPT | Performed by: EMERGENCY MEDICINE

## 2022-06-19 PROCEDURE — 83690 ASSAY OF LIPASE: CPT | Performed by: EMERGENCY MEDICINE

## 2022-06-19 RX ORDER — KETOROLAC TROMETHAMINE 15 MG/ML
15 INJECTION, SOLUTION INTRAMUSCULAR; INTRAVENOUS ONCE
Status: COMPLETED | OUTPATIENT
Start: 2022-06-19 | End: 2022-06-19

## 2022-06-19 RX ORDER — SODIUM CHLORIDE 0.9 % (FLUSH) 0.9 %
10 SYRINGE (ML) INJECTION AS NEEDED
Status: DISCONTINUED | OUTPATIENT
Start: 2022-06-19 | End: 2022-06-19 | Stop reason: HOSPADM

## 2022-06-19 RX ORDER — HYDROMORPHONE HYDROCHLORIDE 1 MG/ML
0.5 INJECTION, SOLUTION INTRAMUSCULAR; INTRAVENOUS; SUBCUTANEOUS ONCE
Status: COMPLETED | OUTPATIENT
Start: 2022-06-19 | End: 2022-06-19

## 2022-06-19 RX ORDER — ONDANSETRON 2 MG/ML
4 INJECTION INTRAMUSCULAR; INTRAVENOUS ONCE
Status: COMPLETED | OUTPATIENT
Start: 2022-06-19 | End: 2022-06-19

## 2022-06-19 RX ORDER — CYCLOBENZAPRINE HCL 5 MG
5 TABLET ORAL 3 TIMES DAILY PRN
Qty: 20 TABLET | Refills: 0 | Status: SHIPPED | OUTPATIENT
Start: 2022-06-19 | End: 2022-09-14

## 2022-06-19 RX ORDER — NAPROXEN 500 MG/1
500 TABLET ORAL 2 TIMES DAILY PRN
Qty: 20 TABLET | Refills: 0 | Status: SHIPPED | OUTPATIENT
Start: 2022-06-19 | End: 2022-09-14

## 2022-06-19 RX ADMIN — SODIUM CHLORIDE 1000 ML: 9 INJECTION, SOLUTION INTRAVENOUS at 11:06

## 2022-06-19 RX ADMIN — HYDROMORPHONE HYDROCHLORIDE 0.5 MG: 1 INJECTION, SOLUTION INTRAMUSCULAR; INTRAVENOUS; SUBCUTANEOUS at 12:12

## 2022-06-19 RX ADMIN — KETOROLAC TROMETHAMINE 15 MG: 15 INJECTION, SOLUTION INTRAMUSCULAR; INTRAVENOUS at 12:12

## 2022-06-19 RX ADMIN — HYDROMORPHONE HYDROCHLORIDE 0.5 MG: 1 INJECTION, SOLUTION INTRAMUSCULAR; INTRAVENOUS; SUBCUTANEOUS at 11:06

## 2022-06-19 RX ADMIN — ONDANSETRON 4 MG: 2 INJECTION INTRAMUSCULAR; INTRAVENOUS at 11:06

## 2022-06-19 NOTE — ED PROVIDER NOTES
"Subjective   56-year-old male presents for evaluation of low back pain, abdominal pain, and right testicular pain.  Of note, the patient states that he has suffered from chronic low back pain now for several years.  He notes that over the past 1 to 2 weeks, he has been experiencing increased pain in his low back.  Additionally, over the same span he has been experiencing pain in his lower abdomen and both flanks.  The pain is intermittent in nature and occasionally radiates to his right testicle as well.  He was unsure as to whether or not these pains were referred pains from his low back pain or \"something else.\"  He denies any fevers, chills, night sweats, or unintentional weight loss.  No rashes.  No bowel or bladder incontinence or retention.  No IV drug use.  He is ambulatory, albeit with pain.  He currently rates his pain at 8 out of 10 in severity.          Review of Systems   Gastrointestinal: Positive for abdominal pain.   Genitourinary: Positive for flank pain and testicular pain.   Musculoskeletal: Positive for back pain.   All other systems reviewed and are negative.      Past Medical History:   Diagnosis Date   • Acid reflux    • Chronic pain disorder    • Extremity pain    • Joint pain    • Low back pain    • Lumbosacral disc disease    • Migraine    • Muscle spasm    • Neck pain    • Osteoarthritis    • PONV (postoperative nausea and vomiting)        No Known Allergies    Past Surgical History:   Procedure Laterality Date   • CARPAL TUNNEL RELEASE Right 8/4/2016    Procedure: CARPAL TUNNEL RELEASE;  Surgeon: Yoel Lua MD;  Location: [facility] OR;  Service:    • CARPAL TUNNEL RELEASE Left 11/9/2017    Procedure: CARPAL TUNNEL RELEASE;  Surgeon: Yoel Lua MD;  Location: [facility] OR;  Service:    • COLONOSCOPY     • LUMBAR FACET INJECTION     • TOE SURGERY Right 03/04/2015       Family History   Problem Relation Age of Onset   • Lung cancer Mother    • Cancer Mother         ovarian "   • Diabetes Sister    • Rheum arthritis Brother    • Cancer Sister         breast       Social History     Socioeconomic History   • Marital status:    Tobacco Use   • Smoking status: Never Smoker   • Smokeless tobacco: Current User     Types: Chew   • Tobacco comment: uses chewing tobacco   Vaping Use   • Vaping Use: Never used   Substance and Sexual Activity   • Alcohol use: No   • Drug use: No   • Sexual activity: Defer           Objective   Physical Exam  Vitals and nursing note reviewed.   Constitutional:       General: He is not in acute distress.     Appearance: He is well-developed. He is not diaphoretic.      Comments: Nontoxic-appearing male   HENT:      Head: Normocephalic and atraumatic.   Eyes:      Pupils: Pupils are equal, round, and reactive to light.   Neck:      Vascular: No JVD.   Cardiovascular:      Rate and Rhythm: Normal rate and regular rhythm.      Heart sounds: Normal heart sounds. No murmur heard.    No friction rub. No gallop.   Pulmonary:      Effort: Pulmonary effort is normal. No respiratory distress.      Breath sounds: Normal breath sounds. No wheezing or rales.   Abdominal:      General: Bowel sounds are normal. There is no distension.      Palpations: Abdomen is soft. There is no mass.      Tenderness: There is no abdominal tenderness. There is no guarding.      Comments: No peritoneal signs or focal abdominal tenderness present, no pain out of proportion to exam   Genitourinary:     Comments: No CVA tenderness noted, normal testicular lie, no testicular tenderness present  Musculoskeletal:         General: Normal range of motion.      Cervical back: Neck supple.   Skin:     General: Skin is warm and dry.      Coloration: Skin is not pale.      Findings: No erythema or rash.      Comments: No dermatomal rash noted   Neurological:      Mental Status: He is alert and oriented to person, place, and time.      Comments: Awake, alert, and oriented x3, clear and fluent speech, no  ataxia or dysmetria, normal gait, neurovascularly intact distally in all fours with bounding distal pulses and normal sensation, 5 out of 5 strength in all fours, no cranial nerve deficits noted with cranial nerves II through XII grossly intact, normoreflexic, no saddle anesthesia present     Psychiatric:         Mood and Affect: Mood normal.         Thought Content: Thought content normal.         Judgment: Judgment normal.         Procedures           ED Course  ED Course as of 06/19/22 1519   Sun Jun 19, 2022   1112 56-year-old male presents for evaluation of multiple complaints.  Of note, the patient suffers from chronic low back pain.  He notes that over the past 1 to 2 weeks he has been experiencing increased pain in his low back as well as referred pain to his lower abdomen and right testicle.  On arrival to the ED, the patient is nontoxic-appearing but mildly tachycardic.  No focal neurological deficits noted.  Normal gait.  No saddle anesthesia noted.  Normal rectal tone.  Nonsurgical abdomen.  No dermatomal rash present.  No CVA tenderness noted.   exam is unremarkable.  Broad differential diagnosis.  We will obtain labs and imaging, and we will reassess following initial interventions. [DD]   1202 Labs are bland. [DD]   1242 Imaging is unrevealing and negative for emergent intra-abdominal or  process. [DD]   1242 Patient reassured and counseled regarding symptomatic management.  Encouraged NSAIDs as needed.  Prescription for Flexeril as needed.  The patient was referred to Dr. Thurman of neurosurgery and will follow-up as soon as possible.  I feel that his pain that he is experiencing is most likely referred pain, but there are no indications for emergent neuroimaging at this time as the patient has no red flag low back pain signs, symptoms, or history necessitating MRI.  He will follow-up as directed.  Agreeable with plan and given appropriate strict return precautions. [DD]      ED Course User  Index  [DD] Ramesh Caal MD                                          Recent Results (from the past 24 hour(s))   Comprehensive Metabolic Panel    Collection Time: 06/19/22 11:07 AM    Specimen: Blood   Result Value Ref Range    Glucose 97 65 - 99 mg/dL    BUN 14 6 - 20 mg/dL    Creatinine 0.87 0.76 - 1.27 mg/dL    Sodium 136 136 - 145 mmol/L    Potassium 4.1 3.5 - 5.2 mmol/L    Chloride 102 98 - 107 mmol/L    CO2 25.0 22.0 - 29.0 mmol/L    Calcium 9.2 8.6 - 10.5 mg/dL    Total Protein 6.9 6.0 - 8.5 g/dL    Albumin 4.40 3.50 - 5.20 g/dL    ALT (SGPT) 29 1 - 41 U/L    AST (SGOT) 19 1 - 40 U/L    Alkaline Phosphatase 96 39 - 117 U/L    Total Bilirubin 0.4 0.0 - 1.2 mg/dL    Globulin 2.5 gm/dL    A/G Ratio 1.8 g/dL    BUN/Creatinine Ratio 16.1 7.0 - 25.0    Anion Gap 9.0 5.0 - 15.0 mmol/L    eGFR 101.3 >60.0 mL/min/1.73   Lipase    Collection Time: 06/19/22 11:07 AM    Specimen: Blood   Result Value Ref Range    Lipase 45 13 - 60 U/L   CBC Auto Differential    Collection Time: 06/19/22 11:07 AM    Specimen: Blood   Result Value Ref Range    WBC 6.73 3.40 - 10.80 10*3/mm3    RBC 5.33 4.14 - 5.80 10*6/mm3    Hemoglobin 15.3 13.0 - 17.7 g/dL    Hematocrit 45.3 37.5 - 51.0 %    MCV 85.0 79.0 - 97.0 fL    MCH 28.7 26.6 - 33.0 pg    MCHC 33.8 31.5 - 35.7 g/dL    RDW 12.9 12.3 - 15.4 %    RDW-SD 39.8 37.0 - 54.0 fl    MPV 8.7 6.0 - 12.0 fL    Platelets 197 140 - 450 10*3/mm3    Neutrophil % 58.1 42.7 - 76.0 %    Lymphocyte % 30.2 19.6 - 45.3 %    Monocyte % 8.2 5.0 - 12.0 %    Eosinophil % 2.8 0.3 - 6.2 %    Basophil % 0.4 0.0 - 1.5 %    Immature Grans % 0.3 0.0 - 0.5 %    Neutrophils, Absolute 3.91 1.70 - 7.00 10*3/mm3    Lymphocytes, Absolute 2.03 0.70 - 3.10 10*3/mm3    Monocytes, Absolute 0.55 0.10 - 0.90 10*3/mm3    Eosinophils, Absolute 0.19 0.00 - 0.40 10*3/mm3    Basophils, Absolute 0.03 0.00 - 0.20 10*3/mm3    Immature Grans, Absolute 0.02 0.00 - 0.05 10*3/mm3    nRBC 0.0 0.0 - 0.2 /100 WBC   Urinalysis With  "Culture If Indicated - Urine, Clean Catch    Collection Time: 06/19/22 11:57 AM    Specimen: Urine, Clean Catch   Result Value Ref Range    Color, UA Yellow Yellow, Straw    Appearance, UA Clear Clear    pH, UA 6.5 5.0 - 8.0    Specific Gravity, UA 1.011 1.001 - 1.030    Glucose, UA Negative Negative    Ketones, UA Negative Negative    Bilirubin, UA Negative Negative    Blood, UA Negative Negative    Protein, UA Negative Negative    Leuk Esterase, UA Negative Negative    Nitrite, UA Negative Negative    Urobilinogen, UA 0.2 E.U./dL 0.2 - 1.0 E.U./dL     Note: In addition to lab results from this visit, the labs listed above may include labs taken at another facility or during a different encounter within the last 24 hours. Please correlate lab times with ED admission and discharge times for further clarification of the services performed during this visit.    US Scrotum & Testicles   Final Result   Normal appearance of the testicles. Incidental note of a few small   epididymal head cysts and small right hydrocele.       This report was finalized on 6/19/2022 12:39 PM by Shamar Pacheco MD.          US Testicular or Ovarian Vascular Limited   Final Result   Normal appearance of the testicles. Incidental note of a few small   epididymal head cysts and small right hydrocele.       This report was finalized on 6/19/2022 12:39 PM by Shamar Pacheco MD.          CT Abdomen Pelvis Without Contrast   Final Result   No acute abdominopelvic findings.       This report was finalized on 6/19/2022 11:31 AM by Shamar Pacheco MD.            Vitals:    06/19/22 1015 06/19/22 1200 06/19/22 1230 06/19/22 1300   BP: 136/85 130/89 129/80 127/81   Pulse: 115      Resp: 18      Temp: 98.2 °F (36.8 °C)      TempSrc: Oral      SpO2: 99% 98% 96% 96%   Weight: 112 kg (248 lb)      Height: 182.9 cm (72\")        Medications   sodium chloride 0.9 % flush 10 mL (has no administration in time range)   sodium chloride 0.9 % bolus 1,000 mL (0 mL " Intravenous Stopped 6/19/22 1237)   ondansetron (ZOFRAN) injection 4 mg (4 mg Intravenous Given 6/19/22 1106)   HYDROmorphone (DILAUDID) injection 0.5 mg (0.5 mg Intravenous Given 6/19/22 1106)   HYDROmorphone (DILAUDID) injection 0.5 mg (0.5 mg Intravenous Given 6/19/22 1212)   ketorolac (TORADOL) injection 15 mg (15 mg Intravenous Given 6/19/22 1212)     ECG/EMG Results (last 24 hours)     ** No results found for the last 24 hours. **        No orders to display                 MDM    Final diagnoses:   Chronic midline low back pain, unspecified whether sciatica present   Generalized abdominal pain       ED Disposition  ED Disposition     ED Disposition   Discharge    Condition   Stable    Comment   --             Ifeanyi Thurman MD  [address] [place] [address]  [phone]    In 1 week           Medication List      New Prescriptions    naproxen 500 MG tablet  Commonly known as: NAPROSYN  Take 1 tablet by mouth 2 (Two) Times a Day As Needed for Mild Pain  or Moderate Pain .        Changed    * cyclobenzaprine 10 MG tablet  Commonly known as: FLEXERIL  Take 1 tablet by mouth Every 8 (Eight) Hours As Needed.  What changed: Another medication with the same name was added. Make sure you understand how and when to take each.     * cyclobenzaprine 5 MG tablet  Commonly known as: FLEXERIL  Take 1 tablet by mouth 3 (Three) Times a Day As Needed for Muscle Spasms.  What changed: You were already taking a medication with the same name, and this prescription was added. Make sure you understand how and when to take each.         * This list has 2 medication(s) that are the same as other medications prescribed for you. Read the directions carefully, and ask your doctor or other care provider to review them with you.               Where to Get Your Medications      These medications were sent to [facility] - [facility]  [address] [place] [address] [address], [place] [address]    Hours: 7:00  AM-5:30 PM M-F, 8:00 AM-4:30 PM Sat-Sun Phone: [phone]   · cyclobenzaprine 5 MG tablet  · naproxen 500 MG tablet          Ramesh Caal MD  06/19/22 9604

## 2022-06-30 ENCOUNTER — OFFICE VISIT (OUTPATIENT)
Dept: UROLOGY | Facility: CLINIC | Age: 57
End: 2022-06-30

## 2022-06-30 VITALS — BODY MASS INDEX: 33.59 KG/M2 | WEIGHT: 248 LBS | HEIGHT: 72 IN

## 2022-06-30 DIAGNOSIS — N42.9 DISORDER OF PROSTATE: Primary | ICD-10-CM

## 2022-06-30 DIAGNOSIS — N40.1 BENIGN PROSTATIC HYPERPLASIA WITH URINARY OBSTRUCTION: ICD-10-CM

## 2022-06-30 DIAGNOSIS — N13.8 BENIGN PROSTATIC HYPERPLASIA WITH URINARY OBSTRUCTION: ICD-10-CM

## 2022-06-30 PROCEDURE — 84153 ASSAY OF PSA TOTAL: CPT | Performed by: UROLOGY

## 2022-06-30 PROCEDURE — 99203 OFFICE O/P NEW LOW 30 MIN: CPT | Performed by: UROLOGY

## 2022-06-30 RX ORDER — TAMSULOSIN HYDROCHLORIDE 0.4 MG/1
0.8 CAPSULE ORAL DAILY
Qty: 180 CAPSULE | Refills: 1 | Status: SHIPPED | OUTPATIENT
Start: 2022-06-30 | End: 2023-01-08 | Stop reason: SDUPTHER

## 2022-07-01 LAB — PSA SERPL-MCNC: 0.67 NG/ML (ref 0–4)

## 2022-08-14 ENCOUNTER — APPOINTMENT (OUTPATIENT)
Dept: CT IMAGING | Facility: HOSPITAL | Age: 57
End: 2022-08-14

## 2022-08-14 ENCOUNTER — HOSPITAL ENCOUNTER (EMERGENCY)
Facility: HOSPITAL | Age: 57
Discharge: HOME | End: 2022-08-14
Attending: STUDENT IN AN ORGANIZED HEALTH CARE EDUCATION/TRAINING PROGRAM | Admitting: STUDENT IN AN ORGANIZED HEALTH CARE EDUCATION/TRAINING PROGRAM

## 2022-08-14 VITALS
RESPIRATION RATE: 16 BRPM | TEMPERATURE: 97.7 F | OXYGEN SATURATION: 98 % | WEIGHT: 250 LBS | SYSTOLIC BLOOD PRESSURE: 138 MMHG | DIASTOLIC BLOOD PRESSURE: 87 MMHG | HEART RATE: 106 BPM | HEIGHT: 72 IN | BODY MASS INDEX: 33.86 KG/M2

## 2022-08-14 DIAGNOSIS — N40.0 PROSTATE ENLARGEMENT: Primary | ICD-10-CM

## 2022-08-14 LAB
ALBUMIN SERPL-MCNC: 4.51 G/DL (ref 3.5–5.2)
ALBUMIN/GLOB SERPL: 2.1 G/DL
ALP SERPL-CCNC: 102 U/L (ref 39–117)
ALT SERPL W P-5'-P-CCNC: 40 U/L (ref 1–41)
ANION GAP SERPL CALCULATED.3IONS-SCNC: 9.2 MMOL/L (ref 5–15)
AST SERPL-CCNC: 27 U/L (ref 1–40)
BASOPHILS # BLD AUTO: 0.03 10*3/MM3 (ref 0–0.2)
BASOPHILS NFR BLD AUTO: 0.5 % (ref 0–1.5)
BILIRUB SERPL-MCNC: 0.3 MG/DL (ref 0–1.2)
BILIRUB UR QL STRIP: NEGATIVE
BUN SERPL-MCNC: 12 MG/DL (ref 6–20)
BUN/CREAT SERPL: 10.7 (ref 7–25)
CALCIUM SPEC-SCNC: 9.3 MG/DL (ref 8.6–10.5)
CHLORIDE SERPL-SCNC: 106 MMOL/L (ref 98–107)
CLARITY UR: CLEAR
CO2 SERPL-SCNC: 22.8 MMOL/L (ref 22–29)
COLOR UR: YELLOW
CREAT SERPL-MCNC: 1.12 MG/DL (ref 0.76–1.27)
CRP SERPL-MCNC: <0.3 MG/DL (ref 0–0.5)
DEPRECATED RDW RBC AUTO: 41.3 FL (ref 37–54)
EGFRCR SERPLBLD CKD-EPI 2021: 76.6 ML/MIN/1.73
EOSINOPHIL # BLD AUTO: 0.16 10*3/MM3 (ref 0–0.4)
EOSINOPHIL NFR BLD AUTO: 2.7 % (ref 0.3–6.2)
ERYTHROCYTE [DISTWIDTH] IN BLOOD BY AUTOMATED COUNT: 13.4 % (ref 12.3–15.4)
ERYTHROCYTE [SEDIMENTATION RATE] IN BLOOD: 6 MM/HR (ref 0–20)
GLOBULIN UR ELPH-MCNC: 2.2 GM/DL
GLUCOSE SERPL-MCNC: 113 MG/DL (ref 65–99)
GLUCOSE UR STRIP-MCNC: NEGATIVE MG/DL
HCT VFR BLD AUTO: 42.5 % (ref 37.5–51)
HGB BLD-MCNC: 14.7 G/DL (ref 13–17.7)
HGB UR QL STRIP.AUTO: NEGATIVE
HOLD SPECIMEN: NORMAL
HOLD SPECIMEN: NORMAL
IMM GRANULOCYTES # BLD AUTO: 0.01 10*3/MM3 (ref 0–0.05)
IMM GRANULOCYTES NFR BLD AUTO: 0.2 % (ref 0–0.5)
KETONES UR QL STRIP: NEGATIVE
LEUKOCYTE ESTERASE UR QL STRIP.AUTO: NEGATIVE
LYMPHOCYTES # BLD AUTO: 1.77 10*3/MM3 (ref 0.7–3.1)
LYMPHOCYTES NFR BLD AUTO: 29.7 % (ref 19.6–45.3)
MCH RBC QN AUTO: 29.3 PG (ref 26.6–33)
MCHC RBC AUTO-ENTMCNC: 34.6 G/DL (ref 31.5–35.7)
MCV RBC AUTO: 84.8 FL (ref 79–97)
MONOCYTES # BLD AUTO: 0.56 10*3/MM3 (ref 0.1–0.9)
MONOCYTES NFR BLD AUTO: 9.4 % (ref 5–12)
NEUTROPHILS NFR BLD AUTO: 3.43 10*3/MM3 (ref 1.7–7)
NEUTROPHILS NFR BLD AUTO: 57.5 % (ref 42.7–76)
NITRITE UR QL STRIP: NEGATIVE
NRBC BLD AUTO-RTO: 0 /100 WBC (ref 0–0.2)
PH UR STRIP.AUTO: 6 [PH] (ref 5–8)
PLATELET # BLD AUTO: 188 10*3/MM3 (ref 140–450)
PMV BLD AUTO: 8.6 FL (ref 6–12)
POTASSIUM SERPL-SCNC: 4.1 MMOL/L (ref 3.5–5.2)
PROT SERPL-MCNC: 6.7 G/DL (ref 6–8.5)
PROT UR QL STRIP: NEGATIVE
RBC # BLD AUTO: 5.01 10*6/MM3 (ref 4.14–5.8)
SODIUM SERPL-SCNC: 138 MMOL/L (ref 136–145)
SP GR UR STRIP: 1.01 (ref 1–1.03)
UROBILINOGEN UR QL STRIP: NORMAL
WBC NRBC COR # BLD: 5.96 10*3/MM3 (ref 3.4–10.8)
WHOLE BLOOD HOLD COAG: NORMAL
WHOLE BLOOD HOLD SPECIMEN: NORMAL

## 2022-08-14 PROCEDURE — 72131 CT LUMBAR SPINE W/O DYE: CPT | Performed by: RADIOLOGY

## 2022-08-14 PROCEDURE — 96372 THER/PROPH/DIAG INJ SC/IM: CPT

## 2022-08-14 PROCEDURE — 25010000002 MORPHINE PER 10 MG: Performed by: STUDENT IN AN ORGANIZED HEALTH CARE EDUCATION/TRAINING PROGRAM

## 2022-08-14 PROCEDURE — 74176 CT ABD & PELVIS W/O CONTRAST: CPT

## 2022-08-14 PROCEDURE — 80053 COMPREHEN METABOLIC PANEL: CPT | Performed by: PHYSICIAN ASSISTANT

## 2022-08-14 PROCEDURE — 99283 EMERGENCY DEPT VISIT LOW MDM: CPT

## 2022-08-14 PROCEDURE — 85652 RBC SED RATE AUTOMATED: CPT | Performed by: PHYSICIAN ASSISTANT

## 2022-08-14 PROCEDURE — 25010000002 METHYLPREDNISOLONE PER 125 MG: Performed by: PHYSICIAN ASSISTANT

## 2022-08-14 PROCEDURE — 74176 CT ABD & PELVIS W/O CONTRAST: CPT | Performed by: RADIOLOGY

## 2022-08-14 PROCEDURE — 81003 URINALYSIS AUTO W/O SCOPE: CPT | Performed by: PHYSICIAN ASSISTANT

## 2022-08-14 PROCEDURE — 86140 C-REACTIVE PROTEIN: CPT | Performed by: PHYSICIAN ASSISTANT

## 2022-08-14 PROCEDURE — 36415 COLL VENOUS BLD VENIPUNCTURE: CPT

## 2022-08-14 PROCEDURE — 25010000002 KETOROLAC TROMETHAMINE PER 15 MG: Performed by: PHYSICIAN ASSISTANT

## 2022-08-14 PROCEDURE — 85025 COMPLETE CBC W/AUTO DIFF WBC: CPT | Performed by: PHYSICIAN ASSISTANT

## 2022-08-14 PROCEDURE — 72131 CT LUMBAR SPINE W/O DYE: CPT

## 2022-08-14 PROCEDURE — 25010000002 ORPHENADRINE CITRATE PER 60 MG: Performed by: PHYSICIAN ASSISTANT

## 2022-08-14 RX ORDER — ORPHENADRINE CITRATE 30 MG/ML
60 INJECTION INTRAMUSCULAR; INTRAVENOUS ONCE
Status: COMPLETED | OUTPATIENT
Start: 2022-08-14 | End: 2022-08-14

## 2022-08-14 RX ORDER — DICLOFENAC SODIUM 75 MG/1
75 TABLET, DELAYED RELEASE ORAL 2 TIMES DAILY
Qty: 14 TABLET | Refills: 0 | Status: SHIPPED | OUTPATIENT
Start: 2022-08-14 | End: 2022-08-23

## 2022-08-14 RX ORDER — ORPHENADRINE CITRATE 100 MG/1
100 TABLET, EXTENDED RELEASE ORAL 2 TIMES DAILY PRN
Qty: 14 TABLET | Refills: 0 | Status: SHIPPED | OUTPATIENT
Start: 2022-08-14 | End: 2022-09-14

## 2022-08-14 RX ORDER — METHYLPREDNISOLONE SODIUM SUCCINATE 125 MG/2ML
80 INJECTION, POWDER, LYOPHILIZED, FOR SOLUTION INTRAMUSCULAR; INTRAVENOUS ONCE
Status: COMPLETED | OUTPATIENT
Start: 2022-08-14 | End: 2022-08-14

## 2022-08-14 RX ORDER — KETOROLAC TROMETHAMINE 30 MG/ML
30 INJECTION, SOLUTION INTRAMUSCULAR; INTRAVENOUS ONCE
Status: COMPLETED | OUTPATIENT
Start: 2022-08-14 | End: 2022-08-14

## 2022-08-14 RX ORDER — METHYLPREDNISOLONE 4 MG/1
TABLET ORAL
Qty: 21 TABLET | Refills: 0 | Status: SHIPPED | OUTPATIENT
Start: 2022-08-14 | End: 2022-08-29 | Stop reason: ALTCHOICE

## 2022-08-14 RX ADMIN — ORPHENADRINE CITRATE 60 MG: 30 INJECTION INTRAMUSCULAR; INTRAVENOUS at 13:11

## 2022-08-14 RX ADMIN — METHYLPREDNISOLONE SODIUM SUCCINATE 80 MG: 125 INJECTION, POWDER, FOR SOLUTION INTRAMUSCULAR; INTRAVENOUS at 13:13

## 2022-08-14 RX ADMIN — KETOROLAC TROMETHAMINE 30 MG: 30 INJECTION, SOLUTION INTRAMUSCULAR at 13:09

## 2022-08-14 RX ADMIN — MORPHINE SULFATE 4 MG: 4 INJECTION, SOLUTION INTRAMUSCULAR; INTRAVENOUS at 14:47

## 2022-08-14 NOTE — ED PROVIDER NOTES
Subjective   57-year-old male with past medical history of GERD, chronic pain disorder, arthralgias, lumbosacral disc disease, migraines, neck pain, and osteoarthritis presents to the emergency room with low back pain in the center.  He states he feels like it goes directly behind his belt line through to the back.  He describes his pain as sharp at times but otherwise is a dull pain.  He states he had this pain for years but has been worse over the past week.  He denies any dysuria.  He denies urinary retention, fecal incontinence, history of drug abuse, history of malignancy/TB/HIV, lower extremity weakness, or fevers.  He states he has been to multiple back doctors in the past and he is only treated symptomatically and states that they tell him there is nothing they can do to fix him.  He does state that he was at [facility] sent in 1 month ago for the same complaints and was given 4 injections which alleviated the pain for a day or 2.  He does state that he feels the pain is a deep and more bony than anything.  He states at times he feels like he is ran a marathon when the pain is persistent.  Denies any other complaints or concerns at this time.      History provided by:  Patient   used: No        Review of Systems   Constitutional: Negative.  Negative for fever.   HENT: Negative.    Respiratory: Negative.    Cardiovascular: Negative.  Negative for chest pain.   Gastrointestinal: Negative.  Negative for abdominal pain.   Endocrine: Negative.    Genitourinary: Negative.  Negative for dysuria.   Musculoskeletal: Positive for back pain.   Skin: Negative.    Neurological: Negative.    Psychiatric/Behavioral: Negative.    All other systems reviewed and are negative.      Past Medical History:   Diagnosis Date   • Acid reflux    • Chronic pain disorder    • Extremity pain    • Joint pain    • Low back pain    • Lumbosacral disc disease    • Migraine    • Muscle spasm    • Neck pain    •  Osteoarthritis    • PONV (postoperative nausea and vomiting)        No Known Allergies    Past Surgical History:   Procedure Laterality Date   • CARPAL TUNNEL RELEASE Right 8/4/2016    Procedure: CARPAL TUNNEL RELEASE;  Surgeon: Yoel Lua MD;  Location:  [facility];  Service:    • CARPAL TUNNEL RELEASE Left 11/9/2017    Procedure: CARPAL TUNNEL RELEASE;  Surgeon: Yoel Lua MD;  Location:  [facility] OR;  Service:    • COLONOSCOPY     • LUMBAR FACET INJECTION     • TOE SURGERY Right 03/04/2015       Family History   Problem Relation Age of Onset   • Lung cancer Mother    • Cancer Mother         ovarian   • Diabetes Sister    • Rheum arthritis Brother    • Cancer Sister         breast       Social History     Socioeconomic History   • Marital status:    Tobacco Use   • Smoking status: Never Smoker   • Smokeless tobacco: Current User     Types: Chew   • Tobacco comment: uses chewing tobacco   Vaping Use   • Vaping Use: Never used   Substance and Sexual Activity   • Alcohol use: No   • Drug use: No   • Sexual activity: Defer           Objective   Physical Exam  Vitals and nursing note reviewed.   Constitutional:       General: He is not in acute distress.     Appearance: He is well-developed. He is not diaphoretic.   HENT:      Head: Normocephalic and atraumatic.      Right Ear: External ear normal.      Left Ear: External ear normal.      Nose: Nose normal.   Eyes:      Conjunctiva/sclera: Conjunctivae normal.      Pupils: Pupils are equal, round, and reactive to light.   Neck:      Vascular: No JVD.      Trachea: No tracheal deviation.   Cardiovascular:      Rate and Rhythm: Normal rate and regular rhythm.      Heart sounds: Normal heart sounds. No murmur heard.  Pulmonary:      Effort: Pulmonary effort is normal. No respiratory distress.      Breath sounds: Normal breath sounds. No wheezing.   Abdominal:      General: Bowel sounds are normal.      Palpations: Abdomen is soft.       Tenderness: There is no abdominal tenderness.   Musculoskeletal:         General: No deformity. Normal range of motion.      Cervical back: Normal range of motion and neck supple.   Skin:     General: Skin is warm and dry.      Coloration: Skin is not pale.      Findings: No erythema or rash.   Neurological:      Mental Status: He is alert and oriented to person, place, and time.      Cranial Nerves: No cranial nerve deficit.   Psychiatric:         Behavior: Behavior normal.         Thought Content: Thought content normal.         Procedures           ED Course  ED Course as of 08/14/22 1438   Sun Aug 14, 2022   1401 CT Lumbar Spine Without Contrast [TK]   1401 CT Abdomen Pelvis Stone Protocol [TK]   1437 Discussed work up and CT findings with patient, states he follows with Dr. Mckeon and 1 month ago was taken off of Finasteride for his prostate after being on it for 2-4 years from Dr. Cortes. He will follow up with Dr. Mckeon for further evaluation as an outpatient. [TK]      ED Course User Index  [TK] Monica Muller, PAPEDRO                                           MDM  Number of Diagnoses or Management Options  Prostate enlargement: new and requires workup     Amount and/or Complexity of Data Reviewed  Clinical lab tests: reviewed and ordered  Tests in the radiology section of CPT®: reviewed and ordered    Risk of Complications, Morbidity, and/or Mortality  Presenting problems: moderate  Diagnostic procedures: moderate  Management options: moderate    Patient Progress  Patient progress: stable      Final diagnoses:   Prostate enlargement       ED Disposition  ED Disposition     ED Disposition   Discharge    Condition   Stable    Comment   --             Benedicto Mckeon MD  [address]  [facility] [address]  [phone]    In 2 days           Medication List      New Prescriptions    diclofenac 75 MG EC tablet  Commonly known as: VOLTAREN  Take 1 tablet by mouth 2 (Two) Times a Day.      methylPREDNISolone 4 MG dose pack  Commonly known as: MEDROL  Take as directed on package instructions.     orphenadrine 100 MG 12 hr tablet  Commonly known as: NORFLEX  Take 1 tablet by mouth 2 (Two) Times a Day As Needed for Muscle Spasms or Mild Pain .           Where to Get Your Medications      [address] [place] [address]    Hours: 8AM-6PM Mon-Fri Phone: [phone]   · diclofenac 75 MG EC tablet  · methylPREDNISolone 4 MG dose pack  · orphenadrine 100 MG 12 hr tablet          Monica Muller PA-C  08/14/22 1422       Monica Muller PA-C  08/14/22 1436

## 2022-08-15 ENCOUNTER — TELEPHONE (OUTPATIENT)
Dept: UROLOGY | Facility: CLINIC | Age: 57
End: 2022-08-15

## 2022-08-15 NOTE — TELEPHONE ENCOUNTER
He went to the ER and they told him his prostate was enlarged but on his last visit Dr. Mckeon told him it was fine. He wanted to speak to someone about this.     [phone]

## 2022-08-15 NOTE — TELEPHONE ENCOUNTER
Aparna can you look at his ED records. He seen you in June and was told his Prosate was fine and the ED told him based on his CT it is enlarged. Can you please advise me.      Sent this message to Mike    I called the pt and saidState enlargement seen on CT is virtually 100% of the people and is of no clinical significance  Which was mike's response.    The pt expressed understanding.

## 2022-08-23 ENCOUNTER — OFFICE VISIT (OUTPATIENT)
Dept: NEUROLOGY | Facility: CLINIC | Age: 57
End: 2022-08-23

## 2022-08-23 ENCOUNTER — LAB (OUTPATIENT)
Dept: LAB | Facility: HOSPITAL | Age: 57
End: 2022-08-23

## 2022-08-23 VITALS
BODY MASS INDEX: 33.46 KG/M2 | HEIGHT: 72 IN | HEART RATE: 76 BPM | WEIGHT: 247 LBS | SYSTOLIC BLOOD PRESSURE: 140 MMHG | OXYGEN SATURATION: 98 % | RESPIRATION RATE: 16 BRPM | DIASTOLIC BLOOD PRESSURE: 92 MMHG

## 2022-08-23 DIAGNOSIS — G62.9 POLYNEUROPATHY: ICD-10-CM

## 2022-08-23 DIAGNOSIS — M25.559 HIP PAIN: Primary | ICD-10-CM

## 2022-08-23 DIAGNOSIS — G89.29 CHRONIC BILATERAL LOW BACK PAIN, UNSPECIFIED WHETHER SCIATICA PRESENT: ICD-10-CM

## 2022-08-23 DIAGNOSIS — M54.50 CHRONIC BILATERAL LOW BACK PAIN, UNSPECIFIED WHETHER SCIATICA PRESENT: ICD-10-CM

## 2022-08-23 LAB
CK SERPL-CCNC: 113 U/L (ref 20–200)
HBA1C MFR BLD: 5.9 % (ref 4.8–5.6)

## 2022-08-23 PROCEDURE — 99214 OFFICE O/P EST MOD 30 MIN: CPT | Performed by: PSYCHIATRY & NEUROLOGY

## 2022-08-23 PROCEDURE — 84165 PROTEIN E-PHORESIS SERUM: CPT

## 2022-08-23 PROCEDURE — 83036 HEMOGLOBIN GLYCOSYLATED A1C: CPT

## 2022-08-23 PROCEDURE — 36415 COLL VENOUS BLD VENIPUNCTURE: CPT

## 2022-08-23 PROCEDURE — 82550 ASSAY OF CK (CPK): CPT

## 2022-08-23 PROCEDURE — 84155 ASSAY OF PROTEIN SERUM: CPT

## 2022-08-23 PROCEDURE — 86038 ANTINUCLEAR ANTIBODIES: CPT

## 2022-08-23 PROCEDURE — 84207 ASSAY OF VITAMIN B-6: CPT

## 2022-08-23 NOTE — PROGRESS NOTES
Subjective:    CC: Avery Watson is seen today in consultation at the request of EDDIE Rollins for Peripheral Neuropathy       HPI:  57-year-old male accompanied by his wife with a history of GERD, arthritis, headaches presents with chronic low back pain.  Patient states he started having low back pain over 20 years ago but in the past few years it has worsened in severity.  He has constant pain around his lower back/hip which radiates to his groin on the right, the inner part of his thigh and the lower outer right leg.  Occasionally he has pain in the back of his left leg as well.  The pain is worse when he lays down.  Denies any tingling or numbness in his feet/hands but his skin does feel tender to touch and he also has a feeling of itchiness from the inside not relieved by scratching.  Has had bilateral carpal tunnel surgeries a few years ago.  He had a MRI lumbar spine in March which showed mild multilevel degenerative changes as well as minor disc bulges at L4-5 and L5-S1 with mild right facet arthropathy/neural foraminal stenosis.  He saw neurosurgery and they ordered flexion/extension lumbar x-rays (the results of which we do not have).  He also saw neurology at [facility] and was told that he is not a surgical candidate.  He has tried several conservative treatment including epidural shots that help only for a few days, on gabapentin that made him extremely lethargic and PT which he could not withstand due to the pain.  He has also been to the ER several times where they gave him pain medications and sent him home.  He is currently on muscle relaxants.  He had an EMG/NCS of the left leg in January that was normal.  Has also had blood work including a normal thyroid function test, normal B12 of 660 and normal STEPH.  Denies any history of diabetes or heavy alcohol intake.  Of note- I personally reviewed his MRI lumbar spine on the neurosurgery notes    The following portions of the patient's history  were reviewed today and updated as of 08/23/2022  : allergies, current medications, past family history, past medical history, past social history, past surgical history and problem list  These document will be scanned to patient's chart.      Current Outpatient Medications:   •  atorvastatin (LIPITOR) 10 MG tablet, Take 1 tablet by mouth Daily., Disp: 90 tablet, Rfl: 1  •  finasteride (PROSCAR) 5 MG tablet, Take 1 tablet by mouth Daily., Disp: 90 tablet, Rfl: 0  •  FLUoxetine (PROzac) 40 MG capsule, Take 1 capsule by mouth Every Morning., Disp: 90 capsule, Rfl: 1  •  nabumetone (RELAFEN) 750 MG tablet, Take 1 tablet by mouth 2 times daily as needed for joint pain., Disp: 60 tablet, Rfl: 6  •  omeprazole (priLOSEC) 40 MG capsule, Take 1 capsule by mouth Daily before a meal., Disp: 90 capsule, Rfl: 1  •  tamsulosin (FLOMAX) 0.4 MG capsule 24 hr capsule, Take 2 capsules by mouth Daily for prostate., Disp: 180 capsule, Rfl: 1  •  cyclobenzaprine (FLEXERIL) 5 MG tablet, Take 1 tablet by mouth 3 (Three) Times a Day As Needed for Muscle Spasms., Disp: 20 tablet, Rfl: 0  •  dexlansoprazole (DEXILANT) 60 MG capsule, Take 1 capsule by mouth Daily., Disp: 90 capsule, Rfl: 1  •  DULoxetine (CYMBALTA) 60 MG capsule, Take 1 capsule by mouth every day., Disp: 90 capsule, Rfl: 0  •  ergocalciferol (ERGOCALCIFEROL) 1.25 MG (55057 UT) capsule, Take 1 capsule by mouth., Disp: , Rfl:   •  escitalopram (Lexapro) 10 MG tablet, Take 1 tablet by mouth Daily., Disp: 90 tablet, Rfl: 0  •  methylPREDNISolone (MEDROL) 4 MG dose pack, TAKE BY MOUTH AS DIRECTED ON PACKAGE., Disp: 21 tablet, Rfl: 0  •  naproxen (NAPROSYN) 500 MG tablet, Take 1 tablet by mouth 2 (Two) Times a Day As Needed for Mild Pain  or Moderate Pain ., Disp: 20 tablet, Rfl: 0  •  orphenadrine (NORFLEX) 100 MG 12 hr tablet, Take 1 tablet by mouth 2 (Two) Times a Day As Needed for Muscle Spasms or Mild Pain ., Disp: 14 tablet, Rfl: 0  •  pantoprazole (PROTONIX) 40 MG EC  "tablet, Take 1 tablet by mouth Daily., Disp: 90 tablet, Rfl: 0  •  tiZANidine (ZANAFLEX) 4 MG tablet, Take 1 tablet by mouth every 6 to 8 hours as needed. do not exceed 3 does in 24 hours, Disp: 30 tablet, Rfl: 1   Past Medical History:   Diagnosis Date   • Acid reflux    • Chronic pain disorder    • Extremity pain    • Joint pain    • Low back pain    • Lumbosacral disc disease    • Migraine    • Muscle spasm    • Neck pain    • Osteoarthritis    • PONV (postoperative nausea and vomiting)       Past Surgical History:   Procedure Laterality Date   • CARPAL TUNNEL RELEASE Right 8/4/2016    Procedure: CARPAL TUNNEL RELEASE;  Surgeon: Yoel Lua MD;  Location: [facility] OR;  Service:    • CARPAL TUNNEL RELEASE Left 11/9/2017    Procedure: CARPAL TUNNEL RELEASE;  Surgeon: Yoel Lua MD;  Location: [facility];  Service:    • COLONOSCOPY     • LUMBAR FACET INJECTION     • TOE SURGERY Right 03/04/2015      Family History   Problem Relation Age of Onset   • Lung cancer Mother    • Cancer Mother         ovarian   • Diabetes Sister    • Rheum arthritis Brother    • Cancer Sister         breast      Social History     Socioeconomic History   • Marital status:    Tobacco Use   • Smoking status: Never Smoker   • Smokeless tobacco: Current User     Types: Chew   • Tobacco comment: uses chewing tobacco   Vaping Use   • Vaping Use: Never used   Substance and Sexual Activity   • Alcohol use: No   • Drug use: No   • Sexual activity: Defer     Review of Systems   Musculoskeletal: Positive for back pain, gait problem and myalgias.   Neurological: Positive for numbness.   All other systems reviewed and are negative.      Objective:    /92   Pulse 76   Resp 16   Ht 182.9 cm (72\")   Wt 112 kg (247 lb)   SpO2 98%   BMI 33.50 kg/m²     Neurology Exam:    General apperance: NAD.     Mental status: Alert, awake and oriented to time place and person.    Recent and Remote memory: Intact.    Attention span " and Concentration: Normal.     Language and Speech: Intact- No dysarthria.    Fluency, Naming , Repitition and Comprehension:  Intact    Cranial Nerves:   CN II: Visual fields are full. Intact. Fundi - Normal, No papillederma, Pupils - WILIAN  CN III, IV and VI: Extraocular movements are intact. Normal saccades.   CN V: Facial sensation is intact.   CN VII: Muscles of facial expression reveal no asymmetry. Intact.   CN VIII: Hearing is intact. Whispered voice intact.   CN IX and X: Palate elevates symmetrically. Intact  CN XI: Shoulder shrug is intact.   CN XII: Tongue is midline without evidence of atrophy or fasciculation.     Ophthalmoscopic exam of optic disc-normal    Motor:  Right UE muscle strength 5/5. Normal tone.     Left UE muscle strength 5/5. Normal tone.      Right LE muscle strength 5 -/5 on hip flexion due to pain, distally 5/5. Normal tone.     Left LE muscle strength 5/5. Normal tone.      Sensory: Mildly reduced to pinprick in both feet till below ankle with reduced vibration in both lower extremities and intact temperature    DTRs: 2+ bilaterally in upper and lower extremities.    Babinski: Negative bilaterally.    Co-ordination: Normal finger-to-nose, heel to shin B/L.    Rhomberg: Negative.    Gait: Mildly antalgic gait    Cardiovascular: Regular rate and rhythm without murmur, gallop or rub.    Assessment and Plan:  1. Hip pain  I feel  the pain the patient is describing could be coming from his right hip.  He also gets referred pain into his testicles as well as the inner part of his thigh.  I will get a MRI of his hip with and without contrast and refer him to orthopedic surgery if that is abnormal  - MRI hip right w wo contrast; Future    2. Polyneuropathy  Based on his examination he does have mild peripheral neuropathy.  EMG/NCS previously was normal  I will check the rest of his neuropathy panel.  B12/thyroid function test was normal  In the past he has tried gabapentin which caused side  effects    - STEPH by IFA, Reflex 9-biomarkers profile; Future  - Protein Elec + Interp, Serum; Future  - Hemoglobin A1c; Future  - Vitamin B6; Future  - CK; Future    3. Chronic bilateral low back pain, unspecified whether sciatica present  Patient has had issues with chronic low back pain.  MRI of the lumbar spine showed minor changes at L4-5 and L5-S1 with mild right facet arthropathy and mild neural foraminal narrowing  He has tried conservative measures in the past  If MRI hip is unremarkable I will send him to pain management to see if he would be a candidate for spinal cord stimulator       Return in about 2 months (around 10/23/2022).     I spent over 50 minutes with the patient face to face out of which over 50% (40 minutes) was spent in management, instructions and education.     Anu Campuzano MD

## 2022-08-24 ENCOUNTER — TELEPHONE (OUTPATIENT)
Dept: NEUROLOGY | Facility: CLINIC | Age: 57
End: 2022-08-24

## 2022-08-24 LAB
ALBUMIN SERPL ELPH-MCNC: 4.1 G/DL (ref 2.9–4.4)
ALBUMIN/GLOB SERPL: 1.4 {RATIO} (ref 0.7–1.7)
ALPHA1 GLOB SERPL ELPH-MCNC: 0.2 G/DL (ref 0–0.4)
ALPHA2 GLOB SERPL ELPH-MCNC: 0.8 G/DL (ref 0.4–1)
B-GLOBULIN SERPL ELPH-MCNC: 1 G/DL (ref 0.7–1.3)
GAMMA GLOB SERPL ELPH-MCNC: 1 G/DL (ref 0.4–1.8)
GLOBULIN SER CALC-MCNC: 3 G/DL (ref 2.2–3.9)
LABORATORY COMMENT REPORT: NORMAL
M PROTEIN SERPL ELPH-MCNC: NORMAL G/DL
PROT PATTERN SERPL ELPH-IMP: NORMAL
PROT SERPL-MCNC: 7.1 G/DL (ref 6–8.5)

## 2022-08-24 NOTE — TELEPHONE ENCOUNTER
Caller: Bianca    Relationship: SELF    Best call back number: [phone]    Who are you requesting to speak with (clinical staff, provider,  specific staff member):  DR LOZADA    What was the call regarding:   MRI THAT WAS SCHEDULED FOR 8-25-22.  PT IS AWARE THE APPT HAS BEEN CANCELLED DUE TO HIS INSURANCE NOT APPROVING. PT IS WANTING TO KNOW WHAT THE NEXT STEP WILL BE.    Do you require a callback: YES, PLEASE.

## 2022-08-24 NOTE — TELEPHONE ENCOUNTER
Caller: Bianca    Relationship: W/ [facility]    What was the call regarding: ALLISON CALLING BACK TO F/U PRESTON/ SHERRY AS DEADLINE FOR DECISION IS AT 3PM TODAY.    Call warm-transferred to: SHERRY    DOCUMENTING PER HUB PROTOCOL.

## 2022-08-24 NOTE — TELEPHONE ENCOUNTER
Bianca from [facility] called to notify the office Pt's MRI Hip for tomorrow is still not approved. She had spoken to a Magdalena and Marta at [facility] who stated that hip pain is not an approved Dx for the MRI any longer, it needs to be something more specific such as a labral tear or other specific reasoning.    Peer to peer can be done by calling [phone]. Patient ID# is [id number]. Open case# [id number].    Bianca needs an answer by 3:00 today as the MRI is tomorrow. If unable to complete the peer to peer or get it approved by 3:00, we need to have Pt RS or if approved by 3:00 we need to let her know to have the referral updated.

## 2022-08-24 NOTE — TELEPHONE ENCOUNTER
I called them and they have put me on hold.  If I do not get a chance to call them again can the order be sent under a different diagnosis like labral tear

## 2022-08-25 ENCOUNTER — APPOINTMENT (OUTPATIENT)
Dept: MRI IMAGING | Facility: HOSPITAL | Age: 57
End: 2022-08-25

## 2022-08-25 ENCOUNTER — APPOINTMENT (OUTPATIENT)
Dept: GENERAL RADIOLOGY | Facility: HOSPITAL | Age: 57
End: 2022-08-25

## 2022-08-25 ENCOUNTER — HOSPITAL ENCOUNTER (EMERGENCY)
Facility: HOSPITAL | Age: 57
Discharge: HOME | End: 2022-08-25
Attending: STUDENT IN AN ORGANIZED HEALTH CARE EDUCATION/TRAINING PROGRAM | Admitting: STUDENT IN AN ORGANIZED HEALTH CARE EDUCATION/TRAINING PROGRAM

## 2022-08-25 ENCOUNTER — APPOINTMENT (OUTPATIENT)
Dept: CT IMAGING | Facility: HOSPITAL | Age: 57
End: 2022-08-25

## 2022-08-25 VITALS
HEIGHT: 72 IN | DIASTOLIC BLOOD PRESSURE: 79 MMHG | RESPIRATION RATE: 18 BRPM | WEIGHT: 245 LBS | TEMPERATURE: 98 F | SYSTOLIC BLOOD PRESSURE: 145 MMHG | HEART RATE: 90 BPM | OXYGEN SATURATION: 99 % | BODY MASS INDEX: 33.18 KG/M2

## 2022-08-25 DIAGNOSIS — M25.559 HIP PAIN: Primary | ICD-10-CM

## 2022-08-25 DIAGNOSIS — M54.50 ACUTE RIGHT-SIDED LOW BACK PAIN WITHOUT SCIATICA: Primary | ICD-10-CM

## 2022-08-25 LAB
ANA SER QL IF: NEGATIVE
LABORATORY COMMENT REPORT: NORMAL

## 2022-08-25 PROCEDURE — 96372 THER/PROPH/DIAG INJ SC/IM: CPT

## 2022-08-25 PROCEDURE — 72131 CT LUMBAR SPINE W/O DYE: CPT

## 2022-08-25 PROCEDURE — 25010000002 METHYLPREDNISOLONE PER 125 MG: Performed by: PHYSICIAN ASSISTANT

## 2022-08-25 PROCEDURE — 25010000002 MORPHINE PER 10 MG: Performed by: EMERGENCY MEDICINE

## 2022-08-25 PROCEDURE — 99283 EMERGENCY DEPT VISIT LOW MDM: CPT

## 2022-08-25 PROCEDURE — 73502 X-RAY EXAM HIP UNI 2-3 VIEWS: CPT

## 2022-08-25 PROCEDURE — 25010000002 ORPHENADRINE CITRATE PER 60 MG: Performed by: PHYSICIAN ASSISTANT

## 2022-08-25 PROCEDURE — 25010000002 KETOROLAC TROMETHAMINE PER 15 MG: Performed by: PHYSICIAN ASSISTANT

## 2022-08-25 RX ORDER — ORPHENADRINE CITRATE 30 MG/ML
60 INJECTION INTRAMUSCULAR; INTRAVENOUS ONCE
Status: COMPLETED | OUTPATIENT
Start: 2022-08-25 | End: 2022-08-25

## 2022-08-25 RX ORDER — HYDROCODONE BITARTRATE AND ACETAMINOPHEN 7.5; 325 MG/1; MG/1
1 TABLET ORAL EVERY 6 HOURS PRN
Qty: 8 TABLET | Refills: 0 | Status: SHIPPED | OUTPATIENT
Start: 2022-08-25 | End: 2022-09-14

## 2022-08-25 RX ORDER — KETOROLAC TROMETHAMINE 30 MG/ML
30 INJECTION, SOLUTION INTRAMUSCULAR; INTRAVENOUS ONCE
Status: COMPLETED | OUTPATIENT
Start: 2022-08-25 | End: 2022-08-25

## 2022-08-25 RX ORDER — METHYLPREDNISOLONE SODIUM SUCCINATE 125 MG/2ML
80 INJECTION, POWDER, LYOPHILIZED, FOR SOLUTION INTRAMUSCULAR; INTRAVENOUS ONCE
Status: COMPLETED | OUTPATIENT
Start: 2022-08-25 | End: 2022-08-25

## 2022-08-25 RX ADMIN — METHYLPREDNISOLONE SODIUM SUCCINATE 80 MG: 125 INJECTION, POWDER, FOR SOLUTION INTRAMUSCULAR; INTRAVENOUS at 18:08

## 2022-08-25 RX ADMIN — MORPHINE SULFATE 4 MG: 4 INJECTION, SOLUTION INTRAMUSCULAR; INTRAVENOUS at 20:15

## 2022-08-25 RX ADMIN — ORPHENADRINE CITRATE 60 MG: 30 INJECTION INTRAMUSCULAR; INTRAVENOUS at 18:11

## 2022-08-25 RX ADMIN — KETOROLAC TROMETHAMINE 30 MG: 30 INJECTION, SOLUTION INTRAMUSCULAR at 18:10

## 2022-08-25 NOTE — ED PROVIDER NOTES
Subjective   Patient is a 57-year-old male with chronic back pain, migraine headaches, and arthritis that presents the ED with complaints of worsening lower back pain for the last 2 weeks.  He states his back pain started about 2 weeks ago with no new injury or trauma.  He denies any recent heavy lifting, pushing, or pulling.  He states pain is just getting progressively worse.  He states it is running down his right leg.  He states is going into the right hip and down the thigh and into the foot.  He is having some numbness and tingling along the anterior aspect of the foot and into the third, fourth, and fifth toes.  He denies chest pain, shortness of breath, fever, chills, nausea, vomiting, headache, dizziness, bladder or bowel incontinence, extremity weakness, abdominal pain, dysuria, diarrhea, or constipation.      History provided by:  Patient   used: No        Review of Systems   Constitutional: Negative.  Negative for chills, diaphoresis, fatigue and fever.   HENT: Negative.  Negative for congestion, drooling, ear discharge, ear pain, facial swelling, postnasal drip, rhinorrhea, sinus pressure, sinus pain, sneezing, sore throat, tinnitus and trouble swallowing.    Eyes: Negative.  Negative for photophobia, pain, discharge, redness and itching.   Respiratory: Negative.  Negative for cough, shortness of breath and wheezing.    Cardiovascular: Negative.  Negative for chest pain.   Gastrointestinal: Negative.  Negative for abdominal pain, constipation, diarrhea, nausea and vomiting.   Endocrine: Negative.    Genitourinary: Negative.  Negative for difficulty urinating, dysuria, flank pain and frequency.   Musculoskeletal: Positive for arthralgias and back pain. Negative for gait problem, joint swelling, myalgias, neck pain and neck stiffness.   Skin: Negative.    Neurological: Negative.  Negative for dizziness, tremors, seizures, syncope, facial asymmetry, speech difficulty, weakness,  light-headedness, numbness and headaches.   Psychiatric/Behavioral: Negative.  Negative for confusion.   All other systems reviewed and are negative.      Past Medical History:   Diagnosis Date   • Acid reflux    • Chronic pain disorder    • Extremity pain    • Joint pain    • Low back pain    • Lumbosacral disc disease    • Migraine    • Muscle spasm    • Neck pain    • Osteoarthritis    • PONV (postoperative nausea and vomiting)        No Known Allergies    Past Surgical History:   Procedure Laterality Date   • CARPAL TUNNEL RELEASE Right 8/4/2016    Procedure: CARPAL TUNNEL RELEASE;  Surgeon: Yoel Lua MD;  Location: [facility] OR;  Service:    • CARPAL TUNNEL RELEASE Left 11/9/2017    Procedure: CARPAL TUNNEL RELEASE;  Surgeon: Yoel Lua MD;  Location: [facility] OR;  Service:    • COLONOSCOPY     • LUMBAR FACET INJECTION     • TOE SURGERY Right 03/04/2015       Family History   Problem Relation Age of Onset   • Lung cancer Mother    • Cancer Mother         ovarian   • Diabetes Sister    • Rheum arthritis Brother    • Cancer Sister         breast       Social History     Socioeconomic History   • Marital status:    Tobacco Use   • Smoking status: Never Smoker   • Smokeless tobacco: Current User     Types: Chew   • Tobacco comment: uses chewing tobacco   Vaping Use   • Vaping Use: Never used   Substance and Sexual Activity   • Alcohol use: No   • Drug use: No   • Sexual activity: Defer           Objective   Physical Exam  Vitals and nursing note reviewed.   Constitutional:       General: He is not in acute distress.     Appearance: He is well-developed. He is not diaphoretic.   HENT:      Head: Normocephalic and atraumatic.      Right Ear: External ear normal.      Left Ear: External ear normal.      Nose: Nose normal.      Mouth/Throat:      Mouth: Mucous membranes are moist.      Pharynx: Oropharynx is clear.   Eyes:      Extraocular Movements: Extraocular movements intact.       Conjunctiva/sclera: Conjunctivae normal.      Pupils: Pupils are equal, round, and reactive to light.   Neck:      Vascular: No JVD.      Trachea: No tracheal deviation.   Cardiovascular:      Rate and Rhythm: Normal rate and regular rhythm.      Pulses: Normal pulses.      Heart sounds: Normal heart sounds. No murmur heard.  Pulmonary:      Effort: Pulmonary effort is normal. No respiratory distress.      Breath sounds: Normal breath sounds. No wheezing.   Abdominal:      General: Bowel sounds are normal. There is no distension.      Palpations: Abdomen is soft.      Tenderness: There is no abdominal tenderness. There is no guarding or rebound.   Musculoskeletal:         General: No deformity.      Cervical back: Normal, normal range of motion and neck supple.      Thoracic back: Normal.      Lumbar back: Spasms and tenderness present. No swelling, edema, deformity, signs of trauma or lacerations. Normal range of motion. Positive right straight leg raise test. Negative left straight leg raise test. No scoliosis.      Right hip: Tenderness present. No deformity, lacerations, bony tenderness or crepitus. Normal range of motion. Normal strength.      Left hip: Normal.   Skin:     General: Skin is warm and dry.      Coloration: Skin is not pale.      Findings: No erythema or rash.   Neurological:      General: No focal deficit present.      Mental Status: He is alert and oriented to person, place, and time.      Cranial Nerves: No cranial nerve deficit.      Sensory: No sensory deficit.      Motor: No weakness.      Coordination: Coordination normal.      Gait: Gait normal.      Deep Tendon Reflexes: Reflexes normal.   Psychiatric:         Mood and Affect: Mood normal.         Behavior: Behavior normal.         Thought Content: Thought content normal.         Procedures           ED Course  ED Course as of 08/25/22 2046   Thu Aug 25, 2022   1913 Discuss CT Lumbar findings with Dr. Ott, radiologist.  []   1933 CT  Lumbar Spine Without Contrast  IMPRESSION:  Large (1.1 x 0.9 cm) right paracentral disc extrusion L4-L5 with caudal migration of disc fragment extending posterior to the L5 vertebral body and contributing to severe right lateral canal stenosis and likely impingement of the descending (likely L5  and possibly S1) nerve roots at this level.     L5-S1 degenerative disc disease with posterior disc protrusion and osteophyte contributing to mild central canal stenosis and moderate bilateral foraminal stenosis.     Findings called and discussed with Teresa Garcia at the time of this dictation.     Signer Name: AYLIN Ott MD   Signed: 8/25/2022 7:15 PM []   1933 XR Hip With or Without Pelvis 2 - 3 View Right  IMPRESSION:  No acute bony abnormality.            Signer Name: Namrata Meadows MD   Signed: 8/25/2022 6:36 PM []   2000 [facility] neurosurgery paged for consult.  []   2042 Discussed with Dr. Grigsby, neurosurgery at [facility].  He states he is stable to discharge home and follow-up with him in the outpatient clinic. []   2042 Discussed plan of care with patient.  Advised if symptoms worsen return to ED.  Advise follow-up with Dr. Grigsby at [facility] neurosurgery. []      ED Course User Index  [] Teresa Garcia PA-C                                           [facility]    Final diagnoses:   Acute right-sided low back pain without sciatica       ED Disposition  ED Disposition     ED Disposition   Discharge    Condition   Stable    Comment   --             Aquiles Grigsby MD  [address] [address] [phone]    Schedule an appointment as soon as possible for a visit in 1 day  ASK FOR RASHAD         Medication List      New Prescriptions    HYDROcodone-acetaminophen 7.5-325 MG per tablet  Commonly known as: NORCO  Take 1 tablet by mouth Every 6 (Six) Hours As Needed for Moderate Pain .           Where to Get Your Medications      These medications were  sent to [facility]   [place] [address] [id number]    Hours: 8AM-6PM Mon-Fri Phone: [phone]   · HYDROcodone-acetaminophen 7.5-325 MG per tablet          Teresa Garcia PA-C  08/25/22 8122

## 2022-08-25 NOTE — TELEPHONE ENCOUNTER
Please let the patient know that I have ordered an x-ray of the hip for him.  He can even go to [facility] and get that done any day.  Hopefully once the hip x-ray is done they will approve the MRI.  Also let him know that most of his blood work is back and is within normal limits.  His A1c is in the prediabetic range and I want him to carry out dietary changes like cutting back on red meat, potatoes, sugary drinks and white bread

## 2022-08-27 LAB — VIT B6 SERPL-MCNC: 19.9 UG/L (ref 3.4–65.2)

## 2022-08-29 ENCOUNTER — APPOINTMENT (OUTPATIENT)
Dept: MRI IMAGING | Facility: HOSPITAL | Age: 57
End: 2022-08-29

## 2022-08-29 ENCOUNTER — HOSPITAL ENCOUNTER (EMERGENCY)
Facility: HOSPITAL | Age: 57
Discharge: HOME | End: 2022-08-29
Attending: EMERGENCY MEDICINE | Admitting: EMERGENCY MEDICINE

## 2022-08-29 VITALS
SYSTOLIC BLOOD PRESSURE: 127 MMHG | HEART RATE: 81 BPM | WEIGHT: 250 LBS | OXYGEN SATURATION: 94 % | DIASTOLIC BLOOD PRESSURE: 81 MMHG | TEMPERATURE: 97.8 F | BODY MASS INDEX: 33.86 KG/M2 | HEIGHT: 72 IN | RESPIRATION RATE: 18 BRPM

## 2022-08-29 DIAGNOSIS — M54.41 ACUTE RIGHT-SIDED LOW BACK PAIN WITH RIGHT-SIDED SCIATICA: ICD-10-CM

## 2022-08-29 DIAGNOSIS — M51.26 HERNIATED INTERVERTEBRAL DISC OF LUMBAR SPINE: Primary | ICD-10-CM

## 2022-08-29 PROCEDURE — 25010000002 KETOROLAC TROMETHAMINE PER 15 MG: Performed by: EMERGENCY MEDICINE

## 2022-08-29 PROCEDURE — 99283 EMERGENCY DEPT VISIT LOW MDM: CPT

## 2022-08-29 PROCEDURE — 96375 TX/PRO/DX INJ NEW DRUG ADDON: CPT

## 2022-08-29 PROCEDURE — 72148 MRI LUMBAR SPINE W/O DYE: CPT

## 2022-08-29 PROCEDURE — 25010000002 METHYLPREDNISOLONE PER 125 MG: Performed by: EMERGENCY MEDICINE

## 2022-08-29 PROCEDURE — 25010000002 HYDROMORPHONE 1 MG/ML SOLUTION: Performed by: EMERGENCY MEDICINE

## 2022-08-29 PROCEDURE — 96374 THER/PROPH/DIAG INJ IV PUSH: CPT

## 2022-08-29 PROCEDURE — 25010000002 DIAZEPAM PER 5 MG: Performed by: EMERGENCY MEDICINE

## 2022-08-29 RX ORDER — PREDNISONE 50 MG/1
50 TABLET ORAL DAILY
Qty: 5 TABLET | Refills: 0 | Status: SHIPPED | OUTPATIENT
Start: 2022-08-29 | End: 2022-09-14

## 2022-08-29 RX ORDER — OXYCODONE AND ACETAMINOPHEN 7.5; 325 MG/1; MG/1
1 TABLET ORAL EVERY 6 HOURS PRN
Qty: 10 TABLET | Refills: 0 | Status: SHIPPED | OUTPATIENT
Start: 2022-08-29 | End: 2022-08-31 | Stop reason: SDUPTHER

## 2022-08-29 RX ORDER — KETOROLAC TROMETHAMINE 30 MG/ML
30 INJECTION, SOLUTION INTRAMUSCULAR; INTRAVENOUS ONCE
Status: COMPLETED | OUTPATIENT
Start: 2022-08-29 | End: 2022-08-29

## 2022-08-29 RX ORDER — METHYLPREDNISOLONE SODIUM SUCCINATE 125 MG/2ML
125 INJECTION, POWDER, LYOPHILIZED, FOR SOLUTION INTRAMUSCULAR; INTRAVENOUS ONCE
Status: COMPLETED | OUTPATIENT
Start: 2022-08-29 | End: 2022-08-29

## 2022-08-29 RX ORDER — DIAZEPAM 5 MG/1
5 TABLET ORAL EVERY 6 HOURS PRN
Qty: 12 TABLET | Refills: 0 | Status: SHIPPED | OUTPATIENT
Start: 2022-08-29 | End: 2022-09-22 | Stop reason: HOSPADM

## 2022-08-29 RX ORDER — DIAZEPAM 5 MG/ML
5 INJECTION, SOLUTION INTRAMUSCULAR; INTRAVENOUS ONCE
Status: COMPLETED | OUTPATIENT
Start: 2022-08-29 | End: 2022-08-29

## 2022-08-29 RX ADMIN — KETOROLAC TROMETHAMINE 30 MG: 30 INJECTION, SOLUTION INTRAMUSCULAR; INTRAVENOUS at 11:20

## 2022-08-29 RX ADMIN — METHYLPREDNISOLONE SODIUM SUCCINATE 125 MG: 125 INJECTION, POWDER, FOR SOLUTION INTRAMUSCULAR; INTRAVENOUS at 11:20

## 2022-08-29 RX ADMIN — DIAZEPAM 5 MG: 5 INJECTION, SOLUTION INTRAMUSCULAR; INTRAVENOUS at 11:20

## 2022-08-29 RX ADMIN — HYDROMORPHONE HYDROCHLORIDE 1 MG: 1 INJECTION, SOLUTION INTRAMUSCULAR; INTRAVENOUS; SUBCUTANEOUS at 11:20

## 2022-08-31 ENCOUNTER — OFFICE VISIT (OUTPATIENT)
Dept: NEUROSURGERY | Facility: CLINIC | Age: 57
End: 2022-08-31

## 2022-08-31 VITALS — TEMPERATURE: 98.2 F | HEIGHT: 72 IN | BODY MASS INDEX: 33.75 KG/M2 | WEIGHT: 249.2 LBS

## 2022-08-31 DIAGNOSIS — M51.16 LUMBAR DISC HERNIATION WITH RADICULOPATHY: ICD-10-CM

## 2022-08-31 DIAGNOSIS — M51.26 HERNIATED INTERVERTEBRAL DISC OF LUMBAR SPINE: ICD-10-CM

## 2022-08-31 DIAGNOSIS — M51.36 DDD (DEGENERATIVE DISC DISEASE), LUMBAR: Primary | ICD-10-CM

## 2022-08-31 PROCEDURE — 99214 OFFICE O/P EST MOD 30 MIN: CPT | Performed by: PHYSICIAN ASSISTANT

## 2022-08-31 RX ORDER — OXYCODONE AND ACETAMINOPHEN 7.5; 325 MG/1; MG/1
1 TABLET ORAL EVERY 8 HOURS PRN
Qty: 40 TABLET | Refills: 0 | Status: SHIPPED | OUTPATIENT
Start: 2022-08-31 | End: 2022-09-15 | Stop reason: SDUPTHER

## 2022-08-31 RX ORDER — GABAPENTIN 300 MG/1
300 CAPSULE ORAL 3 TIMES DAILY
Qty: 40 CAPSULE | Refills: 0 | Status: SHIPPED | OUTPATIENT
Start: 2022-08-31 | End: 2022-09-14

## 2022-08-31 NOTE — PROGRESS NOTES
"Subjective     Chief Complaint: back pain and right leg pain    Patient ID: Avery Watson is a 57 y.o. male is here today for follow-up.    History of Present Illness  Mr. Watson is a 57-year-old man with a long history of low back pain with intermittent exacerbations.He had seen Dr. Ness in the past for this non-operative pain. He has had multiple rounds of physical therapy including twice this year and pain management as well as injections over the last 20 years or so.  Approximately 3 weeks ago, he woke up with low groin tenderness, and a \"crushing\" pain in his right testicle and radiating pain from his back into his right hip lateral thigh, lateral lower leg and ankle with intermittent numbness and tingling of the 3 smallest toes on the right.  There was no inciting incident or fall.  He has not had any weakness or falls.  He denies any bowel or bladder dysfunction or saddle anesthesia   He was seen in the emergency department and an MRI demonstrated an L4 disc herniation.  He was referred to our office for outpatient follow-up    The following portions of the patient's history were reviewed and updated as appropriate: allergies, current medications, past family history, past medical history, past social history, past surgical history and problem list.    Past Medical History:   Diagnosis Date   • Acid reflux    • Chronic pain disorder    • Extremity pain    • Joint pain    • Low back pain    • Lumbosacral disc disease    • Migraine    • Muscle spasm    • Neck pain    • Osteoarthritis    • PONV (postoperative nausea and vomiting)      Past Surgical History:   Procedure Laterality Date   • CARPAL TUNNEL RELEASE Right 8/4/2016    Procedure: CARPAL TUNNEL RELEASE;  Surgeon: Yoel Lua MD;  Location: [facility] OR;  Service:    • CARPAL TUNNEL RELEASE Left 11/9/2017    Procedure: CARPAL TUNNEL RELEASE;  Surgeon: Yoel Lua MD;  Location: [facility] OR;  Service:    • COLONOSCOPY     • LUMBAR FACET " INJECTION     • TOE SURGERY Right 03/04/2015     Family history:   Family History   Problem Relation Age of Onset   • Lung cancer Mother    • Cancer Mother         ovarian   • Diabetes Sister    • Rheum arthritis Brother    • Cancer Sister         breast       Social history:   Social History     Socioeconomic History   • Marital status:    Tobacco Use   • Smoking status: Never Smoker   • Smokeless tobacco: Current User     Types: Chew   • Tobacco comment: uses chewing tobacco   Vaping Use   • Vaping Use: Never used   Substance and Sexual Activity   • Alcohol use: No   • Drug use: No   • Sexual activity: Defer       Review of Systems   Constitutional: Negative for activity change, appetite change, chills, diaphoresis, fatigue, fever and unexpected weight change.   HENT: Negative for congestion, dental problem, drooling, ear discharge, ear pain, facial swelling, hearing loss, mouth sores, nosebleeds, postnasal drip, rhinorrhea, sinus pressure, sneezing, sore throat, tinnitus, trouble swallowing and voice change.    Eyes: Negative for photophobia, pain, discharge, redness, itching and visual disturbance.   Respiratory: Negative for apnea, cough, choking, chest tightness, shortness of breath, wheezing and stridor.    Cardiovascular: Negative for chest pain, palpitations and leg swelling.   Gastrointestinal: Negative for abdominal distention, abdominal pain, anal bleeding, blood in stool, constipation, diarrhea, nausea, rectal pain and vomiting.   Endocrine: Negative for cold intolerance, heat intolerance, polydipsia, polyphagia and polyuria.   Genitourinary: Negative for decreased urine volume, difficulty urinating, dysuria, enuresis, flank pain, frequency, genital sores, hematuria and urgency.   Musculoskeletal: Positive for arthralgias, back pain and myalgias. Negative for gait problem, joint swelling, neck pain and neck stiffness.   Skin: Negative for color change, pallor, rash and wound.  "  Allergic/Immunologic: Negative for environmental allergies, food allergies and immunocompromised state.   Neurological: Negative for dizziness, tremors, seizures, syncope, facial asymmetry, speech difficulty, weakness, light-headedness, numbness and headaches.   Hematological: Negative for adenopathy. Does not bruise/bleed easily.   Psychiatric/Behavioral: Negative for agitation, behavioral problems, confusion, decreased concentration, dysphoric mood, hallucinations, self-injury, sleep disturbance and suicidal ideas. The patient is not nervous/anxious and is not hyperactive.        Objective   Temperature 98.2 °F (36.8 °C), temperature source Infrared, height 182.9 cm (72\"), weight 113 kg (249 lb 3.2 oz).  Body mass index is 33.8 kg/m².    Physical Exam   CONSTITUTIONAL:   - Patient is well-nourished  - Pleasant and appears stated age.  PSYCHIATRIC:  - Normal mood and affect  - Behavior is normal.  HENT:   Head: Normocephalic and Atraumatic.   Eyes:     - Pupils are equal, round, and reactive to light.     - EOM are normal.   CV:   - Regular rate and rhythm on palpable radial pulse   PULMONARY:   - Speaking in full sentences, breathing non labored  - No wheezing   SKIN:  - Clean, dry and intact   MUSCULOSKELETAL:  -Low back is tender to palpation especially in the right side  - Strength is intact in the upper and lower extremities to direct testing, but particularly knee flexion and hip flexion increased pain on the right when down against pressure.  - Muscle tone is normal throughout.  - Station and gait are antalgic.  Patient is limping and favoring his right leg.  There is no foot drop observed  - ROM is reduced.  Patient is in a slow and stiff posture  - Straight leg raise is strongly positive on the right  NEUROLOGICAL:  - A&Ox3  - Attention span, language function, and cognition are intact.  - Sensation is intact to light touch testing throughout.  - Deep tendon reflexes are 1+ and symmetrical.    - " "Coordination is intact.     Patient's Body mass index is 33.80 kg/m². indicating that he is  obese       MRI of the lumbar spine done in the ER on 8/29/2022 was reviewed with Dr. Grigsby and then with the patient in the room.  It shows moderate multilevel degenerative changes.  There is a disc herniation present at L4-5 rightward displacing and compressing the right L5 nerve root this disc herniation is also circumferential but without significant central cord compression.  there is also a small disc bulge at L5-S1 without significant neuroforaminal or central canal stenosis  CT scan of the lumbar spine done on 8/25/2022 was also reviewed.  It shows a possible caudal migration of disc fragment posterior to the L5 vertebral body      Assessment & Plan   Mr. Watson has a new L4-5 disc herniation in the setting of previous history of chronic back pain.  This right leg radicular pain is new.  He has not had previous back surgery or radicular pain in the past.  He has been treated with gabapentin in the past which he did not tolerate well.  He says that it made him feel \"not himself\" but he has not needed it for nerve pain previously  He has been taking Percocet 7.5 3 times a day which was prescribed in ER.  This is helping manage his pain to some degree.  He has not yet had physical therapy for this acute onset of leg pain.   We will start him on gabapentin 300 mg at bedtime.  If he does not tolerate this he will need to be switched to Lyrica.  Dr. Grigsby is willing to refill his Percocet due to his acute onset of pain so that he can attempt to participate with physical therapy over the next 3 to 4 weeks.  We will reevaluate him at that point to continue conservative treatment versus discussed discectomy. He was given Valium in the ER for muscle spasms.  If he needs a refill for muscle spasms we will supply Flexeril or Robaxin  Signs and symptoms of cauda equina were discussed with the patient as well and the need for " emergent follow-up should they occur.  Patient voiced understanding    Diagnoses and all orders for this visit:    1. DDD (degenerative disc disease), lumbar (Primary)    2. Lumbar disc herniation with radiculopathy    gabapentin 300mg TID. Start with qhs and build up to TID over the course of a week. If not tolerated or helpful, we will switch to lyrica.   Percocet 7.5 tid prn, pain. Scripts sent to Dr. Grigsby for signature.     Return in about 4 weeks (around 9/28/2022).    Sonya Alex PA-C        This document signed by Sonya Alex PA-C  August 31, 2022 13:26 EDT

## 2022-09-01 ENCOUNTER — TELEPHONE (OUTPATIENT)
Dept: NEUROSURGERY | Facility: CLINIC | Age: 57
End: 2022-09-01

## 2022-09-12 ENCOUNTER — TELEPHONE (OUTPATIENT)
Dept: NEUROSURGERY | Facility: CLINIC | Age: 57
End: 2022-09-12

## 2022-09-12 DIAGNOSIS — M47.816 LUMBAR FACET ARTHROPATHY: Primary | ICD-10-CM

## 2022-09-12 RX ORDER — PREGABALIN 75 MG/1
75 CAPSULE ORAL 2 TIMES DAILY
Qty: 60 CAPSULE | Refills: 0 | Status: SHIPPED | OUTPATIENT
Start: 2022-09-12 | End: 2022-09-28 | Stop reason: SDUPTHER

## 2022-09-12 NOTE — TELEPHONE ENCOUNTER
S/w patient's wife and let her know about the Lyrica and that we will try to move the up appointment. Patient's wife is inquiring about pre-certification for surgery. I s/w Octavia to confirm information but because their is not an order in patient's chart for surgery a pre-certificiation nor surgery scheduling can be completed. I relayed this information to the patient's wife. She voiced understanding.

## 2022-09-12 NOTE — TELEPHONE ENCOUNTER
"Provider:  Kylie  Surgery/Procedure:  ANDREW  Surgery/Procedure Date: NA   Last visit:  Office Visit with Sonya Alex PA-C (08/31/2022)   Next visit: 10/05/2022     Reason for call:     \"Caller: Moiz Watson  Relationship: Emergency Contact  Best call back number: [phone]    Additional details provided by patient: PT WIFE CALLED, STATES THE GABAPENTIN DID NOT HELP AND IS IN EXTREME PAIN.  PHYSICAL THERAPY WAS TO CALL OFFICE TO ADVISE ON PHYSICAL THERAPY AND PT SHOULD HAVE SURGERY AND NO MOR PHYSICAL THERAPY.  COMPLAIN OF LEG BECOMING WEEK AND GIVES OUT.   NO B AND B PROBLEMS.  PT WIFE IS CALLING TO GET ADVISE.  PLEASE CALL PT WIFE MOIZ.\"    See the above information from Luann. I have called the patient and his wife to gather more information.     Patient's next appointment is 10/05/2022. Do you want patient to come in for a sooner appointment since PT is not helping?     Per last OV note:    \"Assessment & Plan     Mr. Watson has a new L4-5 disc herniation in the setting of previous history of chronic back pain.  This right leg radicular pain is new.  He has not had previous back surgery or radicular pain in the past.  He has been treated with gabapentin in the past which he did not tolerate well.  He says that it made him feel \"not himself\" but he has not needed it for nerve pain previously  He has been taking Percocet 7.5 3 times a day which was prescribed in ER.  This is helping manage his pain to some degree.  He has not yet had physical therapy for this acute onset of leg pain.   We will start him on gabapentin 300 mg at bedtime.  If he does not tolerate this he will need to be switched to Lyrica.  Dr. Grigsby is willing to refill his Percocet due to his acute onset of pain so that he can attempt to participate with physical therapy over the next 3 to 4 weeks.  We will reevaluate him at that point to continue conservative treatment versus discussed discectomy. He was given Valium in the ER for muscle " "spasms.  If he needs a refill for muscle spasms we will supply Flexeril or Robaxin  Signs and symptoms of cauda equina were discussed with the patient as well and the need for emergent follow-up should they occur.  Patient voiced understanding.\"    When did it start:    Has been going on for a long time but the leg weakness is getting worse.    Where is it located:    Bilateral leg weakness  Low back pain and right leg pain/     How long has this been going on/is this new or the same as before surgery:     No surgery    Characteristics of symptom/severity:    Back pain that radiates to his right hip. Patient is having bilateral leg weakness that makes it difficult to walk. Patient denies bowel and bladder incontinence.     Timing- Is it constant or intermittent:    NA    What makes it worse:    Patient states that he is not able to do much due to is leg weakness    What makes it better:    Gabapentin and Percocet help a little bit.     What therapies/medications have you tried:    gabapentin (NEURONTIN) 300 MG capsule (08/31/2022) 3 capsules at night   oxyCODONE-acetaminophen (Percocet) 7.5-325 MG per tablet (08/31/2022) 1 tablet q8h. Patient took 2 tablets instead of 1, yesterday due to the pain. Patient does not want to try Lyrica.     Patient has had 2 PT visits and physical therapist told him that she couldn't really help with. He has another PT appointment Wednesday.      "

## 2022-09-12 NOTE — TELEPHONE ENCOUNTER
Caller: Moiz Watson    Relationship: Emergency Contact    Best call back number: [phone]    Requested Prescriptions:     Pharmacy where request should be sent:      Additional details provided by patient: PT WIFE CALLED, STATES THE GABAPENTIN DID NOT HELP AND IS IN EXTREME PAIN.    PHYSICAL THERAPY WAS TO CALL OFFICE TO ADVISE ON PHYSICAL THERAPY AND PT SHOULD HAVE SURGERY AND NO MOR PHYSICAL THERAPY.    COMPLAIN OF LEG BECOMING WEEK AND GIVES OUT.   NO B AND B PROBLEMS.    PT WIFE IS CALLING TO GET ADVISE.    PLEASE CALL PT WIFE MOIZ.

## 2022-09-14 ENCOUNTER — PRE-ADMISSION TESTING (OUTPATIENT)
Dept: PREADMISSION TESTING | Facility: HOSPITAL | Age: 57
End: 2022-09-14

## 2022-09-14 ENCOUNTER — OFFICE VISIT (OUTPATIENT)
Dept: NEUROSURGERY | Facility: CLINIC | Age: 57
End: 2022-09-14

## 2022-09-14 VITALS — RESPIRATION RATE: 17 BRPM | WEIGHT: 251 LBS | BODY MASS INDEX: 34 KG/M2 | HEIGHT: 72 IN

## 2022-09-14 VITALS — HEIGHT: 72 IN | BODY MASS INDEX: 34.04 KG/M2

## 2022-09-14 DIAGNOSIS — M53.9 MULTILEVEL DEGENERATIVE DISC DISEASE: ICD-10-CM

## 2022-09-14 DIAGNOSIS — M51.16 LUMBAR DISC HERNIATION WITH RADICULOPATHY: Primary | ICD-10-CM

## 2022-09-14 DIAGNOSIS — M47.816 LUMBAR FACET ARTHROPATHY: ICD-10-CM

## 2022-09-14 DIAGNOSIS — M51.16 LUMBAR DISC HERNIATION WITH RADICULOPATHY: ICD-10-CM

## 2022-09-14 LAB
ALBUMIN SERPL-MCNC: 4.4 G/DL (ref 3.5–5.2)
ALBUMIN/GLOB SERPL: 1.9 G/DL
ALP SERPL-CCNC: 87 U/L (ref 39–117)
ALT SERPL W P-5'-P-CCNC: 45 U/L (ref 1–41)
ANION GAP SERPL CALCULATED.3IONS-SCNC: 10 MMOL/L (ref 5–15)
AST SERPL-CCNC: 20 U/L (ref 1–40)
BASOPHILS # BLD AUTO: 0.04 10*3/MM3 (ref 0–0.2)
BASOPHILS NFR BLD AUTO: 0.5 % (ref 0–1.5)
BILIRUB SERPL-MCNC: 0.4 MG/DL (ref 0–1.2)
BUN SERPL-MCNC: 16 MG/DL (ref 6–20)
BUN/CREAT SERPL: 16.8 (ref 7–25)
CALCIUM SPEC-SCNC: 9.3 MG/DL (ref 8.6–10.5)
CHLORIDE SERPL-SCNC: 105 MMOL/L (ref 98–107)
CO2 SERPL-SCNC: 25 MMOL/L (ref 22–29)
CREAT SERPL-MCNC: 0.95 MG/DL (ref 0.76–1.27)
DEPRECATED RDW RBC AUTO: 41.1 FL (ref 37–54)
EGFRCR SERPLBLD CKD-EPI 2021: 93.4 ML/MIN/1.73
EOSINOPHIL # BLD AUTO: 0.23 10*3/MM3 (ref 0–0.4)
EOSINOPHIL NFR BLD AUTO: 3 % (ref 0.3–6.2)
ERYTHROCYTE [DISTWIDTH] IN BLOOD BY AUTOMATED COUNT: 13.1 % (ref 12.3–15.4)
GLOBULIN UR ELPH-MCNC: 2.3 GM/DL
GLUCOSE SERPL-MCNC: 118 MG/DL (ref 65–99)
HCT VFR BLD AUTO: 43 % (ref 37.5–51)
HGB BLD-MCNC: 14.5 G/DL (ref 13–17.7)
IMM GRANULOCYTES # BLD AUTO: 0.04 10*3/MM3 (ref 0–0.05)
IMM GRANULOCYTES NFR BLD AUTO: 0.5 % (ref 0–0.5)
LYMPHOCYTES # BLD AUTO: 2.09 10*3/MM3 (ref 0.7–3.1)
LYMPHOCYTES NFR BLD AUTO: 27.4 % (ref 19.6–45.3)
MCH RBC QN AUTO: 29.1 PG (ref 26.6–33)
MCHC RBC AUTO-ENTMCNC: 33.7 G/DL (ref 31.5–35.7)
MCV RBC AUTO: 86.3 FL (ref 79–97)
MONOCYTES # BLD AUTO: 0.45 10*3/MM3 (ref 0.1–0.9)
MONOCYTES NFR BLD AUTO: 5.9 % (ref 5–12)
NEUTROPHILS NFR BLD AUTO: 4.77 10*3/MM3 (ref 1.7–7)
NEUTROPHILS NFR BLD AUTO: 62.7 % (ref 42.7–76)
NRBC BLD AUTO-RTO: 0 /100 WBC (ref 0–0.2)
PLATELET # BLD AUTO: 212 10*3/MM3 (ref 140–450)
PMV BLD AUTO: 8.2 FL (ref 6–12)
POTASSIUM SERPL-SCNC: 4.2 MMOL/L (ref 3.5–5.2)
PROT SERPL-MCNC: 6.7 G/DL (ref 6–8.5)
QT INTERVAL: 352 MS
QTC INTERVAL: 449 MS
RBC # BLD AUTO: 4.98 10*6/MM3 (ref 4.14–5.8)
SODIUM SERPL-SCNC: 140 MMOL/L (ref 136–145)
WBC NRBC COR # BLD: 7.62 10*3/MM3 (ref 3.4–10.8)

## 2022-09-14 PROCEDURE — 87081 CULTURE SCREEN ONLY: CPT

## 2022-09-14 PROCEDURE — 36415 COLL VENOUS BLD VENIPUNCTURE: CPT

## 2022-09-14 PROCEDURE — 93005 ELECTROCARDIOGRAM TRACING: CPT

## 2022-09-14 PROCEDURE — 93010 ELECTROCARDIOGRAM REPORT: CPT | Performed by: INTERNAL MEDICINE

## 2022-09-14 PROCEDURE — 99214 OFFICE O/P EST MOD 30 MIN: CPT | Performed by: PHYSICIAN ASSISTANT

## 2022-09-14 PROCEDURE — 85025 COMPLETE CBC W/AUTO DIFF WBC: CPT

## 2022-09-14 PROCEDURE — 80053 COMPREHEN METABOLIC PANEL: CPT

## 2022-09-14 RX ORDER — CHLORHEXIDINE GLUCONATE 4 G/100ML
SOLUTION TOPICAL
Qty: 236 ML | Refills: 0 | Status: SHIPPED | OUTPATIENT
Start: 2022-09-14 | End: 2022-09-22 | Stop reason: HOSPADM

## 2022-09-14 RX ORDER — SODIUM CHLORIDE 9 MG/ML
100 INJECTION, SOLUTION INTRAVENOUS CONTINUOUS
Status: CANCELLED | OUTPATIENT
Start: 2022-09-14

## 2022-09-14 RX ORDER — FAMOTIDINE 10 MG
20 TABLET ORAL
Status: CANCELLED | OUTPATIENT
Start: 2022-09-14

## 2022-09-14 NOTE — H&P
"Patient: Avery Watson  : 1965  Chart #: [id number]    Date of Service: 2022    CHIEF COMPLAINT: Low back and right leg pain    History of Present Illness Mr. Watson is a 57-year-old unemployed gentleman with a longstanding history of low back pain with intermittent exacerbations.  He has seen Dr. Ness in the past for nonoperative pain.  He has undergone multiple rounds of physical therapy as well as injections with pain management.  About 5 weeks ago, he woke up with pain in the low groin and a \"crushing \"pain in the right testicle as well as pain radiating from the back into the right hip and down the lateral aspect of the leg to the foot.  He has some numbness in the toes.  No inciting event.  He was referred to formal physical therapy and prescribed steroids, Neurontin, and Percocet.  His gabapentin was recently switched to Lyrica.  Nothing is helping.  He denies weakness or bowel or bladder difficulty      Past Medical History:   Diagnosis Date   • Acid reflux    • Chronic pain disorder    • Extremity pain    • Joint pain    • Low back pain    • Lumbosacral disc disease    • Migraine    • Muscle spasm    • Neck pain    • Osteoarthritis    • PONV (postoperative nausea and vomiting)          Current Outpatient Medications:   •  atorvastatin (LIPITOR) 10 MG tablet, Take 1 tablet by mouth Daily., Disp: 90 tablet, Rfl: 1  •  chlorhexidine (HIBICLENS) 4 % external liquid, Shower each day with solution for 5 days beginning 5 days before surgery., Disp: 236 mL, Rfl: 0  •  cyclobenzaprine (FLEXERIL) 5 MG tablet, Take 1 tablet by mouth 3 (Three) Times a Day As Needed for Muscle Spasms., Disp: 20 tablet, Rfl: 0  •  dexlansoprazole (DEXILANT) 60 MG capsule, Take 1 capsule by mouth Daily., Disp: 90 capsule, Rfl: 1  •  dexlansoprazole (Dexilant) 60 MG capsule, Take 1 capsule by mouth Daily., Disp: 90 capsule, Rfl: 1  •  diazePAM (VALIUM) 5 MG tablet, Take 1 tablet by mouth Every 6 (Six) Hours As Needed for " Muscle Spasms., Disp: 12 tablet, Rfl: 0  •  DULoxetine (CYMBALTA) 60 MG capsule, Take 1 capsule by mouth every day., Disp: 90 capsule, Rfl: 0  •  ergocalciferol (ERGOCALCIFEROL) 1.25 MG (50243 UT) capsule, Take 1 capsule by mouth., Disp: , Rfl:   •  escitalopram (Lexapro) 10 MG tablet, Take 1 tablet by mouth Daily., Disp: 90 tablet, Rfl: 0  •  finasteride (PROSCAR) 5 MG tablet, Take 1 tablet by mouth Daily., Disp: 90 tablet, Rfl: 0  •  FLUoxetine (PROzac) 40 MG capsule, Take 1 capsule by mouth Every Morning., Disp: 90 capsule, Rfl: 1  •  HYDROcodone-acetaminophen (NORCO) 7.5-325 MG per tablet, Take 1 tablet by mouth Every 6 (Six) Hours As Needed for Moderate Pain ., Disp: 8 tablet, Rfl: 0  •  mupirocin (BACTROBAN) 2 % ointment, Apply to the inside of each nostril with a cotton swab 2 times daily, morning and evening, for 5 days before surgery., Disp: 22 g, Rfl: 0  •  nabumetone (RELAFEN) 750 MG tablet, Take 1 tablet by mouth 2 times daily as needed for joint pain., Disp: 60 tablet, Rfl: 6  •  naproxen (NAPROSYN) 500 MG tablet, Take 1 tablet by mouth 2 (Two) Times a Day As Needed for Mild Pain  or Moderate Pain ., Disp: 20 tablet, Rfl: 0  •  orphenadrine (NORFLEX) 100 MG 12 hr tablet, Take 1 tablet by mouth 2 (Two) Times a Day As Needed for Muscle Spasms or Mild Pain ., Disp: 14 tablet, Rfl: 0  •  oxyCODONE-acetaminophen (Percocet) 7.5-325 MG per tablet, Take 1 tablet by mouth Every 8 (Eight) Hours As Needed for Severe Pain., Disp: 40 tablet, Rfl: 0  •  pantoprazole (PROTONIX) 40 MG EC tablet, Take 1 tablet by mouth Daily., Disp: 90 tablet, Rfl: 0  •  predniSONE (DELTASONE) 50 MG tablet, Take 1 tablet by mouth Daily., Disp: 5 tablet, Rfl: 0  •  pregabalin (LYRICA) 75 MG capsule, Take 1 capsule by mouth 2 (Two) Times a Day., Disp: 60 capsule, Rfl: 0  •  tamsulosin (FLOMAX) 0.4 MG capsule 24 hr capsule, Take 2 capsules by mouth Daily for prostate., Disp: 180 capsule, Rfl: 1  •  tiZANidine (ZANAFLEX) 4 MG tablet, Take 1  tablet by mouth every 6 to 8 hours as needed. do not exceed 3 does in 24 hours, Disp: 30 tablet, Rfl: 1    Past Surgical History:   Procedure Laterality Date   • CARPAL TUNNEL RELEASE Right 8/4/2016    Procedure: CARPAL TUNNEL RELEASE;  Surgeon: Yoel Lua MD;  Location: [facility] OR;  Service:    • CARPAL TUNNEL RELEASE Left 11/9/2017    Procedure: CARPAL TUNNEL RELEASE;  Surgeon: Yoel Lua MD;  Location: [facility] OR;  Service:    • COLONOSCOPY     • LUMBAR FACET INJECTION     • TOE SURGERY Right 03/04/2015       Social History     Socioeconomic History   • Marital status:    Tobacco Use   • Smoking status: Never Smoker   • Smokeless tobacco: Current User     Types: Chew   • Tobacco comment: uses chewing tobacco   Vaping Use   • Vaping Use: Never used   Substance and Sexual Activity   • Alcohol use: No   • Drug use: No   • Sexual activity: Defer         Review of Systems   Constitutional: Negative for activity change, appetite change, chills, diaphoresis, fatigue, fever and unexpected weight change.   HENT: Negative for congestion, dental problem, drooling, ear discharge, ear pain, facial swelling, hearing loss, mouth sores, nosebleeds, postnasal drip, rhinorrhea, sinus pressure, sneezing, sore throat, tinnitus, trouble swallowing and voice change.    Eyes: Negative for photophobia, pain, discharge, redness, itching and visual disturbance.   Respiratory: Negative for apnea, cough, choking, chest tightness, shortness of breath, wheezing and stridor.    Cardiovascular: Negative for chest pain, palpitations and leg swelling.   Gastrointestinal: Negative for abdominal distention, abdominal pain, anal bleeding, blood in stool, constipation, diarrhea, nausea, rectal pain and vomiting.   Endocrine: Negative for cold intolerance, heat intolerance, polydipsia, polyphagia and polyuria.   Genitourinary: Negative for decreased urine volume, difficulty urinating, dysuria, enuresis, flank pain,  "frequency, genital sores, hematuria and urgency.   Musculoskeletal: Positive for arthralgias, back pain, myalgias and neck stiffness. Negative for gait problem, joint swelling and neck pain.   Skin: Negative for color change, pallor, rash and wound.   Allergic/Immunologic: Negative for environmental allergies, food allergies and immunocompromised state.   Neurological: Positive for weakness. Negative for dizziness, tremors, seizures, syncope, facial asymmetry, speech difficulty, light-headedness, numbness and headaches.   Hematological: Negative for adenopathy. Does not bruise/bleed easily.   Psychiatric/Behavioral: Negative for agitation, behavioral problems, confusion, decreased concentration, dysphoric mood, hallucinations, self-injury, sleep disturbance and suicidal ideas. The patient is not nervous/anxious and is not hyperactive.    All other systems reviewed and are negative.      Objective   Vital Signs: Resp. rate 17, height 182.9 cm (72\"), weight 114 kg (251 lb).  Physical Exam  Vitals and nursing note reviewed.   Constitutional:       General: He is not in acute distress.     Appearance: He is well-developed.   HENT:      Head: Normocephalic and atraumatic.   Cardiovascular:      Heart sounds: Normal heart sounds.   Pulmonary:      Breath sounds: Normal breath sounds.   Psychiatric:         Behavior: Behavior normal.         Thought Content: Thought content normal.   Musculoskeletal:     Strength is intact in upper and lower extremities to direct testing.     Station and gait are normal.     Straight leg raise positive at 15 degrees  Neurologic:     Muscle tone is normal throughout.     Coordination is intact.     Sensation is intact to light touch throughout.     Patient is oriented to person, place, and time.       Independent review of radiographic imaging: MRI of the lumbar spine dated 8/29/22 demonstrates mod multilevel degenerative disc disease. At l4-5 there is a disc protrusion compressing the " right L5 nerve.     Assessment & Plan   Diagnosis: Lumbar disc herniation with radiculopathy L4-5, right    Medical Decision Making: Patient is struggling.  He has tried physical therapy, steroids and pain medications to no avail.  Dr. Grigsby has recommended discectomy at L4-5.  This will be an effort to help his radicular symptoms.  It is not likely to address his chronic low back pain.  I have discussed the general nature of the procedure as well as risk, limitations and complications.  Patient would like to proceed.  I have also bumped up his Lyrica to 150 mg twice daily.    Diagnoses and all orders for this visit:    1. Lumbar disc herniation with radiculopathy (Primary)    2. Lumbar facet arthropathy/lumbar spondylosis without myelopathy    3. Multilevel degenerative disc disease    4. BMI 34.0-34.9,adult                        BMI is >= 30 and <35. (Class 1 Obesity). The following options were offered after discussion;: exercise counseling/recommendations         Vero Dodge PA-C  Patient Care Team:  Mei Alcaraz APRN as PCP - General (Nurse Practitioner)  Marleni Ricks RN as Ambulatory  (Population Health)

## 2022-09-14 NOTE — PROGRESS NOTES
"Patient: Avery Watson  : 1965  Chart #: [id number]    Date of Service: 2022    CHIEF COMPLAINT: Low back and right leg pain    History of Present Illness Mr. Watson is a 57-year-old unemployed gentleman with a longstanding history of low back pain with intermittent exacerbations.  He has seen Dr. Ness in the past for nonoperative pain.  He has undergone multiple rounds of physical therapy as well as injections with pain management.  About 5 weeks ago, he woke up with pain in the low groin and a \"crushing \"pain in the right testicle as well as pain radiating from the back into the right hip and down the lateral aspect of the leg to the foot.  He has some numbness in the toes.  No inciting event.  He was referred to formal physical therapy and prescribed steroids, Neurontin, and Percocet.  His gabapentin was recently switched to Lyrica.  Nothing is helping.  He denies weakness or bowel or bladder difficulty      Past Medical History:   Diagnosis Date   • Acid reflux    • Chronic pain disorder    • Extremity pain    • Joint pain    • Low back pain    • Lumbosacral disc disease    • Migraine    • Muscle spasm    • Neck pain    • Osteoarthritis    • PONV (postoperative nausea and vomiting)          Current Outpatient Medications:   •  atorvastatin (LIPITOR) 10 MG tablet, Take 1 tablet by mouth Daily., Disp: 90 tablet, Rfl: 1  •  chlorhexidine (HIBICLENS) 4 % external liquid, Shower each day with solution for 5 days beginning 5 days before surgery., Disp: 236 mL, Rfl: 0  •  cyclobenzaprine (FLEXERIL) 5 MG tablet, Take 1 tablet by mouth 3 (Three) Times a Day As Needed for Muscle Spasms., Disp: 20 tablet, Rfl: 0  •  dexlansoprazole (DEXILANT) 60 MG capsule, Take 1 capsule by mouth Daily., Disp: 90 capsule, Rfl: 1  •  dexlansoprazole (Dexilant) 60 MG capsule, Take 1 capsule by mouth Daily., Disp: 90 capsule, Rfl: 1  •  diazePAM (VALIUM) 5 MG tablet, Take 1 tablet by mouth Every 6 (Six) Hours As Needed for " Muscle Spasms., Disp: 12 tablet, Rfl: 0  •  DULoxetine (CYMBALTA) 60 MG capsule, Take 1 capsule by mouth every day., Disp: 90 capsule, Rfl: 0  •  ergocalciferol (ERGOCALCIFEROL) 1.25 MG (81908 UT) capsule, Take 1 capsule by mouth., Disp: , Rfl:   •  escitalopram (Lexapro) 10 MG tablet, Take 1 tablet by mouth Daily., Disp: 90 tablet, Rfl: 0  •  finasteride (PROSCAR) 5 MG tablet, Take 1 tablet by mouth Daily., Disp: 90 tablet, Rfl: 0  •  FLUoxetine (PROzac) 40 MG capsule, Take 1 capsule by mouth Every Morning., Disp: 90 capsule, Rfl: 1  •  HYDROcodone-acetaminophen (NORCO) 7.5-325 MG per tablet, Take 1 tablet by mouth Every 6 (Six) Hours As Needed for Moderate Pain ., Disp: 8 tablet, Rfl: 0  •  mupirocin (BACTROBAN) 2 % ointment, Apply to the inside of each nostril with a cotton swab 2 times daily, morning and evening, for 5 days before surgery., Disp: 22 g, Rfl: 0  •  nabumetone (RELAFEN) 750 MG tablet, Take 1 tablet by mouth 2 times daily as needed for joint pain., Disp: 60 tablet, Rfl: 6  •  naproxen (NAPROSYN) 500 MG tablet, Take 1 tablet by mouth 2 (Two) Times a Day As Needed for Mild Pain  or Moderate Pain ., Disp: 20 tablet, Rfl: 0  •  orphenadrine (NORFLEX) 100 MG 12 hr tablet, Take 1 tablet by mouth 2 (Two) Times a Day As Needed for Muscle Spasms or Mild Pain ., Disp: 14 tablet, Rfl: 0  •  oxyCODONE-acetaminophen (Percocet) 7.5-325 MG per tablet, Take 1 tablet by mouth Every 8 (Eight) Hours As Needed for Severe Pain., Disp: 40 tablet, Rfl: 0  •  pantoprazole (PROTONIX) 40 MG EC tablet, Take 1 tablet by mouth Daily., Disp: 90 tablet, Rfl: 0  •  predniSONE (DELTASONE) 50 MG tablet, Take 1 tablet by mouth Daily., Disp: 5 tablet, Rfl: 0  •  pregabalin (LYRICA) 75 MG capsule, Take 1 capsule by mouth 2 (Two) Times a Day., Disp: 60 capsule, Rfl: 0  •  tamsulosin (FLOMAX) 0.4 MG capsule 24 hr capsule, Take 2 capsules by mouth Daily for prostate., Disp: 180 capsule, Rfl: 1  •  tiZANidine (ZANAFLEX) 4 MG tablet, Take 1  tablet by mouth every 6 to 8 hours as needed. do not exceed 3 does in 24 hours, Disp: 30 tablet, Rfl: 1    Past Surgical History:   Procedure Laterality Date   • CARPAL TUNNEL RELEASE Right 8/4/2016    Procedure: CARPAL TUNNEL RELEASE;  Surgeon: Yoel Lua MD;  Location: [facility] OR;  Service:    • CARPAL TUNNEL RELEASE Left 11/9/2017    Procedure: CARPAL TUNNEL RELEASE;  Surgeon: Yoel Lua MD;  Location: [facility] OR;  Service:    • COLONOSCOPY     • LUMBAR FACET INJECTION     • TOE SURGERY Right 03/04/2015       Social History     Socioeconomic History   • Marital status:    Tobacco Use   • Smoking status: Never Smoker   • Smokeless tobacco: Current User     Types: Chew   • Tobacco comment: uses chewing tobacco   Vaping Use   • Vaping Use: Never used   Substance and Sexual Activity   • Alcohol use: No   • Drug use: No   • Sexual activity: Defer         Review of Systems   Constitutional: Negative for activity change, appetite change, chills, diaphoresis, fatigue, fever and unexpected weight change.   HENT: Negative for congestion, dental problem, drooling, ear discharge, ear pain, facial swelling, hearing loss, mouth sores, nosebleeds, postnasal drip, rhinorrhea, sinus pressure, sneezing, sore throat, tinnitus, trouble swallowing and voice change.    Eyes: Negative for photophobia, pain, discharge, redness, itching and visual disturbance.   Respiratory: Negative for apnea, cough, choking, chest tightness, shortness of breath, wheezing and stridor.    Cardiovascular: Negative for chest pain, palpitations and leg swelling.   Gastrointestinal: Negative for abdominal distention, abdominal pain, anal bleeding, blood in stool, constipation, diarrhea, nausea, rectal pain and vomiting.   Endocrine: Negative for cold intolerance, heat intolerance, polydipsia, polyphagia and polyuria.   Genitourinary: Negative for decreased urine volume, difficulty urinating, dysuria, enuresis, flank pain,  "frequency, genital sores, hematuria and urgency.   Musculoskeletal: Positive for arthralgias, back pain, myalgias and neck stiffness. Negative for gait problem, joint swelling and neck pain.   Skin: Negative for color change, pallor, rash and wound.   Allergic/Immunologic: Negative for environmental allergies, food allergies and immunocompromised state.   Neurological: Positive for weakness. Negative for dizziness, tremors, seizures, syncope, facial asymmetry, speech difficulty, light-headedness, numbness and headaches.   Hematological: Negative for adenopathy. Does not bruise/bleed easily.   Psychiatric/Behavioral: Negative for agitation, behavioral problems, confusion, decreased concentration, dysphoric mood, hallucinations, self-injury, sleep disturbance and suicidal ideas. The patient is not nervous/anxious and is not hyperactive.    All other systems reviewed and are negative.      Objective   Vital Signs: Resp. rate 17, height 182.9 cm (72\"), weight 114 kg (251 lb).  Physical Exam  Vitals and nursing note reviewed.   Constitutional:       General: He is not in acute distress.     Appearance: He is well-developed.   HENT:      Head: Normocephalic and atraumatic.   Cardiovascular:      Heart sounds: Normal heart sounds.   Pulmonary:      Breath sounds: Normal breath sounds.   Psychiatric:         Behavior: Behavior normal.         Thought Content: Thought content normal.     Musculoskeletal:     Strength is intact in upper and lower extremities to direct testing.     Station and gait are normal.     Straight leg raise positive at 15 degrees  Neurologic:     Muscle tone is normal throughout.     Coordination is intact.     Sensation is intact to light touch throughout.     Patient is oriented to person, place, and time.       Independent review of radiographic imaging: MRI of the lumbar spine dated 8/29/22 demonstrates mod multilevel degenerative disc disease. At l4-5 there is a disc protrusion compressing the " right L5 nerve.     Assessment & Plan   Diagnosis: Lumbar disc herniation with radiculopathy L4-5, right    Medical Decision Making: Patient is struggling.  He has tried physical therapy, steroids and pain medications to no avail.  Dr. Grigsby has recommended discectomy at L4-5.  This will be an effort to help his radicular symptoms.  It is not likely to address his chronic low back pain.  I have discussed the general nature of the procedure as well as risk, limitations and complications.  Patient would like to proceed.  I have also bumped up his Lyrica to 150 mg twice daily.    Diagnoses and all orders for this visit:    1. Lumbar disc herniation with radiculopathy (Primary)    2. Lumbar facet arthropathy/lumbar spondylosis without myelopathy    3. Multilevel degenerative disc disease    4. BMI 34.0-34.9,adult                        BMI is >= 30 and <35. (Class 1 Obesity). The following options were offered after discussion;: exercise counseling/recommendations         Vero Dodge PA-C  Patient Care Team:  Mei Alcaraz APRN as PCP - General (Nurse Practitioner)  Marleni Ricks RN as Ambulatory  (Population Health)

## 2022-09-14 NOTE — PAT
An arrival time for procedure was not provided during PAT visit. If patient had any questions or concerns about their arrival time, they were instructed to contact their surgeon/physician.  Additionally, if the patient referred to an arrival time that was acquired from their my chart account, patient was encouraged to verify that time with their surgeon/physician. Arrival times are NOT provided in Pre Admission Testing Department.    Patient to apply Chlorhexadine wipes  to surgical area (as instructed) the night before procedure and the AM of procedure. Wipes provided.    Bactroban (if prescribed) and Chlorhexidine Prescription prescribed by physician before PAT visit.  Verified with patient that medication(s) were picked up from their pharmacy.  Written instructions given to patient during PAT visit.  Patient/family also instructed to complete skin prep checklist and return the checklist on the day of surgery to preoperative staff.  Patient/family verbalized understanding.    Patient instructed to drink 20 ounces of Gatorade and it needs to be completed 1 hour (for Main OR patients) or 2 hours (scheduled  section & [facility] patients) before given arrival time for procedure (NO RED Gatorade)    Patient verbalized understanding.    Patient denies any current skin issues.     Patient viewed general PAT education video as instructed in their preoperative information received from their surgeon.  Patient stated the general PAT education video was viewed in its entirety and survey completed.  Copies of PAT general education handouts (Incentive Spirometry, Meds to Beds Program, Patient Belongings, Pre-op skin preparation instructions, Blood Glucose testing, Visitor policy, Surgery FAQ, Code H) distributed to patient if not printed. Education related to the PAT pass and skin preparation for surgery (if applicable) completed in PAT as a reinforcement to PAT education video. Patient instructed to return PAT pass  provided today as well as completed skin preparation sheet (if applicable) on the day of procedure.     Additionally if patient had not viewed video yet but intended to view it at home or in our waiting area, then referred them to the handout with QR code/link provided during PAT visit.  Instructed patient to complete survey after viewing the video in its entirety.  Encouraged patient/family to read PAT general education handouts thoroughly and notify PAT staff with any questions or concerns. Patient verbalized understanding of all information and priority content.

## 2022-09-15 DIAGNOSIS — M51.26 HERNIATED INTERVERTEBRAL DISC OF LUMBAR SPINE: ICD-10-CM

## 2022-09-15 LAB — MRSA SPEC QL CULT: NORMAL

## 2022-09-15 RX ORDER — OXYCODONE AND ACETAMINOPHEN 7.5; 325 MG/1; MG/1
1 TABLET ORAL EVERY 8 HOURS PRN
Qty: 40 TABLET | Refills: 0 | Status: ON HOLD | OUTPATIENT
Start: 2022-09-15 | End: 2022-09-22 | Stop reason: SDUPTHER

## 2022-09-15 NOTE — TELEPHONE ENCOUNTER
"Provider:  Seb  Caller: Patient   Surgery:  LUMBAR DISCECTOMY l4-5 right  Surgery Date:  09/22/2022  Last visit:  Office Visit with Vero Dodge PA-C (09/14/2022)  Next visit: Surgery  Last filled: Refill with Sonya Alex PA-C (08/31/2022)        Reason for call:         Patient called and is requesting a refill on Percocet. Medication pending.     \"Additional details provided by patient: PATIENT TAKES 2-3 PER DAY - PATIENT STATES HE ONLY HAS 4 PILLS LEFT. PATIENT WOULD LIKE A CALL IF WE CAN REFILL, VM IS OK  Does the patient have less than a 3 day supply:  [x]? Yes  []? No\"    Requested Prescriptions     Pending Prescriptions Disp Refills   • oxyCODONE-acetaminophen (Percocet) 7.5-325 MG per tablet 40 tablet 0     Sig: Take 1 tablet by mouth Every 8 (Eight) Hours As Needed for Severe Pain.     ELSA:    08/31/2022 Gabapentin 300MG 1965 [address] Aqu[facility] Grigsby [facility]  OUTPATIENT  PHARMACY  [facility] 1  08/31/2022 Oxycodone/Acetaminophen  325MG/7.5MG * Overlap *  1965 [address] Aquiles Grigsby [facility]  OUTPATIENT  PHARMACY  [facility] 32 1  09/12/2022 Pregabalin 75MG 1965 [address] [facility]  OUTPATIENT  PHARMACY  [facility] 1  "

## 2022-09-15 NOTE — TELEPHONE ENCOUNTER
Caller: Avery Waston    Relationship: Self    Best call back number:[phone]    Requested Prescriptions:   Requested Prescriptions     Pending Prescriptions Disp Refills   • oxyCODONE-acetaminophen (Percocet) 7.5-325 MG per tablet 40 tablet 0     Sig: Take 1 tablet by mouth Every 8 (Eight) Hours As Needed for Severe Pain.        Pharmacy where request should be sent:  [facility]  [phone]    Additional details provided by patient: PATIENT TAKES 2-3 PER DAY - PATIENT STATES HE ONLY HAS 4 PILLS LEFT. PATIENT WOULD LIKE A CALL IF WE CAN REFILL, VM IS OK  Does the patient have less than a 3 day supply:  [x] Yes  [] No    Nehemiah Borja Rep   09/15/22 09:19 EDT

## 2022-09-21 ENCOUNTER — ANESTHESIA EVENT (OUTPATIENT)
Dept: PERIOP | Facility: HOSPITAL | Age: 57
End: 2022-09-21

## 2022-09-22 ENCOUNTER — ANESTHESIA (OUTPATIENT)
Dept: PERIOP | Facility: HOSPITAL | Age: 57
End: 2022-09-22

## 2022-09-22 ENCOUNTER — APPOINTMENT (OUTPATIENT)
Dept: GENERAL RADIOLOGY | Facility: HOSPITAL | Age: 57
End: 2022-09-22

## 2022-09-22 ENCOUNTER — HOSPITAL ENCOUNTER (OUTPATIENT)
Facility: HOSPITAL | Age: 57
Setting detail: HOSPITAL OUTPATIENT SURGERY
Discharge: HOME | End: 2022-09-22
Attending: NEUROLOGICAL SURGERY | Admitting: NEUROLOGICAL SURGERY

## 2022-09-22 VITALS
TEMPERATURE: 98 F | HEIGHT: 72 IN | WEIGHT: 251 LBS | HEART RATE: 100 BPM | SYSTOLIC BLOOD PRESSURE: 122 MMHG | RESPIRATION RATE: 14 BRPM | OXYGEN SATURATION: 95 % | DIASTOLIC BLOOD PRESSURE: 87 MMHG | BODY MASS INDEX: 34 KG/M2

## 2022-09-22 DIAGNOSIS — M51.26 HERNIATED INTERVERTEBRAL DISC OF LUMBAR SPINE: ICD-10-CM

## 2022-09-22 DIAGNOSIS — M51.16 LUMBAR DISC HERNIATION WITH RADICULOPATHY: ICD-10-CM

## 2022-09-22 PROCEDURE — 25010000002 DEXAMETHASONE PER 1 MG: Performed by: NURSE ANESTHETIST, CERTIFIED REGISTERED

## 2022-09-22 PROCEDURE — 25010000002 CEFAZOLIN IN DEXTROSE 2-4 GM/100ML-% SOLUTION: Performed by: PHYSICIAN ASSISTANT

## 2022-09-22 PROCEDURE — 25010000002 HYDROMORPHONE 1 MG/ML SOLUTION

## 2022-09-22 PROCEDURE — 25010000002 FENTANYL CITRATE (PF) 50 MCG/ML SOLUTION

## 2022-09-22 PROCEDURE — 76000 FLUOROSCOPY <1 HR PHYS/QHP: CPT

## 2022-09-22 PROCEDURE — 63030 LAMOT DCMPRN NRV RT 1 LMBR: CPT | Performed by: NEUROLOGICAL SURGERY

## 2022-09-22 PROCEDURE — 25010000002 ONDANSETRON PER 1 MG: Performed by: NURSE ANESTHETIST, CERTIFIED REGISTERED

## 2022-09-22 PROCEDURE — 25010000002 FENTANYL CITRATE (PF) 50 MCG/ML SOLUTION: Performed by: NURSE ANESTHETIST, CERTIFIED REGISTERED

## 2022-09-22 PROCEDURE — 63030 LAMOT DCMPRN NRV RT 1 LMBR: CPT | Performed by: PHYSICIAN ASSISTANT

## 2022-09-22 PROCEDURE — 25010000002 PROPOFOL 10 MG/ML EMULSION: Performed by: NURSE ANESTHETIST, CERTIFIED REGISTERED

## 2022-09-22 DEVICE — HEMOST ABS SURGIFOAM SZ100 8X12 10MM: Type: IMPLANTABLE DEVICE | Site: SPINE LUMBAR | Status: FUNCTIONAL

## 2022-09-22 DEVICE — FLOSEAL HEMOSTATIC MATRIX, 10ML
Type: IMPLANTABLE DEVICE | Site: SPINE LUMBAR | Status: FUNCTIONAL
Brand: FLOSEAL HEMOSTATIC MATRIX

## 2022-09-22 RX ORDER — SODIUM CHLORIDE, SODIUM LACTATE, POTASSIUM CHLORIDE, CALCIUM CHLORIDE 600; 310; 30; 20 MG/100ML; MG/100ML; MG/100ML; MG/100ML
9 INJECTION, SOLUTION INTRAVENOUS CONTINUOUS
Status: DISCONTINUED | OUTPATIENT
Start: 2022-09-22 | End: 2022-09-22 | Stop reason: HOSPADM

## 2022-09-22 RX ORDER — FENTANYL CITRATE 50 UG/ML
50 INJECTION, SOLUTION INTRAMUSCULAR; INTRAVENOUS
Status: DISCONTINUED | OUTPATIENT
Start: 2022-09-22 | End: 2022-09-22 | Stop reason: HOSPADM

## 2022-09-22 RX ORDER — LIDOCAINE HYDROCHLORIDE 10 MG/ML
0.5 INJECTION, SOLUTION EPIDURAL; INFILTRATION; INTRACAUDAL; PERINEURAL ONCE AS NEEDED
Status: COMPLETED | OUTPATIENT
Start: 2022-09-22 | End: 2022-09-22

## 2022-09-22 RX ORDER — BUPIVACAINE HYDROCHLORIDE AND EPINEPHRINE 2.5; 5 MG/ML; UG/ML
INJECTION, SOLUTION EPIDURAL; INFILTRATION; INTRACAUDAL; PERINEURAL AS NEEDED
Status: DISCONTINUED | OUTPATIENT
Start: 2022-09-22 | End: 2022-09-22 | Stop reason: HOSPADM

## 2022-09-22 RX ORDER — FAMOTIDINE 20 MG/1
20 TABLET, FILM COATED ORAL
Status: DISCONTINUED | OUTPATIENT
Start: 2022-09-22 | End: 2022-09-22 | Stop reason: HOSPADM

## 2022-09-22 RX ORDER — SODIUM CHLORIDE 0.9 % (FLUSH) 0.9 %
10 SYRINGE (ML) INJECTION AS NEEDED
Status: DISCONTINUED | OUTPATIENT
Start: 2022-09-22 | End: 2022-09-22 | Stop reason: HOSPADM

## 2022-09-22 RX ORDER — ONDANSETRON 2 MG/ML
4 INJECTION INTRAMUSCULAR; INTRAVENOUS ONCE AS NEEDED
Status: DISCONTINUED | OUTPATIENT
Start: 2022-09-22 | End: 2022-09-22 | Stop reason: HOSPADM

## 2022-09-22 RX ORDER — OXYCODONE AND ACETAMINOPHEN 7.5; 325 MG/1; MG/1
1 TABLET ORAL EVERY 8 HOURS PRN
Qty: 20 TABLET | Refills: 0 | Status: SHIPPED | OUTPATIENT
Start: 2022-09-22 | End: 2022-12-09

## 2022-09-22 RX ORDER — BUPIVACAINE HCL/0.9 % NACL/PF 0.125 %
PLASTIC BAG, INJECTION (ML) EPIDURAL AS NEEDED
Status: DISCONTINUED | OUTPATIENT
Start: 2022-09-22 | End: 2022-09-22 | Stop reason: SURG

## 2022-09-22 RX ORDER — SODIUM CHLORIDE 9 MG/ML
100 INJECTION, SOLUTION INTRAVENOUS CONTINUOUS
Status: DISCONTINUED | OUTPATIENT
Start: 2022-09-22 | End: 2022-09-22 | Stop reason: HOSPADM

## 2022-09-22 RX ORDER — ONDANSETRON 2 MG/ML
INJECTION INTRAMUSCULAR; INTRAVENOUS AS NEEDED
Status: DISCONTINUED | OUTPATIENT
Start: 2022-09-22 | End: 2022-09-22 | Stop reason: SURG

## 2022-09-22 RX ORDER — PROPOFOL 10 MG/ML
VIAL (ML) INTRAVENOUS AS NEEDED
Status: DISCONTINUED | OUTPATIENT
Start: 2022-09-22 | End: 2022-09-22 | Stop reason: SURG

## 2022-09-22 RX ORDER — MAGNESIUM HYDROXIDE 1200 MG/15ML
LIQUID ORAL AS NEEDED
Status: DISCONTINUED | OUTPATIENT
Start: 2022-09-22 | End: 2022-09-22 | Stop reason: HOSPADM

## 2022-09-22 RX ORDER — SODIUM CHLORIDE, SODIUM LACTATE, POTASSIUM CHLORIDE, CALCIUM CHLORIDE 600; 310; 30; 20 MG/100ML; MG/100ML; MG/100ML; MG/100ML
INJECTION, SOLUTION INTRAVENOUS CONTINUOUS PRN
Status: DISCONTINUED | OUTPATIENT
Start: 2022-09-22 | End: 2022-09-22 | Stop reason: SURG

## 2022-09-22 RX ORDER — HYDROMORPHONE HYDROCHLORIDE 1 MG/ML
0.5 INJECTION, SOLUTION INTRAMUSCULAR; INTRAVENOUS; SUBCUTANEOUS
Status: DISCONTINUED | OUTPATIENT
Start: 2022-09-22 | End: 2022-09-22 | Stop reason: HOSPADM

## 2022-09-22 RX ORDER — DEXAMETHASONE SODIUM PHOSPHATE 10 MG/ML
INJECTION INTRAMUSCULAR; INTRAVENOUS AS NEEDED
Status: DISCONTINUED | OUTPATIENT
Start: 2022-09-22 | End: 2022-09-22 | Stop reason: SURG

## 2022-09-22 RX ORDER — LIDOCAINE HYDROCHLORIDE 20 MG/ML
INJECTION, SOLUTION INFILTRATION; PERINEURAL AS NEEDED
Status: DISCONTINUED | OUTPATIENT
Start: 2022-09-22 | End: 2022-09-22 | Stop reason: SURG

## 2022-09-22 RX ORDER — SODIUM CHLORIDE 0.9 % (FLUSH) 0.9 %
10 SYRINGE (ML) INJECTION EVERY 12 HOURS SCHEDULED
Status: DISCONTINUED | OUTPATIENT
Start: 2022-09-22 | End: 2022-09-22 | Stop reason: HOSPADM

## 2022-09-22 RX ORDER — OXYCODONE AND ACETAMINOPHEN 7.5; 325 MG/1; MG/1
1 TABLET ORAL ONCE AS NEEDED
Status: COMPLETED | OUTPATIENT
Start: 2022-09-22 | End: 2022-09-22

## 2022-09-22 RX ORDER — FENTANYL CITRATE 50 UG/ML
INJECTION, SOLUTION INTRAMUSCULAR; INTRAVENOUS AS NEEDED
Status: DISCONTINUED | OUTPATIENT
Start: 2022-09-22 | End: 2022-09-22 | Stop reason: SURG

## 2022-09-22 RX ORDER — FENTANYL CITRATE 50 UG/ML
INJECTION, SOLUTION INTRAMUSCULAR; INTRAVENOUS
Status: COMPLETED
Start: 2022-09-22 | End: 2022-09-22

## 2022-09-22 RX ORDER — OXYCODONE AND ACETAMINOPHEN 7.5; 325 MG/1; MG/1
TABLET ORAL
Status: COMPLETED
Start: 2022-09-22 | End: 2022-09-22

## 2022-09-22 RX ORDER — ROCURONIUM BROMIDE 10 MG/ML
INJECTION, SOLUTION INTRAVENOUS AS NEEDED
Status: DISCONTINUED | OUTPATIENT
Start: 2022-09-22 | End: 2022-09-22 | Stop reason: SURG

## 2022-09-22 RX ORDER — CEFAZOLIN SODIUM 2 G/100ML
2 INJECTION, SOLUTION INTRAVENOUS ONCE
Status: COMPLETED | OUTPATIENT
Start: 2022-09-22 | End: 2022-09-22

## 2022-09-22 RX ORDER — EPHEDRINE SULFATE 50 MG/ML
INJECTION, SOLUTION INTRAVENOUS AS NEEDED
Status: DISCONTINUED | OUTPATIENT
Start: 2022-09-22 | End: 2022-09-22 | Stop reason: SURG

## 2022-09-22 RX ORDER — MIDAZOLAM HYDROCHLORIDE 1 MG/ML
1 INJECTION INTRAMUSCULAR; INTRAVENOUS
Status: DISCONTINUED | OUTPATIENT
Start: 2022-09-22 | End: 2022-09-22 | Stop reason: HOSPADM

## 2022-09-22 RX ADMIN — ROCURONIUM BROMIDE 10 MG: 50 INJECTION, SOLUTION INTRAVENOUS at 11:20

## 2022-09-22 RX ADMIN — DEXAMETHASONE SODIUM PHOSPHATE 8 MG: 10 INJECTION INTRAMUSCULAR; INTRAVENOUS at 10:58

## 2022-09-22 RX ADMIN — LIDOCAINE HYDROCHLORIDE 0.5 ML: 10 INJECTION, SOLUTION EPIDURAL; INFILTRATION; INTRACAUDAL; PERINEURAL at 10:34

## 2022-09-22 RX ADMIN — CEFAZOLIN SODIUM 2 G: 2 INJECTION, SOLUTION INTRAVENOUS at 10:58

## 2022-09-22 RX ADMIN — PROPOFOL 200 MG: 10 INJECTION, EMULSION INTRAVENOUS at 10:50

## 2022-09-22 RX ADMIN — ROCURONIUM BROMIDE 10 MG: 50 INJECTION, SOLUTION INTRAVENOUS at 11:10

## 2022-09-22 RX ADMIN — HYDROMORPHONE HYDROCHLORIDE 0.5 MG: 1 INJECTION, SOLUTION INTRAMUSCULAR; INTRAVENOUS; SUBCUTANEOUS at 12:42

## 2022-09-22 RX ADMIN — SUGAMMADEX 200 MG: 100 INJECTION, SOLUTION INTRAVENOUS at 11:43

## 2022-09-22 RX ADMIN — ROCURONIUM BROMIDE 50 MG: 50 INJECTION, SOLUTION INTRAVENOUS at 10:50

## 2022-09-22 RX ADMIN — HYDROMORPHONE HYDROCHLORIDE 0.5 MG: 1 INJECTION, SOLUTION INTRAMUSCULAR; INTRAVENOUS; SUBCUTANEOUS at 12:36

## 2022-09-22 RX ADMIN — Medication 100 MCG: at 11:01

## 2022-09-22 RX ADMIN — OXYCODONE HYDROCHLORIDE AND ACETAMINOPHEN 1 TABLET: 7.5; 325 TABLET ORAL at 13:36

## 2022-09-22 RX ADMIN — ONDANSETRON 4 MG: 2 INJECTION INTRAMUSCULAR; INTRAVENOUS at 11:43

## 2022-09-22 RX ADMIN — FENTANYL CITRATE 50 MCG: 50 INJECTION INTRAMUSCULAR; INTRAVENOUS at 12:49

## 2022-09-22 RX ADMIN — LIDOCAINE HYDROCHLORIDE 50 MG: 20 INJECTION, SOLUTION INFILTRATION; PERINEURAL at 10:50

## 2022-09-22 RX ADMIN — FENTANYL CITRATE 50 MCG: 50 INJECTION INTRAMUSCULAR; INTRAVENOUS at 12:23

## 2022-09-22 RX ADMIN — SODIUM CHLORIDE, POTASSIUM CHLORIDE, SODIUM LACTATE AND CALCIUM CHLORIDE: 600; 310; 30; 20 INJECTION, SOLUTION INTRAVENOUS at 10:48

## 2022-09-22 RX ADMIN — PROPOFOL 25 MCG/KG/MIN: 10 INJECTION, EMULSION INTRAVENOUS at 11:04

## 2022-09-22 RX ADMIN — EPHEDRINE SULFATE 10 MG: 50 INJECTION INTRAVENOUS at 11:06

## 2022-09-22 RX ADMIN — EPHEDRINE SULFATE 10 MG: 50 INJECTION INTRAVENOUS at 11:10

## 2022-09-22 RX ADMIN — Medication 100 MCG: at 11:08

## 2022-09-22 RX ADMIN — SODIUM CHLORIDE, POTASSIUM CHLORIDE, SODIUM LACTATE AND CALCIUM CHLORIDE 9 ML/HR: 600; 310; 30; 20 INJECTION, SOLUTION INTRAVENOUS at 10:34

## 2022-09-22 RX ADMIN — EPHEDRINE SULFATE 10 MG: 50 INJECTION INTRAVENOUS at 11:03

## 2022-09-22 RX ADMIN — FENTANYL CITRATE 50 MCG: 50 INJECTION, SOLUTION INTRAMUSCULAR; INTRAVENOUS at 12:23

## 2022-09-22 RX ADMIN — OXYCODONE AND ACETAMINOPHEN 1 TABLET: 7.5; 325 TABLET ORAL at 13:36

## 2022-09-22 RX ADMIN — FENTANYL CITRATE 50 MCG: 50 INJECTION, SOLUTION INTRAMUSCULAR; INTRAVENOUS at 12:49

## 2022-09-22 RX ADMIN — FENTANYL CITRATE 100 MCG: 50 INJECTION, SOLUTION INTRAMUSCULAR; INTRAVENOUS at 10:50

## 2022-09-22 RX ADMIN — Medication 100 MCG: at 11:12

## 2022-09-22 NOTE — ANESTHESIA POSTPROCEDURE EVALUATION
Patient: Avery Watson    Procedure Summary     Date: 09/22/22 Room / Location:  [facility] OR 12 /  [facility]    Anesthesia Start: 1048 Anesthesia Stop: 1205    Procedure: LUMBAR DISCECTOMY L4-5 right (Right Spine Lumbar) Diagnosis:       Lumbar disc herniation with radiculopathy      (Lumbar disc herniation with radiculopathy [M51.16])    Surgeons: Aquiles Grigsby MD Provider: Sujit Christine MD    Anesthesia Type: general ASA Status: 3          Anesthesia Type: general    Vitals  No vitals data found for the desired time range.          Post Anesthesia Care and Evaluation    Patient location during evaluation: PACU  Patient participation: complete - patient participated  Level of consciousness: awake and alert and sleepy but conscious  Pain management: adequate    Airway patency: patent  Anesthetic complications: No anesthetic complications  PONV Status: none  Cardiovascular status: hemodynamically stable and acceptable  Respiratory status: nonlabored ventilation, acceptable and nasal cannula  Hydration status: acceptable

## 2022-09-22 NOTE — ANESTHESIA PREPROCEDURE EVALUATION
Anesthesia Evaluation                  Airway   Mallampati: II  Dental      Pulmonary    Cardiovascular         Neuro/Psych  GI/Hepatic/Renal/Endo    (+)  GERD,      Musculoskeletal     Abdominal    Substance History      OB/GYN          Other                        Anesthesia Plan    ASA 3     general             CODE STATUS:

## 2022-09-22 NOTE — ANESTHESIA PROCEDURE NOTES
Airway  Urgency: elective    Date/Time: 9/22/2022 11:06 AM  Airway not difficult    General Information and Staff    Patient location during procedure: OR    Indications and Patient Condition  Indications for airway management: airway protection    Preoxygenated: yes  MILS not maintained throughout  Mask difficulty assessment: 1 - vent by mask    Final Airway Details  Final airway type: endotracheal airway      Successful airway: ETT  Cuffed: yes   Successful intubation technique: video laryngoscopy  Endotracheal tube insertion site: oral  Blade: Lange  Blade size: 3  ETT size (mm): 7.5  Cormack-Lehane Classification: grade I - full view of glottis  Placement verified by: chest auscultation and capnometry   Measured from: lips  ETT/EBT  to lips (cm): 23  Number of attempts at approach: 1  Assessment: lips, teeth, and gum same as pre-op and atraumatic intubation    Additional Comments  Negative epigastric sounds, Breath sound equal bilaterally with symmetric chest rise and fall

## 2022-09-22 NOTE — H&P
Pre-Op H&P  Avery Watson  [id number]  1965    Chief complaint: low back and right leg pain     HPI:  Patient is a 57 y.o.male who presents with a longstanding history of low back pain. Despite a variety of treatment methods his low back pain has continued to persist. The back pain radiates to the right lower extremity. He is scheduled for a right lumbar discectomy of L4-L5. He does not take any blood thinners routinely.     Review of Systems:  General ROS: negative for chills, fever or skin lesions.  Cardiovascular ROS: no chest pain or dyspnea on exertion  Respiratory ROS: no cough, shortness of breath, or wheezing    Allergies: No Known Allergies    Home Meds:    No current facility-administered medications on file prior to encounter.     Current Outpatient Medications on File Prior to Encounter   Medication Sig Dispense Refill   • atorvastatin (LIPITOR) 10 MG tablet Take 1 tablet by mouth Daily. 90 tablet 1   • chlorhexidine (HIBICLENS) 4 % external liquid Shower each day with solution for 5 days beginning 5 days before surgery. 236 mL 0   • dexlansoprazole (Dexilant) 60 MG capsule Take 1 capsule by mouth Daily. 90 capsule 1   • diazePAM (VALIUM) 5 MG tablet Take 1 tablet by mouth Every 6 (Six) Hours As Needed for Muscle Spasms. 12 tablet 0   • ergocalciferol (ERGOCALCIFEROL) 1.25 MG (02751 UT) capsule Take 1 capsule by mouth.     • finasteride (PROSCAR) 5 MG tablet Take 1 tablet by mouth Daily. 90 tablet 0   • FLUoxetine (PROzac) 40 MG capsule Take 1 capsule by mouth Every Morning. 90 capsule 1   • mupirocin (BACTROBAN) 2 % ointment Apply to the inside of each nostril with a cotton swab 2 times daily, morning and evening, for 5 days before surgery. 22 g 0   • nabumetone (RELAFEN) 750 MG tablet Take 1 tablet by mouth 2 times daily as needed for joint pain. 60 tablet 6   • pregabalin (LYRICA) 75 MG capsule Take 1 capsule by mouth 2 (Two) Times a Day. 60 capsule 0   • tamsulosin (FLOMAX) 0.4 MG capsule  "24 hr capsule Take 2 capsules by mouth Daily for prostate. 180 capsule 1       PMH:   Past Medical History:   Diagnosis Date   • Acid reflux    • Chronic pain disorder    • Extremity pain    • Joint pain    • Low back pain    • Lumbosacral disc disease    • Migraine    • Muscle spasm    • Neck pain    • Osteoarthritis    • PONV (postoperative nausea and vomiting)      PSH:    Past Surgical History:   Procedure Laterality Date   • CARPAL TUNNEL RELEASE Right 8/4/2016    Procedure: CARPAL TUNNEL RELEASE;  Surgeon: Yoel Lua MD;  Location: [facility] OR;  Service:    • CARPAL TUNNEL RELEASE Left 11/9/2017    Procedure: CARPAL TUNNEL RELEASE;  Surgeon: Yoel Lua MD;  Location: [facility] OR;  Service:    • COLONOSCOPY     • LUMBAR FACET INJECTION     • TOE SURGERY Right 03/04/2015       Immunization History:  Influenza: due  Pneumococcal: due  Tetanus: due    Social History:   Tobacco:   Social History     Tobacco Use   Smoking Status Never Smoker   Smokeless Tobacco Current User   • Types: Chew   Tobacco Comment    uses chewing tobacco      Alcohol:     Social History     Substance and Sexual Activity   Alcohol Use No       Vitals:           /93 (BP Location: Right arm, Patient Position: Sitting)   Pulse 74   Temp 97.2 °F (36.2 °C) (Temporal)   Resp 16   Ht 182.9 cm (72\")   Wt 114 kg (251 lb)   SpO2 96%   BMI 34.04 kg/m²     Physical Exam:  General Appearance:    Alert, cooperative, no distress, appears stated age   Head:    Normocephalic, without obvious abnormality, atraumatic   Lungs:     Clear to auscultation bilaterally, respirations unlabored    Heart:   Regular rate and rhythm, no murmur, rub or gallop    Abdomen:    Soft, nontender. No rigidity or guarding.    Breast Exam:    deferred   Genitalia:    deferred   Extremities:   Extremities normal, atraumatic, no cyanosis or edema   Skin:   Skin color, texture, turgor normal, no rashes or lesions   Neurologic:   Grossly intact "   Results Review  LABS:  Lab Results   Component Value Date    WBC 7.62 09/14/2022    HGB 14.5 09/14/2022    HCT 43.0 09/14/2022    MCV 86.3 09/14/2022     09/14/2022    NEUTROABS 4.77 09/14/2022    GLUCOSE 118 (H) 09/14/2022    BUN 16 09/14/2022    CREATININE 0.95 09/14/2022    EGFRIFNONA 71 05/03/2021     09/14/2022    K 4.2 09/14/2022     09/14/2022    CO2 25.0 09/14/2022    MG 2.3 08/13/2020    CALCIUM 9.3 09/14/2022    ALBUMIN 4.40 09/14/2022    AST 20 09/14/2022    ALT 45 (H) 09/14/2022    BILITOT 0.4 09/14/2022       RADIOLOGY:  No radiology results for the last 3 days     I reviewed the patient's new imaging results and agree with the interpretation.     Impression: lumbar disc herniation with radiculopathy     Plan: right lumbar discectomy of L4-L5    Anthony Landry PA-C   9/22/2022   10:28 EDT

## 2022-09-22 NOTE — OP NOTE
NEUROSURGICAL OPERATIVE NOTE        PREOPERATIVE DIAGNOSIS:    Right L4-5 disc herniation      POSTOPERATIVE DIAGNOSIS:  Same      PROCEDURE:  Right L4-5 laminotomy with medial facetectomy, foraminotomy, and discectomy      SURGEON:  Aquiles Grigsby M.D.      ASSISTANT: Sonya Alex PA-C    PAC assisted with:   Suctioning   Retraction   Tying   Suturing   Closing   Application of dressing   Skilled neurosurgery PA assistance was necessary to perform this procedure.        ANESTHESIA:  General      ESTIMATED BLOOD LOSS: Minimal      SPECIMEN: None      DRAINS: None      COMPLICATIONS:  None      CLINICAL NOTE:  The patient is a 57-year-old gentleman with a history of severe back and right leg pain that has been unremitting.  Studies demonstrate right L4-5 disc herniation that provides clinical correlation.  As such, the patient presents at this time for lumbar discectomy.  The nature of the procedure as well as the potential risks, complications, limitations, and alternatives to the procedure were discussed at length with the patient and the patient has agreed to proceed with surgery.      TECHNICAL NOTE:  The patient was brought to the operating room and while on his cart general endotracheal anesthesia was achieved.  He was then turned prone onto the Cloward saddle frame.  Special care was ensured to protect pressure points.  His low back was prepared and draped in the usual fashion.  A localizing radiograph was obtained with a spinal needle in the lumbosacral midline.  Based on this, a 3 cm vertical incision was fashioned overlying the L4-L5 level.  Underlying tissues were divided with cautery to provide exposure to the left L4 and L5 hemilamina.  Laminotomy was fashioned on the inferior aspect of L4.  Another radiograph confirmed the operative level.  Thickened interlaminar ligament was removed.  Medial aspect of facet was resected with Kerrison punches.  The neural foramen was decompressed with Kerrison  punch.  Laminotomy of the upper aspect of L5 was performed.  The dural sac and nerve root were retracted medially over a large extruded disk herniation.  With retraction, some of the disk delivered itself into the gutter.  Additional fragments were evacuated.  No further fragments were noted.  I probed superiorly, laterally, medially, inferiorly.  No further fragments were noted.  I then worked inferiorly, followed the L5 nerve root in the axilla and even below with a blunt ball probe.  No further fragments were identified.  Bleeding points were controlled with bipolar cautery.  With the Valsalva maneuver, there was no significant bleeding or evidence of CSF leak.  The wound was washed out with a saline solution.  A small portion of Gelfoam was placed over the exposed dura.  The paraspinous muscle and fascia were then reapproximated in interrupted fashion with 0 Vicryl suture.  Then, 0.25% Marcaine was instilled in the paraspinous musculature and subcutaneous tissues. Subcutaneous tissues were closed in layers with 3-0 Vicryl suture.  The skin was closed in a running subcuticular fashion with 3-0 Vicryl suture.  Dermal sealant and sterile dressing were applied.  The patient was rolled onto his cart, extubated, and taken to recovery room in satisfactory condition.            Aquiles Grigsby M.D.

## 2022-09-28 DIAGNOSIS — M47.816 LUMBAR FACET ARTHROPATHY: ICD-10-CM

## 2022-09-28 RX ORDER — PREGABALIN 150 MG/1
150 CAPSULE ORAL 2 TIMES DAILY
Qty: 60 CAPSULE | Refills: 1 | Status: SHIPPED | OUTPATIENT
Start: 2022-09-28 | End: 2023-02-01 | Stop reason: SDDI

## 2022-09-28 NOTE — TELEPHONE ENCOUNTER
S/w patient and let him know to take 1 tablet BID. Patient voiced understanding and was thankful for the call back.

## 2022-09-28 NOTE — TELEPHONE ENCOUNTER
"Provider:  Seb  Caller: Patient   Surgery:  LUMBAR DISCECTOMY L4-5 RIGHT  Surgery Date:  09/22/2022  Last visit:   Office Visit with Vero Dodge PA-C (09/14/2022)  Next visit: 10/14/2022  Last filled:  Refill with Sonya Alex PA-C (08/31/2022)        Reason for call:         I s/w patient because he commented that it was changes to, \"Was changed to 150 mg twice daily.\"    He states that the lady he saw last time told him it was okay to take 2 capsules BID.     I looked at patient's last OV: Office Visit with Vero Dodge PA-C (09/14/2022)    \"Medical Decision Making: Patient is struggling.  He has tried physical therapy, steroids and pain medications to no avail.  Dr. Grigsby has recommended discectomy at L4-5.  This will be an effort to help his radicular symptoms.  It is not likely to address his chronic low back pain.  I have discussed the general nature of the procedure as well as risk, limitations and complications.  Patient would like to proceed.  I have also bumped up his Lyrica to 150 mg twice daily.\"    150 MG BID pending, please sign if appropriate.      Requested Prescriptions     Pending Prescriptions Disp Refills   • pregabalin (LYRICA) 75 MG capsule 60 capsule 0     Sig: Take 1 capsule by mouth 2 (Two) Times a Day.     ELSA:    10/18/2021 Pregabalin 150MG 1965 60 [address] [facility]  OUTPATIENT  PHARMACY  [facility] 1  08/26/2022 Hydrocodone/Acetaminophen  325MG/7.5MG  1965 [address] 2 [facility]  OUTPATIENT  PHARMACY  [facility] 30 1  08/29/2022 Diazepam 5MG * Overlap * 1965 [address] [facility]  OUTPATIENT  PHARMACY  [facility] 1  08/29/2022 Oxycodone/Acetaminophen  325MG/7.5MG * Overlap *  1965 10 [address] [facility]  OUTPATIENT  PHARMACY  [facility] 38 1  "

## 2022-10-14 ENCOUNTER — OFFICE VISIT (OUTPATIENT)
Dept: NEUROSURGERY | Facility: CLINIC | Age: 57
End: 2022-10-14

## 2022-10-14 VITALS — BODY MASS INDEX: 34.02 KG/M2 | HEIGHT: 72 IN | WEIGHT: 251.2 LBS | TEMPERATURE: 98.2 F

## 2022-10-14 DIAGNOSIS — Z98.890 HISTORY OF LUMBAR DISCECTOMY: Primary | ICD-10-CM

## 2022-10-14 PROCEDURE — 99024 POSTOP FOLLOW-UP VISIT: CPT

## 2022-10-14 RX ORDER — NABUMETONE 750 MG/1
1 TABLET, FILM COATED ORAL EVERY 12 HOURS SCHEDULED
COMMUNITY
Start: 2022-07-12 | End: 2022-12-09

## 2022-10-14 RX ORDER — OMEPRAZOLE 20 MG/1
TABLET, DELAYED RELEASE ORAL EVERY 12 HOURS
COMMUNITY
End: 2023-02-01 | Stop reason: SDDI

## 2022-10-14 NOTE — PROGRESS NOTES
Subjective   Patient: Avery Watson   Age, Date of Birth: 57 y.o., 1965  Sex: male    Primary Care Provider: Kieran April, APRSAIMA    Chief Complaint: ***     History of Present Illness:  ***    Current Outpatient Medications   Medication Sig Dispense Refill   • atorvastatin (LIPITOR) 10 MG tablet Take 1 tablet by mouth Daily. 90 tablet 1   • dexlansoprazole (Dexilant) 60 MG capsule Take 1 capsule by mouth Daily. 90 capsule 1   • FLUoxetine (PROzac) 40 MG capsule Take 1 capsule by mouth Every Morning. 90 capsule 1   • nabumetone (RELAFEN) 750 MG tablet Take 1 tablet by mouth 2 times daily as needed for joint pain. 60 tablet 6   • nabumetone (RELAFEN) 750 MG tablet Take 1 tablet by mouth Every 12 (Twelve) Hours.     • omeprazole OTC (PriLOSEC OTC) 20 MG EC tablet Take  by mouth Every 12 (Twelve) Hours.     • oxyCODONE-acetaminophen (Percocet) 7.5-325 MG per tablet Take 1 tablet by mouth Every 8 (Eight) Hours As Needed for Severe Pain. (Patient taking differently: Take 1 tablet by mouth As Needed for Severe Pain.) 20 tablet 0   • pregabalin (LYRICA) 150 MG capsule Take 1 capsule by mouth 2 (Two) Times a Day. 60 capsule 1   • tamsulosin (FLOMAX) 0.4 MG capsule 24 hr capsule Take 2 capsules by mouth Daily for prostate. 180 capsule 1     No current facility-administered medications for this visit.       No Known Allergies      Past Medical History:   Diagnosis Date   • Acid reflux    • Chronic pain disorder    • Extremity pain    • Joint pain    • Low back pain    • Lumbosacral disc disease    • Migraine    • Muscle spasm    • Neck pain    • Osteoarthritis    • PONV (postoperative nausea and vomiting)        Social History     Socioeconomic History   • Marital status:    Tobacco Use   • Smoking status: Never   • Smokeless tobacco: Current     Types: Chew   • Tobacco comments:     uses chewing tobacco   Vaping Use   • Vaping Use: Never used   Substance and Sexual Activity   • Alcohol use: No   • Drug use:  No   • Sexual activity: Defer       Family History   Problem Relation Age of Onset   • Lung cancer Mother    • Cancer Mother         ovarian   • Diabetes Sister    • Rheum arthritis Brother    • Cancer Sister         breast       Review of Systems   Constitutional: Negative for activity change, appetite change, chills, diaphoresis, fatigue, fever and unexpected weight change.   HENT: Negative for congestion, dental problem, drooling, ear discharge, ear pain, facial swelling, hearing loss, mouth sores, nosebleeds, postnasal drip, rhinorrhea, sinus pressure, sinus pain, sneezing, sore throat, tinnitus, trouble swallowing and voice change.    Eyes: Negative for photophobia, pain, discharge, redness, itching and visual disturbance.   Respiratory: Negative for apnea, cough, choking, chest tightness, shortness of breath, wheezing and stridor.    Cardiovascular: Negative for chest pain, palpitations and leg swelling.   Gastrointestinal: Negative for abdominal distention, abdominal pain, anal bleeding, blood in stool, constipation, diarrhea, nausea, rectal pain and vomiting.   Endocrine: Negative for cold intolerance, heat intolerance, polydipsia, polyphagia and polyuria.   Genitourinary: Negative for decreased urine volume, difficulty urinating, dysuria, enuresis, flank pain, frequency, genital sores, hematuria and urgency.   Musculoskeletal: Positive for back pain. Negative for arthralgias, gait problem, joint swelling, myalgias, neck pain and neck stiffness.   Skin: Negative for color change, pallor, rash and wound.   Allergic/Immunologic: Negative for environmental allergies, food allergies and immunocompromised state.   Neurological: Negative for dizziness, tremors, seizures, syncope, facial asymmetry, speech difficulty, weakness, light-headedness, numbness and headaches.   Hematological: Negative for adenopathy. Does not bruise/bleed easily.   Psychiatric/Behavioral: Negative for agitation, behavioral problems,  "confusion, decreased concentration, dysphoric mood, hallucinations, self-injury, sleep disturbance and suicidal ideas. The patient is not nervous/anxious and is not hyperactive.    All other systems reviewed and are negative.      Objective   Vitals:    10/14/22 1304   Temp: 98.2 °F (36.8 °C)   TempSrc: Infrared   Weight: 114 kg (251 lb 3.2 oz)   Height: 182.9 cm (72.01\")        Physical Exam:    Physical Exam    Tobacco Use: High Risk   • Smoking Tobacco Use: Never   • Smokeless Tobacco Use: Current   • Passive Exposure: Not on file       Avery Watson  reports that he has never smoked. His smokeless tobacco use includes chew.. I have educated him on the risk of diseases from using tobacco products such as {Tobacco Cessation Diseases:58761::\"cancer\",\"COPD\",\"heart disease\"}.     I advised him to quit and he is {Willing/Not Willing to Quit Tobacco Products:05668}.    I spent {Time Spent Tobacco :68183} minutes counseling the patient.           {BMI is >= 30 and <35. (Class 1 Obesity). The following options were offered after discussion;:[id number]}       ADENIKEADI Fall Risk Assessment has not been completed.    Assessment & Plan     Data Review:  (All imaging is independently reviewed unless stated otherwise.)  ***    Medical Decision Making:  ***  Patient encouraged to contact us if he has any changes in their condition or any concerns.    No diagnosis found.    Electronically signed by:    Anthony Quiroga PA-C on 13:11 EDT 10/14/22          "

## 2022-10-14 NOTE — PROGRESS NOTES
Subjective   Patient: Avery Watson   Age, Date of Birth: 57 y.o., 1965  Sex: male    Primary Care Provider: Mei Alcaraz APRN    Chief Complaint: Discectomy follow-up    History of Present Illness:  Patient is a 57-year-old male who underwent a right sided L4-L5 lumbar discectomy with Dr. Aquiles Grigsby on 9/22/2022.  Prior to the surgery he was experiencing low back pain that radiated into his right leg.  Since the surgery he reports resolution of his right leg pain, he is still experiencing back pain that is around the same, but could feel little bit different as well.  He was given Percocet postoperatively, he is not taking the Percocet, he is still taking the Lyrica.  He has not had any problems with his incision, and he has been observing restrictions.  He denies urinary or bowel incontinence    Current Outpatient Medications   Medication Sig Dispense Refill   • atorvastatin (LIPITOR) 10 MG tablet Take 1 tablet by mouth Daily. 90 tablet 1   • dexlansoprazole (Dexilant) 60 MG capsule Take 1 capsule by mouth Daily. 90 capsule 1   • FLUoxetine (PROzac) 40 MG capsule Take 1 capsule by mouth Every Morning. 90 capsule 1   • nabumetone (RELAFEN) 750 MG tablet Take 1 tablet by mouth 2 times daily as needed for joint pain. 60 tablet 6   • nabumetone (RELAFEN) 750 MG tablet Take 1 tablet by mouth Every 12 (Twelve) Hours.     • omeprazole OTC (PriLOSEC OTC) 20 MG EC tablet Take  by mouth Every 12 (Twelve) Hours.     • oxyCODONE-acetaminophen (Percocet) 7.5-325 MG per tablet Take 1 tablet by mouth Every 8 (Eight) Hours As Needed for Severe Pain. (Patient taking differently: Take 1 tablet by mouth As Needed for Severe Pain.) 20 tablet 0   • pregabalin (LYRICA) 150 MG capsule Take 1 capsule by mouth 2 (Two) Times a Day. 60 capsule 1   • tamsulosin (FLOMAX) 0.4 MG capsule 24 hr capsule Take 2 capsules by mouth Daily for prostate. 180 capsule 1     No current facility-administered medications for this visit.        No Known Allergies      Past Medical History:   Diagnosis Date   • Acid reflux    • Chronic pain disorder    • Extremity pain    • Joint pain    • Low back pain    • Lumbosacral disc disease    • Migraine    • Muscle spasm    • Neck pain    • Osteoarthritis    • PONV (postoperative nausea and vomiting)        Social History     Socioeconomic History   • Marital status:    Tobacco Use   • Smoking status: Never   • Smokeless tobacco: Current     Types: Chew   • Tobacco comments:     uses chewing tobacco   Vaping Use   • Vaping Use: Never used   Substance and Sexual Activity   • Alcohol use: No   • Drug use: No   • Sexual activity: Defer       Family History   Problem Relation Age of Onset   • Lung cancer Mother    • Cancer Mother         ovarian   • Diabetes Sister    • Rheum arthritis Brother    • Cancer Sister         breast       Review of Systems   Constitutional: Negative for activity change, appetite change, chills, diaphoresis, fatigue, fever and unexpected weight change.   HENT: Negative for congestion, dental problem, drooling, ear discharge, ear pain, facial swelling, hearing loss, mouth sores, nosebleeds, postnasal drip, rhinorrhea, sinus pressure, sinus pain, sneezing, sore throat, tinnitus, trouble swallowing and voice change.    Eyes: Negative for photophobia, pain, discharge, redness, itching and visual disturbance.   Respiratory: Negative for apnea, cough, choking, chest tightness, shortness of breath, wheezing and stridor.    Cardiovascular: Negative for chest pain, palpitations and leg swelling.   Gastrointestinal: Negative for abdominal distention, abdominal pain, anal bleeding, blood in stool, constipation, diarrhea, nausea, rectal pain and vomiting.   Endocrine: Negative for cold intolerance, heat intolerance, polydipsia, polyphagia and polyuria.   Genitourinary: Negative for decreased urine volume, difficulty urinating, dysuria, enuresis, flank pain, frequency, genital sores,  "hematuria and urgency.   Musculoskeletal: Positive for back pain. Negative for arthralgias, gait problem, joint swelling, myalgias, neck pain and neck stiffness.   Skin: Negative for color change, pallor, rash and wound.   Allergic/Immunologic: Negative for environmental allergies, food allergies and immunocompromised state.   Neurological: Negative for dizziness, tremors, seizures, syncope, facial asymmetry, speech difficulty, weakness, light-headedness, numbness and headaches.   Hematological: Negative for adenopathy. Does not bruise/bleed easily.   Psychiatric/Behavioral: Negative for agitation, behavioral problems, confusion, decreased concentration, dysphoric mood, hallucinations, self-injury, sleep disturbance and suicidal ideas. The patient is not nervous/anxious and is not hyperactive.    All other systems reviewed and are negative.      Objective   Vitals:    10/14/22 1304   Temp: 98.2 °F (36.8 °C)   TempSrc: Infrared   Weight: 114 kg (251 lb 3.2 oz)   Height: 182.9 cm (72.01\")        Physical Exam:    Physical Exam  Vitals and nursing note reviewed.   Constitutional:       Appearance: Normal appearance.   HENT:      Head: Normocephalic and atraumatic.      Right Ear: External ear normal.      Left Ear: External ear normal.   Eyes:      General: No scleral icterus.        Right eye: No discharge.         Left eye: No discharge.   Pulmonary:      Effort: Pulmonary effort is normal. No respiratory distress.   Musculoskeletal:      Right lower leg: No edema.      Left lower leg: No edema.      Comments: Patient had 5 out of 5 strength with hip flexion, plantar and dorsiflexion bilaterally.   Skin:     Comments: Incision on back is well approximated and scarring over.   Neurological:      Mental Status: He is alert.      Cranial Nerves: No cranial nerve deficit or facial asymmetry.      Sensory: Sensation is intact. No sensory deficit.      Motor: Motor function is intact. No weakness, tremor, atrophy, abnormal " "muscle tone or seizure activity.      Coordination: Romberg sign negative.      Gait: Gait is intact. Gait and tandem walk normal.      Deep Tendon Reflexes:      Reflex Scores:       Patellar reflexes are 2+ on the right side and 2+ on the left side.       Achilles reflexes are 2+ on the right side and 2+ on the left side.     Comments: Straight leg raise was negative bilaterally.    Intact vibratory and temperature sensation bilaterally.  No signs of ankle clonus bilaterally.  Was able to ambulate on heels and tiptoes free of pain.   Psychiatric:         Mood and Affect: Mood normal.         Behavior: Behavior normal.         Thought Content: Thought content normal.         Judgment: Judgment normal.         Tobacco Use: High Risk   • Smoking Tobacco Use: Never   • Smokeless Tobacco Use: Current   • Passive Exposure: Not on file       BMI is >= 30 and <35. (Class 1 Obesity). The following options were offered after discussion;: exercise counseling/recommendations       STEADI Fall Risk Assessment has not been completed.    Assessment & Plan     Data Review:  (All imaging is independently reviewed unless stated otherwise.)  No new data review at this time.     Medical Decision Making:  Patient educated they can now submerge their incision site, he was encouraged to still observe restrictions but he can begin to ease his way back into things.  He was educated that he \"has a bad back\" now.  He will follow-up with Dr. Aquiles Grigsby in 6 to 8 weeks  Patient encouraged to contact us if he has any changes in their condition or any concerns.     Diagnosis Plan   1. History of lumbar discectomy      Right L4-L5 discectomy performed by Dr. Aquiles Grigsby 9/20/2022          Electronically signed by:    Anthony Quiroga PA-C on 15:43 EDT 10/14/22          "

## 2022-12-09 ENCOUNTER — OFFICE VISIT (OUTPATIENT)
Dept: NEUROSURGERY | Facility: CLINIC | Age: 57
End: 2022-12-09

## 2022-12-09 VITALS — TEMPERATURE: 98.9 F | BODY MASS INDEX: 34.92 KG/M2 | WEIGHT: 257.8 LBS | HEIGHT: 72 IN

## 2022-12-09 DIAGNOSIS — M47.816 LUMBAR FACET ARTHROPATHY: ICD-10-CM

## 2022-12-09 DIAGNOSIS — Z98.890 S/P LUMBAR DISCECTOMY: Primary | ICD-10-CM

## 2022-12-09 PROCEDURE — 99024 POSTOP FOLLOW-UP VISIT: CPT | Performed by: NEUROLOGICAL SURGERY

## 2022-12-09 NOTE — PROGRESS NOTES
Patient: Avery Watson  : 1965    Primary Care Provider: Mei Alcaraz APRN    Requesting Provider: As above        History    Chief Complaint: Low back and right leg pain.    History of Present Illness: Mr. Watson is a 57-year-old gentleman who is seen in follow-up.  He presented with above-noted complaints and ultimately on 2022 underwent right L4-5 discectomy.  His right leg pain is absent.  Since he was in his late teens he has had low back pain that intermittently extends down into his left leg.  That is now bothering him intermittently.  Sometimes he simply gets a shock all the way down the left leg.  In the past he has had facet blocks and even ablations which did not last.    Review of Systems   Constitutional: Negative for activity change, appetite change, chills, diaphoresis, fatigue, fever and unexpected weight change.   HENT: Negative for congestion, dental problem, drooling, ear discharge, ear pain, facial swelling, hearing loss, mouth sores, nosebleeds, postnasal drip, rhinorrhea, sinus pressure, sinus pain, sneezing, sore throat, tinnitus, trouble swallowing and voice change.    Eyes: Negative for photophobia, pain, discharge, redness, itching and visual disturbance.   Respiratory: Negative for apnea, cough, choking, chest tightness, shortness of breath, wheezing and stridor.    Cardiovascular: Negative for chest pain, palpitations and leg swelling.   Gastrointestinal: Negative for abdominal distention, abdominal pain, anal bleeding, blood in stool, constipation, diarrhea, nausea, rectal pain and vomiting.   Endocrine: Negative for cold intolerance, heat intolerance, polydipsia, polyphagia and polyuria.   Genitourinary: Negative for decreased urine volume, difficulty urinating, dysuria, enuresis, flank pain, frequency, genital sores, hematuria and urgency.   Musculoskeletal: Positive for arthralgias, back pain and myalgias. Negative for gait problem, joint swelling, neck pain and  "neck stiffness.   Skin: Negative for color change, pallor, rash and wound.   Allergic/Immunologic: Negative for environmental allergies, food allergies and immunocompromised state.   Neurological: Negative for dizziness, tremors, seizures, syncope, facial asymmetry, speech difficulty, weakness, light-headedness, numbness and headaches.   Hematological: Negative for adenopathy. Does not bruise/bleed easily.   Psychiatric/Behavioral: Negative for agitation, behavioral problems, confusion, decreased concentration, dysphoric mood, hallucinations, self-injury, sleep disturbance and suicidal ideas. The patient is not nervous/anxious and is not hyperactive.    All other systems reviewed and are negative.      The patient's past medical history, past surgical history, family history, and social history have been reviewed at length in the electronic medical record.      Physical Exam:   Temp 98.9 °F (37.2 °C) (Infrared)   Ht 182.9 cm (72.01\")   Wt 117 kg (257 lb 12.8 oz)   BMI 34.96 kg/m²   Lumbar incision looks great.    Medical Decision Making    Data Review:   (All imaging studies were personally reviewed unless stated otherwise)  I reviewed his MRI once again from August.  The previously noted disc protrusion is once again noted.  I do not see significant nerve root compromise on the left at any level.    Diagnosis:   1.  Right L4-5 disc herniation status post discectomy, doing well.  2.  Chronic back and left leg pain.    Treatment Options:   The patient is doing well in terms of his right-sided radiculopathy.  He has more chronic left-sided symptoms for which I do not have a surgical solution.  Someone has discussed for suggested spinal cord stimulation.  He is going to think that over and if he like to pursue that then I will make a referral.       Diagnosis Plan   1. S/P lumbar discectomy        2. Lumbar facet arthropathy/lumbar spondylosis without myelopathy                I, Dr. Grigsby, personally performed the " services described in the documentation, as scribed in my presence, and it is both accurate and complete.

## 2022-12-24 ENCOUNTER — APPOINTMENT (OUTPATIENT)
Dept: CT IMAGING | Facility: HOSPITAL | Age: 57
End: 2022-12-24

## 2022-12-24 ENCOUNTER — HOSPITAL ENCOUNTER (EMERGENCY)
Facility: HOSPITAL | Age: 57
Discharge: HOME | End: 2022-12-24
Attending: FAMILY MEDICINE | Admitting: FAMILY MEDICINE

## 2022-12-24 VITALS
RESPIRATION RATE: 18 BRPM | HEART RATE: 104 BPM | HEIGHT: 72 IN | OXYGEN SATURATION: 99 % | SYSTOLIC BLOOD PRESSURE: 156 MMHG | WEIGHT: 258 LBS | TEMPERATURE: 97.2 F | DIASTOLIC BLOOD PRESSURE: 99 MMHG | BODY MASS INDEX: 34.95 KG/M2

## 2022-12-24 DIAGNOSIS — M54.42 LEFT-SIDED LOW BACK PAIN WITH LEFT-SIDED SCIATICA, UNSPECIFIED CHRONICITY: Primary | ICD-10-CM

## 2022-12-24 LAB
ALBUMIN SERPL-MCNC: 3.88 G/DL (ref 3.5–5.2)
ALBUMIN/GLOB SERPL: 1.5 G/DL
ALP SERPL-CCNC: 93 U/L (ref 39–117)
ALT SERPL W P-5'-P-CCNC: 25 U/L (ref 1–41)
ANION GAP SERPL CALCULATED.3IONS-SCNC: 9.5 MMOL/L (ref 5–15)
AST SERPL-CCNC: 18 U/L (ref 1–40)
BASOPHILS # BLD AUTO: 0.04 10*3/MM3 (ref 0–0.2)
BASOPHILS NFR BLD AUTO: 0.6 % (ref 0–1.5)
BILIRUB SERPL-MCNC: 0.2 MG/DL (ref 0–1.2)
BILIRUB UR QL STRIP: NEGATIVE
BUN SERPL-MCNC: 14 MG/DL (ref 6–20)
BUN/CREAT SERPL: 12.8 (ref 7–25)
CALCIUM SPEC-SCNC: 9.1 MG/DL (ref 8.6–10.5)
CHLORIDE SERPL-SCNC: 105 MMOL/L (ref 98–107)
CLARITY UR: CLEAR
CO2 SERPL-SCNC: 25.5 MMOL/L (ref 22–29)
COLOR UR: YELLOW
CREAT SERPL-MCNC: 1.09 MG/DL (ref 0.76–1.27)
CRP SERPL-MCNC: <0.3 MG/DL (ref 0–0.5)
DEPRECATED RDW RBC AUTO: 39.8 FL (ref 37–54)
EGFRCR SERPLBLD CKD-EPI 2021: 79.2 ML/MIN/1.73
EOSINOPHIL # BLD AUTO: 0.21 10*3/MM3 (ref 0–0.4)
EOSINOPHIL NFR BLD AUTO: 3.1 % (ref 0.3–6.2)
ERYTHROCYTE [DISTWIDTH] IN BLOOD BY AUTOMATED COUNT: 12.6 % (ref 12.3–15.4)
ERYTHROCYTE [SEDIMENTATION RATE] IN BLOOD: 8 MM/HR (ref 0–20)
GLOBULIN UR ELPH-MCNC: 2.6 GM/DL
GLUCOSE SERPL-MCNC: 92 MG/DL (ref 65–99)
GLUCOSE UR STRIP-MCNC: NEGATIVE MG/DL
HCT VFR BLD AUTO: 40.8 % (ref 37.5–51)
HGB BLD-MCNC: 14 G/DL (ref 13–17.7)
HGB UR QL STRIP.AUTO: NEGATIVE
HOLD SPECIMEN: NORMAL
HOLD SPECIMEN: NORMAL
IMM GRANULOCYTES # BLD AUTO: 0.01 10*3/MM3 (ref 0–0.05)
IMM GRANULOCYTES NFR BLD AUTO: 0.1 % (ref 0–0.5)
KETONES UR QL STRIP: NEGATIVE
LEUKOCYTE ESTERASE UR QL STRIP.AUTO: NEGATIVE
LYMPHOCYTES # BLD AUTO: 2 10*3/MM3 (ref 0.7–3.1)
LYMPHOCYTES NFR BLD AUTO: 29.7 % (ref 19.6–45.3)
MCH RBC QN AUTO: 29.5 PG (ref 26.6–33)
MCHC RBC AUTO-ENTMCNC: 34.3 G/DL (ref 31.5–35.7)
MCV RBC AUTO: 86.1 FL (ref 79–97)
MONOCYTES # BLD AUTO: 0.66 10*3/MM3 (ref 0.1–0.9)
MONOCYTES NFR BLD AUTO: 9.8 % (ref 5–12)
NEUTROPHILS NFR BLD AUTO: 3.82 10*3/MM3 (ref 1.7–7)
NEUTROPHILS NFR BLD AUTO: 56.7 % (ref 42.7–76)
NITRITE UR QL STRIP: NEGATIVE
NRBC BLD AUTO-RTO: 0 /100 WBC (ref 0–0.2)
PH UR STRIP.AUTO: 6.5 [PH] (ref 5–8)
PLATELET # BLD AUTO: 198 10*3/MM3 (ref 140–450)
PMV BLD AUTO: 8.7 FL (ref 6–12)
POTASSIUM SERPL-SCNC: 4.4 MMOL/L (ref 3.5–5.2)
PROT SERPL-MCNC: 6.5 G/DL (ref 6–8.5)
PROT UR QL STRIP: NEGATIVE
RBC # BLD AUTO: 4.74 10*6/MM3 (ref 4.14–5.8)
SODIUM SERPL-SCNC: 140 MMOL/L (ref 136–145)
SP GR UR STRIP: 1.01 (ref 1–1.03)
UROBILINOGEN UR QL STRIP: NORMAL
WBC NRBC COR # BLD: 6.74 10*3/MM3 (ref 3.4–10.8)
WHOLE BLOOD HOLD COAG: NORMAL
WHOLE BLOOD HOLD SPECIMEN: NORMAL

## 2022-12-24 PROCEDURE — 72131 CT LUMBAR SPINE W/O DYE: CPT

## 2022-12-24 PROCEDURE — 85652 RBC SED RATE AUTOMATED: CPT | Performed by: PHYSICIAN ASSISTANT

## 2022-12-24 PROCEDURE — 80053 COMPREHEN METABOLIC PANEL: CPT | Performed by: PHYSICIAN ASSISTANT

## 2022-12-24 PROCEDURE — 81003 URINALYSIS AUTO W/O SCOPE: CPT | Performed by: PHYSICIAN ASSISTANT

## 2022-12-24 PROCEDURE — 99283 EMERGENCY DEPT VISIT LOW MDM: CPT

## 2022-12-24 PROCEDURE — 85025 COMPLETE CBC W/AUTO DIFF WBC: CPT | Performed by: PHYSICIAN ASSISTANT

## 2022-12-24 PROCEDURE — 25010000002 HYDROMORPHONE PER 4 MG: Performed by: EMERGENCY MEDICINE

## 2022-12-24 PROCEDURE — 25010000002 ORPHENADRINE CITRATE PER 60 MG: Performed by: PHYSICIAN ASSISTANT

## 2022-12-24 PROCEDURE — 96374 THER/PROPH/DIAG INJ IV PUSH: CPT

## 2022-12-24 PROCEDURE — 25010000002 KETOROLAC TROMETHAMINE PER 15 MG: Performed by: PHYSICIAN ASSISTANT

## 2022-12-24 PROCEDURE — 86140 C-REACTIVE PROTEIN: CPT | Performed by: PHYSICIAN ASSISTANT

## 2022-12-24 PROCEDURE — 25010000002 METHYLPREDNISOLONE PER 125 MG: Performed by: PHYSICIAN ASSISTANT

## 2022-12-24 PROCEDURE — 96375 TX/PRO/DX INJ NEW DRUG ADDON: CPT

## 2022-12-24 RX ORDER — HYDROMORPHONE HYDROCHLORIDE 1 MG/ML
0.5 INJECTION, SOLUTION INTRAMUSCULAR; INTRAVENOUS; SUBCUTANEOUS ONCE
Status: COMPLETED | OUTPATIENT
Start: 2022-12-24 | End: 2022-12-24

## 2022-12-24 RX ORDER — METHYLPREDNISOLONE 4 MG/1
TABLET ORAL
Qty: 21 TABLET | Refills: 0 | Status: SHIPPED | OUTPATIENT
Start: 2022-12-24 | End: 2023-02-01

## 2022-12-24 RX ORDER — ORPHENADRINE CITRATE 30 MG/ML
60 INJECTION INTRAMUSCULAR; INTRAVENOUS ONCE
Status: DISCONTINUED | OUTPATIENT
Start: 2022-12-24 | End: 2022-12-24

## 2022-12-24 RX ORDER — METHYLPREDNISOLONE SODIUM SUCCINATE 125 MG/2ML
80 INJECTION, POWDER, LYOPHILIZED, FOR SOLUTION INTRAMUSCULAR; INTRAVENOUS ONCE
Status: COMPLETED | OUTPATIENT
Start: 2022-12-24 | End: 2022-12-24

## 2022-12-24 RX ORDER — ORPHENADRINE CITRATE 30 MG/ML
60 INJECTION INTRAMUSCULAR; INTRAVENOUS ONCE
Status: COMPLETED | OUTPATIENT
Start: 2022-12-24 | End: 2022-12-24

## 2022-12-24 RX ORDER — ORPHENADRINE CITRATE 100 MG/1
100 TABLET, EXTENDED RELEASE ORAL 2 TIMES DAILY PRN
Qty: 14 TABLET | Refills: 0 | Status: SHIPPED | OUTPATIENT
Start: 2022-12-24 | End: 2023-02-01

## 2022-12-24 RX ORDER — KETOROLAC TROMETHAMINE 30 MG/ML
30 INJECTION, SOLUTION INTRAMUSCULAR; INTRAVENOUS ONCE
Status: COMPLETED | OUTPATIENT
Start: 2022-12-24 | End: 2022-12-24

## 2022-12-24 RX ORDER — METHYLPREDNISOLONE SODIUM SUCCINATE 125 MG/2ML
80 INJECTION, POWDER, LYOPHILIZED, FOR SOLUTION INTRAMUSCULAR; INTRAVENOUS ONCE
Status: DISCONTINUED | OUTPATIENT
Start: 2022-12-24 | End: 2022-12-24

## 2022-12-24 RX ORDER — KETOROLAC TROMETHAMINE 30 MG/ML
30 INJECTION, SOLUTION INTRAMUSCULAR; INTRAVENOUS ONCE
Status: DISCONTINUED | OUTPATIENT
Start: 2022-12-24 | End: 2022-12-24

## 2022-12-24 RX ADMIN — ORPHENADRINE CITRATE 60 MG: 30 INJECTION INTRAMUSCULAR; INTRAVENOUS at 14:34

## 2022-12-24 RX ADMIN — HYDROMORPHONE HYDROCHLORIDE 0.5 MG: 1 INJECTION, SOLUTION INTRAMUSCULAR; INTRAVENOUS; SUBCUTANEOUS at 14:34

## 2022-12-24 RX ADMIN — METHYLPREDNISOLONE SODIUM SUCCINATE 80 MG: 125 INJECTION, POWDER, FOR SOLUTION INTRAMUSCULAR; INTRAVENOUS at 14:33

## 2022-12-24 RX ADMIN — KETOROLAC TROMETHAMINE 30 MG: 30 INJECTION, SOLUTION INTRAMUSCULAR at 14:34

## 2022-12-24 NOTE — ED PROVIDER NOTES
"Subjective   History of Present Illness  57 year old male with past medical hx of GERD, chronic pain disorder, chronic back pain, lumbosacral disc disease, mirgraines, and OA presents to the ED with low back pain. Patient states 3 months ago he had a lumbar discectomy by Dr. Grigsby at [facility] and over the past couple of days has felt like \"something is shifting around\". He denies any recent injury, pulling, tugging, or lifting. No recent trauma, unexplained weight loss, neurological deficits, fever, hx of or current use of IVD, steroid use, or hx of cancer/TB/HIV. Aggravating factors include movement. Denies any alleviating factors, despite Percocet which he took at home from prior back surgery.    History provided by:  Patient   used: No        Review of Systems   Constitutional: Negative.  Negative for fever.   HENT: Negative.    Respiratory: Negative.    Cardiovascular: Negative.  Negative for chest pain.   Gastrointestinal: Negative.  Negative for abdominal pain.   Endocrine: Negative.    Genitourinary: Negative.  Negative for dysuria.   Musculoskeletal: Positive for back pain.   Skin: Negative.    Neurological: Negative.    Psychiatric/Behavioral: Negative.    All other systems reviewed and are negative.      Past Medical History:   Diagnosis Date   • Acid reflux    • Chronic pain disorder    • Extremity pain    • Joint pain    • Low back pain    • Lumbosacral disc disease    • Migraine    • Muscle spasm    • Neck pain    • Osteoarthritis    • PONV (postoperative nausea and vomiting)        No Known Allergies    Past Surgical History:   Procedure Laterality Date   • CARPAL TUNNEL RELEASE Right 8/4/2016    Procedure: CARPAL TUNNEL RELEASE;  Surgeon: Yoel Lua MD;  Location: [facility] OR;  Service:    • CARPAL TUNNEL RELEASE Left 11/9/2017    Procedure: CARPAL TUNNEL RELEASE;  Surgeon: Yoel Lua MD;  Location: [facility] OR;  Service:    • COLONOSCOPY     • LUMBAR " DISCECTOMY Right 9/22/2022    Procedure: LUMBAR DISCECTOMY L4-5 RIGHT;  Surgeon: Aquiles Grigsby MD;  Location: [facility];  Service: Neurosurgery;  Laterality: Right;   • LUMBAR FACET INJECTION     • TOE SURGERY Right 03/04/2015       Family History   Problem Relation Age of Onset   • Lung cancer Mother    • Cancer Mother         ovarian   • Diabetes Sister    • Rheum arthritis Brother    • Cancer Sister         breast       Social History     Socioeconomic History   • Marital status:    Tobacco Use   • Smoking status: Never   • Smokeless tobacco: Current     Types: Chew   • Tobacco comments:     uses chewing tobacco   Vaping Use   • Vaping Use: Never used   Substance and Sexual Activity   • Alcohol use: No   • Drug use: No   • Sexual activity: Defer           Objective   Physical Exam  Vitals and nursing note reviewed.   Constitutional:       General: He is not in acute distress.     Appearance: He is well-developed. He is not diaphoretic.   HENT:      Head: Normocephalic and atraumatic.      Right Ear: External ear normal.      Left Ear: External ear normal.      Nose: Nose normal.   Eyes:      Conjunctiva/sclera: Conjunctivae normal.      Pupils: Pupils are equal, round, and reactive to light.   Neck:      Vascular: No JVD.      Trachea: No tracheal deviation.   Cardiovascular:      Rate and Rhythm: Normal rate and regular rhythm.      Heart sounds: Normal heart sounds. No murmur heard.  Pulmonary:      Effort: Pulmonary effort is normal. No respiratory distress.      Breath sounds: Normal breath sounds. No wheezing.   Abdominal:      General: Bowel sounds are normal.      Palpations: Abdomen is soft.      Tenderness: There is no abdominal tenderness.   Musculoskeletal:         General: No deformity. Normal range of motion.      Cervical back: Normal range of motion and neck supple.   Skin:     General: Skin is warm and dry.      Coloration: Skin is not pale.      Findings: No erythema or rash.    Neurological:      Mental Status: He is alert and oriented to person, place, and time.      Cranial Nerves: No cranial nerve deficit.   Psychiatric:         Behavior: Behavior normal.         Thought Content: Thought content normal.         Procedures           ED Course  ED Course as of 12/24/22 1551   Sat Dec 24, 2022   1413 CT Lumbar Spine Without Contrast [TK]      ED Course User Index  [TK] Monica Muller PA-C                                           MDM  Number of Diagnoses or Management Options  Left-sided low back pain with left-sided sciatica, unspecified chronicity: new and requires workup     Amount and/or Complexity of Data Reviewed  Clinical lab tests: reviewed and ordered  Tests in the radiology section of CPT®: reviewed and ordered    Risk of Complications, Morbidity, and/or Mortality  Presenting problems: moderate  Diagnostic procedures: moderate  Management options: moderate    Patient Progress  Patient progress: stable      Final diagnoses:   Left-sided low back pain with left-sided sciatica, unspecified chronicity       ED Disposition  ED Disposition     ED Disposition   Discharge    Condition   Stable    Comment   --             Kieran, April, APRN  [address] [facility] [address] [phone]    In 2 days           Medication List      New Prescriptions    methylPREDNISolone 4 MG dose pack  Commonly known as: MEDROL  Take as directed on package instructions.     orphenadrine 100 MG 12 hr tablet  Commonly known as: NORFLEX  Take 1 tablet by mouth 2 (Two) Times a Day As Needed for Muscle Spasms.           Where to Get Your Medications      You can get these medications from any pharmacy    Bring a paper prescription for each of these medications  · methylPREDNISolone 4 MG dose pack  · orphenadrine 100 MG 12 hr tablet          Monica Muller PA-C  12/24/22 1551

## 2023-01-08 DIAGNOSIS — N42.9 DISORDER OF PROSTATE: ICD-10-CM

## 2023-01-09 RX ORDER — TAMSULOSIN HYDROCHLORIDE 0.4 MG/1
0.8 CAPSULE ORAL DAILY
Qty: 180 CAPSULE | Refills: 1 | Status: SHIPPED | OUTPATIENT
Start: 2023-01-09

## 2023-01-17 ENCOUNTER — TRANSCRIBE ORDERS (OUTPATIENT)
Dept: OTHER | Facility: OTHER | Age: 58
End: 2023-01-17
Payer: COMMERCIAL

## 2023-01-17 ENCOUNTER — LAB (OUTPATIENT)
Dept: LAB | Facility: HOSPITAL | Age: 58
End: 2023-01-17
Payer: COMMERCIAL

## 2023-01-17 DIAGNOSIS — F41.9 ANXIETY DISORDER, UNSPECIFIED TYPE: ICD-10-CM

## 2023-01-17 DIAGNOSIS — R25.1 TREMOR: ICD-10-CM

## 2023-01-17 DIAGNOSIS — F32.A DEPRESSION, UNSPECIFIED DEPRESSION TYPE: ICD-10-CM

## 2023-01-17 DIAGNOSIS — R53.83 OTHER FATIGUE: ICD-10-CM

## 2023-01-17 DIAGNOSIS — M47.27 OTHER SPONDYLOSIS WITH RADICULOPATHY, LUMBOSACRAL REGION: ICD-10-CM

## 2023-01-17 DIAGNOSIS — F32.A DEPRESSION, UNSPECIFIED DEPRESSION TYPE: Primary | ICD-10-CM

## 2023-01-17 LAB
ALBUMIN SERPL-MCNC: 4.6 G/DL (ref 3.5–5.2)
ALBUMIN/GLOB SERPL: 1.9 G/DL
ALP SERPL-CCNC: 97 U/L (ref 39–117)
ALT SERPL W P-5'-P-CCNC: 30 U/L (ref 1–41)
ANION GAP SERPL CALCULATED.3IONS-SCNC: 7 MMOL/L (ref 5–15)
AST SERPL-CCNC: 23 U/L (ref 1–40)
BASOPHILS # BLD AUTO: 0.03 10*3/MM3 (ref 0–0.2)
BASOPHILS NFR BLD AUTO: 0.5 % (ref 0–1.5)
BILIRUB SERPL-MCNC: 0.4 MG/DL (ref 0–1.2)
BUN SERPL-MCNC: 18 MG/DL (ref 6–20)
BUN/CREAT SERPL: 17.6 (ref 7–25)
CALCIUM SPEC-SCNC: 9.2 MG/DL (ref 8.6–10.5)
CHLORIDE SERPL-SCNC: 106 MMOL/L (ref 98–107)
CHOLEST SERPL-MCNC: 168 MG/DL (ref 0–200)
CO2 SERPL-SCNC: 27 MMOL/L (ref 22–29)
CREAT SERPL-MCNC: 1.02 MG/DL (ref 0.76–1.27)
DEPRECATED RDW RBC AUTO: 40.1 FL (ref 37–54)
EGFRCR SERPLBLD CKD-EPI 2021: 85.7 ML/MIN/1.73
EOSINOPHIL # BLD AUTO: 0.21 10*3/MM3 (ref 0–0.4)
EOSINOPHIL NFR BLD AUTO: 3.2 % (ref 0.3–6.2)
ERYTHROCYTE [DISTWIDTH] IN BLOOD BY AUTOMATED COUNT: 13.1 % (ref 12.3–15.4)
GLOBULIN UR ELPH-MCNC: 2.4 GM/DL
GLUCOSE SERPL-MCNC: 94 MG/DL (ref 65–99)
HBA1C MFR BLD: 5.9 % (ref 4.8–5.6)
HCT VFR BLD AUTO: 45.8 % (ref 37.5–51)
HDLC SERPL-MCNC: 37 MG/DL (ref 40–60)
HGB BLD-MCNC: 15.5 G/DL (ref 13–17.7)
IMM GRANULOCYTES # BLD AUTO: 0.02 10*3/MM3 (ref 0–0.05)
IMM GRANULOCYTES NFR BLD AUTO: 0.3 % (ref 0–0.5)
LDLC SERPL CALC-MCNC: 90 MG/DL (ref 0–100)
LDLC/HDLC SERPL: 2.21 {RATIO}
LYMPHOCYTES # BLD AUTO: 1.87 10*3/MM3 (ref 0.7–3.1)
LYMPHOCYTES NFR BLD AUTO: 28.1 % (ref 19.6–45.3)
MCH RBC QN AUTO: 28.8 PG (ref 26.6–33)
MCHC RBC AUTO-ENTMCNC: 33.8 G/DL (ref 31.5–35.7)
MCV RBC AUTO: 85 FL (ref 79–97)
MONOCYTES # BLD AUTO: 0.54 10*3/MM3 (ref 0.1–0.9)
MONOCYTES NFR BLD AUTO: 8.1 % (ref 5–12)
NEUTROPHILS NFR BLD AUTO: 3.99 10*3/MM3 (ref 1.7–7)
NEUTROPHILS NFR BLD AUTO: 59.8 % (ref 42.7–76)
NRBC BLD AUTO-RTO: 0 /100 WBC (ref 0–0.2)
PLATELET # BLD AUTO: 201 10*3/MM3 (ref 140–450)
PMV BLD AUTO: 8.8 FL (ref 6–12)
POTASSIUM SERPL-SCNC: 4.1 MMOL/L (ref 3.5–5.2)
PROT SERPL-MCNC: 7 G/DL (ref 6–8.5)
RBC # BLD AUTO: 5.39 10*6/MM3 (ref 4.14–5.8)
SODIUM SERPL-SCNC: 140 MMOL/L (ref 136–145)
T4 FREE SERPL-MCNC: 1.07 NG/DL (ref 0.93–1.7)
TRIGL SERPL-MCNC: 246 MG/DL (ref 0–150)
TSH SERPL DL<=0.05 MIU/L-ACNC: 1.66 UIU/ML (ref 0.27–4.2)
VLDLC SERPL-MCNC: 41 MG/DL (ref 5–40)
WBC NRBC COR # BLD: 6.66 10*3/MM3 (ref 3.4–10.8)

## 2023-01-17 PROCEDURE — 80050 GENERAL HEALTH PANEL: CPT

## 2023-01-17 PROCEDURE — 84439 ASSAY OF FREE THYROXINE: CPT

## 2023-01-17 PROCEDURE — 83036 HEMOGLOBIN GLYCOSYLATED A1C: CPT

## 2023-01-17 PROCEDURE — 80061 LIPID PANEL: CPT

## 2023-01-17 PROCEDURE — 36415 COLL VENOUS BLD VENIPUNCTURE: CPT

## 2023-02-01 ENCOUNTER — OFFICE VISIT (OUTPATIENT)
Dept: FAMILY MEDICINE CLINIC | Facility: CLINIC | Age: 58
End: 2023-02-01
Payer: COMMERCIAL

## 2023-02-01 VITALS
HEIGHT: 72 IN | WEIGHT: 246.6 LBS | TEMPERATURE: 97.8 F | SYSTOLIC BLOOD PRESSURE: 130 MMHG | OXYGEN SATURATION: 99 % | HEART RATE: 121 BPM | BODY MASS INDEX: 33.4 KG/M2 | DIASTOLIC BLOOD PRESSURE: 80 MMHG

## 2023-02-01 DIAGNOSIS — E66.09 CLASS 1 OBESITY DUE TO EXCESS CALORIES WITH SERIOUS COMORBIDITY AND BODY MASS INDEX (BMI) OF 33.0 TO 33.9 IN ADULT: Primary | ICD-10-CM

## 2023-02-01 PROCEDURE — 99213 OFFICE O/P EST LOW 20 MIN: CPT | Performed by: FAMILY MEDICINE

## 2023-02-01 NOTE — PROGRESS NOTES
"Chief Complaint  Establish Care    Subjective          Avery Watson presents to [facility]  History of Present Illness    Patient presents to the clinic today with interest in the [facility] weight loss program.  Patient is a candidate for the program with a BMI of 33.  Patient denies personal history of pancreatitis and family history of medullary thyroid cancer.  Details regarding the process of the program discussed with patient.  Offered education on weight loss medications.  Patient voiced understanding.  Referral sent to specialty pharmacy for PA.  Outpatient labs ordered for baseline.  Patient had no further questions or concerns.    Review of Systems      Objective   Vital Signs:   /80 (BP Location: Right arm, Patient Position: Sitting, Cuff Size: Large Adult)   Pulse (!) 121   Temp 97.8 °F (36.6 °C) (Temporal)   Ht 182.9 cm (72\")   Wt 112 kg (246 lb 9.6 oz)   SpO2 99%   BMI 33.44 kg/m²     Physical Exam  Constitutional:       General: He is not in acute distress.     Appearance: Normal appearance. He is well-developed and well-groomed. He is not ill-appearing, toxic-appearing or diaphoretic.   HENT:      Head: Normocephalic.      Nose: Nose normal. No congestion or rhinorrhea.      Mouth/Throat:      Mouth: Mucous membranes are moist.      Pharynx: Oropharynx is clear. No oropharyngeal exudate or posterior oropharyngeal erythema.   Eyes:      General: Lids are normal.         Right eye: No discharge.         Left eye: No discharge.      Extraocular Movements: Extraocular movements intact.      Pupils: Pupils are equal, round, and reactive to light.   Neck:      Vascular: No carotid bruit.   Cardiovascular:      Rate and Rhythm: Normal rate and regular rhythm.      Pulses: Normal pulses.      Heart sounds: Normal heart sounds. No murmur heard.    No friction rub. No gallop.   Pulmonary:      Effort: Pulmonary effort is normal. No respiratory distress.      " Breath sounds: Normal breath sounds. No stridor. No wheezing, rhonchi or rales.   Chest:      Chest wall: No tenderness.   Abdominal:      General: Bowel sounds are normal. There is no distension.      Palpations: Abdomen is soft. There is no mass.      Tenderness: There is no abdominal tenderness. There is no right CVA tenderness, left CVA tenderness, guarding or rebound.      Hernia: No hernia is present.   Musculoskeletal:         General: No swelling or tenderness. Normal range of motion.      Cervical back: Normal range of motion and neck supple. No rigidity or tenderness.      Right lower leg: No edema.      Left lower leg: No edema.   Lymphadenopathy:      Cervical: No cervical adenopathy.   Skin:     General: Skin is warm.      Capillary Refill: Capillary refill takes less than 2 seconds.      Coloration: Skin is not jaundiced.      Findings: No bruising, erythema or rash.   Neurological:      General: No focal deficit present.      Mental Status: He is alert and oriented to person, place, and time.      Motor: Motor function is intact. No weakness.      Coordination: Coordination is intact.      Gait: Gait is intact. Gait normal.   Psychiatric:         Attention and Perception: Attention normal.         Mood and Affect: Mood normal.         Speech: Speech normal.         Behavior: Behavior normal.         Cognition and Memory: Cognition normal.         Judgment: Judgment normal.        Result Review :                 Assessment and Plan    Diagnoses and all orders for this visit:    1. Class 1 obesity due to excess calories with serious comorbidity and body mass index (BMI) of 33.0 to 33.9 in adult (Primary)  Comments:  referral faxed to weight loss clinic      Patient's Body mass index is 33.44 kg/m². indicating that he is obese (BMI >30). Obesity-related health conditions include the following: dyslipidemias and GERD. Obesity is unchanged. BMI is is above average; BMI management plan is completed. We  discussed low calorie, low carb based diet program, portion control and increasing exercise..    Follow Up   Return in about 3 months (around 5/1/2023), or if symptoms worsen or fail to improve.  Patient was given instructions and counseling regarding his condition or for health maintenance advice. Please see specific information pulled into the AVS if appropriate.     This document has been electronically signed by EDDIE Mead  February 1, 2023 14:56 EST

## 2023-03-15 ENCOUNTER — DISEASE STATE MANAGEMENT VISIT (OUTPATIENT)
Dept: PHARMACY | Facility: HOSPITAL | Age: 58
End: 2023-03-15
Payer: COMMERCIAL

## 2023-03-15 VITALS
SYSTOLIC BLOOD PRESSURE: 144 MMHG | BODY MASS INDEX: 34.62 KG/M2 | DIASTOLIC BLOOD PRESSURE: 93 MMHG | HEART RATE: 112 BPM | WEIGHT: 255.6 LBS | HEIGHT: 72 IN

## 2023-03-15 RX ORDER — SEMAGLUTIDE 0.25 MG/.5ML
0.25 INJECTION, SOLUTION SUBCUTANEOUS WEEKLY
Qty: 2 ML | Refills: 0 | Status: SHIPPED | OUTPATIENT
Start: 2023-03-15

## 2023-03-15 NOTE — PROGRESS NOTES
[facility]  Weight Management Program      Avery Watson is a 57 y.o. male referred to the [facility] by Luly Jimenez for clinical pharmacy and specialty pharmacy management of GLP1 for weight management.  The patient denies a personal history or family history of thyroid cancer and denies a personal history of pancreatitis.     Avery Watson has previously tried lifestyle changes for weight loss. Current weight loss efforts include none.     Relevant Past Medical History and Co-morbidities  Past Medical History:   Diagnosis Date   • Acid reflux    • Chronic pain disorder    • Extremity pain    • Joint pain    • Low back pain    • Lumbosacral disc disease    • Migraine    • Muscle spasm    • Neck pain    • Osteoarthritis    • PONV (postoperative nausea and vomiting)      Social History     Socioeconomic History   • Marital status:    Tobacco Use   • Smoking status: Never   • Smokeless tobacco: Current     Types: Chew   • Tobacco comments:     uses chewing tobacco   Vaping Use   • Vaping Use: Never used   Substance and Sexual Activity   • Alcohol use: No   • Drug use: No   • Sexual activity: Defer       Allergies  Patient has no known allergies.    Current Medication List    Current Outpatient Medications:   •  amitriptyline (ELAVIL) 25 MG tablet, Take 1 to 2 tablets by mouth one hour prior to bedtime for sleep and pain., Disp: 180 tablet, Rfl: 2  •  atorvastatin (LIPITOR) 10 MG tablet, Take 1 tablet by mouth Daily., Disp: 90 tablet, Rfl: 1  •  busPIRone (BUSPAR) 5 MG tablet, Take 1 tablet by mouth 2 times every day as needed., Disp: 60 tablet, Rfl: 2  •  celecoxib (CeleBREX) 200 MG capsule, Take 1 capsule by mouth 2 times daily as needed for joint pain/stiffness., Disp: 180 capsule, Rfl: 2  •  dexlansoprazole (Dexilant) 60 MG capsule, Take 1 capsule by mouth Daily., Disp: 90 capsule, Rfl: 1  •  DULoxetine (CYMBALTA) 60 MG capsule, Take 1 capsule by mouth  "daily., Disp: 90 capsule, Rfl: 2  •  FLUoxetine (PROzac) 40 MG capsule, Take 1 capsule by mouth Every Morning., Disp: 90 capsule, Rfl: 1  •  Pbkbrjiufoynajf31.5mg/5mL-Qlpvdlix943,000units/mL-Antacid-Lidocaine2% 1:1:1:1, SWISH 1-2  TEASPOONFUL FOR 30 SECONDS THEN SPIT 4 TIMES DAILY AS NEEDED., Disp: 240 mL, Rfl: 2  •  polyethylene glycol (MIRALAX) 17 GM/SCOOP powder, Mix 238 grams of powder with liquid and take by mouth three times a day. Use as directed for bowel prep, Disp: 714 g, Rfl: 0  •  tamsulosin (FLOMAX) 0.4 MG capsule 24 hr capsule, Take 2 capsules by mouth Daily for prostate., Disp: 180 capsule, Rfl: 1    Drug Interactions  Cymbalta/prozac/elavil - FLUoxetine may enhance the serotonergic effect of Tricyclic Antidepressants. FLUoxetine may increase the serum concentration of Tricyclic Antidepressants. SSRI may be duplication of therapy with SNRI.    Relevant Laboratory Values  Lab Results   Component Value Date    CHOL 168 01/17/2023    CHLPL 205 (H) 03/04/2015    TRIG 246 (H) 01/17/2023    HDL 37 (L) 01/17/2023    LDL 90 01/17/2023     There is no height or weight on file to calculate BMI.    Vaccinations:   Patient recommended to keep up with routine vaccinations.     Goals of Therapy  • Clinical Goals or Therapeutic Targets: 10% weight loss goal      Date 3/15/23         Weight (lb) 255.6 lb       BMI kg/ 34.67       Waist Circumference (in)  46.5\"           Medication Assessment & Plan    Patient's current BMI is 34.67, which is considered Class I Obesity.    Will order Wegovy 0.25mg SC weekly.      The following medications will need dose adjustments/closer monitoring once the GLP1 is started: none    Patient stated he thought his psychiatric medications were started by multiple providers. Counseled patient to contact PCP and discuss prozac/cymbalta/elavil combination with them and he voiced agreement.    Discussed lifestyle modifications, including diet and exercise.  Patient education provided. "     Worked with patient to create SMART Goal(s):   1. Drink 64 oz of water a day  2. Walk 30 mins twice a week  3. Cut down to 2 sweets per week    Will follow-up with patient in clinic in 4 weeks.     Hudson Fagan RPH  3/15/2023  10:40 EDT

## 2023-05-15 ENCOUNTER — TRANSCRIBE ORDERS (OUTPATIENT)
Dept: ADMINISTRATIVE | Facility: HOSPITAL | Age: 58
End: 2023-05-15
Payer: COMMERCIAL

## 2023-05-15 ENCOUNTER — LAB (OUTPATIENT)
Dept: LAB | Facility: HOSPITAL | Age: 58
End: 2023-05-15
Payer: COMMERCIAL

## 2023-05-15 DIAGNOSIS — E55.9 VITAMIN D DEFICIENCY: ICD-10-CM

## 2023-05-15 DIAGNOSIS — F41.9 ANXIETY DISORDER, UNSPECIFIED TYPE: ICD-10-CM

## 2023-05-15 DIAGNOSIS — R53.83 TIREDNESS: ICD-10-CM

## 2023-05-15 DIAGNOSIS — F32.A DEPRESSION, UNSPECIFIED DEPRESSION TYPE: ICD-10-CM

## 2023-05-15 DIAGNOSIS — E53.8 DEFICIENCY OF OTHER SPECIFIED B GROUP VITAMINS: ICD-10-CM

## 2023-05-15 DIAGNOSIS — E53.8 DEFICIENCY OF OTHER SPECIFIED B GROUP VITAMINS: Primary | ICD-10-CM

## 2023-05-15 PROCEDURE — 80050 GENERAL HEALTH PANEL: CPT

## 2023-05-15 PROCEDURE — 84466 ASSAY OF TRANSFERRIN: CPT

## 2023-05-15 PROCEDURE — 83540 ASSAY OF IRON: CPT

## 2023-05-15 PROCEDURE — 36415 COLL VENOUS BLD VENIPUNCTURE: CPT

## 2023-05-15 PROCEDURE — 82306 VITAMIN D 25 HYDROXY: CPT

## 2023-05-15 PROCEDURE — 82607 VITAMIN B-12: CPT

## 2023-05-15 PROCEDURE — 84439 ASSAY OF FREE THYROXINE: CPT

## 2023-05-15 PROCEDURE — 84403 ASSAY OF TOTAL TESTOSTERONE: CPT

## 2023-05-15 PROCEDURE — 83036 HEMOGLOBIN GLYCOSYLATED A1C: CPT

## 2023-05-16 LAB
25(OH)D3 SERPL-MCNC: 34.2 NG/ML (ref 30–100)
ALBUMIN SERPL-MCNC: 4.5 G/DL (ref 3.5–5.2)
ALBUMIN/GLOB SERPL: 1.8 G/DL
ALP SERPL-CCNC: 96 U/L (ref 39–117)
ALT SERPL W P-5'-P-CCNC: 36 U/L (ref 1–41)
ANION GAP SERPL CALCULATED.3IONS-SCNC: 13.9 MMOL/L (ref 5–15)
AST SERPL-CCNC: 24 U/L (ref 1–40)
BASOPHILS # BLD AUTO: 0.03 10*3/MM3 (ref 0–0.2)
BASOPHILS NFR BLD AUTO: 0.5 % (ref 0–1.5)
BILIRUB SERPL-MCNC: 0.2 MG/DL (ref 0–1.2)
BUN SERPL-MCNC: 15 MG/DL (ref 6–20)
BUN/CREAT SERPL: 16.9 (ref 7–25)
CALCIUM SPEC-SCNC: 9.7 MG/DL (ref 8.6–10.5)
CHLORIDE SERPL-SCNC: 103 MMOL/L (ref 98–107)
CO2 SERPL-SCNC: 23.1 MMOL/L (ref 22–29)
CREAT SERPL-MCNC: 0.89 MG/DL (ref 0.76–1.27)
DEPRECATED RDW RBC AUTO: 40.7 FL (ref 37–54)
EGFRCR SERPLBLD CKD-EPI 2021: 100 ML/MIN/1.73
EOSINOPHIL # BLD AUTO: 0.19 10*3/MM3 (ref 0–0.4)
EOSINOPHIL NFR BLD AUTO: 2.9 % (ref 0.3–6.2)
ERYTHROCYTE [DISTWIDTH] IN BLOOD BY AUTOMATED COUNT: 13.3 % (ref 12.3–15.4)
GLOBULIN UR ELPH-MCNC: 2.5 GM/DL
GLUCOSE SERPL-MCNC: 102 MG/DL (ref 65–99)
HBA1C MFR BLD: 5.7 % (ref 4.8–5.6)
HCT VFR BLD AUTO: 43.9 % (ref 37.5–51)
HGB BLD-MCNC: 15 G/DL (ref 13–17.7)
IMM GRANULOCYTES # BLD AUTO: 0.01 10*3/MM3 (ref 0–0.05)
IMM GRANULOCYTES NFR BLD AUTO: 0.2 % (ref 0–0.5)
IRON 24H UR-MRATE: 82 MCG/DL (ref 59–158)
IRON SATN MFR SERPL: 20 % (ref 20–50)
LYMPHOCYTES # BLD AUTO: 2.24 10*3/MM3 (ref 0.7–3.1)
LYMPHOCYTES NFR BLD AUTO: 33.7 % (ref 19.6–45.3)
MCH RBC QN AUTO: 28.8 PG (ref 26.6–33)
MCHC RBC AUTO-ENTMCNC: 34.2 G/DL (ref 31.5–35.7)
MCV RBC AUTO: 84.4 FL (ref 79–97)
MONOCYTES # BLD AUTO: 0.64 10*3/MM3 (ref 0.1–0.9)
MONOCYTES NFR BLD AUTO: 9.6 % (ref 5–12)
NEUTROPHILS NFR BLD AUTO: 3.54 10*3/MM3 (ref 1.7–7)
NEUTROPHILS NFR BLD AUTO: 53.1 % (ref 42.7–76)
NRBC BLD AUTO-RTO: 0 /100 WBC (ref 0–0.2)
PLATELET # BLD AUTO: 221 10*3/MM3 (ref 140–450)
PMV BLD AUTO: 9.4 FL (ref 6–12)
POTASSIUM SERPL-SCNC: 4.2 MMOL/L (ref 3.5–5.2)
PROT SERPL-MCNC: 7 G/DL (ref 6–8.5)
RBC # BLD AUTO: 5.2 10*6/MM3 (ref 4.14–5.8)
SODIUM SERPL-SCNC: 140 MMOL/L (ref 136–145)
T4 FREE SERPL-MCNC: 1.11 NG/DL (ref 0.93–1.7)
TESTOST SERPL-MCNC: 195 NG/DL (ref 193–740)
TIBC SERPL-MCNC: 407 MCG/DL (ref 298–536)
TRANSFERRIN SERPL-MCNC: 273 MG/DL (ref 200–360)
TSH SERPL DL<=0.05 MIU/L-ACNC: 1.36 UIU/ML (ref 0.27–4.2)
VIT B12 BLD-MCNC: 653 PG/ML (ref 211–946)
WBC NRBC COR # BLD: 6.65 10*3/MM3 (ref 3.4–10.8)

## 2023-07-03 PROBLEM — R79.89 LOW TESTOSTERONE: Status: ACTIVE | Noted: 2023-07-03

## 2023-07-28 ENCOUNTER — APPOINTMENT (OUTPATIENT)
Dept: CT IMAGING | Facility: HOSPITAL | Age: 58
End: 2023-07-28
Payer: COMMERCIAL

## 2023-07-28 ENCOUNTER — APPOINTMENT (OUTPATIENT)
Dept: GENERAL RADIOLOGY | Facility: HOSPITAL | Age: 58
End: 2023-07-28
Payer: COMMERCIAL

## 2023-07-28 ENCOUNTER — HOSPITAL ENCOUNTER (EMERGENCY)
Facility: HOSPITAL | Age: 58
Discharge: HOME | End: 2023-07-28
Attending: STUDENT IN AN ORGANIZED HEALTH CARE EDUCATION/TRAINING PROGRAM
Payer: COMMERCIAL

## 2023-07-28 VITALS
RESPIRATION RATE: 18 BRPM | TEMPERATURE: 98.8 F | OXYGEN SATURATION: 95 % | SYSTOLIC BLOOD PRESSURE: 135 MMHG | WEIGHT: 250 LBS | BODY MASS INDEX: 33.86 KG/M2 | DIASTOLIC BLOOD PRESSURE: 93 MMHG | HEART RATE: 93 BPM | HEIGHT: 72 IN

## 2023-07-28 DIAGNOSIS — R53.1 GENERALIZED WEAKNESS: ICD-10-CM

## 2023-07-28 DIAGNOSIS — R07.9 CHEST PAIN, UNSPECIFIED TYPE: Primary | ICD-10-CM

## 2023-07-28 LAB
ALBUMIN SERPL-MCNC: 4.4 G/DL (ref 3.5–5.2)
ALBUMIN/GLOB SERPL: 1.6 G/DL
ALP SERPL-CCNC: 98 U/L (ref 39–117)
ALT SERPL W P-5'-P-CCNC: 24 U/L (ref 1–41)
ANION GAP SERPL CALCULATED.3IONS-SCNC: 12.3 MMOL/L (ref 5–15)
AST SERPL-CCNC: 19 U/L (ref 1–40)
BASOPHILS # BLD AUTO: 0.04 10*3/MM3 (ref 0–0.2)
BASOPHILS NFR BLD AUTO: 0.5 % (ref 0–1.5)
BILIRUB SERPL-MCNC: 0.3 MG/DL (ref 0–1.2)
BILIRUB UR QL STRIP: NEGATIVE
BUN SERPL-MCNC: 11 MG/DL (ref 6–20)
BUN/CREAT SERPL: 10.5 (ref 7–25)
CALCIUM SPEC-SCNC: 9.2 MG/DL (ref 8.6–10.5)
CHLORIDE SERPL-SCNC: 103 MMOL/L (ref 98–107)
CLARITY UR: CLEAR
CO2 SERPL-SCNC: 22.7 MMOL/L (ref 22–29)
COLOR UR: YELLOW
CREAT SERPL-MCNC: 1.05 MG/DL (ref 0.76–1.27)
CRP SERPL-MCNC: <0.3 MG/DL (ref 0–0.5)
DEPRECATED RDW RBC AUTO: 40.5 FL (ref 37–54)
EGFRCR SERPLBLD CKD-EPI 2021: 82.3 ML/MIN/1.73
EOSINOPHIL # BLD AUTO: 0.2 10*3/MM3 (ref 0–0.4)
EOSINOPHIL NFR BLD AUTO: 2.3 % (ref 0.3–6.2)
ERYTHROCYTE [DISTWIDTH] IN BLOOD BY AUTOMATED COUNT: 13.1 % (ref 12.3–15.4)
ERYTHROCYTE [SEDIMENTATION RATE] IN BLOOD: 10 MM/HR (ref 0–20)
FLUAV RNA RESP QL NAA+PROBE: NOT DETECTED
FLUBV RNA RESP QL NAA+PROBE: NOT DETECTED
GEN 5 2HR TROPONIN T REFLEX: <6 NG/L
GLOBULIN UR ELPH-MCNC: 2.7 GM/DL
GLUCOSE SERPL-MCNC: 136 MG/DL (ref 65–99)
GLUCOSE UR STRIP-MCNC: NEGATIVE MG/DL
HCT VFR BLD AUTO: 45.5 % (ref 37.5–51)
HGB BLD-MCNC: 14.9 G/DL (ref 13–17.7)
HGB UR QL STRIP.AUTO: NEGATIVE
HOLD SPECIMEN: NORMAL
HOLD SPECIMEN: NORMAL
IMM GRANULOCYTES # BLD AUTO: 0.02 10*3/MM3 (ref 0–0.05)
IMM GRANULOCYTES NFR BLD AUTO: 0.2 % (ref 0–0.5)
KETONES UR QL STRIP: NEGATIVE
LEUKOCYTE ESTERASE UR QL STRIP.AUTO: NEGATIVE
LYMPHOCYTES # BLD AUTO: 2.31 10*3/MM3 (ref 0.7–3.1)
LYMPHOCYTES NFR BLD AUTO: 26.8 % (ref 19.6–45.3)
MCH RBC QN AUTO: 28.2 PG (ref 26.6–33)
MCHC RBC AUTO-ENTMCNC: 32.7 G/DL (ref 31.5–35.7)
MCV RBC AUTO: 86 FL (ref 79–97)
MONOCYTES # BLD AUTO: 0.69 10*3/MM3 (ref 0.1–0.9)
MONOCYTES NFR BLD AUTO: 8 % (ref 5–12)
NEUTROPHILS NFR BLD AUTO: 5.37 10*3/MM3 (ref 1.7–7)
NEUTROPHILS NFR BLD AUTO: 62.2 % (ref 42.7–76)
NITRITE UR QL STRIP: NEGATIVE
NRBC BLD AUTO-RTO: 0 /100 WBC (ref 0–0.2)
PH UR STRIP.AUTO: 6.5 [PH] (ref 5–8)
PLATELET # BLD AUTO: 193 10*3/MM3 (ref 140–450)
PMV BLD AUTO: 8.5 FL (ref 6–12)
POTASSIUM SERPL-SCNC: 4 MMOL/L (ref 3.5–5.2)
PROT SERPL-MCNC: 7.1 G/DL (ref 6–8.5)
PROT UR QL STRIP: NEGATIVE
RBC # BLD AUTO: 5.29 10*6/MM3 (ref 4.14–5.8)
SARS-COV-2 RNA RESP QL NAA+PROBE: NOT DETECTED
SODIUM SERPL-SCNC: 138 MMOL/L (ref 136–145)
SP GR UR STRIP: 1.01 (ref 1–1.03)
TROPONIN T DELTA: NORMAL
TROPONIN T SERPL HS-MCNC: <6 NG/L
TSH SERPL DL<=0.05 MIU/L-ACNC: 1.39 UIU/ML (ref 0.27–4.2)
UROBILINOGEN UR QL STRIP: NORMAL
WBC NRBC COR # BLD: 8.63 10*3/MM3 (ref 3.4–10.8)
WHOLE BLOOD HOLD COAG: NORMAL
WHOLE BLOOD HOLD SPECIMEN: NORMAL

## 2023-07-28 PROCEDURE — 93005 ELECTROCARDIOGRAM TRACING: CPT | Performed by: STUDENT IN AN ORGANIZED HEALTH CARE EDUCATION/TRAINING PROGRAM

## 2023-07-28 PROCEDURE — 80050 GENERAL HEALTH PANEL: CPT | Performed by: STUDENT IN AN ORGANIZED HEALTH CARE EDUCATION/TRAINING PROGRAM

## 2023-07-28 PROCEDURE — 71046 X-RAY EXAM CHEST 2 VIEWS: CPT

## 2023-07-28 PROCEDURE — 87636 SARSCOV2 & INF A&B AMP PRB: CPT | Performed by: PHYSICIAN ASSISTANT

## 2023-07-28 PROCEDURE — 86140 C-REACTIVE PROTEIN: CPT | Performed by: PHYSICIAN ASSISTANT

## 2023-07-28 PROCEDURE — 74177 CT ABD & PELVIS W/CONTRAST: CPT | Performed by: RADIOLOGY

## 2023-07-28 PROCEDURE — 25510000001 IOPAMIDOL PER 1 ML: Performed by: STUDENT IN AN ORGANIZED HEALTH CARE EDUCATION/TRAINING PROGRAM

## 2023-07-28 PROCEDURE — 74177 CT ABD & PELVIS W/CONTRAST: CPT

## 2023-07-28 PROCEDURE — 70450 CT HEAD/BRAIN W/O DYE: CPT | Performed by: RADIOLOGY

## 2023-07-28 PROCEDURE — 71046 X-RAY EXAM CHEST 2 VIEWS: CPT | Performed by: RADIOLOGY

## 2023-07-28 PROCEDURE — 84484 ASSAY OF TROPONIN QUANT: CPT | Performed by: STUDENT IN AN ORGANIZED HEALTH CARE EDUCATION/TRAINING PROGRAM

## 2023-07-28 PROCEDURE — 93010 ELECTROCARDIOGRAM REPORT: CPT | Performed by: INTERNAL MEDICINE

## 2023-07-28 PROCEDURE — 99285 EMERGENCY DEPT VISIT HI MDM: CPT

## 2023-07-28 PROCEDURE — 71275 CT ANGIOGRAPHY CHEST: CPT | Performed by: RADIOLOGY

## 2023-07-28 PROCEDURE — 71275 CT ANGIOGRAPHY CHEST: CPT

## 2023-07-28 PROCEDURE — 70450 CT HEAD/BRAIN W/O DYE: CPT

## 2023-07-28 PROCEDURE — 81003 URINALYSIS AUTO W/O SCOPE: CPT | Performed by: PHYSICIAN ASSISTANT

## 2023-07-28 PROCEDURE — 85652 RBC SED RATE AUTOMATED: CPT | Performed by: PHYSICIAN ASSISTANT

## 2023-07-28 RX ORDER — SODIUM CHLORIDE 0.9 % (FLUSH) 0.9 %
10 SYRINGE (ML) INJECTION AS NEEDED
Status: DISCONTINUED | OUTPATIENT
Start: 2023-07-28 | End: 2023-07-29 | Stop reason: HOSPADM

## 2023-07-28 RX ADMIN — IOPAMIDOL 70 ML: 755 INJECTION, SOLUTION INTRAVENOUS at 21:54

## 2023-07-28 NOTE — ED NOTES
MEDICAL SCREENING:    Reason for Visit: Chest tightness    Patient initially seen in triage.  The patient was advised further evaluation and diagnostic testing will be needed, some of the treatment and testing will be initiated in the lobby in order to begin the process.  The patient will be returned to the waiting area for the time being and possibly be re-assessed by a subsequent ED provider.  The patient will be brought back to the treatment area in as timely manner as possible.       Robert Roberts PA  07/28/23 1917

## 2023-07-31 LAB
QT INTERVAL: 316 MS
QTC INTERVAL: 450 MS

## 2023-08-12 NOTE — ED PROVIDER NOTES
Subjective   History of Present Illness  57 yo male patient with hx of GERD, migraine, and chronic back pain presents to the ED with complaints of CP and generalized weakness/fatigue. Pt states he has not been feeling well for 2 weeks, but started having CP this evening.  Pt denies any worsening or alleviating factors.  Denies any cardiac hx, non-smoker, and no family hx of cardiac disease.     History provided by:  Patient   used: No      Review of Systems   Constitutional: Negative.    HENT: Negative.     Eyes: Negative.    Respiratory: Negative.     Cardiovascular:  Positive for chest pain.   Gastrointestinal: Negative.    Endocrine: Negative.    Genitourinary: Negative.    Musculoskeletal: Negative.    Skin: Negative.    Allergic/Immunologic: Negative.    Neurological: Negative.    Hematological: Negative.    Psychiatric/Behavioral: Negative.     All other systems reviewed and are negative.    Past Medical History:   Diagnosis Date    Acid reflux     Chronic pain disorder     Extremity pain     Joint pain     Low back pain     Lumbosacral disc disease     Migraine     Muscle spasm     Neck pain     Osteoarthritis     PONV (postoperative nausea and vomiting)        No Known Allergies    Past Surgical History:   Procedure Laterality Date    CARPAL TUNNEL RELEASE Right 8/4/2016    Procedure: CARPAL TUNNEL RELEASE;  Surgeon: Yoel Lua MD;  Location: [facility] OR;  Service:     CARPAL TUNNEL RELEASE Left 11/9/2017    Procedure: CARPAL TUNNEL RELEASE;  Surgeon: Yoel Lua MD;  Location: [facility] OR;  Service:     COLONOSCOPY      LUMBAR DISCECTOMY Right 9/22/2022    Procedure: LUMBAR DISCECTOMY L4-5 RIGHT;  Surgeon: Aquiles Grigsby MD;  Location: [facility] OR;  Service: Neurosurgery;  Laterality: Right;    LUMBAR FACET INJECTION      TOE SURGERY Right 03/04/2015       Family History   Problem Relation Age of Onset    Lung cancer Mother     Cancer Mother         ovarian     Diabetes Sister     Rheum arthritis Brother     Cancer Sister         breast       Social History     Socioeconomic History    Marital status:    Tobacco Use    Smoking status: Never    Smokeless tobacco: Current     Types: Chew    Tobacco comments:     uses chewing tobacco   Vaping Use    Vaping Use: Never used   Substance and Sexual Activity    Alcohol use: No    Drug use: No    Sexual activity: Defer           Objective   Physical Exam  Vitals and nursing note reviewed.   Constitutional:       Appearance: He is well-developed and normal weight.   HENT:      Head: Normocephalic and atraumatic.   Eyes:      Extraocular Movements: Extraocular movements intact.      Pupils: Pupils are equal, round, and reactive to light.   Cardiovascular:      Rate and Rhythm: Normal rate and regular rhythm.      Heart sounds: Normal heart sounds.   Pulmonary:      Effort: Pulmonary effort is normal.      Breath sounds: Normal breath sounds.   Abdominal:      General: Bowel sounds are normal.      Palpations: Abdomen is soft.   Musculoskeletal:         General: Normal range of motion.      Cervical back: Normal range of motion and neck supple.   Skin:     General: Skin is warm and dry.      Capillary Refill: Capillary refill takes less than 2 seconds.   Neurological:      General: No focal deficit present.      Mental Status: He is alert and oriented to person, place, and time.   Psychiatric:         Mood and Affect: Mood normal.         Behavior: Behavior normal.       Procedures           ED Course  ED Course as of 08/12/23 1644   Fri Jul 28, 2023   1757 ECG 12 Lead ED Triage Standing Order; Chest Pain  Sinus tachycardia rate 122  QRS 86 QTc 450; no prominent ST deviations at this time.  Electronically signed by Almita Rothman DO, 07/28/23, 1757PM EDT.   [LK]   1942 XR Chest 2 View  IMPRESSION:  No acute cardiopulmonary process.     This report was finalized on 7/28/2023 6:30 PM by Alex Pallas, DO.           Specimen  Collected: 07/28/23 18:29 EDT Last Resulted: 07/28/23 18:30 EDT            [ML]   2051 CT Head Without Contrast  IMPRESSION:  No acute intracranial process.     This report was finalized on 7/28/2023 8:25 PM by Alex Pallas, DO.           Specimen Collected: 07/28/23 20:24 EDT Last Resulted: 07/28/23 20:25 EDT         [ML]   2206 CT Angiogram Chest Pulmonary Embolism [ML]   2206 CT Abdomen Pelvis With Contrast [ML]      ED Course User Index  [LK] Almita Rothman DO  [ML] Katelyn Trinh PA                                           Medical Decision Making  Problems Addressed:  Chest pain, unspecified type: complicated acute illness or injury  Generalized weakness: complicated acute illness or injury    Amount and/or Complexity of Data Reviewed  Labs: ordered.  Radiology: ordered. Decision-making details documented in ED Course.  ECG/medicine tests: ordered.    Risk  Prescription drug management.        Final diagnoses:   Chest pain, unspecified type   Generalized weakness       ED Disposition  ED Disposition       ED Disposition   Discharge    Condition   Stable    Comment   --               Kieran, April, APRN  [address] [facility] [address]  [phone]    Schedule an appointment as soon as possible for a visit in 3 days           Medication List        Changed      amitriptyline 25 MG tablet  Commonly known as: ELAVIL  Take 1 to 2 tablets by mouth one hour prior to bedtime for sleep and pain.  What changed: how much to take                 Katelyn Trinh PA  08/12/23 2770

## 2023-08-23 NOTE — TELEPHONE ENCOUNTER
Caller: AGUILA SEQUEIRA    Relationship:  SELF    Best call back number: [phone]    What was the call regarding: PT CALLED AND STATES HE CALLED [facility] IN [place] TO SCHEDULE PHYSICAL THERAPY AND THEY STATE THEY HAVE NOT RECEIVED ANYTHING.     PT WOULD LIKE TO GO TO [facility] IN [place].  P#[phone]    PT ALSO STATES THAT THE OFFICE WAS GOING TO CHECK TO SEE IF HE COULD BE SEEN WITH JAXSON CARTER EARLIER THAN 4 WEEKS BEFORE SHE IS VACATION.  PT STATES HE IS IN A GREAT DEAL OF PAIN.    Do you require a callback:     PLEASE CALL PT WITH ANY QUESTIONS  THANK YOU   Hyun is a 76 Mariam High MD   [facility]    Subjective   Hyun is a 76 12/14/2022     9:55 AM 6/29/2023    10:03 AM 8/23/12/14/2022     9:55 AM 6/29/2023    10:03 AM 8/23/2023    11:35 AM   FAVIOLA-7 SCORE   Total Score   6 (mild anxiety)   Total Score 5 2 6           Video-Visit Details    Type of service:  Video Visit   Video Start Time: 11:41 AM  Video End Time:11:58 AM  Originating Location (pt. Location): Home  Distant Location (provider location):   On-site  Platform used for Video Visit: Ady

## 2023-08-29 ENCOUNTER — OFFICE VISIT (OUTPATIENT)
Dept: UROLOGY | Facility: CLINIC | Age: 58
End: 2023-08-29
Payer: COMMERCIAL

## 2023-08-29 VITALS
WEIGHT: 259 LBS | SYSTOLIC BLOOD PRESSURE: 135 MMHG | BODY MASS INDEX: 35.08 KG/M2 | DIASTOLIC BLOOD PRESSURE: 81 MMHG | HEART RATE: 109 BPM | HEIGHT: 72 IN

## 2023-08-29 DIAGNOSIS — N42.9 DISORDER OF PROSTATE: Primary | ICD-10-CM

## 2023-08-29 DIAGNOSIS — R79.89 LOW TESTOSTERONE: ICD-10-CM

## 2023-08-29 PROCEDURE — 84403 ASSAY OF TOTAL TESTOSTERONE: CPT | Performed by: UROLOGY

## 2023-08-29 PROCEDURE — 84153 ASSAY OF PSA TOTAL: CPT | Performed by: UROLOGY

## 2023-08-29 PROCEDURE — 82670 ASSAY OF TOTAL ESTRADIOL: CPT | Performed by: UROLOGY

## 2023-08-29 PROCEDURE — 99214 OFFICE O/P EST MOD 30 MIN: CPT | Performed by: UROLOGY

## 2023-08-29 PROCEDURE — 85027 COMPLETE CBC AUTOMATED: CPT | Performed by: UROLOGY

## 2023-08-29 RX ORDER — TADALAFIL 5 MG/1
5 TABLET ORAL DAILY PRN
Qty: 30 TABLET | Refills: 6 | Status: SHIPPED | OUTPATIENT
Start: 2023-08-29

## 2023-08-29 RX ORDER — TESTOSTERONE CYPIONATE 200 MG/ML
INJECTION, SOLUTION INTRAMUSCULAR
Qty: 10 ML | Refills: 2 | Status: SHIPPED | OUTPATIENT
Start: 2023-08-29

## 2023-08-29 NOTE — PROGRESS NOTES
"Chief Complaint:      Chief Complaint   Patient presents with    Benign Prostatic Hypertrophy       HPI:   58 y.o. male patient returns today for follow-up.  He has been on testosterone replacement therapy.  He reports a dramatic improvement in his SHAWNA questionnaire: -SHAWNA-androgen deficiency in the age male questionnaire. The patient was queried regarding the androgen deficiency in the age male questionnaire.  This is a validated questionnaire that was performed on a set of 314 [place] male physicians. When it was positive it correlated directly with a 94% chance of low testosterone.  Patient indicates there is a decrease in libido or sex drive, a lack of energy, decreased  strength and endurance, a decreased \"enjoyment of life\", sad and grumpy feelings with significant difficulty maintaining erections.  There has also been a recent deterioration regarding work performance. He reports weight loss.  He has good facility and the use of subcutaneous and intramuscular injections as well as comfort level and using the medication in a sterile fashion.  He understands he should use only the prescribed dose.  He is here for appropriate lab monitoring regarding this.  He understands this is a controlled substance and therefore must be watched closely, will not be refilled in the medical loss or miscalculation of the dose.  He is very happy with the treatment and therefore wants to continue it.    Past Medical History:     Past Medical History:   Diagnosis Date    Acid reflux     Chronic pain disorder     Extremity pain     Joint pain     Low back pain     Lumbosacral disc disease     Migraine     Muscle spasm     Neck pain     Osteoarthritis     PONV (postoperative nausea and vomiting)        Current Meds:     Current Outpatient Medications   Medication Sig Dispense Refill    amitriptyline (ELAVIL) 25 MG tablet Take 1 to 2 tablets by mouth one hour prior to bedtime for sleep and pain. (Patient taking differently: 2 " "tablets.) 180 tablet 2    amitriptyline (ELAVIL) 25 MG tablet Take 1 to 2 tablets by mouth one hour prior to bedtime for sleep and pain. 180 tablet 2    atorvastatin (LIPITOR) 10 MG tablet Take 1 tablet by mouth Daily. 90 tablet 1    atorvastatin (LIPITOR) 10 MG tablet Take 1 tablet by mouth Daily. 90 tablet 1    busPIRone (BUSPAR) 5 MG tablet Take 1 tablet by mouth 2 (Two) Times a Day As Needed. 60 tablet 2    busPIRone (BUSPAR) 5 MG tablet Take 1 tablet by mouth 2 (Two) Times a Day As Needed. 60 tablet 2    celecoxib (CeleBREX) 200 MG capsule Take 1 capsule by mouth 2 times daily as needed for joint pain/stiffness. 180 capsule 2    celecoxib (CeleBREX) 200 MG capsule Take 1 capsule by mouth 2 times daily as needed for joint pain/stiffness. 180 capsule 2    dexlansoprazole (Dexilant) 60 MG capsule Take 1 capsule by mouth Daily. 90 capsule 1    doxycycline (VIBRAMYCIN) 100 MG capsule Take 1 capsule by mouth Every 12 (Twelve) Hours. 20 capsule 0    DULoxetine (CYMBALTA) 60 MG capsule Take 1 capsule by mouth daily. 90 capsule 2    FLUoxetine (PROzac) 40 MG capsule Take 1 capsule by mouth Every Morning. 90 capsule 1    Syringe 25G X 5/8\" 3 ML misc Use as directed 2 x weekly 24 each 3    tadalafil (Cialis) 5 MG tablet Take 1 tablet by mouth Daily As Needed for Erectile Dysfunction. Take one Daily 30 tablet 6    tamsulosin (FLOMAX) 0.4 MG capsule 24 hr capsule Take 2 capsules by mouth Daily for prostate. 180 capsule 1    terbinafine (lamiSIL) 250 MG tablet Take 1 tablet by mouth once daily on the first 7 days of each month. 84 tablet 0    Testosterone Cypionate (Depo-Testosterone) 200 MG/ML injection Inject 1/2 ml subcutaneously every Monday and Thursday. 10 mL 2    Semaglutide-Weight Management (Wegovy) 0.25 MG/0.5ML solution auto-injector Inject 0.25 mg under the skin 1 (One) Time Per Week. 2 mL 0     No current facility-administered medications for this visit.        Allergies:      No Known Allergies     Past " Surgical History:     Past Surgical History:   Procedure Laterality Date    CARPAL TUNNEL RELEASE Right 8/4/2016    Procedure: CARPAL TUNNEL RELEASE;  Surgeon: Yoel Lua MD;  Location:  [facility];  Service:     CARPAL TUNNEL RELEASE Left 11/9/2017    Procedure: CARPAL TUNNEL RELEASE;  Surgeon: Yoel Lua MD;  Location:  [facility];  Service:     COLONOSCOPY      LUMBAR DISCECTOMY Right 9/22/2022    Procedure: LUMBAR DISCECTOMY L4-5 RIGHT;  Surgeon: Aquiles Grigsby MD;  Location:  [facility];  Service: Neurosurgery;  Laterality: Right;    LUMBAR FACET INJECTION      TOE SURGERY Right 03/04/2015       Social History:     Social History     Socioeconomic History    Marital status:    Tobacco Use    Smoking status: Never    Smokeless tobacco: Current     Types: Chew    Tobacco comments:     uses chewing tobacco   Vaping Use    Vaping Use: Never used   Substance and Sexual Activity    Alcohol use: No    Drug use: No    Sexual activity: Defer       Family History:     Family History   Problem Relation Age of Onset    Lung cancer Mother     Cancer Mother         ovarian    Diabetes Sister     Rheum arthritis Brother     Cancer Sister         breast       Review of Systems:     Review of Systems   Constitutional: Negative.    HENT: Negative.     Eyes: Negative.    Respiratory: Negative.     Cardiovascular: Negative.    Gastrointestinal: Negative.    Endocrine: Negative.    Musculoskeletal: Negative.    Allergic/Immunologic: Negative.    Neurological: Negative.    Hematological: Negative.    Psychiatric/Behavioral: Negative.       Physical Exam:     Physical Exam  Vitals and nursing note reviewed.   Constitutional:       Appearance: He is well-developed.   HENT:      Head: Normocephalic and atraumatic.   Eyes:      Conjunctiva/sclera: Conjunctivae normal.      Pupils: Pupils are equal, round, and reactive to light.   Cardiovascular:      Rate and Rhythm: Normal rate and regular rhythm.      Heart  sounds: Normal heart sounds.   Pulmonary:      Effort: Pulmonary effort is normal.      Breath sounds: Normal breath sounds.   Abdominal:      General: Bowel sounds are normal.      Palpations: Abdomen is soft.   Musculoskeletal:         General: Normal range of motion.      Cervical back: Normal range of motion.   Skin:     General: Skin is warm and dry.   Neurological:      Mental Status: He is alert and oriented to person, place, and time.      Deep Tendon Reflexes: Reflexes are normal and symmetric.   Psychiatric:         Behavior: Behavior normal.         Thought Content: Thought content normal.         Judgment: Judgment normal.       I have reviewed the following portions of the patient's history: Allergies, current medications, past family history, past medical history, past social history, past surgical history, problem list, and ROS and confirm it is accurate.    Recent Image (CT and/or KUB):      CT Abdomen and Pelvis: No results found for this or any previous visit.       CT Stone Protocol: Results for orders placed during the hospital encounter of 08/14/22    CT Abdomen Pelvis Stone Protocol    Narrative  EXAM: CT ABDOMEN PELVIS STONE PROTOCOL-      TECHNIQUE: Multiple axial CT images were obtained from lung bases  through pubic symphysis WITHOUT administration of IV contrast.  Reformatted images in the coronal and/or sagittal plane(s) were  generated from the axial data set to facilitate diagnostic accuracy  and/or surgical planning.  Oral Contrast:NONE.    Radiation dose reduction techniques were utilized per ALARA protocol.  Automated exposure control was initiated through either or CareDose or  DoseRigGlyde software packages by  protocol.  DOSE:    Clinical information Flank pain, kidney stone suspected    Comparison 01/21/2019    FINDINGS:    Lower thorax: Clear. No effusions.    Abdomen:    Liver: Homogeneous. No focal hepatic mass or ductal dilatation.    Gallbladder: No dilation or  stone identified.    Pancreas: Unremarkable. No mass or ductal dilatation.    Spleen: Homogeneous. No splenomegaly.    Adrenals: No mass.    Kidneys/ureters: No mass. No obstructive uropathy.  No evidence of  urolithiasis.    GI tract: Moderate to large stool burden. There is no evidence of  appendicitis    MESENTERY: No free fluid, walled off fluid collections, mesenteric  stranding, or enlarged lymph nodes      Vasculature: Evidence of atherosclerotic vascular disease    Abdominal wall: No focal hernia or mass.      Bladder: Mild thickening of the urinary bladder wall    Reproductive: Prostate enlargement    Bones: Arthritic change in the spine    Impression  1. No obstructive uropathy. Mild thickening of the urinary bladder wall    2.Prostate enlargement  3. Other findings as above              This report was finalized on 8/14/2022 1:46 PM by Dr. Kvng Thomas MD.       KUB: No results found for this or any previous visit.       Labs (past 3 months):      Admission on 07/28/2023, Discharged on 07/28/2023   Component Date Value Ref Range Status    QT Interval 07/28/2023 316  ms Final    QTC Interval 07/28/2023 450  ms Final    Glucose 07/28/2023 136 (H)  65 - 99 mg/dL Final    BUN 07/28/2023 11  6 - 20 mg/dL Final    Creatinine 07/28/2023 1.05  0.76 - 1.27 mg/dL Final    Sodium 07/28/2023 138  136 - 145 mmol/L Final    Potassium 07/28/2023 4.0  3.5 - 5.2 mmol/L Final    Slight hemolysis detected by analyzer. Results may be affected.    Chloride 07/28/2023 103  98 - 107 mmol/L Final    CO2 07/28/2023 22.7  22.0 - 29.0 mmol/L Final    Calcium 07/28/2023 9.2  8.6 - 10.5 mg/dL Final    Total Protein 07/28/2023 7.1  6.0 - 8.5 g/dL Final    Albumin 07/28/2023 4.4  3.5 - 5.2 g/dL Final    ALT (SGPT) 07/28/2023 24  1 - 41 U/L Final    AST (SGOT) 07/28/2023 19  1 - 40 U/L Final    Alkaline Phosphatase 07/28/2023 98  39 - 117 U/L Final    Total Bilirubin 07/28/2023 0.3  0.0 - 1.2 mg/dL Final    Globulin 07/28/2023 2.7   gm/dL Final    A/G Ratio 07/28/2023 1.6  g/dL Final    BUN/Creatinine Ratio 07/28/2023 10.5  7.0 - 25.0 Final    Anion Gap 07/28/2023 12.3  5.0 - 15.0 mmol/L Final    eGFR 07/28/2023 82.3  >60.0 mL/min/1.73 Final    HS Troponin T 07/28/2023 <6  <15 ng/L Final    Extra Tube 07/28/2023 Hold for add-ons.   Final    Auto resulted.    Extra Tube 07/28/2023 hold for add-on   Final    Auto resulted    Extra Tube 07/28/2023 Hold for add-ons.   Final    Auto resulted.    Extra Tube 07/28/2023 Hold for add-ons.   Final    Auto resulted    WBC 07/28/2023 8.63  3.40 - 10.80 10*3/mm3 Final    RBC 07/28/2023 5.29  4.14 - 5.80 10*6/mm3 Final    Hemoglobin 07/28/2023 14.9  13.0 - 17.7 g/dL Final    Hematocrit 07/28/2023 45.5  37.5 - 51.0 % Final    MCV 07/28/2023 86.0  79.0 - 97.0 fL Final    MCH 07/28/2023 28.2  26.6 - 33.0 pg Final    MCHC 07/28/2023 32.7  31.5 - 35.7 g/dL Final    RDW 07/28/2023 13.1  12.3 - 15.4 % Final    RDW-SD 07/28/2023 40.5  37.0 - 54.0 fl Final    MPV 07/28/2023 8.5  6.0 - 12.0 fL Final    Platelets 07/28/2023 193  140 - 450 10*3/mm3 Final    Neutrophil % 07/28/2023 62.2  42.7 - 76.0 % Final    Lymphocyte % 07/28/2023 26.8  19.6 - 45.3 % Final    Monocyte % 07/28/2023 8.0  5.0 - 12.0 % Final    Eosinophil % 07/28/2023 2.3  0.3 - 6.2 % Final    Basophil % 07/28/2023 0.5  0.0 - 1.5 % Final    Immature Grans % 07/28/2023 0.2  0.0 - 0.5 % Final    Neutrophils, Absolute 07/28/2023 5.37  1.70 - 7.00 10*3/mm3 Final    Lymphocytes, Absolute 07/28/2023 2.31  0.70 - 3.10 10*3/mm3 Final    Monocytes, Absolute 07/28/2023 0.69  0.10 - 0.90 10*3/mm3 Final    Eosinophils, Absolute 07/28/2023 0.20  0.00 - 0.40 10*3/mm3 Final    Basophils, Absolute 07/28/2023 0.04  0.00 - 0.20 10*3/mm3 Final    Immature Grans, Absolute 07/28/2023 0.02  0.00 - 0.05 10*3/mm3 Final    nRBC 07/28/2023 0.0  0.0 - 0.2 /100 WBC Final    HS Troponin T 07/28/2023 <6  <15 ng/L Final    Troponin T Delta 07/28/2023    Final    Unable to calculate.     COVID19 07/28/2023 Not Detected  Not Detected - Ref. Range Final    Influenza A PCR 07/28/2023 Not Detected  Not Detected Final    Influenza B PCR 07/28/2023 Not Detected  Not Detected Final    C-Reactive Protein 07/28/2023 <0.30  0.00 - 0.50 mg/dL Final    Sed Rate 07/28/2023 10  0 - 20 mm/hr Final    Color, UA 07/28/2023 Yellow  Yellow, Straw Final    Appearance, UA 07/28/2023 Clear  Clear Final    pH, UA 07/28/2023 6.5  5.0 - 8.0 Final    Specific Gravity, UA 07/28/2023 1.008  1.005 - 1.030 Final    Glucose, UA 07/28/2023 Negative  Negative Final    Ketones, UA 07/28/2023 Negative  Negative Final    Bilirubin, UA 07/28/2023 Negative  Negative Final    Blood, UA 07/28/2023 Negative  Negative Final    Protein, UA 07/28/2023 Negative  Negative Final    Leuk Esterase, UA 07/28/2023 Negative  Negative Final    Nitrite, UA 07/28/2023 Negative  Negative Final    Urobilinogen, UA 07/28/2023 0.2 E.U./dL  0.2 - 1.0 E.U./dL Final    TSH 07/28/2023 1.390  0.270 - 4.200 uIU/mL Final   Lab on 07/14/2023   Component Date Value Ref Range Status    IgG 07/14/2023 930  700 - 1,600 mg/dL Final    IgM 07/14/2023 117  40 - 230 mg/dL Final    Lyme Total Antibody EIA 07/14/2023 Negative  Negative Final    Lyme antibodies not detected. Reflex testing is not indicated.  No laboratory evidence of infection with B. burgdorferi (Lyme disease).  Negative results may occur in patients recently infected (less than  or equal to 14 days) with B. burgdorferi.  If recent infection is  suspected, repeat testing on a new sample collected in 7 to 14 days is  recommended.        Procedure:       Assessment/Plan:   Low testosterone: Patient is here for follow-up.  Since beginning the medication, he has been very pleased.  He reports a dramatic improvement in his erections, ability to achieve and maintain an erection, improvement in libido, increase in frequency of morning erections, and a noticeable weight loss consistent with the treatment.   He is  going to have appropriate safety laboratory parameters checked.   He understands that the new data implicates testosterone with the development of prostate cancer and this is all but been disproven and the medical literature as well as the risks of cardiovascular disease which has actually also been disproven.  He understands that while he is a candidate for topical therapy if he is in contact with children this is not an option because it has been shown to accentuate genitalia development at an early age that is frequently irreversible.  He also understands this it is a controlled substance and as such will not be prescribed without appropriate follow-up and appropriate laboratory investigation.  He understands effects on spermatogenesis including the fact that this is not always completely reversible and not always completely limited his ability to father a child.  He has demonstrated facility in the technique of both intramuscular and subcutaneous injection and has been taught sterility when drawing up the medication.    Erectile dysfunction-we discussed the anatomy and physiology of the penis and the endothelium.  We discussed the various forms of erectile dysfunction including peripheral vascular occlusive disease, postoperative, secondary to radiation treatments of the prostate, and arterial inflow.  We discussed the various treatment options available including oral medication and its various forms.  We discussed the use of both generic and non-generic Viagra.  We discussed Cialis and a longer half-life of 17 hours as well as the other 2 medications.  We discussed cost involved with this including the fact that the generic is much cheaper but is taken as multiple pills because they are 20 mg dosages.  We did discuss the other alternatives including penile injections, vacuum erection devices, and surgical intervention reserved for only the most severe cases.  We discussed the need for testosterone in about 20%  of cases of erectile dysfunction.  Continue PDE-5 inhibition    PSA testing-I am recommending a PSA blood test that stands for prostate specific antigen.  I discussed the pathophysiology of PSA testing indicating its use in the diagnosis and management of prostate cancer.  I discussed the normal range being 0 to 4, but more appropriately being much closer to 0 to 2 in a normal male.  I discussed the fact that after a certain age we don't recommend PSA testing especially in view of numerous comorbidities, that this will not be a useful test.  I discussed many of the things that can artificially raise PSA including a recent infection, urinary tract infection, and recent sexual intercourse, or even the type of movement such as manipulation of the prostate from riding a bicycle.  After all this is taken into account when the test is reviewed, the most important use of PSA is the velocity measurement.  In other words, the change of PSA with time is a very important factor in the use and that we look for greater than 20% rise over a year to help us make the prediction of prostate cancer.  I also discussed that the use with prostate cancer indicating that after a radical prostatectomy, the PSA should be 0 and any rise indicates an early biochemical recurrence.    Hyperestrogenism-we spoke about the role of estrogen metabolism and breakdown in the  presence of testosterone replacement therapy.  We spoke about how high estradiol levels can interfere with the improvement noted in a man on testosterone as well as significant side effects such as pseudogynecomastia.  We discussed the use of the medication Arimidex used in an off label setting and using a very judicious low-dose fashion to prevent too low of an estradiol which would precipitate bone complications.  Going to check an estradiol level.  He is at higher risk for breast problems due to his replacement    Polycythemia-I am going to check a CBC to rule out hemoglobin  changes.  We utilized the American Heart Association guidelines for polycythemia which is a hemoglobin greater than 18 and a hematocrit greater than 54.5.  Recommend therapeutic phlebotomy as the treatment.  It is important that we indicate that is the most likely cause of the polycythemia.  We also discussed the possibility of decreasing the dose of testosterone and of stopping it altogether.    Controlled substance-he understands this is a controlled substance and as such is regulated under the state.  I cannot refill it outside the prescribed window.  I stressed the importance of follow-up and appropriate laboratory parameters monitoring.    Liver function tests-according to the AUA guidelines we will not check liver functions due to the fact this is not an oral alkylated testosterone        This document has been electronically signed by LYNDA CANNON MD August 29, 2023 13:56 EDT    Dictated Utilizing Dragon Dictation: Part of this note may be an electronic transcription/translation of spoken language to printed text using the Dragon Dictation System.

## 2023-08-30 LAB
DEPRECATED RDW RBC AUTO: 36.9 FL (ref 37–54)
ERYTHROCYTE [DISTWIDTH] IN BLOOD BY AUTOMATED COUNT: 12.8 % (ref 12.3–15.4)
ESTRADIOL SERPL HS-MCNC: 52.7 PG/ML
HCT VFR BLD AUTO: 44.2 % (ref 37.5–51)
HGB BLD-MCNC: 15.1 G/DL (ref 13–17.7)
MCH RBC QN AUTO: 27.6 PG (ref 26.6–33)
MCHC RBC AUTO-ENTMCNC: 34.2 G/DL (ref 31.5–35.7)
MCV RBC AUTO: 80.8 FL (ref 79–97)
PLATELET # BLD AUTO: 233 10*3/MM3 (ref 140–450)
PMV BLD AUTO: 9.5 FL (ref 6–12)
PSA SERPL-MCNC: 4.45 NG/ML (ref 0–4)
RBC # BLD AUTO: 5.47 10*6/MM3 (ref 4.14–5.8)
TESTOST SERPL-MCNC: 672 NG/DL (ref 193–740)
WBC NRBC COR # BLD: 7.08 10*3/MM3 (ref 3.4–10.8)

## 2023-09-19 ENCOUNTER — TELEPHONE (OUTPATIENT)
Dept: NEUROSURGERY | Facility: CLINIC | Age: 58
End: 2023-09-19
Payer: COMMERCIAL

## 2023-09-19 DIAGNOSIS — M53.9 MULTILEVEL DEGENERATIVE DISC DISEASE: Primary | ICD-10-CM

## 2023-09-19 NOTE — TELEPHONE ENCOUNTER
Caller: PATIENT    Relationship: SELF    Best call back number: [phone]    What is the best time to reach you: ANYTIME    Who are you requesting to speak with (clinical staff, provider,  specific staff member): CLINICAL STAFF    Do you know the name of the person who called: NA    What was the call regarding: PT CALLED AND STATES THAT HE HAS DISCUSSED WITH  ABOUT A REFERRAL TO  FOR A SPINAL CORD STIMULATOR-PT IS ASKING IF OUR OFFICE CAN SEND A REFERRAL AS HE WOULD LIKE TO PROCEED WITH THIS-PT IS ASKING FOR A CALL BACK TO ADVISE THANK YOU     Is it okay if the provider responds through [employer]: NO

## 2023-10-09 ENCOUNTER — TELEPHONE (OUTPATIENT)
Dept: PAIN MEDICINE | Facility: CLINIC | Age: 58
End: 2023-10-09

## 2023-10-09 NOTE — TELEPHONE ENCOUNTER
Provider: DR. MEDRANO    Caller:  AGUILA SEQUEIRA    Relationship to Patient: SELF    Phone Number: [phone]    Reason for Call: PATIENT CALLED ABOUT PSYCHOLOGY REFERRAL. STATES HIS INSURANCE WILL NOT COVER DR. ALEXANDRA'S PRIVATE PRACTICE. LOOKING FOR OTHER OPTIONS. PLEASE ADVISE.

## 2023-10-10 DIAGNOSIS — M53.9 MULTILEVEL DEGENERATIVE DISC DISEASE: Primary | ICD-10-CM

## 2023-10-18 ENCOUNTER — HOSPITAL ENCOUNTER (EMERGENCY)
Facility: HOSPITAL | Age: 58
Discharge: HOME | End: 2023-10-18
Attending: EMERGENCY MEDICINE
Payer: COMMERCIAL

## 2023-10-18 ENCOUNTER — APPOINTMENT (OUTPATIENT)
Dept: GENERAL RADIOLOGY | Facility: HOSPITAL | Age: 58
End: 2023-10-18
Payer: COMMERCIAL

## 2023-10-18 VITALS
SYSTOLIC BLOOD PRESSURE: 141 MMHG | RESPIRATION RATE: 18 BRPM | DIASTOLIC BLOOD PRESSURE: 99 MMHG | HEART RATE: 88 BPM | TEMPERATURE: 98.2 F | OXYGEN SATURATION: 98 % | WEIGHT: 260 LBS | HEIGHT: 72 IN | BODY MASS INDEX: 35.21 KG/M2

## 2023-10-18 DIAGNOSIS — R07.9 NONSPECIFIC CHEST PAIN: Primary | ICD-10-CM

## 2023-10-18 LAB
ALBUMIN SERPL-MCNC: 4.4 G/DL (ref 3.5–5.2)
ALBUMIN/GLOB SERPL: 1.7 G/DL
ALP SERPL-CCNC: 80 U/L (ref 39–117)
ALT SERPL W P-5'-P-CCNC: 8 U/L (ref 1–41)
ANION GAP SERPL CALCULATED.3IONS-SCNC: 7.7 MMOL/L (ref 5–15)
AST SERPL-CCNC: 26 U/L (ref 1–40)
BASOPHILS # BLD AUTO: 0.06 10*3/MM3 (ref 0–0.2)
BASOPHILS NFR BLD AUTO: 0.8 % (ref 0–1.5)
BILIRUB SERPL-MCNC: 0.3 MG/DL (ref 0–1.2)
BUN SERPL-MCNC: 12 MG/DL (ref 6–20)
BUN/CREAT SERPL: 10.5 (ref 7–25)
CALCIUM SPEC-SCNC: 9.5 MG/DL (ref 8.6–10.5)
CHLORIDE SERPL-SCNC: 103 MMOL/L (ref 98–107)
CO2 SERPL-SCNC: 27.3 MMOL/L (ref 22–29)
CREAT SERPL-MCNC: 1.14 MG/DL (ref 0.76–1.27)
DEPRECATED RDW RBC AUTO: 38.1 FL (ref 37–54)
EGFRCR SERPLBLD CKD-EPI 2021: 74.5 ML/MIN/1.73
EOSINOPHIL # BLD AUTO: 0.29 10*3/MM3 (ref 0–0.4)
EOSINOPHIL NFR BLD AUTO: 3.7 % (ref 0.3–6.2)
ERYTHROCYTE [DISTWIDTH] IN BLOOD BY AUTOMATED COUNT: 13.1 % (ref 12.3–15.4)
GEN 5 2HR TROPONIN T REFLEX: <6 NG/L
GLOBULIN UR ELPH-MCNC: 2.6 GM/DL
GLUCOSE SERPL-MCNC: 110 MG/DL (ref 65–99)
HCT VFR BLD AUTO: 44.4 % (ref 37.5–51)
HGB BLD-MCNC: 14.3 G/DL (ref 13–17.7)
HOLD SPECIMEN: NORMAL
IMM GRANULOCYTES # BLD AUTO: 0.02 10*3/MM3 (ref 0–0.05)
IMM GRANULOCYTES NFR BLD AUTO: 0.3 % (ref 0–0.5)
LYMPHOCYTES # BLD AUTO: 2.28 10*3/MM3 (ref 0.7–3.1)
LYMPHOCYTES NFR BLD AUTO: 29.2 % (ref 19.6–45.3)
MCH RBC QN AUTO: 26.1 PG (ref 26.6–33)
MCHC RBC AUTO-ENTMCNC: 32.2 G/DL (ref 31.5–35.7)
MCV RBC AUTO: 81 FL (ref 79–97)
MONOCYTES # BLD AUTO: 0.76 10*3/MM3 (ref 0.1–0.9)
MONOCYTES NFR BLD AUTO: 9.7 % (ref 5–12)
NEUTROPHILS NFR BLD AUTO: 4.4 10*3/MM3 (ref 1.7–7)
NEUTROPHILS NFR BLD AUTO: 56.3 % (ref 42.7–76)
NRBC BLD AUTO-RTO: 0 /100 WBC (ref 0–0.2)
PLATELET # BLD AUTO: 234 10*3/MM3 (ref 140–450)
PMV BLD AUTO: 8.9 FL (ref 6–12)
POTASSIUM SERPL-SCNC: 4.4 MMOL/L (ref 3.5–5.2)
PROT SERPL-MCNC: 7 G/DL (ref 6–8.5)
RBC # BLD AUTO: 5.48 10*6/MM3 (ref 4.14–5.8)
SODIUM SERPL-SCNC: 138 MMOL/L (ref 136–145)
TROPONIN T DELTA: NORMAL
TROPONIN T SERPL HS-MCNC: <6 NG/L
WBC NRBC COR # BLD: 7.81 10*3/MM3 (ref 3.4–10.8)
WHOLE BLOOD HOLD COAG: NORMAL
WHOLE BLOOD HOLD SPECIMEN: NORMAL
WHOLE BLOOD HOLD SPECIMEN: NORMAL

## 2023-10-18 PROCEDURE — 71045 X-RAY EXAM CHEST 1 VIEW: CPT | Performed by: RADIOLOGY

## 2023-10-18 PROCEDURE — 84484 ASSAY OF TROPONIN QUANT: CPT | Performed by: EMERGENCY MEDICINE

## 2023-10-18 PROCEDURE — 93005 ELECTROCARDIOGRAM TRACING: CPT | Performed by: EMERGENCY MEDICINE

## 2023-10-18 PROCEDURE — 80053 COMPREHEN METABOLIC PANEL: CPT | Performed by: EMERGENCY MEDICINE

## 2023-10-18 PROCEDURE — 71045 X-RAY EXAM CHEST 1 VIEW: CPT

## 2023-10-18 PROCEDURE — 99284 EMERGENCY DEPT VISIT MOD MDM: CPT

## 2023-10-18 PROCEDURE — 85025 COMPLETE CBC W/AUTO DIFF WBC: CPT | Performed by: EMERGENCY MEDICINE

## 2023-10-18 PROCEDURE — 93010 ELECTROCARDIOGRAM REPORT: CPT | Performed by: INTERNAL MEDICINE

## 2023-10-18 PROCEDURE — 36415 COLL VENOUS BLD VENIPUNCTURE: CPT

## 2023-10-18 RX ORDER — SODIUM CHLORIDE 0.9 % (FLUSH) 0.9 %
10 SYRINGE (ML) INJECTION AS NEEDED
Status: DISCONTINUED | OUTPATIENT
Start: 2023-10-18 | End: 2023-10-19 | Stop reason: HOSPADM

## 2023-10-18 RX ORDER — ASPIRIN 81 MG/1
324 TABLET, CHEWABLE ORAL ONCE
Status: COMPLETED | OUTPATIENT
Start: 2023-10-18 | End: 2023-10-18

## 2023-10-18 RX ADMIN — ASPIRIN 324 MG: 81 TABLET, CHEWABLE ORAL at 18:55

## 2023-10-19 ENCOUNTER — TRANSCRIBE ORDERS (OUTPATIENT)
Dept: ADMINISTRATIVE | Facility: HOSPITAL | Age: 58
End: 2023-10-19
Payer: COMMERCIAL

## 2023-10-19 DIAGNOSIS — R07.9 CHEST PAIN, UNSPECIFIED TYPE: Primary | ICD-10-CM

## 2023-10-19 LAB
QT INTERVAL: 312 MS
QTC INTERVAL: 408 MS

## 2023-10-19 NOTE — ED NOTES
Patient resting on stretcher. NADN at this time. IV flushed and patent. Safety measures remain in place. Bed in low and locked position. Call light within reach. Patient reports no needs at this time.

## 2023-10-20 ENCOUNTER — HOSPITAL ENCOUNTER (OUTPATIENT)
Dept: NUCLEAR MEDICINE | Facility: HOSPITAL | Age: 58
Discharge: HOME | End: 2023-10-20
Payer: COMMERCIAL

## 2023-10-20 ENCOUNTER — LAB (OUTPATIENT)
Dept: LAB | Facility: HOSPITAL | Age: 58
End: 2023-10-20
Payer: COMMERCIAL

## 2023-10-20 ENCOUNTER — TRANSCRIBE ORDERS (OUTPATIENT)
Dept: ADMINISTRATIVE | Facility: HOSPITAL | Age: 58
End: 2023-10-20
Payer: COMMERCIAL

## 2023-10-20 ENCOUNTER — HOSPITAL ENCOUNTER (OUTPATIENT)
Dept: CARDIOLOGY | Facility: HOSPITAL | Age: 58
Discharge: HOME | End: 2023-10-20
Payer: COMMERCIAL

## 2023-10-20 DIAGNOSIS — R07.9 CHEST PAIN, UNSPECIFIED TYPE: ICD-10-CM

## 2023-10-20 DIAGNOSIS — E53.8 DEFICIENCY OF OTHER SPECIFIED B GROUP VITAMINS: ICD-10-CM

## 2023-10-20 DIAGNOSIS — R53.83 OTHER FATIGUE: ICD-10-CM

## 2023-10-20 DIAGNOSIS — I10 ESSENTIAL (PRIMARY) HYPERTENSION: ICD-10-CM

## 2023-10-20 DIAGNOSIS — E55.9 VITAMIN D DEFICIENCY: ICD-10-CM

## 2023-10-20 DIAGNOSIS — E78.5 HYPERLIPIDEMIA, UNSPECIFIED HYPERLIPIDEMIA TYPE: ICD-10-CM

## 2023-10-20 DIAGNOSIS — E53.8 DEFICIENCY OF OTHER SPECIFIED B GROUP VITAMINS: Primary | ICD-10-CM

## 2023-10-20 LAB
25(OH)D3 SERPL-MCNC: 28.8 NG/ML (ref 30–100)
ALBUMIN SERPL-MCNC: 4.3 G/DL (ref 3.5–5.2)
ALBUMIN/GLOB SERPL: 1.7 G/DL
ALP SERPL-CCNC: 75 U/L (ref 39–117)
ALT SERPL W P-5'-P-CCNC: 26 U/L (ref 1–41)
ANION GAP SERPL CALCULATED.3IONS-SCNC: 8.2 MMOL/L (ref 5–15)
AST SERPL-CCNC: 25 U/L (ref 1–40)
BASOPHILS # BLD AUTO: 0.05 10*3/MM3 (ref 0–0.2)
BASOPHILS NFR BLD AUTO: 0.7 % (ref 0–1.5)
BH CV NUCLEAR PRIOR STUDY: 3
BH CV REST NUCLEAR ISOTOPE DOSE: 10.5 MCI
BH CV STRESS BP STAGE 1: NORMAL
BH CV STRESS COMMENTS STAGE 1: NORMAL
BH CV STRESS DOSE REGADENOSON STAGE 1: 0.4
BH CV STRESS DURATION MIN STAGE 1: 0
BH CV STRESS DURATION SEC STAGE 1: 10
BH CV STRESS HR STAGE 1: 101
BH CV STRESS NUCLEAR ISOTOPE DOSE: 30.8 MCI
BH CV STRESS PROTOCOL 1: NORMAL
BH CV STRESS RECOVERY BP: NORMAL MMHG
BH CV STRESS RECOVERY HR: 97 BPM
BH CV STRESS STAGE 1: 1
BILIRUB SERPL-MCNC: 0.4 MG/DL (ref 0–1.2)
BUN SERPL-MCNC: 15 MG/DL (ref 6–20)
BUN/CREAT SERPL: 13.2 (ref 7–25)
CALCIUM SPEC-SCNC: 9.1 MG/DL (ref 8.6–10.5)
CHLORIDE SERPL-SCNC: 105 MMOL/L (ref 98–107)
CHOLEST SERPL-MCNC: 139 MG/DL (ref 0–200)
CO2 SERPL-SCNC: 25.8 MMOL/L (ref 22–29)
CREAT SERPL-MCNC: 1.14 MG/DL (ref 0.76–1.27)
DEPRECATED RDW RBC AUTO: 36.9 FL (ref 37–54)
EGFRCR SERPLBLD CKD-EPI 2021: 74.5 ML/MIN/1.73
EOSINOPHIL # BLD AUTO: 0.27 10*3/MM3 (ref 0–0.4)
EOSINOPHIL NFR BLD AUTO: 3.7 % (ref 0.3–6.2)
ERYTHROCYTE [DISTWIDTH] IN BLOOD BY AUTOMATED COUNT: 13.1 % (ref 12.3–15.4)
FOLATE SERPL-MCNC: 13.7 NG/ML (ref 4.78–24.2)
GLOBULIN UR ELPH-MCNC: 2.6 GM/DL
GLUCOSE SERPL-MCNC: 87 MG/DL (ref 65–99)
HBA1C MFR BLD: 5.7 % (ref 4.8–5.6)
HCT VFR BLD AUTO: 45.9 % (ref 37.5–51)
HDLC SERPL-MCNC: 33 MG/DL (ref 40–60)
HGB BLD-MCNC: 15 G/DL (ref 13–17.7)
IMM GRANULOCYTES # BLD AUTO: 0.02 10*3/MM3 (ref 0–0.05)
IMM GRANULOCYTES NFR BLD AUTO: 0.3 % (ref 0–0.5)
LDLC SERPL CALC-MCNC: 79 MG/DL (ref 0–100)
LDLC/HDLC SERPL: 2.29 {RATIO}
LV EF NUC BP: 50 %
LYMPHOCYTES # BLD AUTO: 1.99 10*3/MM3 (ref 0.7–3.1)
LYMPHOCYTES NFR BLD AUTO: 27 % (ref 19.6–45.3)
MAXIMAL PREDICTED HEART RATE: 162 BPM
MCH RBC QN AUTO: 25.9 PG (ref 26.6–33)
MCHC RBC AUTO-ENTMCNC: 32.7 G/DL (ref 31.5–35.7)
MCV RBC AUTO: 79.3 FL (ref 79–97)
MONOCYTES # BLD AUTO: 0.68 10*3/MM3 (ref 0.1–0.9)
MONOCYTES NFR BLD AUTO: 9.2 % (ref 5–12)
NEUTROPHILS NFR BLD AUTO: 4.36 10*3/MM3 (ref 1.7–7)
NEUTROPHILS NFR BLD AUTO: 59.1 % (ref 42.7–76)
NRBC BLD AUTO-RTO: 0 /100 WBC (ref 0–0.2)
PERCENT MAX PREDICTED HR: 62.35 %
PLATELET # BLD AUTO: 238 10*3/MM3 (ref 140–450)
PMV BLD AUTO: 9 FL (ref 6–12)
POTASSIUM SERPL-SCNC: 4.5 MMOL/L (ref 3.5–5.2)
PROT SERPL-MCNC: 6.9 G/DL (ref 6–8.5)
RBC # BLD AUTO: 5.79 10*6/MM3 (ref 4.14–5.8)
SODIUM SERPL-SCNC: 139 MMOL/L (ref 136–145)
STRESS BASELINE BP: NORMAL MMHG
STRESS BASELINE HR: 88 BPM
STRESS PERCENT HR: 73 %
STRESS POST PEAK BP: NORMAL MMHG
STRESS POST PEAK HR: 101 BPM
STRESS TARGET HR: 138 BPM
T4 FREE SERPL-MCNC: 1.01 NG/DL (ref 0.93–1.7)
TRIGL SERPL-MCNC: 152 MG/DL (ref 0–150)
TSH SERPL DL<=0.05 MIU/L-ACNC: 1.69 UIU/ML (ref 0.27–4.2)
VIT B12 BLD-MCNC: 496 PG/ML (ref 211–946)
VLDLC SERPL-MCNC: 27 MG/DL (ref 5–40)
WBC NRBC COR # BLD: 7.37 10*3/MM3 (ref 3.4–10.8)

## 2023-10-20 PROCEDURE — 78452 HT MUSCLE IMAGE SPECT MULT: CPT

## 2023-10-20 PROCEDURE — 25010000002 REGADENOSON 0.4 MG/5ML SOLUTION: Performed by: INTERNAL MEDICINE

## 2023-10-20 PROCEDURE — 0 TECHNETIUM SESTAMIBI: Performed by: INTERNAL MEDICINE

## 2023-10-20 PROCEDURE — 84439 ASSAY OF FREE THYROXINE: CPT

## 2023-10-20 PROCEDURE — A9500 TC99M SESTAMIBI: HCPCS | Performed by: INTERNAL MEDICINE

## 2023-10-20 PROCEDURE — 82607 VITAMIN B-12: CPT

## 2023-10-20 PROCEDURE — 83036 HEMOGLOBIN GLYCOSYLATED A1C: CPT

## 2023-10-20 PROCEDURE — 80050 GENERAL HEALTH PANEL: CPT

## 2023-10-20 PROCEDURE — 82746 ASSAY OF FOLIC ACID SERUM: CPT

## 2023-10-20 PROCEDURE — 84403 ASSAY OF TOTAL TESTOSTERONE: CPT

## 2023-10-20 PROCEDURE — 82306 VITAMIN D 25 HYDROXY: CPT

## 2023-10-20 PROCEDURE — 80061 LIPID PANEL: CPT

## 2023-10-20 PROCEDURE — 84402 ASSAY OF FREE TESTOSTERONE: CPT

## 2023-10-20 PROCEDURE — 93017 CV STRESS TEST TRACING ONLY: CPT

## 2023-10-20 PROCEDURE — 36415 COLL VENOUS BLD VENIPUNCTURE: CPT

## 2023-10-20 RX ORDER — REGADENOSON 0.08 MG/ML
0.4 INJECTION, SOLUTION INTRAVENOUS
Status: COMPLETED | OUTPATIENT
Start: 2023-10-20 | End: 2023-10-20

## 2023-10-20 RX ADMIN — TECHNETIUM TC 99M SESTAMIBI 1 DOSE: 1 INJECTION INTRAVENOUS at 08:47

## 2023-10-20 RX ADMIN — REGADENOSON 0.4 MG: 0.08 INJECTION, SOLUTION INTRAVENOUS at 08:47

## 2023-10-20 RX ADMIN — TECHNETIUM TC 99M SESTAMIBI 1 DOSE: 1 INJECTION INTRAVENOUS at 07:42

## 2023-10-21 NOTE — ED PROVIDER NOTES
Subjective     History provided by:  Patient   used: No    Chest Pain  Pain location:  Substernal area  Pain quality: aching and dull    Pain radiates to:  Does not radiate  Pain severity:  Mild  Onset quality:  Gradual  Duration:  2 weeks  Timing:  Constant  Progression:  Worsening  Chronicity:  Chronic  Context: not breathing, not drug use, not eating, not intercourse, not lifting, not movement, not raising an arm, not at rest, not stress and not trauma    Relieved by:  Nothing  Worsened by:  Nothing  Ineffective treatments:  None tried  Associated symptoms: no abdominal pain, no AICD problem, no altered mental status, no anorexia, no anxiety, no back pain, no claudication, no cough, no diaphoresis, no dizziness, no dysphagia, no fatigue, no fever, no headache, no heartburn, no lower extremity edema, no nausea, no near-syncope, no numbness, no orthopnea, no palpitations, no PND, no shortness of breath, no syncope, no vomiting and no weakness    Risk factors: male sex    Risk factors: no aortic disease, no coronary artery disease, no diabetes mellitus, no Meryl-Danlos syndrome, no high cholesterol, no hypertension, no immobilization, no Marfan's syndrome, not obese, not pregnant, no prior DVT/PE, no smoking and no surgery        Review of Systems   Constitutional:  Negative for activity change, appetite change, chills, diaphoresis, fatigue and fever.   HENT:  Negative for congestion, ear pain, sore throat and trouble swallowing.    Eyes:  Negative for redness.   Respiratory:  Negative for cough, chest tightness, shortness of breath and wheezing.    Cardiovascular:  Positive for chest pain. Negative for palpitations, orthopnea, claudication, leg swelling, syncope, PND and near-syncope.   Gastrointestinal:  Negative for abdominal pain, anorexia, diarrhea, heartburn, nausea and vomiting.   Genitourinary:  Negative for dysuria and urgency.   Musculoskeletal:  Negative for arthralgias, back pain,  myalgias and neck pain.   Skin:  Negative for pallor, rash and wound.   Neurological:  Negative for dizziness, speech difficulty, weakness, numbness and headaches.   Psychiatric/Behavioral:  Negative for agitation, behavioral problems, confusion and decreased concentration.    All other systems reviewed and are negative.      Past Medical History:   Diagnosis Date    Acid reflux     Chronic pain disorder     Extremity pain     Joint pain     Low back pain     Lumbosacral disc disease     Migraine     Muscle spasm     Neck pain     Osteoarthritis     PONV (postoperative nausea and vomiting)        No Known Allergies    Past Surgical History:   Procedure Laterality Date    CARPAL TUNNEL RELEASE Right 8/4/2016    Procedure: CARPAL TUNNEL RELEASE;  Surgeon: Yoel Lua MD;  Location:  [facility];  Service:     CARPAL TUNNEL RELEASE Left 11/9/2017    Procedure: CARPAL TUNNEL RELEASE;  Surgeon: Yoel Lua MD;  Location:  [facility];  Service:     COLONOSCOPY      LUMBAR DISCECTOMY Right 9/22/2022    Procedure: LUMBAR DISCECTOMY L4-5 RIGHT;  Surgeon: Aquiles Grigsby MD;  Location:  [facility];  Service: Neurosurgery;  Laterality: Right;    LUMBAR FACET INJECTION      TOE SURGERY Right 03/04/2015       Family History   Problem Relation Age of Onset    Lung cancer Mother     Cancer Mother         ovarian    Diabetes Sister     Rheum arthritis Brother     Cancer Sister         breast       Social History     Socioeconomic History    Marital status:    Tobacco Use    Smoking status: Never    Smokeless tobacco: Current     Types: Chew    Tobacco comments:     uses chewing tobacco   Vaping Use    Vaping Use: Never used   Substance and Sexual Activity    Alcohol use: No    Drug use: No    Sexual activity: Defer           Objective   Physical Exam  Vitals and nursing note reviewed.   Constitutional:       General: He is not in acute distress.     Appearance: Normal appearance. He is well-developed. He is  not toxic-appearing or diaphoretic.   HENT:      Head: Normocephalic and atraumatic.      Right Ear: External ear normal.      Left Ear: External ear normal.      Nose: Nose normal.      Mouth/Throat:      Pharynx: No oropharyngeal exudate.      Tonsils: No tonsillar exudate.   Eyes:      General: Lids are normal.      Conjunctiva/sclera: Conjunctivae normal.      Pupils: Pupils are equal, round, and reactive to light.   Neck:      Thyroid: No thyromegaly.   Cardiovascular:      Rate and Rhythm: Normal rate and regular rhythm.      Pulses: Normal pulses.      Heart sounds: Normal heart sounds, S1 normal and S2 normal.   Pulmonary:      Effort: Pulmonary effort is normal. No tachypnea or respiratory distress.      Breath sounds: Normal breath sounds. No decreased breath sounds, wheezing or rales.   Chest:      Chest wall: No tenderness.   Abdominal:      General: Bowel sounds are normal. There is no distension.      Palpations: Abdomen is soft.      Tenderness: There is no abdominal tenderness. There is no guarding or rebound.   Musculoskeletal:         General: No tenderness or deformity. Normal range of motion.      Cervical back: Full passive range of motion without pain, normal range of motion and neck supple.   Lymphadenopathy:      Cervical: No cervical adenopathy.   Skin:     General: Skin is warm and dry.      Coloration: Skin is not pale.      Findings: No erythema or rash.   Neurological:      Mental Status: He is alert and oriented to person, place, and time.      GCS: GCS eye subscore is 4. GCS verbal subscore is 5. GCS motor subscore is 6.      Cranial Nerves: No cranial nerve deficit.      Sensory: No sensory deficit.   Psychiatric:         Speech: Speech normal.         Behavior: Behavior normal.         Thought Content: Thought content normal.         Judgment: Judgment normal.         Procedures           ED Course  ED Course as of 10/21/23 1644   Wed Oct 18, 2023   1844 ECG 12 Lead Chest Pain  Vent.  Rate : 103 BPM     Atrial Rate : 103 BPM     P-R Int : 142 ms          QRS Dur :  80 ms      QT Int : 312 ms       P-R-T Axes :  56  17  34 degrees     QTc Int : 408 ms     Sinus tachycardia  Otherwise normal ECG  When compared with ECG of 28-JUL-2023 17:57,  No significant change was found   [ES]   Sat Oct 21, 2023   1643 XR Chest 1 View  IMPRESSION:  No acute cardiopulmonary process.      [ES]   1644 Heart Score: 2 [ES]      ED Course User Index  [ES] Suman Stern MD                                           Medical Decision Making    History provided by:  Patient   used: No    Chest Pain  Pain location:  Substernal area  Pain quality: aching and dull    Pain radiates to:  Does not radiate  Pain severity:  Mild  Onset quality:  Gradual  Duration:  2 weeks  Timing:  Constant  Progression:  Worsening  Chronicity:  Chronic  Context: not breathing, not drug use, not eating, not intercourse, not lifting, not movement, not raising an arm, not at rest, not stress and not trauma    Relieved by:  Nothing  Worsened by:  Nothing  Ineffective treatments:  None tried  Associated symptoms: no abdominal pain, no AICD problem, no altered mental status, no anorexia, no anxiety, no back pain, no claudication, no cough, no diaphoresis, no dizziness, no dysphagia, no fatigue, no fever, no headache, no heartburn, no lower extremity edema, no nausea, no near-syncope, no numbness, no orthopnea, no palpitations, no PND, no shortness of breath, no syncope, no vomiting and no weakness    Risk factors: male sex    Risk factors: no aortic disease, no coronary artery disease, no diabetes mellitus, no Meryl-Danlos syndrome, no high cholesterol, no hypertension, no immobilization, no Marfan's syndrome, not obese, not pregnant, no prior DVT/PE, no smoking and no surgery        Problems Addressed:  Nonspecific chest pain: complicated acute illness or injury    Amount and/or Complexity of Data Reviewed  External  Data Reviewed: labs, radiology, ECG and notes.  Labs: ordered. Decision-making details documented in ED Course.  Radiology: ordered and independent interpretation performed. Decision-making details documented in ED Course.  ECG/medicine tests: ordered and independent interpretation performed. Decision-making details documented in ED Course.    Risk  OTC drugs.        Final diagnoses:   Nonspecific chest pain       ED Disposition  ED Disposition       ED Disposition   Discharge    Condition   Stable    Comment   --               Hebert Jordan MD  [facility] [address]  [place] [id number]  [phone]    Schedule an appointment as soon as possible for a visit in 1 day  EVALUATE         Medication List        Changed      * amitriptyline 25 MG tablet  Commonly known as: ELAVIL  Take 1 to 2 tablets by mouth one hour prior to bedtime for sleep and pain.  What changed: how much to take     * amitriptyline 25 MG tablet  Commonly known as: ELAVIL  Take 1 to 2 tablets by mouth one hour prior to bedtime for sleep and pain.  What changed: Another medication with the same name was changed. Make sure you understand how and when to take each.           * This list has 2 medication(s) that are the same as other medications prescribed for you. Read the directions carefully, and ask your doctor or other care provider to review them with you.                     Suman Stern MD  10/21/23 [id number]

## 2023-10-25 ENCOUNTER — OFFICE VISIT (OUTPATIENT)
Dept: CARDIOLOGY | Facility: CLINIC | Age: 58
End: 2023-10-25
Payer: COMMERCIAL

## 2023-10-25 VITALS
HEART RATE: 96 BPM | WEIGHT: 261.8 LBS | OXYGEN SATURATION: 98 % | HEIGHT: 72 IN | SYSTOLIC BLOOD PRESSURE: 139 MMHG | BODY MASS INDEX: 35.46 KG/M2 | DIASTOLIC BLOOD PRESSURE: 87 MMHG

## 2023-10-25 DIAGNOSIS — R07.2 PRECORDIAL PAIN: Primary | ICD-10-CM

## 2023-10-25 DIAGNOSIS — E78.5 DYSLIPIDEMIA: Chronic | ICD-10-CM

## 2023-10-25 DIAGNOSIS — I10 ESSENTIAL HYPERTENSION: Chronic | ICD-10-CM

## 2023-10-25 PROCEDURE — 99214 OFFICE O/P EST MOD 30 MIN: CPT | Performed by: NURSE PRACTITIONER

## 2023-10-25 PROCEDURE — 93000 ELECTROCARDIOGRAM COMPLETE: CPT | Performed by: NURSE PRACTITIONER

## 2023-10-25 NOTE — PROGRESS NOTES
"Chief Complaint   Patient presents with    Establish Care     Seen in ER with CP, shoulder pain,arm pain, neck pain,nausea  Intermittent CP, chronic fatigue    Med Review     Tolerating all current medications.       Subjective     History of Present Illness    Mr. Watson is a 58-year-old male with a past medical history significant for dyslipidemia, hypertension, osteoarthritis and chronic pain disorder.  He initially presented to the ED on 10/18/2023 with complaint of substernal chest pain.  Work-up was benign with negative high-sensitivity troponin x2 and EKG without ischemic changes.  Outpatient nuclear stress test was ordered.    On 10/20/2023 Mr. Watson had outpatient nuclear stress test which showed a normal myocardial perfusion study and normal ECG study consistent with a low risk for cardiovascular disease.    He presents today for follow-up of his recent emergency room visit.  He states that ever since he had COVID approximately 1-1/2 years ago he has had fatigue.  He reports that he has low testosterone and was started on testosterone supplement around that time.    On 10/18/2023 he states he had sudden onset of chest pain associated with left arm pain, radiation to his neck and nausea.  He was not exerting himself when the pain started.  He states that once the pain resolved he felt like he had \"no energy\".    Since being seen in the ED and having his stress test he has had no further similar episodes.    He denies past medical history of congenital heart disease or rheumatic fever.  He denies family history of coronary artery disease.      Past Medical History:   Diagnosis Date    Acid reflux     Chronic pain disorder     Essential hypertension 10/27/2023    Extremity pain     Joint pain     Low back pain     Lumbosacral disc disease     Migraine     Muscle spasm     Neck pain     Osteoarthritis     PONV (postoperative nausea and vomiting)      Past Surgical History:   Procedure Laterality Date    CARPAL " TUNNEL RELEASE Right 8/4/2016    Procedure: CARPAL TUNNEL RELEASE;  Surgeon: Yoel Lua MD;  Location:  [facility];  Service:     CARPAL TUNNEL RELEASE Left 11/9/2017    Procedure: CARPAL TUNNEL RELEASE;  Surgeon: Yoel Lua MD;  Location:  [facility];  Service:     COLONOSCOPY      LUMBAR DISCECTOMY Right 9/22/2022    Procedure: LUMBAR DISCECTOMY L4-5 RIGHT;  Surgeon: Aquiles Grigsby MD;  Location:  [facility];  Service: Neurosurgery;  Laterality: Right;    LUMBAR FACET INJECTION      TOE SURGERY Right 03/04/2015     Family History   Problem Relation Age of Onset    Lung cancer Mother     Cancer Mother         ovarian    Diabetes Sister     Cancer Sister         breast    Heart disease Brother     Rheum arthritis Brother      Social History     Tobacco Use    Smoking status: Never    Smokeless tobacco: Former     Types: Chew    Tobacco comments:     uses chewing tobacco   Vaping Use    Vaping Use: Never used   Substance Use Topics    Alcohol use: No    Drug use: No       Current Outpatient Medications   Medication Sig Dispense Refill    amitriptyline (ELAVIL) 25 MG tablet Take 1 to 2 tablets by mouth one hour prior to bedtime for sleep and pain. (Patient taking differently: 2 tablets.) 180 tablet 2    amitriptyline (ELAVIL) 25 MG tablet Take 1 to 2 tablets by mouth one hour prior to bedtime for sleep and pain. 180 tablet 2    atorvastatin (LIPITOR) 10 MG tablet Take 1 tablet by mouth Daily. 90 tablet 1    atorvastatin (LIPITOR) 10 MG tablet Take 1 tablet by mouth Daily. 90 tablet 1    buPROPion (WELLBUTRIN) 75 MG tablet Take 1 tablet by mouth every day 30 tablet 0    busPIRone (BUSPAR) 5 MG tablet Take 1 tablet by mouth 2 (Two) Times a Day As Needed. 60 tablet 2    busPIRone (BUSPAR) 5 MG tablet Take 1 tablet by mouth 2 (Two) Times a Day As Needed. 60 tablet 2    celecoxib (CeleBREX) 200 MG capsule Take 1 capsule by mouth 2 times daily as needed for joint pain/stiffness. 180 capsule 2     "celecoxib (CeleBREX) 200 MG capsule Take 1 capsule by mouth 2 times daily as needed for joint pain/stiffness. 180 capsule 2    dexlansoprazole (DEXILANT) 60 MG capsule Take 1 capsule by mouth Daily. 90 capsule 5    doxycycline (VIBRAMYCIN) 100 MG capsule Take 1 capsule by mouth Every 12 (Twelve) Hours. 20 capsule 0    DULoxetine (CYMBALTA) 60 MG capsule Take 1 capsule by mouth daily. 90 capsule 2    FLUoxetine (PROzac) 40 MG capsule Take 1 capsule by mouth Every Morning. 90 capsule 1    Syringe 25G X 5/8\" 3 ML misc Use as directed 2 times weekly (use with testosterone injection) 24 each 3    tamsulosin (FLOMAX) 0.4 MG capsule 24 hr capsule Take 2 capsules by mouth Daily for prostate. 180 capsule 1    terbinafine (lamiSIL) 250 MG tablet Take 1 tablet by mouth once daily on the first 7 days of each month. 84 tablet 0    traZODone (DESYREL) 50 MG tablet Take 1 tablet by mouth every night at bedtime 30 tablet 0    metoprolol tartrate (LOPRESSOR) 25 MG tablet Take 1 tablet by mouth 2 (Two) Times a Day. 60 tablet 11    tadalafil (Cialis) 5 MG tablet Take 1 tablet by mouth daily as needed for erectile dysfunction. 30 tablet 6    Testosterone Cypionate (Depo-Testosterone) 200 MG/ML injection Inject 0.5 mL under the skin every Monday and Thursday. 10 mL 2     No current facility-administered medications for this visit.         Allergies:  Patient has no known allergies.    Review of Systems   Constitutional:  Positive for fatigue.   Respiratory:  Positive for shortness of breath.    Cardiovascular:  Positive for chest pain. Negative for palpitations and leg swelling.   Gastrointestinal:  Positive for nausea. Negative for blood in stool.   Neurological:  Negative for dizziness, syncope, weakness and light-headedness.   Hematological:  Does not bruise/bleed easily.   All other systems reviewed and are negative.          Objective      Vital Signs  /87   Pulse 96   Ht 182.9 cm (72\")   Wt 119 kg (261 lb 12.8 oz)   SpO2 " 98%   BMI 35.51 kg/m²      Physical Exam  Vitals reviewed.   Constitutional:       Appearance: He is well-developed.   HENT:      Head: Normocephalic and atraumatic.   Eyes:      Pupils: Pupils are equal, round, and reactive to light.   Neck:      Vascular: No JVD.   Cardiovascular:      Rate and Rhythm: Normal rate and regular rhythm.      Heart sounds: No murmur heard.     No friction rub. No gallop.   Pulmonary:      Effort: Pulmonary effort is normal. No respiratory distress.      Breath sounds: Normal breath sounds. No wheezing or rales.   Abdominal:      Palpations: Abdomen is soft. There is no mass.      Tenderness: There is no abdominal tenderness.      Hernia: No hernia is present.   Skin:     General: Skin is warm and dry.           ECG 12 Lead    Date/Time: 10/25/2023 9:22 AM  Performed by: Marleni Martinez APRN    Authorized by: Marleni Martinez APRN  Comparison: compared with previous ECG from 10/18/2023  Similar to previous ECG  Comparison to previous ECG: Sinus tachycardia 103 bpm  Rhythm: sinus rhythm  Rate: normal  BPM: 88    Clinical impression: normal ECG  Comments: QTc 384          Assessment and Plan    Problem List Items Addressed This Visit          Cardiac and Vasculature    Dyslipidemia (Chronic)    Essential hypertension (Chronic)    Relevant Medications    metoprolol tartrate (LOPRESSOR) 25 MG tablet    Chest pain - Primary    Relevant Orders    ECG 12 Lead    Adult Transthoracic Echo Complete W/ Cont if Necessary Per Protocol           Follow Up     Medications were reviewed with the patient.    Results of nuclear stress test were discussed with the patient.  We will proceed with echocardiogram to evaluate patient's overwhelming sensation of fatigue.    Hypertension is not well controlled, metoprolol added today.    Continue atorvastatin for dyslipidemia.    Return in about 5 weeks (around 11/29/2023).

## 2023-10-26 LAB
TESTOST FREE SERPL-MCNC: 9.4 PG/ML (ref 7.2–24)
TESTOST SERPL-MCNC: 550 NG/DL (ref 264–916)

## 2023-10-27 PROBLEM — I10 ESSENTIAL HYPERTENSION: Chronic | Status: ACTIVE | Noted: 2023-10-27

## 2023-10-27 PROBLEM — E78.5 DYSLIPIDEMIA: Chronic | Status: ACTIVE | Noted: 2023-10-27

## 2023-10-27 PROBLEM — I10 ESSENTIAL HYPERTENSION: Status: ACTIVE | Noted: 2023-10-27

## 2023-10-27 PROBLEM — E78.5 DYSLIPIDEMIA: Status: ACTIVE | Noted: 2023-10-27

## 2023-11-20 ENCOUNTER — TELEPHONE (OUTPATIENT)
Dept: CARDIOLOGY | Facility: CLINIC | Age: 58
End: 2023-11-20

## 2023-11-20 NOTE — TELEPHONE ENCOUNTER
Caller: Avery Watson    Relationship: Self    Best call back number: [phone]     What was the call regarding: PT HAD FOLLOW APPT ON 11/20/23 AND DUE TO UNFORSEEN CIRCUMSTANCES, THEY HAD TO RESCHEDULE. NEXT AVAILABLE WAS  1/10/24. JUST WANTED TO MAKE OFFICE AWARE

## 2023-11-30 ENCOUNTER — OFFICE VISIT (OUTPATIENT)
Dept: FAMILY MEDICINE CLINIC | Facility: CLINIC | Age: 58
End: 2023-11-30
Payer: COMMERCIAL

## 2023-11-30 ENCOUNTER — TELEPHONE (OUTPATIENT)
Dept: PAIN MEDICINE | Facility: CLINIC | Age: 58
End: 2023-11-30

## 2023-11-30 VITALS
OXYGEN SATURATION: 97 % | DIASTOLIC BLOOD PRESSURE: 84 MMHG | TEMPERATURE: 98.2 F | HEART RATE: 83 BPM | SYSTOLIC BLOOD PRESSURE: 136 MMHG | WEIGHT: 264 LBS | BODY MASS INDEX: 35.76 KG/M2 | HEIGHT: 72 IN

## 2023-11-30 DIAGNOSIS — B37.9 CANDIDIASIS: ICD-10-CM

## 2023-11-30 DIAGNOSIS — J02.9 SORE THROAT: Primary | ICD-10-CM

## 2023-11-30 LAB
EXPIRATION DATE: NORMAL
INTERNAL CONTROL: NORMAL
Lab: NORMAL
S PYO AG THROAT QL: NEGATIVE

## 2023-11-30 PROCEDURE — 87081 CULTURE SCREEN ONLY: CPT | Performed by: FAMILY MEDICINE

## 2023-11-30 NOTE — TELEPHONE ENCOUNTER
Provider: DR. MEDRANO    Caller: AGUILA SEQUEIRA    Relationship to Patient: SELF    Phone Number: [phone]    Reason for Call: PATIENT CALLED STATING HE GOT THE PSYCH EVALUATION DONE WITH DR. MEDELLIN. PATIENT STATES THEY HAVE NOT HEARD ANYTHING BACK. REQUESTING CALL BACK REGARDING NEXT STEPS FOR [facility]. PLEASE ADVISE.

## 2023-11-30 NOTE — PROGRESS NOTES
"Chief Complaint  Sore Throat (For the past 5 weeks had covid a month ago)    Subjective          Avery Watson presents to [facility]  Sore Throat   This is a new problem. The current episode started more than 1 month ago. The problem has been unchanged. There has been no fever. Pertinent negatives include no coughing, diarrhea, stridor or swollen glands. He has had no exposure to strep. He has tried NSAIDs and cool liquids for the symptoms. The treatment provided mild relief.       Review of Systems   HENT:  Positive for sore throat.    Respiratory:  Negative for cough and stridor.    Gastrointestinal:  Negative for diarrhea.         Objective   Vital Signs:   /84 (BP Location: Right arm, Patient Position: Sitting)   Pulse 83   Temp 98.2 °F (36.8 °C)   Ht 182.9 cm (72\")   Wt 120 kg (264 lb)   SpO2 97%   BMI 35.80 kg/m²     Physical Exam  Constitutional:       General: He is not in acute distress.     Appearance: Normal appearance. He is well-developed and well-groomed. He is not ill-appearing, toxic-appearing or diaphoretic.   HENT:      Head: Normocephalic.      Right Ear: Tympanic membrane, ear canal and external ear normal.      Left Ear: Tympanic membrane, ear canal and external ear normal.      Nose: Nose normal. No congestion or rhinorrhea.      Mouth/Throat:      Mouth: Mucous membranes are moist.      Pharynx: Oropharynx is clear. Oropharyngeal exudate and posterior oropharyngeal erythema present.   Eyes:      General: Lids are normal.         Right eye: No discharge.         Left eye: No discharge.      Extraocular Movements: Extraocular movements intact.      Pupils: Pupils are equal, round, and reactive to light.   Neck:      Vascular: No carotid bruit.   Cardiovascular:      Rate and Rhythm: Normal rate and regular rhythm.      Pulses: Normal pulses.      Heart sounds: Normal heart sounds. No murmur heard.     No friction rub. No gallop.   Pulmonary: "      Effort: Pulmonary effort is normal. No respiratory distress.      Breath sounds: Normal breath sounds. No stridor. No wheezing, rhonchi or rales.   Chest:      Chest wall: No tenderness.   Abdominal:      General: Bowel sounds are normal. There is no distension.      Palpations: Abdomen is soft. There is no mass.      Tenderness: There is no abdominal tenderness. There is no right CVA tenderness, left CVA tenderness, guarding or rebound.      Hernia: No hernia is present.   Musculoskeletal:         General: No swelling or tenderness. Normal range of motion.      Cervical back: Normal range of motion and neck supple. No rigidity or tenderness.      Right lower leg: No edema.      Left lower leg: No edema.   Lymphadenopathy:      Cervical: No cervical adenopathy.   Skin:     General: Skin is warm.      Capillary Refill: Capillary refill takes less than 2 seconds.      Coloration: Skin is not jaundiced.      Findings: No bruising, erythema or rash.   Neurological:      General: No focal deficit present.      Mental Status: He is alert and oriented to person, place, and time.      Motor: Motor function is intact. No weakness.      Coordination: Coordination is intact.      Gait: Gait is intact. Gait normal.   Psychiatric:         Attention and Perception: Attention normal.         Mood and Affect: Mood normal.         Speech: Speech normal.         Behavior: Behavior normal.         Cognition and Memory: Cognition normal.         Judgment: Judgment normal.        Result Review :                 Assessment and Plan    Diagnoses and all orders for this visit:    1. Sore throat (Primary)  -     POCT rapid strep A  -     Beta Strep Culture, Throat - , Throat; Future  -     Beta Strep Culture, Throat - Swab, Throat    2. Candidiasis  -     Ptuiphgovumhdaz84.5mg/5mL-Ggafkvgb354,000units/mL-Antacid-Lidocaine2% 1:1:1:1; Swish and swallow 5 mL Every 4 (Four) Hours As Needed for Mucositis for up to 7 days.  Dispense: 200 mL;  Refill: 0      Patient's Body mass index is 35.8 kg/m². indicating that he is obese (BMI >30). Obesity-related health conditions include the following: hypertension, dyslipidemias, and GERD. Obesity is unchanged. BMI is is above average; BMI management plan is completed. We discussed low calorie, low carb based diet program, portion control, and increasing exercise..    Follow Up   Return in about 1 week (around 12/7/2023), or if symptoms worsen or fail to improve.  Patient was given instructions and counseling regarding his condition or for health maintenance advice. Please see specific information pulled into the AVS if appropriate.     This document has been electronically signed by EDDIE Mead  December 5, 2023 16:00 EST

## 2023-12-01 ENCOUNTER — OFFICE VISIT (OUTPATIENT)
Dept: UROLOGY | Facility: CLINIC | Age: 58
End: 2023-12-01
Payer: COMMERCIAL

## 2023-12-01 VITALS
DIASTOLIC BLOOD PRESSURE: 85 MMHG | WEIGHT: 261.4 LBS | SYSTOLIC BLOOD PRESSURE: 127 MMHG | HEART RATE: 76 BPM | BODY MASS INDEX: 35.41 KG/M2 | HEIGHT: 72 IN

## 2023-12-01 DIAGNOSIS — R97.20 BPH WITH ELEVATED PSA: ICD-10-CM

## 2023-12-01 DIAGNOSIS — N41.1 PROSTATITIS, CHRONIC: ICD-10-CM

## 2023-12-01 DIAGNOSIS — R35.1 BENIGN PROSTATIC HYPERPLASIA WITH NOCTURIA: ICD-10-CM

## 2023-12-01 DIAGNOSIS — R35.0 FREQUENCY OF MICTURITION: Primary | ICD-10-CM

## 2023-12-01 DIAGNOSIS — N40.1 BENIGN PROSTATIC HYPERPLASIA WITH NOCTURIA: ICD-10-CM

## 2023-12-01 DIAGNOSIS — N40.0 BPH WITH ELEVATED PSA: ICD-10-CM

## 2023-12-01 LAB
BILIRUB BLD-MCNC: NEGATIVE MG/DL
CLARITY, POC: CLEAR
COLOR UR: YELLOW
EXPIRATION DATE: NORMAL
GLUCOSE UR STRIP-MCNC: NEGATIVE MG/DL
KETONES UR QL: NEGATIVE
LEUKOCYTE EST, POC: NEGATIVE
Lab: NORMAL
NITRITE UR-MCNC: NEGATIVE MG/ML
PH UR: 6 [PH] (ref 5–8)
PROT UR STRIP-MCNC: NEGATIVE MG/DL
RBC # UR STRIP: NEGATIVE /UL
SP GR UR: 1.01 (ref 1–1.03)
UROBILINOGEN UR QL: NORMAL

## 2023-12-01 PROCEDURE — 87086 URINE CULTURE/COLONY COUNT: CPT | Performed by: NURSE PRACTITIONER

## 2023-12-01 RX ORDER — PHENAZOPYRIDINE HYDROCHLORIDE 100 MG/1
100 TABLET, FILM COATED ORAL 3 TIMES DAILY PRN
Qty: 20 TABLET | Refills: 0 | Status: SHIPPED | OUTPATIENT
Start: 2023-12-01

## 2023-12-01 RX ORDER — CEFTRIAXONE 1 G/1
1 INJECTION, POWDER, FOR SOLUTION INTRAMUSCULAR; INTRAVENOUS ONCE
Status: COMPLETED | OUTPATIENT
Start: 2023-12-01 | End: 2023-12-01

## 2023-12-01 RX ORDER — ONDANSETRON 4 MG/1
4 TABLET, FILM COATED ORAL EVERY 12 HOURS PRN
Qty: 20 TABLET | Refills: 0 | Status: SHIPPED | OUTPATIENT
Start: 2023-12-01

## 2023-12-01 RX ORDER — SULFAMETHOXAZOLE AND TRIMETHOPRIM 800; 160 MG/1; MG/1
1 TABLET ORAL 2 TIMES DAILY
Qty: 56 TABLET | Refills: 2 | Status: SHIPPED | OUTPATIENT
Start: 2023-12-01 | End: 2023-12-29

## 2023-12-01 RX ADMIN — CEFTRIAXONE 1 G: 1 INJECTION, POWDER, FOR SOLUTION INTRAMUSCULAR; INTRAVENOUS at 09:25

## 2023-12-01 NOTE — PROGRESS NOTES
"Chief Complaint  Urinary Tract Infection (FOLLOW UP DYSURIA/CHRONIC PROSTATITIS/BPH WITH ELEVATED PSA)    Subjective          Avery Watson presents to [facility] for PROSTATITIS FLARE/DYSURIA  History of Present Illness    Mr. Avery Watson is a pleasant 58-year-old male, well-known to clinic and Dr. Mckeon who returns to clinic today for evaluation with concerns of dysuria, BURNING WITH URINATION, Perineal discomfort.. Patient has a significant history of BPH with lower urinary tract symptoms, hypogonadism, low testosterone and is currently on Testosterone replacement therapy by Dr. Mckeon.     Last evaluated on 08/23/2023. He was in no apparent discomfort and reported doing relatively well. until recently. Nevertheless, returns to clinic today with concerns of possible UTI. Patient reports having burning with urination. He has also had burning after sex. He has had prostatitis in the past and fears he may have it again. Nevertheless, urinalysis is completely negative for leukocyte esterase. It is negative for nitrite. It is negative for gross/microscopic hematuria. His PVR was 0 mL with IPSS score of 8. HIS LAST PSA WAS 4.45 ON 08/29/23, IT WAS 0.669 THE year prior-2022, it was 1.670 6 years ago     For approximately 1 month, the patient has burning when he has the urge to urinate,which  worsens after urination, and after sex. He has intermittent perineal pain. He has chronic back issues. There has been no hematuria. His stream stops and starts. He has urinary frequency. He denies any urgency or incontinence. He has nocturia 1 to 2 times nightly. He denies any possibility of STIs. He does not drink much water. He rates his pain a 6 out of 10. He wakes up in the middle of the night and his pelvic area is hot and wakes him up. The patient does not sleep on his stomach. He denies any yeast.     It has been \"quite a few years back\" since he was treated for " "prostatitis. He recalls taking a low dose of an antibiotic for 3 to 6 months.    He denies any recent constipation.    The patient had COVID-19 and conjunctivitis in 11/2023 and was treated with cefdinir. He has not taken other antibiotics recently.    He denies any family history of prostate or bladder cancer. His mother had ovarian and breast cancer.      Active Ambulatory Problems     Diagnosis Date Noted    Benign prostatic hyperplasia with urinary obstruction 05/10/2016    Fatigue 05/10/2016    Multilevel degenerative disc disease 06/09/2016    Migraine 07/07/2016    Shoulder pain 07/07/2016    Lumbar facet arthropathy/lumbar spondylosis without myelopathy 07/19/2016    DDD (degenerative disc disease), lumbar 07/19/2016    Myofascial pain 07/19/2016    Bilateral carpal tunnel syndrome 07/19/2016    Displacement of intervertebral disc of mid-cervical region 07/19/2016    Neuroforaminal stenosis of spine, right C6-C7 07/19/2016    Carpal tunnel syndrome, left 10/30/2017    Chest pain 05/03/2021    Lumbar disc herniation with radiculopathy 08/31/2022    Low testosterone 07/03/2023    Dyslipidemia 10/27/2023    Essential hypertension 10/27/2023     Resolved Ambulatory Problems     Diagnosis Date Noted    No Resolved Ambulatory Problems     Past Medical History:   Diagnosis Date    Acid reflux     Chronic pain disorder     Extremity pain     Joint pain     Low back pain     Lumbosacral disc disease     Muscle spasm     Neck pain     Osteoarthritis     PONV (postoperative nausea and vomiting)       Objective   Vital Signs:   /85 (BP Location: Left arm, Patient Position: Sitting, Cuff Size: Adult)   Pulse 76   Ht 182.9 cm (72.01\")   Wt 119 kg (261 lb 6.4 oz)   BMI 35.44 kg/m²       ROS:   Review of Systems   Constitutional:  Positive for activity change, appetite change, fatigue and unexpected weight gain. Negative for diaphoresis, fever and unexpected weight loss.   HENT:  Negative for congestion, ear " discharge, ear pain, nosebleeds, rhinorrhea, sinus pressure and sore throat.    Eyes:  Negative for blurred vision, double vision, photophobia, pain, redness and visual disturbance.   Respiratory:  Negative for apnea, cough, chest tightness, shortness of breath, wheezing and stridor.    Cardiovascular:  Negative for chest pain and palpitations.   Gastrointestinal:  Positive for abdominal distention, abdominal pain, nausea and rectal pain. Negative for constipation, diarrhea and vomiting.   Endocrine: Negative for polydipsia, polyphagia and polyuria.   Genitourinary:  Positive for decreased urine volume, dysuria, flank pain, frequency, genital sores and nocturia. Negative for difficulty urinating, hematuria, urgency and urinary incontinence.   Musculoskeletal:  Positive for back pain and myalgias. Negative for arthralgias and joint swelling.   Skin:  Positive for dry skin. Negative for pallor, rash and wound.   Neurological:  Positive for dizziness. Negative for tremors, syncope, weakness, light-headedness, memory problem and confusion.   Psychiatric/Behavioral:  Positive for sleep disturbance and stress. Negative for behavioral problems.         Physical Exam  Constitutional:       General: He is in acute distress.      Appearance: He is well-developed. He is obese. He is ill-appearing.   HENT:      Head: Normocephalic and atraumatic.      Right Ear: External ear normal.      Left Ear: External ear normal.   Eyes:      General:         Right eye: No discharge.         Left eye: No discharge.      Conjunctiva/sclera: Conjunctivae normal.      Pupils: Pupils are equal, round, and reactive to light.   Neck:      Thyroid: No thyromegaly.      Trachea: No tracheal deviation.   Cardiovascular:      Rate and Rhythm: Normal rate and regular rhythm.      Heart sounds: No murmur heard.     No friction rub.   Pulmonary:      Effort: Pulmonary effort is normal. No respiratory distress.      Breath sounds: Normal breath sounds.  No stridor.   Abdominal:      General: Bowel sounds are normal. There is distension.      Palpations: Abdomen is soft.      Tenderness: There is abdominal tenderness. There is guarding.   Genitourinary:     Penis: Normal and uncircumcised. No tenderness or discharge.       Testes: Normal.      Rectum: Normal. Guaiac result negative.      Comments: NAINA DEFERRED PER PTS REQUEST. REPORTS Dysuria, burning with urination, FREQUENCY, NOCTURIA  Musculoskeletal:         General: Tenderness present. No deformity. Normal range of motion.      Cervical back: Normal range of motion and neck supple.   Skin:     General: Skin is warm and dry.      Capillary Refill: Capillary refill takes less than 2 seconds.      Coloration: Skin is pale.   Neurological:      Mental Status: He is alert and oriented to person, place, and time.      Cranial Nerves: No cranial nerve deficit.      Motor: Weakness present.      Coordination: Coordination normal.   Psychiatric:         Behavior: Behavior normal.         Thought Content: Thought content normal.         Judgment: Judgment normal.        Result Review :     UA          12/24/2022    13:01 7/28/2023    21:00 12/1/2023    08:40   Urinalysis   Specific Gravity, UA 1.015  1.008     Ketones, UA Negative  Negative  Negative    Blood, UA Negative  Negative     Leukocytes, UA Negative  Negative  Negative    Nitrite, UA Negative  Negative       Urine Culture          12/1/2023    08:35   Urine Culture   Urine Culture No growth      PSA          8/29/2023    14:31   PSA   PSA 4.450           Patient's most recent PSA from 08/09/2023 was elevated at 4.450 ng/mL. His PSA 1 year prior to that was 0.66 ng/mL and his PSA 5 years prior to that was 1.670 ng/mL.           Assessment and Plan    Problem List Items Addressed This Visit    None  Visit Diagnoses       Frequency of micturition    -  Primary    PSA on 08/09/2023 was 4.450 ng/mL. PSA 1 year prior was 0.66 ng/mL and years prior was 1.670 ng/mL.    We will treat him for prostatitis flareup and recheck PSA.    Relevant Medications    phenazopyridine (PYRIDIUM) 100 MG tablet    sulfamethoxazole-trimethoprim (Bactrim DS) 800-160 MG per tablet    ondansetron (Zofran) 4 MG tablet    Other Relevant Orders    POC Urinalysis Dipstick, Automated (Completed)    Urine Culture - Urine, Urine, Random Void (Completed)    PSA Total+% Free (Serial)    Benign prostatic hyperplasia with nocturia        PSA on 08/09/2023 was 4.450 ng/mL. PSA 1 year prior was 0.66 ng/mL and years prior was 1.670 ng/mL.   We will treat him for prostatitis flareup and recheck PSA.    Relevant Medications    phenazopyridine (PYRIDIUM) 100 MG tablet    sulfamethoxazole-trimethoprim (Bactrim DS) 800-160 MG per tablet    ondansetron (Zofran) 4 MG tablet    Other Relevant Orders    PSA Total+% Free (Serial)    BPH with elevated PSA        PSA on 08/09/2023 was 4.450 ng/mL. PSA 1 year prior was 0.66 ng/mL and years prior was 1.670 ng/mL.   We will treat him for prostatitis flareup and recheck PSA.    Relevant Medications    phenazopyridine (PYRIDIUM) 100 MG tablet    sulfamethoxazole-trimethoprim (Bactrim DS) 800-160 MG per tablet    ondansetron (Zofran) 4 MG tablet    Other Relevant Orders    PSA Total+% Free (Serial)    Prostatitis, chronic        PSA on 08/09/2023 was 4.450 ng/mL. PSA 1 year prior was 0.66 ng/mL and years prior was 1.670 ng/mL.   We will treat him for prostatitis flareup and recheck PSA.    Relevant Medications    phenazopyridine (PYRIDIUM) 100 MG tablet    sulfamethoxazole-trimethoprim (Bactrim DS) 800-160 MG per tablet    ondansetron (Zofran) 4 MG tablet    cefTRIAXone (ROCEPHIN) injection 1 g (Completed)    Other Relevant Orders    PSA Total+% Free (Serial)              ASSESSMENT  CHRONIC PROSTATITIS/BPH WITH LUTS-DYSURIA/FREQ/ELEVATED PSA  Mr. Avery Watson is a pleasant 58-year-old male,  who returns to clinic today for evaluation with concerns of dysuria, BURNING WITH  URINATION, Perineal discomfort.. Patient has a significant history of BPH with lower urinary tract symptoms, Chronic prostatitis. Last evaluated on 08/23/2023. He was in no apparent discomfort and reported doing relatively well. until recently Patient reports having burning with urination. He has also had burning after sex, and fears he may have prostate flare up again. Nevertheless, urinalysis is completely negative for leukocyte esterase. It is negative for nitrite. It is negative for gross/microscopic hematuria. His PVR was 0 mL with IPSS score of 8. HIS LAST PSA WAS 4.45 ON 08/29/23, IT WAS 0.669 THE year prior-2022, it was 1.670 6 years ago     BPH WITH Elevated Prostate Specific Antigen-4.45.  WE discussed the diagnosis of elevated prostate-specific antigen.  I explained the pathophysiology of PSA. AS A serine protease, and that its function in the male reproductive tract is to facilitate the liquefaction of semen.  It is for this reason the body does not want it freely floating in the serum and why it typically bbinds tightly to albumin.      We discussed why we used both a PSA free and total to determine the need for more aggressive therapy I discussed the normal range.  Additionally it was in the range of 1-4 but more recently has been downgraded to something less than 2 or even approaching 1.  I discussed the risk of family history particularly the fact that the average male has a 14% risk of prostate cancer and that in the face of a positive diagnosis in a father it will tablet and any other first-generation relative continued tablet insofar that a father and brother with prostate cancer will produce almost a 50% risk of prostate cancer.      I also discussed the temporal use of PSA as the best option for monitoring. We also discussed the fact that an elevated PSA is an isolated event does not mean that this is prostate cancer and should not engender worry in this regard. I discussed other things that can  elevate PSA including constipation, prostatitis, infection, recent intercourse, horseback riding etc. as well as the risks and benefits associated with this.  Also discussed the fact that this is with a dilutional test and as a consequence of such were present produce slight variations on a single specimen.     Next, We discussed his dysuria, burning with urination and the differential diagnosis of prostatitis including the National Institutes of Health classification system I through IV.  I discussed that type I prostatitis as  acute bacterial prostatitis and an associated with high fevers typically hematuria and severe pain with voiding. We discussed the fact that it necessitates 6 weeks of chronic antibiotics to be sure it doesn't turn into a chronic bacterial prostatitis that can have lifelong ramifications.  We discussed National Institutes of Health type II chronic bacterial prostatitis.  We discussed the fact that this a chronic bacterial infection of the prostate that often requires suppressive antibiotics.      We discussed type III being related more to nonspecific urethritis as well as tight for being related to finding inflammation and a biopsy in the absence of symptomatology.  I discussed the diagnostic strategies including the VB 1 through 4 urine culture and discussed empiric therapy. I emphasized that with the use of chronic antibiotics I strongly recommended the concomitant use of probiotics.  Additionally, we discussed the various antibiotics used and the risks and benefits associated with each particularly the use of long-term ciprofloxacin and the effect on joints and collagen in human beings.        PLAN  ROCEPHIN 1 GM IM X 1 DOSE GIVEN IN CLINIC     We resent his urine for culture. I will call  with results if any bacteria growth.    Will Continue  with BACTRIM DS BID X 4 WEEKS-CHRONIC PROSTATITIS    He has been encouraged to increase his p.o. fluid intake to at least 1 to 2 L daily and avoid  bladder irritants such as caffeine products, spicy foods, and citrusy foods.     I recommend concomitant probiotics with treatment with antibiotics to protect the rectal reservoir including over-the-counter yogurt preparations to sudhir oral pills containing the appropriate probiotics. Patient reports the diligent use of Probiotics.    CONTINUE CIALIS /ALL OTHER MEDS AS PRESCRIBED    PYRIDIUM 200BMG TID PRN DYSURIA / BURNING WITH URINATION    Discussed some home remedies for Acute VS CHRONIC prostatitis and encouraged patient to engage in taking warm showers or baths as tolerated for comfort, avoiding activities that put pressure on the prostate such as bicycling, sitting on hard surfaces for longer.      Encourage sitting on a donut or  cushion, avoiding bladder irritants such as caffeine, alcohol, reducing or avoiding consumption of spicy foods, and increasing p.o. fluid intake to at least 2 to 3 L of water daily    See him back in 1 month -DR CANNON for follow-up, to reevaluate his symptoms at that time     Further discussed the risks and benefits of a prostate biopsy as well if PSA is positive and indicated.      WE WILL  Recheck HIS  PSA free and tota IN 4 WEEKS POST ABXl.      Pending results for definitive plan of care.    Patient is Agreeable plan.    Patient reports that he is not currently experiencing any symptoms of urinary incontinence.    RADIOLOGY (CT AND/OR KUB):    CT Abdomen and Pelvis: No results found for this or any previous visit.     CT Stone Protocol: Results for orders placed during the hospital encounter of 08/14/22    CT Abdomen Pelvis Stone Protocol    Narrative  EXAM: CT ABDOMEN PELVIS STONE PROTOCOL-    TECHNIQUE: Multiple axial CT images were obtained from lung bases  through pubic symphysis WITHOUT administration of IV contrast.  Reformatted images in the coronal and/or sagittal plane(s) were  generated from the axial data set to facilitate diagnostic accuracy  and/or surgical  planning.  Oral Contrast:NONE.    Radiation dose reduction techniques were utilized per ALARA protocol.  Automated exposure control was initiated through either or Dash Hudson or  [employer] software packages by  protocol.  DOSE:    Clinical information Flank pain, kidney stone suspected    Comparison 01/21/2019    FINDINGS:    Lower thorax: Clear. No effusions.    Abdomen:    Liver: Homogeneous. No focal hepatic mass or ductal dilatation.    Gallbladder: No dilation or stone identified.    Pancreas: Unremarkable. No mass or ductal dilatation.    Spleen: Homogeneous. No splenomegaly.    Adrenals: No mass.    Kidneys/ureters: No mass. No obstructive uropathy.  No evidence of  urolithiasis.    GI tract: Moderate to large stool burden. There is no evidence of  appendicitis    MESENTERY: No free fluid, walled off fluid collections, mesenteric  stranding, or enlarged lymph nodes      Vasculature: Evidence of atherosclerotic vascular disease    Abdominal wall: No focal hernia or mass.      Bladder: Mild thickening of the urinary bladder wall    Reproductive: Prostate enlargement    Bones: Arthritic change in the spine    Impression  1. No obstructive uropathy. Mild thickening of the urinary bladder wall    2.Prostate enlargement  3. Other findings as above    This report was finalized on 8/14/2022 1:46 PM by Dr. Kvng Thomas MD.     KUB: No results found for this or any previous visit.       LABS (3 MONTHS):    Office Visit on 12/01/2023   Component Date Value Ref Range Status    Color 12/01/2023 Yellow  Yellow, Straw, Dark Yellow, Lisy Final    Clarity, UA 12/01/2023 Clear  Clear Final    Specific Gravity  12/01/2023 1.015  1.005 - 1.030 Final    pH, Urine 12/01/2023 6.0  5.0 - 8.0 Final    Leukocytes 12/01/2023 Negative  Negative Final    Nitrite, UA 12/01/2023 Negative  Negative Final    Protein, POC 12/01/2023 Negative  Negative mg/dL Final    Glucose, UA 12/01/2023 Negative  Negative mg/dL Final    Ketones,  UA 12/01/2023 Negative  Negative Final    Urobilinogen, UA 12/01/2023 Normal  Normal, 0.2 E.U./dL Final    Bilirubin 12/01/2023 Negative  Negative Final    Blood, UA 12/01/2023 Negative  Negative Final    Lot Number 12/01/2023 [id number]   Final    Expiration Date 12/01/2023 10/25/24   Final    Urine Culture 12/01/2023 No growth   Final   Office Visit on 11/30/2023   Component Date Value Ref Range Status    Rapid Strep A Screen 11/30/2023 Negative  Negative, VALID, INVALID, Not Performed Final    Internal Control 11/30/2023 Passed  Passed Final    Lot Number 11/30/2023 [id number]   Final    Expiration Date 11/30/2023 10/18/2024   Final    Throat Culture, Beta Strep 11/30/2023 No Beta Hemolytic Streptococcus Isolated   Final   Hospital Outpatient Visit on 10/20/2023   Component Date Value Ref Range Status    Target HR (85%) 10/20/2023 138  bpm Final    Max. Pred. HR (100%) 10/20/2023 162  bpm Final    Nuclear Prior Study 10/20/2023 3.0   Final    BH CV REST NUCLEAR ISOTOPE DOSE 10/20/2023 10.5  mCi Final    BH CV STRESS NUCLEAR ISOTOPE DOSE 10/20/2023 30.8  mCi Final     CV STRESS PROTOCOL 1 10/20/2023 Pharmacologic   Final    Stage 1 10/20/2023 1.0   Final    HR Stage 1 10/20/2023 101   Final    BP Stage 1 10/20/2023 151/85   Final    Duration Min Stage 1 10/20/2023 0   Final    Duration Sec Stage 1 10/20/2023 10   Final    Stress Dose Regadenoson Stage 1 10/20/2023 0.40   Final    Stress Comments Stage 1 10/20/2023 10 sec bolus injection   Final    Baseline HR 10/20/2023 88  bpm Final    Baseline BP 10/20/2023 132/92  mmHg Final    Peak HR 10/20/2023 101  bpm Final    Peak BP 10/20/2023 151/85  mmHg Final    Recovery HR 10/20/2023 97  bpm Final    Recovery BP 10/20/2023 131/87  mmHg Final    Percent Max Pred HR 10/20/2023 62.35  % Final    Percent Target HR 10/20/2023 73  % Final    Nuc Stress EF 10/20/2023 50  % Final   Lab on 10/20/2023   Component Date Value Ref Range Status    Vitamin B-12 10/20/2023 493   211 - 946 pg/mL Final    Folate 10/20/2023 13.70  4.78 - 24.20 ng/mL Final    Hemoglobin A1C 10/20/2023 5.70 (H)  4.80 - 5.60 % Final    25 Hydroxy, Vitamin D 10/20/2023 28.8 (L)  30.0 - 100.0 ng/ml Final    Testosterone, Total 10/20/2023 550  264 - 916 ng/dL Final    Adult male reference interval is based on a population of  healthy nonobese males (BMI <30) between 19 and 39 years old.  Day et.al. JCEM 2017,102;2376-3716. PMID: [id number].    Testosterone, Free 10/20/2023 9.4  7.2 - 24.0 pg/mL Final    TSH 10/20/2023 1.690  0.270 - 4.200 uIU/mL Final    Free T4 10/20/2023 1.01  0.93 - 1.70 ng/dL Final    Total Cholesterol 10/20/2023 139  0 - 200 mg/dL Final    Triglycerides 10/20/2023 152 (H)  0 - 150 mg/dL Final    HDL Cholesterol 10/20/2023 33 (L)  40 - 60 mg/dL Final    LDL Cholesterol  10/20/2023 79  0 - 100 mg/dL Final    VLDL Cholesterol 10/20/2023 27  5 - 40 mg/dL Final    LDL/HDL Ratio 10/20/2023 2.29   Final    Glucose 10/20/2023 87  65 - 99 mg/dL Final    BUN 10/20/2023 15  6 - 20 mg/dL Final    Creatinine 10/20/2023 1.14  0.76 - 1.27 mg/dL Final    Sodium 10/20/2023 139  136 - 145 mmol/L Final    Potassium 10/20/2023 4.5  3.5 - 5.2 mmol/L Final    Slight hemolysis detected by analyzer. Results may be affected.    Chloride 10/20/2023 105  98 - 107 mmol/L Final    CO2 10/20/2023 25.8  22.0 - 29.0 mmol/L Final    Calcium 10/20/2023 9.1  8.6 - 10.5 mg/dL Final    Total Protein 10/20/2023 6.9  6.0 - 8.5 g/dL Final    Albumin 10/20/2023 4.3  3.5 - 5.2 g/dL Final    ALT (SGPT) 10/20/2023 26  1 - 41 U/L Final    AST (SGOT) 10/20/2023 25  1 - 40 U/L Final    Alkaline Phosphatase 10/20/2023 75  39 - 117 U/L Final    Total Bilirubin 10/20/2023 0.4  0.0 - 1.2 mg/dL Final    Globulin 10/20/2023 2.6  gm/dL Final    A/G Ratio 10/20/2023 1.7  g/dL Final    BUN/Creatinine Ratio 10/20/2023 13.2  7.0 - 25.0 Final    Anion Gap 10/20/2023 8.2  5.0 - 15.0 mmol/L Final    eGFR 10/20/2023 74.5  >60.0 mL/min/1.73 Final     WBC 10/20/2023 7.37  3.40 - 10.80 10*3/mm3 Final    RBC 10/20/2023 5.79  4.14 - 5.80 10*6/mm3 Final    Hemoglobin 10/20/2023 15.0  13.0 - 17.7 g/dL Final    Hematocrit 10/20/2023 45.9  37.5 - 51.0 % Final    MCV 10/20/2023 79.3  79.0 - 97.0 fL Final    MCH 10/20/2023 25.9 (L)  26.6 - 33.0 pg Final    MCHC 10/20/2023 32.7  31.5 - 35.7 g/dL Final    RDW 10/20/2023 13.1  12.3 - 15.4 % Final    RDW-SD 10/20/2023 36.9 (L)  37.0 - 54.0 fl Final    MPV 10/20/2023 9.0  6.0 - 12.0 fL Final    Platelets 10/20/2023 238  140 - 450 10*3/mm3 Final    Neutrophil % 10/20/2023 59.1  42.7 - 76.0 % Final    Lymphocyte % 10/20/2023 27.0  19.6 - 45.3 % Final    Monocyte % 10/20/2023 9.2  5.0 - 12.0 % Final    Eosinophil % 10/20/2023 3.7  0.3 - 6.2 % Final    Basophil % 10/20/2023 0.7  0.0 - 1.5 % Final    Immature Grans % 10/20/2023 0.3  0.0 - 0.5 % Final    Neutrophils, Absolute 10/20/2023 4.36  1.70 - 7.00 10*3/mm3 Final    Lymphocytes, Absolute 10/20/2023 1.99  0.70 - 3.10 10*3/mm3 Final    Monocytes, Absolute 10/20/2023 0.68  0.10 - 0.90 10*3/mm3 Final    Eosinophils, Absolute 10/20/2023 0.27  0.00 - 0.40 10*3/mm3 Final    Basophils, Absolute 10/20/2023 0.05  0.00 - 0.20 10*3/mm3 Final    Immature Grans, Absolute 10/20/2023 0.02  0.00 - 0.05 10*3/mm3 Final    nRBC 10/20/2023 0.0  0.0 - 0.2 /100 WBC Final   Admission on 10/18/2023, Discharged on 10/18/2023   Component Date Value Ref Range Status    Extra Tube 10/18/2023 Hold for add-ons.   Final    Auto resulted.    Extra Tube 10/18/2023 hold for add-on   Final    Auto resulted    Extra Tube 10/18/2023 Hold for add-ons.   Final    Auto resulted.    Extra Tube 10/18/2023 Hold for add-ons.   Final    Auto resulted    QT Interval 10/18/2023 312  ms Final    QTC Interval 10/18/2023 408  ms Final    Glucose 10/18/2023 110 (H)  65 - 99 mg/dL Final    BUN 10/18/2023 12  6 - 20 mg/dL Final    Creatinine 10/18/2023 1.14  0.76 - 1.27 mg/dL Final    Sodium 10/18/2023 138  136 - 145 mmol/L Final     Potassium 10/18/2023 4.4  3.5 - 5.2 mmol/L Final    Slight hemolysis detected by analyzer. Results may be affected.    Chloride 10/18/2023 103  98 - 107 mmol/L Final    CO2 10/18/2023 27.3  22.0 - 29.0 mmol/L Final    Calcium 10/18/2023 9.5  8.6 - 10.5 mg/dL Final    Total Protein 10/18/2023 7.0  6.0 - 8.5 g/dL Final    Albumin 10/18/2023 4.4  3.5 - 5.2 g/dL Final    ALT (SGPT) 10/18/2023 8  1 - 41 U/L Final    AST (SGOT) 10/18/2023 26  1 - 40 U/L Final    Alkaline Phosphatase 10/18/2023 80  39 - 117 U/L Final    Total Bilirubin 10/18/2023 0.3  0.0 - 1.2 mg/dL Final    Globulin 10/18/2023 2.6  gm/dL Final    A/G Ratio 10/18/2023 1.7  g/dL Final    BUN/Creatinine Ratio 10/18/2023 10.5  7.0 - 25.0 Final    Anion Gap 10/18/2023 7.7  5.0 - 15.0 mmol/L Final    eGFR 10/18/2023 74.5  >60.0 mL/min/1.73 Final    HS Troponin T 10/18/2023 <6  <15 ng/L Final    Extra Tube 10/18/2023 Hold for add-ons.   Final    Auto resulted.    Extra Tube 10/18/2023 hold for add-on   Final    Auto resulted    WBC 10/18/2023 7.81  3.40 - 10.80 10*3/mm3 Final    RBC 10/18/2023 5.48  4.14 - 5.80 10*6/mm3 Final    Hemoglobin 10/18/2023 14.3  13.0 - 17.7 g/dL Final    Hematocrit 10/18/2023 44.4  37.5 - 51.0 % Final    MCV 10/18/2023 81.0  79.0 - 97.0 fL Final    MCH 10/18/2023 26.1 (L)  26.6 - 33.0 pg Final    MCHC 10/18/2023 32.2  31.5 - 35.7 g/dL Final    RDW 10/18/2023 13.1  12.3 - 15.4 % Final    RDW-SD 10/18/2023 38.1  37.0 - 54.0 fl Final    MPV 10/18/2023 8.9  6.0 - 12.0 fL Final    Platelets 10/18/2023 234  140 - 450 10*3/mm3 Final    Neutrophil % 10/18/2023 56.3  42.7 - 76.0 % Final    Lymphocyte % 10/18/2023 29.2  19.6 - 45.3 % Final    Monocyte % 10/18/2023 9.7  5.0 - 12.0 % Final    Eosinophil % 10/18/2023 3.7  0.3 - 6.2 % Final    Basophil % 10/18/2023 0.8  0.0 - 1.5 % Final    Immature Grans % 10/18/2023 0.3  0.0 - 0.5 % Final    Neutrophils, Absolute 10/18/2023 4.40  1.70 - 7.00 10*3/mm3 Final    Lymphocytes, Absolute 10/18/2023  2.28  0.70 - 3.10 10*3/mm3 Final    Monocytes, Absolute 10/18/2023 0.76  0.10 - 0.90 10*3/mm3 Final    Eosinophils, Absolute 10/18/2023 0.29  0.00 - 0.40 10*3/mm3 Final    Basophils, Absolute 10/18/2023 0.06  0.00 - 0.20 10*3/mm3 Final    Immature Grans, Absolute 10/18/2023 0.02  0.00 - 0.05 10*3/mm3 Final    nRBC 10/18/2023 0.0  0.0 - 0.2 /100 WBC Final    HS Troponin T 10/18/2023 <6  <15 ng/L Final    Troponin T Delta 10/18/2023    Final    Unable to calculate.        IPSS Questionnaire (AUA-7):  Over the past month…    1)  How often have you had a sensation of not emptying your bladder completely after you finish urinating?  0 - Not at all   2)  How often have you had to urinate again less than two hours after you finished urinating? 3 - About half the time   3)  How often have you found you stopped and started again several times when you urinated?  0 - Not at all   4) How difficult have you found it to postpone urination?  0 - Not at all   5) How often have you had a weak urinary stream?  0 - Not at all   6) How often have you had to push or strain to begin urination?  0 - Not at all   7) How many times did you most typically get up to urinate from the time you went to bed until the time you got up in the morning?  2 - 2 times   Total Score:  5     Smoking Cessation Counseling:  Former smoker.CHEW TOBACCO  I advised patient to quit tobacco use and offered support.  I provided patient with tobacco cessation educational material printed in the patient's After Visit Summary.     Follow Up   Return in about 8 weeks (around 1/28/2024) for Next scheduled follow up, With Dr. Mckeon , RECURRENT UTI/DYSURIA/ACUTE PROSTATITIS/BPH-RECHECK PSA.  The patient will follow up in 4 weeks for reevaluation and PSA recheck.    Patient was given instructions and counseling regarding his condition or for health maintenance advice. Please see specific information pulled into the AVS if appropriate.          This document has been  electronically signed by Griselda Cheng-Akwa, APRN   December 3, 2023 01:06 EST      Dictated Utilizing Dragon Dictation: Part of this note may be an electronic transcription/translation of spoken language to printed text using the Dragon Dictation System.    Transcribed from ambient dictation for Griselda Cheng-Akwa, APRN by Brooke Castellanos.  12/01/23   10:41 EST    Patient or patient representative verbalized consent to the visit recording.  I have personally performed the services described in this document as transcribed by the above individual, and it is both accurate and complete.

## 2023-12-02 LAB
BACTERIA SPEC AEROBE CULT: NO GROWTH
BACTERIA SPEC AEROBE CULT: NORMAL

## 2023-12-06 ENCOUNTER — HOSPITAL ENCOUNTER (OUTPATIENT)
Dept: CARDIOLOGY | Facility: HOSPITAL | Age: 58
Discharge: HOME | End: 2023-12-06
Admitting: NURSE PRACTITIONER
Payer: COMMERCIAL

## 2023-12-06 DIAGNOSIS — R07.2 PRECORDIAL PAIN: ICD-10-CM

## 2023-12-06 LAB
BH CV ECHO MEAS - ACS: 1.8 CM
BH CV ECHO MEAS - AO MAX PG: 9.6 MMHG
BH CV ECHO MEAS - AO MEAN PG: 5 MMHG
BH CV ECHO MEAS - AO ROOT DIAM: 3.6 CM
BH CV ECHO MEAS - AO V2 MAX: 155 CM/SEC
BH CV ECHO MEAS - AO V2 VTI: 27.5 CM
BH CV ECHO MEAS - EDV(CUBED): 140.6 ML
BH CV ECHO MEAS - EDV(MOD-SP4): 91.3 ML
BH CV ECHO MEAS - EF(MOD-BP): 54 %
BH CV ECHO MEAS - EF(MOD-SP4): 54 %
BH CV ECHO MEAS - ESV(CUBED): 46.7 ML
BH CV ECHO MEAS - ESV(MOD-SP4): 42 ML
BH CV ECHO MEAS - FS: 30.8 %
BH CV ECHO MEAS - IVS/LVPW: 0.75 CM
BH CV ECHO MEAS - IVSD: 0.9 CM
BH CV ECHO MEAS - LA DIMENSION: 4.4 CM
BH CV ECHO MEAS - LAT PEAK E' VEL: 8.6 CM/SEC
BH CV ECHO MEAS - LV DIASTOLIC VOL/BSA (35-75): 38.3 CM2
BH CV ECHO MEAS - LV MASS(C)D: 207.3 GRAMS
BH CV ECHO MEAS - LV SYSTOLIC VOL/BSA (12-30): 17.6 CM2
BH CV ECHO MEAS - LVIDD: 5.2 CM
BH CV ECHO MEAS - LVIDS: 3.6 CM
BH CV ECHO MEAS - LVOT AREA: 4.5 CM2
BH CV ECHO MEAS - LVOT DIAM: 2.4 CM
BH CV ECHO MEAS - LVPWD: 1.2 CM
BH CV ECHO MEAS - MED PEAK E' VEL: 6.2 CM/SEC
BH CV ECHO MEAS - MV A MAX VEL: 76.5 CM/SEC
BH CV ECHO MEAS - MV DEC SLOPE: 446 CM/SEC2
BH CV ECHO MEAS - MV DEC TIME: 0.19 SEC
BH CV ECHO MEAS - MV E MAX VEL: 82.6 CM/SEC
BH CV ECHO MEAS - MV E/A: 1.08
BH CV ECHO MEAS - PA ACC TIME: 0.15 SEC
BH CV ECHO MEAS - SI(MOD-SP4): 20.7 ML/M2
BH CV ECHO MEAS - SV(MOD-SP4): 49.3 ML
BH CV ECHO MEAS - TAPSE (>1.6): 2.8 CM
BH CV ECHO MEASUREMENTS AVERAGE E/E' RATIO: 11.16
LEFT ATRIUM VOLUME INDEX: 16.4 ML/M2

## 2023-12-06 PROCEDURE — 93306 TTE W/DOPPLER COMPLETE: CPT

## 2023-12-07 ENCOUNTER — TELEPHONE (OUTPATIENT)
Dept: CARDIOLOGY | Facility: CLINIC | Age: 58
End: 2023-12-07
Payer: COMMERCIAL

## 2023-12-07 ENCOUNTER — OFFICE VISIT (OUTPATIENT)
Dept: FAMILY MEDICINE CLINIC | Facility: CLINIC | Age: 58
End: 2023-12-07
Payer: COMMERCIAL

## 2023-12-07 VITALS
OXYGEN SATURATION: 96 % | TEMPERATURE: 97.8 F | DIASTOLIC BLOOD PRESSURE: 80 MMHG | BODY MASS INDEX: 35.05 KG/M2 | HEIGHT: 72 IN | HEART RATE: 78 BPM | SYSTOLIC BLOOD PRESSURE: 128 MMHG | WEIGHT: 258.8 LBS

## 2023-12-07 DIAGNOSIS — J06.9 UPPER RESPIRATORY TRACT INFECTION, UNSPECIFIED TYPE: Primary | ICD-10-CM

## 2023-12-07 DIAGNOSIS — J32.9 CHRONIC SINUSITIS, UNSPECIFIED LOCATION: ICD-10-CM

## 2023-12-07 RX ORDER — FLUTICASONE PROPIONATE 50 MCG
2 SPRAY, SUSPENSION (ML) NASAL DAILY
Qty: 16 ML | Refills: 0 | Status: SHIPPED | OUTPATIENT
Start: 2023-12-07

## 2023-12-07 RX ORDER — DEXAMETHASONE SODIUM PHOSPHATE 4 MG/ML
4 INJECTION, SOLUTION INTRA-ARTICULAR; INTRALESIONAL; INTRAMUSCULAR; INTRAVENOUS; SOFT TISSUE ONCE
Status: COMPLETED | OUTPATIENT
Start: 2023-12-07 | End: 2023-12-07

## 2023-12-07 RX ORDER — AMOXICILLIN AND CLAVULANATE POTASSIUM 875; 125 MG/1; MG/1
1 TABLET, FILM COATED ORAL 2 TIMES DAILY
Qty: 20 TABLET | Refills: 0 | Status: SHIPPED | OUTPATIENT
Start: 2023-12-07

## 2023-12-07 RX ADMIN — DEXAMETHASONE SODIUM PHOSPHATE 4 MG: 4 INJECTION, SOLUTION INTRA-ARTICULAR; INTRALESIONAL; INTRAMUSCULAR; INTRAVENOUS; SOFT TISSUE at 10:11

## 2023-12-07 NOTE — TELEPHONE ENCOUNTER
Spoke to patient regarding test results . They are understanding of them and will keep follow up appointment on 01/10/24----- Message from EDDIE Mcdonald sent at 12/7/2023  7:56 AM EST -----  Please call patient about their normal result.    Echocardiogram shows normal pumping function of the heart and no hemodynamically significant valvular abnormalities.  This result, combined with the stress test results, are reassuring.    Please keep your appointment with Dr. Jordan in January to discuss further.    Thanks, Marleni

## 2023-12-07 NOTE — TELEPHONE ENCOUNTER
Spoke to patient regarding normal test results. Patient is understanding and will keep follow up appointment with Dr Chacon on 01/10/24----- Message from EDDIE Mcdonald sent at 12/7/2023  7:56 AM EST -----  Please call patient about their normal result.    Echocardiogram shows normal pumping function of the heart and no hemodynamically significant valvular abnormalities.  This result, combined with the stress test results, are reassuring.    Please keep your appointment with Dr. Jordan in January to discuss further.    Thanks, Marleni

## 2023-12-12 NOTE — PROGRESS NOTES
"Chief Complaint: \"Pain in my lower back and left leg.\"      History of Present Illness:   Patient: Mr. Avery Watson, 58 y.o. male   Referring Physician: Dr. Aquiles Grigsby  Reason for Referral: Consultation for chronic intractable lower back and lower extremity pain.   Pain History: Patient reports a greater than 30-year history of chronic intractable lower back and lower extremity pain, which began without incident. Avery Watson is a former patient from 2016, then referred by Dr Ness, who suggested treatment for facet arthropathy (had Dx lumbar MBBs = no relief) or an epidural (had LES without relief). He has been suffering from lower back and lower extremity pain since his late teens. Ultimately, he presented with severe lower back and right lower extremity pain and on 09/22/2022, he underwent right L4-L5 lumbar discectomy with Dr. Grigsby.  His right lower extremity pain has resolved. He has continued experiencing lower back pain that radiates into his left lower extremity. Avery Watson did not undergo MRI since his surgery.  Avery Watson underwent neurosurgical consultation with Dr. Aquiles Grigsby on 12/9/2022, and was found not to be a surgical candidate as patient presents with chronic left-sided pain for which he did not have a surgical solution and suggested spinal cord stimulation.  Avery Watson underwent psychological consultation on 10/17/2023 & 11/07/2023 with Yee Farnsworth PhD & Delaney Finley PhD: \"From a psychological perspective patient is considered to be an appropriate candidate for a SCS.\"  MRI of the lumbar spine prior to surgery did not reveal significant nerve root compromise on the left side at any level. CT of the lumbar spine w/o contrast on 12/24/2022 revealed moderate disc space narrowing at L5-S1 with mild posterior osteophyte formations. No significant spinal stenosis. Pain has progressed in intensity over the past years. Avery Watson has " failed to obtain pain relief with conservative measures for more than 30 years including oral analgesics, opioids, topical analgesics, ice, heat, TENs, physical therapy (last visit within the past 18 months, got worse), physical therapist directed home exercise program HEP (ongoing), chiropractic therapy (in the past), to name a few  Pain Description:  Constant lower back pain with intermittent exacerbation, described as aching, dull, sharp, stabbing, throbbing,  burning, shooting, numbing, and tingling sensation.   Radiation of Pain: The pain radiates into the left lower extremity to left foot  Pain intensity today: 6/10   Average pain intensity last week: 8/10  Pain intensity ranges from: 5/10 to 10/10  Aggravating factors: Pain increases with lifting, pushing, pulling, movement, bending forward, arching the back, twisting, protracted sitting, standing, walking. Patient describes neurogenic claudication.  Patient reports limitations and general mobility deficits. Patient does not use a cane or walker   Alleviating factors: Pain decreases with lying down flat in bed  Associated Symptoms:   Patient reports pain and numbness but denies weakness in the left lower extremity. Patient denies symptoms in the opposite limb  Patient denies any new bladder or bowel problems.   Patient reports difficulties with his balance but denies recent falls.   Pain interferes with ADLs, general activities (ability to walk, stand, transition from different positions), and affects patient's quality of life  Pain interferes with sleep: falling asleep and causing sleep fragmentation   Muscle spasms: No  Stiffness: LB    Review of previous therapies and additional medical records:  Avery Watson has already failed the following measures, including:   Conservative Measures:  Oral analgesics, opioids, topical analgesics, ice, heat, TENs, physical therapy (last visit within the past 18 months, got worse), physical therapist directed home  "exercise program HEP (ongoing), chiropractic therapy (in the past)  Interventional Measures:   Pain clinic in [place] 4-5 injections prior to surgery: No relief  2016 (MBBs: No relief) and 2017 (LES: No relief)  Surgical Measures: 09/22/2022, he underwent right L4-L5 lumbar discectomy with Dr. Grigsby  Avery Watson underwent neurosurgical consultation with Dr. Aquiles Grigsby on 12/9/2022, and was found not to be a surgical candidate.  Avery Watson underwent psychological consultation on 10/17/2023 & 11/07/2023 with Yee Farnsworth PhD & Delaney Finely PhD: \"From a psychological perspective patient is considered to be an appropriate candidate for a SCS.\"   Avery Watson presents with significant comorbidities including hypertension, dyslipidemia, GERD, migraines, osteoarthritis, benign prostatic hyperplasia with urinary obstruction,   In terms of current analgesics, Avery Watson takes: celecoxib. Patient also takes trazodone, bupropion, buspirone, duloxetine, Prozac, ondansetron   I have reviewed Juan Report consistent with medication reconciliation.  SOAPP/ORT: Low Risk     PHQ-9 Depression Screening  Little interest or pleasure in doing things? 2-->more than half the days   Feeling down, depressed, or hopeless? 1-->several days   Trouble falling or staying asleep, or sleeping too much? 3-->nearly every day   Feeling tired or having little energy? 2-->more than half the days   Poor appetite or overeating? 1-->several days   Feeling bad about yourself - or that you are a failure or have let yourself or your family down? 0-->not at all   Trouble concentrating on things, such as reading the newspaper or watching television? 1-->several days   Moving or speaking so slowly that other people could have noticed? Or the opposite - being so fidgety or restless that you have been moving around a lot more than usual? 0-->not at all   Thoughts that you would be better off dead, or of hurting yourself " in some way? 0-->not at all   PHQ-9 Total Score 10   If you checked off any problems, how difficult have these problems made it for you to do your work, take care of things at home, or get along with other people? somewhat difficult        Pain Self-Efficacy Questionnaire (PSEQ)  ITEM 12-14  2023        I can enjoy things despite the pain. 0        I can do most of the household chores (tidying up, washing dishes, etc), despite the pain. 2        I can socialize with my friends or family members as often as I used to do, despite the pain. 3        I can cope with my pain in most situations. 2        I can do some form of work, despite the pain (includes housework, paid, and unpaid work). 1        I can still do many of the things I enjoy doing, such as hobbies or leisure activity despite pain. 1        I can cope with my pain without medications. 2        I can accomplish most of my goals in life despite the pain. 3        I can live in a normal lifestyle, despite the pain. 4        I can gradually become more active, despite the pain. 1        TOTAL SCORE 19/60            Global Pain Scale 12-14 2023          Pain 16          Feelings 4          Clinical outcomes 14          Activities 14          GPS Total: 48            The [place] Back Pain Disability Scale   DATE 12-14 2023          Sleep through the night 3          Turn over in bed 2          Get out of bed 3          Make your bed 1          Put on socks (pantyhose) 3          Ride in a car 2          Sit in a chair for several hours 4          Stand up for 20-30 minutes 2          Climb one flight of stairs 4          Walk a few blocks (200-300 yards)  3          Walk several miles 5          Run one block (about 50 yards) 5          Take food out of the refrigerator 1          Reach up to high shelves 2          Move a chair 3          Pull or push heavy doors 2          Bend over to clean the bathtub 5          Throw a ball 2          Carry two bags of  groceries 2          Lift and carry a heavy suitcase 5          Total score 59            Review of Diagnostic Studies:  I have independently reviewed and interpreted the images with the patient and used the images and a tridimensional spine model to explain findings. I have also reviewed the reports.  MRI of the lumbar spine w/o contrast on 08/29/2022 (prior to surgery) revealed normal height and alignment of the lumbar vertebral bodies.  The spinal cord terminates at T12 with normal signal seen within the conus. 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  Axial images:  T12-L1: No significant spinal canal stenosis or neural foraminal stenosis.  L1-L2: Mild degenerative facet changes. No significant spinal canal stenosis or neural foraminal stenosis.  L2-L3: Circumferential disc bulge. Mild degenerative facet arthropathy. No significant spinal canal stenosis or neural foraminal stenosis.  L3-L4: Mild degenerative facet arthropathy. No significant spinal canal stenosis or neural foraminal stenosis.  L4-L5: Circumferential disc bulge with right paracentral disc protrusion that displaces the right L5 nerve root in the lateral recess. Mild degenerative facet arthropathy. No significant spinal canal stenosis or neural foraminal stenosis.  L5-S1: Disc bulge. Moderate degenerative facet arthropathy. No significant spinal canal stenosis or neural foraminal stenosis.    Review of Systems   HENT:  Positive for tinnitus.    Musculoskeletal:  Positive for arthralgias and back pain.   Neurological:  Positive for light-headedness.   Psychiatric/Behavioral:  The patient is nervous/anxious.    All other systems reviewed and are negative.        Patient Active Problem List   Diagnosis    Benign prostatic hyperplasia with urinary obstruction    Fatigue    Multilevel degenerative disc disease    Migraine    Shoulder pain    Spondylosis of lumbar region without myelopathy or radiculopathy    Degeneration of lumbar or lumbosacral intervertebral disc     Myofascial pain    Bilateral carpal tunnel syndrome    Displacement of intervertebral disc of mid-cervical region    Neuroforaminal stenosis of spine, right C6-C7    Carpal tunnel syndrome, left    Chest pain    Chronic lumbar radiculopathy    Low testosterone    Dyslipidemia    Essential hypertension    Lumbar postlaminectomy syndrome    BMI 35.0-35.9,adult    Moderate obesity    Physical deconditioning       Past Medical History:   Diagnosis Date    Acid reflux     Chronic pain disorder     Depression     Essential hypertension 10/27/2023    Extremity pain     Joint pain     Low back pain     Lumbosacral disc disease     Migraine     Muscle spasm     Neck pain     Osteoarthritis     PONV (postoperative nausea and vomiting)     Rheumatoid arthritis          Past Surgical History:   Procedure Laterality Date    BACK SURGERY      CARPAL TUNNEL RELEASE Right 08/04/2016    Procedure: CARPAL TUNNEL RELEASE;  Surgeon: Yoel Lua MD;  Location:  [facility];  Service:     CARPAL TUNNEL RELEASE Left 11/09/2017    Procedure: CARPAL TUNNEL RELEASE;  Surgeon: Yoel Lua MD;  Location:  [facility];  Service:     COLONOSCOPY      LUMBAR DISCECTOMY Right 09/22/2022    Procedure: LUMBAR DISCECTOMY L4-5 RIGHT;  Surgeon: Aquiles Grigsby MD;  Location:  [facility];  Service: Neurosurgery;  Laterality: Right;    LUMBAR FACET INJECTION      TOE SURGERY Right 03/04/2015         Family History   Problem Relation Age of Onset    Lung cancer Mother     Cancer Mother         ovarian    Diabetes Sister     Cancer Sister         breast    Heart disease Brother     Rheum arthritis Brother     Arthritis Brother          Social History     Socioeconomic History    Marital status:    Tobacco Use    Smoking status: Never    Smokeless tobacco: Former     Types: Chew    Tobacco comments:     uses chewing tobacco   Vaping Use    Vaping Use: Never used   Substance and Sexual Activity    Alcohol use: No    Drug use: No     "Sexual activity: Yes     Partners: Female     Birth control/protection: Same-sex partner           Current Outpatient Medications:     amoxicillin-clavulanate (AUGMENTIN) 875-125 MG per tablet, Take 1 tablet by mouth 2 (Two) Times a Day., Disp: 20 tablet, Rfl: 0    atorvastatin (LIPITOR) 10 MG tablet, Take 1 tablet by mouth Daily., Disp: 90 tablet, Rfl: 1    buPROPion (WELLBUTRIN) 75 MG tablet, Take 1 tablet by mouth every day, Disp: 30 tablet, Rfl: 0    busPIRone (BUSPAR) 5 MG tablet, Take 1 tablet by mouth 2 (Two) Times a Day As Needed., Disp: 60 tablet, Rfl: 2    celecoxib (CeleBREX) 200 MG capsule, Take 1 capsule by mouth 2 times daily as needed for joint pain/stiffness., Disp: 180 capsule, Rfl: 2    dexlansoprazole (DEXILANT) 60 MG capsule, Take 1 capsule by mouth Daily., Disp: 90 capsule, Rfl: 5    DULoxetine (Cymbalta) 60 MG capsule, , Disp: , Rfl:     FLUoxetine (PROzac) 40 MG capsule, Take 1 capsule by mouth Every Morning., Disp: 90 capsule, Rfl: 1    fluticasone (FLONASE) 50 MCG/ACT nasal spray, Use 2 sprays in nostril(s) daily as directed by provider., Disp: 16 mL, Rfl: 0    metoprolol tartrate (LOPRESSOR) 25 MG tablet, Take 1 tablet by mouth 2 (Two) Times a Day., Disp: 60 tablet, Rfl: 11    ondansetron (Zofran) 4 MG tablet, Take 1 tablet by mouth Every 12 (Twelve) Hours As Needed for Nausea., Disp: 20 tablet, Rfl: 0    phenazopyridine (PYRIDIUM) 100 MG tablet, Take 1 tablet by mouth 3 (Three) Times a Day As Needed for Bladder Spasms., Disp: 20 tablet, Rfl: 0    sulfamethoxazole-trimethoprim (Bactrim DS) 800-160 MG per tablet, Take 1 tablet by mouth 2 (Two) Times a Day for 28 days., Disp: 56 tablet, Rfl: 2    Syringe 25G X 5/8\" 3 ML misc, Use as directed 2 times weekly (use with testosterone injection), Disp: 24 each, Rfl: 3    tadalafil (Cialis) 5 MG tablet, Take 1 tablet by mouth daily as needed for erectile dysfunction., Disp: 30 tablet, Rfl: 6    tamsulosin (FLOMAX) 0.4 MG capsule 24 hr capsule, Take " "2 capsules by mouth Daily for prostate., Disp: 180 capsule, Rfl: 1    terbinafine (lamiSIL) 250 MG tablet, Take 1 tablet by mouth once daily on the first 7 days of each month., Disp: 84 tablet, Rfl: 0    Testosterone Cypionate (Depo-Testosterone) 200 MG/ML injection, Inject 0.5 mL under the skin every Monday and Thursday., Disp: 10 mL, Rfl: 2    traZODone (DESYREL) 50 MG tablet, Take 1 or 2 tablets by mouth every day at bedtime., Disp: 60 tablet, Rfl: 2    vitamin D (ERGOCALCIFEROL) 1.25 MG (87498 UT) capsule capsule, Take 1 capsule by mouth Every 7 (Seven) Days., Disp: 12 capsule, Rfl: 2      No Known Allergies      Ht 182.9 cm (72\")   Wt 120 kg (264 lb 12.8 oz)   BMI 35.91 kg/m²       Physical Exam:  Constitutional: Patient appears well-developed, well-nourished, well-hydrated, HEENT: Head: Normocephalic and atraumatic  Eyes: Conjunctivae and lids are normal  Pupils: Equal, round, reactive to light  Peripheral vascular exam: Femoral: right 2+, left 2+. Posterior tibialis: right 2+ and left 2+. Dorsalis pedis: right 2+ and left 2+. No edema.   Musculoskeletal   Gait and station: Gait evaluation demonstrated a normal gait. Able to walk on heels, toes, tandem walking   Lumbar Spine: Passive and active range of motion are limited secondary to pain. Extension, flexion, lateral flexion, rotation of the lumbar spine increased and reproduced pain. Lumbar facet joint loading maneuvers are positive.  Sacroiliac Joints: Bo's test, Gaenslen's test, thigh thrust test, SI compression test, posterior shear test, SI distraction test, pelvic rock test, Yeoman's test: negative   Piriformis maneuvers: Negative   Right Hip Joint: The range of motion of the hip joint is almost full and without pain   Left Hip Joint: The range of motion of the hip joint is almost full and without pain   Palpation of the bilateral ischial tuberosities:  Unrevealing   Palpation of the bilateral psoas tendons and iliopsoas bursas: Reveals " tenderness on the left with exacerbation with provocative maneuvers  Palpation of the bilateral greater trochanters: Unrevealing   Examination of the Iliotibial band: Unrevealing   Neurological:   Patient is alert and oriented to person, place, and time.   Speech: Normal.   Cortical function: Normal mental status.   Cranial nerves 2-12: intact.   Reflex Scores:  Right patellar: 1+  Left patellar: 1+  Right Achilles: 1+  Left Achilles: 1+  Motor strength: 5/5  Motor Tone: Normal  Involuntary movements: None.   Superficial/Primitive Reflexes: Primitive reflexes were absent.   Right Casiano: Absent  Left Casiano: Absent  Right ankle clonus: Absent  Left ankle clonus: Absent   Babinsky: Absent  Long tract signs: Negative. Straight leg raising test: Negative. Positive on the left at 30-40 degrees with positive Lasegue. Femoral stretch sign: Negative.   Sensory exam: Intact to light touch, intact pain and temperature sensation, intact vibration sensation and normal proprioception  Coordination: Finger to nose: Normal.  Heel to shin: Normal. Balance: Normal Romberg's sign: Negative   Skin and subcutaneous tissue: Skin is warm and intact. No rash noted. No cyanosis.   Psychiatric: Judgment and insight: Normal. Recent and remote memory: Intact. Mood and affect: Normal.     ASSESSMENT:   1. Lumbar postlaminectomy syndrome    2. Chronic lumbar radiculopathy    3. Degeneration of lumbar or lumbosacral intervertebral disc    4. Spondylosis of lumbar region without myelopathy or radiculopathy    5. BMI 35.0-35.9,adult    6. Moderate obesity    7. Physical deconditioning        PLAN/MEDICAL DECISION MAKING:  Mr. Avery Watson, 58 y.o. male presents with a greater than 30-year history of chronic intractable lower back and lower extremity pain. He failed conservative and interventional pain management measures at different facilities and ultimately on 09/22/2022 he underwent right L4-L5 lumbar discectomy with Dr. Grigsby. His  "right lower extremity pain has resolved since then. Unfortunately, he has continued experiencing severe lower back pain that radiates into his left lower extremity into his left foot. Avery Watson underwent neurosurgical consultation with Dr. Aquiles Grigsby on 12/09/2022, and was found not to be a surgical candidate as patient presents with chronic left-sided pain for which he did not have a surgical solution and suggested spinal cord stimulation. Avery Watson underwent psychological consultation on 10/17/2023 & 11/07/2023 with Yee Farnsworth PhD & Delaney Finley PhD: \"From a psychological perspective patient is considered to be an appropriate candidate for a SCS.\"  MRI of the lumbar spine prior to surgery did not reveal significant nerve root compromise on the left side at any level. CT of the lumbar spine w/o contrast on 12/24/2022 revealed moderate disc space narrowing at L5-S1 with mild posterior osteophyte formations. No significant spinal stenosis. Avery Watson did not undergo MRI since his surgery.  Pain has progressed in intensity over the past years. Avery Watson has failed to obtain pain relief with conservative measures for more than 30 years including oral analgesics, opioids, topical analgesics, ice, heat, TENs, physical therapy (last visit within the past 18 months, got worse), physical therapist directed home exercise program HEP (ongoing), chiropractic therapy (in the past), to name a few. A comprehensive evaluation including history and physical exam along with pertinent physiologic and functional assessment was performed. Patient presents with intractable pain due to the diagnoses listed above. Patient has failed to respond to conservative modalities and previous surgical interventions, as referenced under HPI. Patient failed to respond to minimally invasive interventional pain management measures prior to his lumbar surgery, as referenced from previous notes and under " HPI. I have documented the impact of patient's moderate-to-severe pain contributing to significant impairment in daily activities, ADLs, and a negative impact on the patient's quality of life, as reflected on Global Pain Scale 48/100; The [place] Back Pain Disability Scale 59/100; Tinetti Gait & Balance Assessment Tool  (low risk for falls). I have reviewed pertinent supporting diagnostic studies of patient's chronic pain condition as well as all available pertinent medical records to patient's chronic pain condition including previous therapies, as referenced above. PHQ-9 Depression Screening 10; Pain Self-Efficacy Questionnaire (PSEQ) 19/60. I had a lengthy conversation with . Avery Watson regarding his chronic pain condition and potential therapeutic options including risks, benefits, alternative therapies, to name a few. We have discussed using a stepwise approach starting with the least intense level of care as determined by the extent required to diagnose and or treat a patient's condition. The proposed treatments are consistent with the patient's medical condition and known to be safe and effective by current guidelines and the standard of care. The duration and frequency proposed are considered appropriate for the service in accordance with accepted standards of medical practice for the diagnosis and treatment of the patient's condition and intended to improve the patient's level of function. These services will be furnished in a setting appropriate to the patient's medical needs and condition. Therefore, I have proposed the following plan:    1. Interventional pain management measures: Patient does not take blood thinners. Patient will be scheduled for a spinal cord stimulator trial with [email or web address]. Patient has received formal education from me including risks, benefits, and alternative treatments, as well as detailed information regarding the procedure and specific goals for the trial.  Patient has also received didactic materials including educational booklets and DVDs on spinal cord stimulation therapies.    2. Diagnostic studies:   A. MRI of the lumbar spine with/without contrast, creatinine level  B. MRI of the thoracic spine without contrast to assess capacity and patency of the spinal canal and epidural space prior to spinal cord stimulator trial and implant  C. Flexion and extension X-rays of the lumbar spine to assess lumbar stability  D. EMG/NCV of the bilateral lower extremities   E. CBC, PT, PTT prior to SCS trial    3. Pharmacological measures: Reviewed and discussed; Patient takes celecoxib, duloxetine. Patient also takes trazodone, bupropion, buspirone, Prozac, ondansetron      4. Long-term rehabilitation efforts:  A. The patient does not have a history of falls. Also, I performed a risk assessment for falls using the Tinetti gait & balance assessment tool (scored; low risk for falls).   B. Patient will start a comprehensive physical therapy program for Alter-G, water therapy, gait and balance training, neurodynamics, core strengthening, gluteal and abductor strengthening, ultrasound, ASTYM, E-STIM, myofascial release, cupping dry needling, 2-3 x per week for 8 weeks once pain is under control   C. Contrast therapy: Apply ice-packs for 15-20 minutes, followed by heating pads for 15-20 minutes to affected area   D. Start a low impact exercise program such as water therapy, swimming, yoga  E. I have prescribed a home exercise program with instructions. I have spent a significant amount of time talking with the patient and providing specific recommendations regarding lifestyle modification, preventive measures, and self-care management of chronic pain.  In addition, I have provided a copy of the booklet Care of the back by Giovani Quintero MD and Kaitlynn Wolf MD to be used as a physician supervised home exercise program  F. Referral to [facility].  Patient's Body mass index is 35.91 kg/m². Patient counseled on the importance of weight loss to help with overall health and pain control.   GOscar Watson  reports that he has never smoked. He has quit using smokeless tobacco.  His smokeless tobacco use included chew.    5. The patient has been instructed to contact my office with any questions or difficulties. The patient understands the plan and agrees to proceed accordingly.    The patient has a documented plan of care to address chronic pain. Avery Watson reports a pain score of 7/10.  Given his pain assessment as noted, treatment options were discussed and the following options were decided upon as a follow-up plan to address the patient's pain: continuation of current treatment plan for pain, educational materials on pain management, home exercises and therapy, prescription for non-opiod analgesics, referral to Physical Therapy, referral to specialist for assistance in pain treatment guidance, use of non-medical modalities (ice, heat, stretching and/or behavior modifications), and interventional pain management therapies including neuromodulation techniques .               Pain Management Panel           No data to display                 ELSA query complete. ELSA reviewed by Flaco Gallego MD.     Pain Medications               buPROPion (WELLBUTRIN) 75 MG tablet Take 1 tablet by mouth every day    celecoxib (CeleBREX) 200 MG capsule Take 1 capsule by mouth 2 times daily as needed for joint pain/stiffness.    DULoxetine (Cymbalta) 60 MG capsule     FLUoxetine (PROzac) 40 MG capsule Take 1 capsule by mouth Every Morning.    traZODone (DESYREL) 50 MG tablet Take 1 or 2 tablets by mouth every day at bedtime.             No orders of the defined types were placed in this encounter.     Please note that portions of this note were completed with a voice recognition program.   Any copied data in any portion of my note has been reviewed by  myself and accurate.     The 21st Century Cures Act makes medical notes like this available to patients in the interest of transparency. This is a medical document intended as peer to peer communication. It is written in medical language and may contain abbreviations or verbiage that are unfamiliar. It may appear blunt or direct. Medical documents are intended to carry relevant information, facts as evident, and the clinical opinion of the practitioner.     Flaco Gallego MD    Patient Care Team:  Palma Yadav MD as PCP - General (Family Medicine)  Flaco Gallego MD as Consulting Physician (Pain Medicine)     No orders of the defined types were placed in this encounter.        Future Appointments   Date Time Provider Department [place]   12/21/2023  2:00 PM Luly Jimenez APRN [facility]   12/28/2023  8:00 AM Benedicto Mckeon MD [facility]   1/10/2024  2:15 PM Hebert Jordan MD [facility]

## 2023-12-12 NOTE — PROGRESS NOTES
"Chief Complaint  Sore Throat    Subjective          Avery Watson presents to [facility]  Sinusitis  This is a recurrent problem. The current episode started more than 1 month ago. The problem is unchanged. Associated symptoms include congestion, coughing, headaches, a hoarse voice, sinus pressure, sneezing and a sore throat. Past treatments include acetaminophen and saline nose sprays. The treatment provided mild relief.       Avery Watson presents to [facility]  Sore Throat   This is a new problem. The current episode started more than 1 month ago. The problem has been unchanged. There has been no fever. Pertinent negatives include no coughing, diarrhea, stridor or swollen glands. He has had no exposure to strep. He has tried NSAIDs and cool liquids for the symptoms. The treatment provided mild relief.     Review of Systems   HENT:  Positive for congestion, hoarse voice, sinus pressure, sneezing and sore throat.    Respiratory:  Positive for cough.    Neurological:  Positive for headaches.         Objective   Vital Signs:   /80 (BP Location: Right arm, Patient Position: Sitting, Cuff Size: Large Adult)   Pulse 78   Temp 97.8 °F (36.6 °C) (Temporal)   Ht 182.9 cm (72.01\")   Wt 117 kg (258 lb 12.8 oz)   SpO2 96%   BMI 35.09 kg/m²     Physical Exam  Constitutional:       General: He is not in acute distress.     Appearance: Normal appearance. He is well-developed and well-groomed. He is not ill-appearing, toxic-appearing or diaphoretic.   HENT:      Head: Normocephalic.      Nose: Nose normal. No congestion or rhinorrhea.      Mouth/Throat:      Mouth: Mucous membranes are moist.      Pharynx: Oropharynx is clear. No oropharyngeal exudate or posterior oropharyngeal erythema.   Eyes:      General: Lids are normal.         Right eye: No discharge.         Left eye: No discharge.      Extraocular Movements: Extraocular " movements intact.      Pupils: Pupils are equal, round, and reactive to light.   Neck:      Vascular: No carotid bruit.   Cardiovascular:      Rate and Rhythm: Normal rate and regular rhythm.      Pulses: Normal pulses.      Heart sounds: Normal heart sounds. No murmur heard.     No friction rub. No gallop.   Pulmonary:      Effort: Pulmonary effort is normal. No respiratory distress.      Breath sounds: Normal breath sounds. No stridor. No wheezing, rhonchi or rales.   Chest:      Chest wall: No tenderness.   Abdominal:      General: Bowel sounds are normal. There is no distension.      Palpations: Abdomen is soft. There is no mass.      Tenderness: There is no abdominal tenderness. There is no right CVA tenderness, left CVA tenderness, guarding or rebound.      Hernia: No hernia is present.   Musculoskeletal:         General: No swelling or tenderness. Normal range of motion.      Cervical back: Normal range of motion and neck supple. No rigidity or tenderness.      Right lower leg: No edema.      Left lower leg: No edema.   Lymphadenopathy:      Cervical: No cervical adenopathy.   Skin:     General: Skin is warm.      Capillary Refill: Capillary refill takes less than 2 seconds.      Coloration: Skin is not jaundiced.      Findings: No bruising, erythema or rash.   Neurological:      General: No focal deficit present.      Mental Status: He is alert and oriented to person, place, and time.      Motor: Motor function is intact. No weakness.      Coordination: Coordination is intact.      Gait: Gait is intact. Gait normal.   Psychiatric:         Attention and Perception: Attention normal.         Mood and Affect: Mood normal.         Speech: Speech normal.         Behavior: Behavior normal.         Cognition and Memory: Cognition normal.         Judgment: Judgment normal.        Result Review :                 Assessment and Plan    Diagnoses and all orders for this visit:    1. Upper respiratory tract infection,  unspecified type (Primary)  -     dexAMETHasone (DECADRON) injection 4 mg    2. Chronic sinusitis, unspecified location  -     amoxicillin-clavulanate (AUGMENTIN) 875-125 MG per tablet; Take 1 tablet by mouth 2 (Two) Times a Day.  Dispense: 20 tablet; Refill: 0  -     fluticasone (FLONASE) 50 MCG/ACT nasal spray; Use 2 sprays in nostril(s) daily as directed by provider.  Dispense: 16 mL; Refill: 0  -     Ambulatory Referral to ENT (Otolaryngology)      Patient's Body mass index is 35.09 kg/m². indicating that he is obese (BMI >30). Obesity-related health conditions include the following: hypertension, dyslipidemias, and GERD. Obesity is unchanged. BMI is is above average; BMI management plan is completed. We discussed low calorie, low carb based diet program, portion control, and increasing exercise..    Follow Up   Return in about 10 days (around 12/17/2023), or if symptoms worsen or fail to improve.  Patient was given instructions and counseling regarding his condition or for health maintenance advice. Please see specific information pulled into the AVS if appropriate.     This document has been electronically signed by EDDIE Mead  December 12, 2023 14:43 EST

## 2023-12-14 ENCOUNTER — OFFICE VISIT (OUTPATIENT)
Dept: PAIN MEDICINE | Facility: CLINIC | Age: 58
End: 2023-12-14
Payer: COMMERCIAL

## 2023-12-14 VITALS — BODY MASS INDEX: 35.87 KG/M2 | HEIGHT: 72 IN | WEIGHT: 264.8 LBS

## 2023-12-14 DIAGNOSIS — M54.16 CHRONIC LUMBAR RADICULOPATHY: ICD-10-CM

## 2023-12-14 DIAGNOSIS — M96.1 LUMBAR POSTLAMINECTOMY SYNDROME: ICD-10-CM

## 2023-12-14 DIAGNOSIS — Z01.818 PRE-PROCEDURAL EXAMINATION: ICD-10-CM

## 2023-12-14 DIAGNOSIS — E66.8 MODERATE OBESITY: ICD-10-CM

## 2023-12-14 DIAGNOSIS — Z01.812 PRE-PROCEDURAL LABORATORY EXAMINATIONS: ICD-10-CM

## 2023-12-14 DIAGNOSIS — M47.816 SPONDYLOSIS OF LUMBAR REGION WITHOUT MYELOPATHY OR RADICULOPATHY: ICD-10-CM

## 2023-12-14 DIAGNOSIS — M96.1 LUMBAR POSTLAMINECTOMY SYNDROME: Primary | ICD-10-CM

## 2023-12-14 DIAGNOSIS — R53.81 PHYSICAL DECONDITIONING: ICD-10-CM

## 2023-12-14 DIAGNOSIS — M51.37 DEGENERATION OF LUMBAR OR LUMBOSACRAL INTERVERTEBRAL DISC: ICD-10-CM

## 2023-12-14 PROBLEM — E66.9 MODERATE OBESITY: Status: ACTIVE | Noted: 2023-12-14

## 2023-12-14 RX ORDER — DULOXETIN HYDROCHLORIDE 60 MG/1
CAPSULE, DELAYED RELEASE ORAL
COMMUNITY

## 2023-12-21 ENCOUNTER — TELEPHONE (OUTPATIENT)
Dept: FAMILY MEDICINE CLINIC | Facility: CLINIC | Age: 58
End: 2023-12-21
Payer: COMMERCIAL

## 2023-12-21 ENCOUNTER — OFFICE VISIT (OUTPATIENT)
Dept: FAMILY MEDICINE CLINIC | Facility: CLINIC | Age: 58
End: 2023-12-21
Payer: COMMERCIAL

## 2023-12-21 VITALS
SYSTOLIC BLOOD PRESSURE: 138 MMHG | TEMPERATURE: 98.2 F | WEIGHT: 267.4 LBS | HEIGHT: 72 IN | DIASTOLIC BLOOD PRESSURE: 74 MMHG | BODY MASS INDEX: 36.22 KG/M2 | HEART RATE: 86 BPM | OXYGEN SATURATION: 96 %

## 2023-12-21 DIAGNOSIS — K21.00 GASTROESOPHAGEAL REFLUX DISEASE WITH ESOPHAGITIS WITHOUT HEMORRHAGE: Primary | ICD-10-CM

## 2023-12-21 PROCEDURE — 99214 OFFICE O/P EST MOD 30 MIN: CPT | Performed by: FAMILY MEDICINE

## 2023-12-21 RX ORDER — DEXLANSOPRAZOLE 60 MG/1
1 CAPSULE, DELAYED RELEASE ORAL DAILY
Qty: 90 CAPSULE | Refills: 5 | Status: SHIPPED | OUTPATIENT
Start: 2023-12-21

## 2023-12-22 NOTE — TELEPHONE ENCOUNTER
12/22/2023 PA for Dexlansoprazole (Dexilant) approved from 12/21/2023 to 12/21/2024. Patient notified.

## 2023-12-28 ENCOUNTER — OFFICE VISIT (OUTPATIENT)
Dept: UROLOGY | Facility: CLINIC | Age: 58
End: 2023-12-28
Payer: COMMERCIAL

## 2023-12-28 VITALS
SYSTOLIC BLOOD PRESSURE: 133 MMHG | HEART RATE: 72 BPM | DIASTOLIC BLOOD PRESSURE: 86 MMHG | WEIGHT: 267 LBS | BODY MASS INDEX: 36.16 KG/M2 | HEIGHT: 72 IN

## 2023-12-28 DIAGNOSIS — N41.1 PROSTATITIS, CHRONIC: ICD-10-CM

## 2023-12-28 DIAGNOSIS — N40.1 BENIGN PROSTATIC HYPERPLASIA WITH NOCTURIA: ICD-10-CM

## 2023-12-28 DIAGNOSIS — R97.20 BPH WITH ELEVATED PSA: ICD-10-CM

## 2023-12-28 DIAGNOSIS — N40.0 BPH WITH ELEVATED PSA: ICD-10-CM

## 2023-12-28 DIAGNOSIS — R35.1 BENIGN PROSTATIC HYPERPLASIA WITH NOCTURIA: ICD-10-CM

## 2023-12-28 DIAGNOSIS — R35.0 FREQUENCY OF MICTURITION: ICD-10-CM

## 2023-12-28 PROCEDURE — 99213 OFFICE O/P EST LOW 20 MIN: CPT | Performed by: UROLOGY

## 2023-12-28 RX ORDER — SULFAMETHOXAZOLE AND TRIMETHOPRIM 800; 160 MG/1; MG/1
1 TABLET ORAL 2 TIMES DAILY
Qty: 56 TABLET | Refills: 2 | Status: SHIPPED | OUTPATIENT
Start: 2023-12-28 | End: 2024-03-21

## 2023-12-28 NOTE — PROGRESS NOTES
Chief Complaint:      Chief Complaint   Patient presents with    Benign Prostatic Hypertrophy       HPI:   58 y.o. male returns today with a history of chronic prostatitis.  His PSA took a dramatic increase I suspect secondary to infection I am not can repeat the PSA until he is had an additional 6 weeks of Septra I discussed the diagnosis of prostatitis at length    Past Medical History:     Past Medical History:   Diagnosis Date    Acid reflux     Chronic pain disorder     Depression     Essential hypertension 10/27/2023    Extremity pain     Joint pain     Low back pain     Lumbosacral disc disease     Migraine     Muscle spasm     Neck pain     Osteoarthritis     PONV (postoperative nausea and vomiting)     Rheumatoid arthritis        Current Meds:     Current Outpatient Medications   Medication Sig Dispense Refill    atorvastatin (LIPITOR) 10 MG tablet Take 1 tablet by mouth Daily. 90 tablet 1    buPROPion (WELLBUTRIN) 75 MG tablet Take 1 tablet by mouth every day. 30 tablet 2    busPIRone (BUSPAR) 5 MG tablet Take 1 tablet by mouth 2 (Two) Times a Day As Needed. 60 tablet 2    celecoxib (CeleBREX) 200 MG capsule Take 1 capsule by mouth 2 times daily as needed for joint pain/stiffness. 180 capsule 2    dexlansoprazole (DEXILANT) 60 MG capsule Take 1 capsule by mouth Daily. 90 capsule 5    DULoxetine (Cymbalta) 60 MG capsule       FLUoxetine (PROzac) 40 MG capsule Take 1 capsule by mouth Every Morning. 90 capsule 1    fluticasone (FLONASE) 50 MCG/ACT nasal spray Use 2 sprays in nostril(s) daily as directed by provider. 16 mL 0    metoprolol tartrate (LOPRESSOR) 25 MG tablet Take 1 tablet by mouth 2 (Two) Times a Day. 60 tablet 11    ondansetron (Zofran) 4 MG tablet Take 1 tablet by mouth Every 12 (Twelve) Hours As Needed for Nausea. 20 tablet 0    phenazopyridine (PYRIDIUM) 100 MG tablet Take 1 tablet by mouth 3 (Three) Times a Day As Needed for Bladder Spasms. 20 tablet 0    sulfamethoxazole-trimethoprim  "(Bactrim DS) 800-160 MG per tablet Take 1 tablet by mouth 2 (Two) Times a Day for 28 days. 56 tablet 2    Syringe 25G X 5/8\" 3 ML misc Use as directed 2 times weekly (use with testosterone injection) 24 each 3    tadalafil (Cialis) 5 MG tablet Take 1 tablet by mouth daily as needed for erectile dysfunction. 30 tablet 6    tamsulosin (FLOMAX) 0.4 MG capsule 24 hr capsule Take 2 capsules by mouth Daily for prostate. 180 capsule 1    terbinafine (lamiSIL) 250 MG tablet Take 1 tablet by mouth once daily on the first 7 days of each month. 84 tablet 0    Testosterone Cypionate (Depo-Testosterone) 200 MG/ML injection Inject 0.5 mL under the skin every Monday and Thursday. 10 mL 2    traZODone (DESYREL) 50 MG tablet Take 1 or 2 tablets by mouth every day at bedtime. 60 tablet 2    vitamin D (ERGOCALCIFEROL) 1.25 MG (92524 UT) capsule capsule Take 1 capsule by mouth Every 7 (Seven) Days. 12 capsule 2     No current facility-administered medications for this visit.        Allergies:      No Known Allergies     Past Surgical History:     Past Surgical History:   Procedure Laterality Date    BACK SURGERY      CARPAL TUNNEL RELEASE Right 08/04/2016    Procedure: CARPAL TUNNEL RELEASE;  Surgeon: Yoel uLa MD;  Location: [facility] OR;  Service:     CARPAL TUNNEL RELEASE Left 11/09/2017    Procedure: CARPAL TUNNEL RELEASE;  Surgeon: Yoel Lua MD;  Location: [facility] OR;  Service:     COLONOSCOPY      LUMBAR DISCECTOMY Right 09/22/2022    Procedure: LUMBAR DISCECTOMY L4-5 RIGHT;  Surgeon: Aquiles Grigsby MD;  Location: [facility] OR;  Service: Neurosurgery;  Laterality: Right;    LUMBAR FACET INJECTION      TOE SURGERY Right 03/04/2015       Social History:     Social History     Socioeconomic History    Marital status:    Tobacco Use    Smoking status: Never    Smokeless tobacco: Former     Types: Chew    Tobacco comments:     uses chewing tobacco   Vaping Use    Vaping Use: Never used   Substance and " Sexual Activity    Alcohol use: No    Drug use: No    Sexual activity: Yes     Partners: Female     Birth control/protection: Same-sex partner       Family History:     Family History   Problem Relation Age of Onset    Lung cancer Mother     Cancer Mother         ovarian    Diabetes Sister     Cancer Sister         breast    Heart disease Brother     Rheum arthritis Brother     Arthritis Brother        Review of Systems:     Review of Systems   Constitutional: Negative.    HENT: Negative.     Eyes: Negative.    Respiratory: Negative.     Cardiovascular: Negative.    Gastrointestinal: Negative.    Endocrine: Negative.    Musculoskeletal: Negative.    Allergic/Immunologic: Negative.    Neurological: Negative.    Hematological: Negative.    Psychiatric/Behavioral: Negative.         Physical Exam:     Physical Exam  Vitals and nursing note reviewed.   Constitutional:       Appearance: He is well-developed.   HENT:      Head: Normocephalic and atraumatic.   Eyes:      Conjunctiva/sclera: Conjunctivae normal.      Pupils: Pupils are equal, round, and reactive to light.   Cardiovascular:      Rate and Rhythm: Normal rate and regular rhythm.      Heart sounds: Normal heart sounds.   Pulmonary:      Effort: Pulmonary effort is normal.      Breath sounds: Normal breath sounds.   Abdominal:      General: Bowel sounds are normal.      Palpations: Abdomen is soft.   Musculoskeletal:         General: Normal range of motion.      Cervical back: Normal range of motion.   Skin:     General: Skin is warm and dry.   Neurological:      Mental Status: He is alert and oriented to person, place, and time.      Deep Tendon Reflexes: Reflexes are normal and symmetric.   Psychiatric:         Behavior: Behavior normal.         Thought Content: Thought content normal.         Judgment: Judgment normal.         I have reviewed the following portions of the patient's history: Allergies, current medications, past family history, past medical  history, past social history, past surgical history, problem list, and ROS and confirm it is accurate.    Recent Image (CT and/or KUB):      CT Abdomen and Pelvis: No results found for this or any previous visit.       CT Stone Protocol: Results for orders placed during the hospital encounter of 08/14/22    CT Abdomen Pelvis Stone Protocol    Narrative  EXAM: CT ABDOMEN PELVIS STONE PROTOCOL-      TECHNIQUE: Multiple axial CT images were obtained from lung bases  through pubic symphysis WITHOUT administration of IV contrast.  Reformatted images in the coronal and/or sagittal plane(s) were  generated from the axial data set to facilitate diagnostic accuracy  and/or surgical planning.  Oral Contrast:NONE.    Radiation dose reduction techniques were utilized per ALARA protocol.  Automated exposure control was initiated through either or [employer] or  [email or web address] software packages by  protocol.  DOSE:    Clinical information Flank pain, kidney stone suspected    Comparison 01/21/2019    FINDINGS:    Lower thorax: Clear. No effusions.    Abdomen:    Liver: Homogeneous. No focal hepatic mass or ductal dilatation.    Gallbladder: No dilation or stone identified.    Pancreas: Unremarkable. No mass or ductal dilatation.    Spleen: Homogeneous. No splenomegaly.    Adrenals: No mass.    Kidneys/ureters: No mass. No obstructive uropathy.  No evidence of  urolithiasis.    GI tract: Moderate to large stool burden. There is no evidence of  appendicitis    MESENTERY: No free fluid, walled off fluid collections, mesenteric  stranding, or enlarged lymph nodes      Vasculature: Evidence of atherosclerotic vascular disease    Abdominal wall: No focal hernia or mass.      Bladder: Mild thickening of the urinary bladder wall    Reproductive: Prostate enlargement    Bones: Arthritic change in the spine    Impression  1. No obstructive uropathy. Mild thickening of the urinary bladder wall    2.Prostate enlargement  3. Other  findings as above              This report was finalized on 8/14/2022 1:46 PM by Dr. Kvng Thomas MD.       KUB: No results found for this or any previous visit.       Labs (past 3 months):      Hospital Outpatient Visit on 12/06/2023   Component Date Value Ref Range Status    LVIDd 12/06/2023 5.2  cm Final    LVIDs 12/06/2023 3.6  cm Final    IVSd 12/06/2023 0.90  cm Final    LVPWd 12/06/2023 1.20  cm Final    FS 12/06/2023 30.8  % Final    IVS/LVPW 12/06/2023 0.75  cm Final    ESV(cubed) 12/06/2023 46.7  ml Final    LV Sys Vol (BSA corrected) 12/06/2023 17.6  cm2 Final    EDV(cubed) 12/06/2023 140.6  ml Final    LV Kim Vol (BSA corrected) 12/06/2023 38.3  cm2 Final    LV mass(C)d 12/06/2023 207.3  grams Final    LVOT area 12/06/2023 4.5  cm2 Final    LVOT diam 12/06/2023 2.40  cm Final    EDV(MOD-sp4) 12/06/2023 91.3  ml Final    ESV(MOD-sp4) 12/06/2023 42.0  ml Final    SV(MOD-sp4) 12/06/2023 49.3  ml Final    SI(MOD-sp4) 12/06/2023 20.7  ml/m2 Final    EF(MOD-sp4) 12/06/2023 54.0  % Final    MV E max wilmar 12/06/2023 82.6  cm/sec Final    MV A max wilmar 12/06/2023 76.5  cm/sec Final    MV dec time 12/06/2023 0.19  sec Final    MV E/A 12/06/2023 1.08   Final    LA ESV Index (BP) 12/06/2023 16.4  ml/m2 Final    Med Peak E' Wilmar 12/06/2023 6.2  cm/sec Final    Lat Peak E' Wilmar 12/06/2023 8.6  cm/sec Final    Avg E/e' ratio 12/06/2023 11.16   Final    TAPSE (>1.6) 12/06/2023 2.8  cm Final    LA dimension (2D)  12/06/2023 4.4  cm Final    Ao pk wilmar 12/06/2023 155.0  cm/sec Final    Ao max PG 12/06/2023 9.6  mmHg Final    Ao mean PG 12/06/2023 5.0  mmHg Final    Ao V2 VTI 12/06/2023 27.5  cm Final    MV dec slope 12/06/2023 446.0  cm/sec2 Final    PA acc time 12/06/2023 0.15  sec Final    Ao root diam 12/06/2023 3.6  cm Final    ACS 12/06/2023 1.80  cm Final    EF(MOD-bp) 12/06/2023 54.0  % Final   Office Visit on 12/01/2023   Component Date Value Ref Range Status    Color 12/01/2023 Yellow  Yellow, Straw, Dark Yellow,  Lisy Final    Clarity, UA 12/01/2023 Clear  Clear Final    Specific Gravity  12/01/2023 1.015  1.005 - 1.030 Final    pH, Urine 12/01/2023 6.0  5.0 - 8.0 Final    Leukocytes 12/01/2023 Negative  Negative Final    Nitrite, UA 12/01/2023 Negative  Negative Final    Protein, POC 12/01/2023 Negative  Negative mg/dL Final    Glucose, UA 12/01/2023 Negative  Negative mg/dL Final    Ketones, UA 12/01/2023 Negative  Negative Final    Urobilinogen, UA 12/01/2023 Normal  Normal, 0.2 E.U./dL Final    Bilirubin 12/01/2023 Negative  Negative Final    Blood, UA 12/01/2023 Negative  Negative Final    Lot Number 12/01/2023 [id number]   Final    Expiration Date 12/01/2023 10/25/24   Final    Urine Culture 12/01/2023 No growth   Final   Office Visit on 11/30/2023   Component Date Value Ref Range Status    Rapid Strep A Screen 11/30/2023 Negative  Negative, VALID, INVALID, Not Performed Final    Internal Control 11/30/2023 Passed  Passed Final    Lot Number 11/30/2023 [id number]   Final    Expiration Date 11/30/2023 10/18/2024   Final    Throat Culture, Beta Strep 11/30/2023 No Beta Hemolytic Streptococcus Isolated   Final   Hospital Outpatient Visit on 10/20/2023   Component Date Value Ref Range Status    Target HR (85%) 10/20/2023 138  bpm Final    Max. Pred. HR (100%) 10/20/2023 162  bpm Final    Nuclear Prior Study 10/20/2023 3.0   Final    BH CV REST NUCLEAR ISOTOPE DOSE 10/20/2023 10.5  mCi Final    BH CV STRESS NUCLEAR ISOTOPE DOSE 10/20/2023 30.8  mCi Final     CV STRESS PROTOCOL 1 10/20/2023 Pharmacologic   Final    Stage 1 10/20/2023 1.0   Final    HR Stage 1 10/20/2023 101   Final    BP Stage 1 10/20/2023 151/85   Final    Duration Min Stage 1 10/20/2023 0   Final    Duration Sec Stage 1 10/20/2023 10   Final    Stress Dose Regadenoson Stage 1 10/20/2023 0.40   Final    Stress Comments Stage 1 10/20/2023 10 sec bolus injection   Final    Baseline HR 10/20/2023 88  bpm Final    Baseline BP 10/20/2023 132/92  mmHg Final     Peak HR 10/20/2023 101  bpm Final    Peak BP 10/20/2023 151/85  mmHg Final    Recovery HR 10/20/2023 97  bpm Final    Recovery BP 10/20/2023 131/87  mmHg Final    Percent Max Pred HR 10/20/2023 62.35  % Final    Percent Target HR 10/20/2023 73  % Final    Nuc Stress EF 10/20/2023 50  % Final   Lab on 10/20/2023   Component Date Value Ref Range Status    Vitamin B-12 10/20/2023 496  211 - 946 pg/mL Final    Folate 10/20/2023 13.70  4.78 - 24.20 ng/mL Final    Hemoglobin A1C 10/20/2023 5.70 (H)  4.80 - 5.60 % Final    25 Hydroxy, Vitamin D 10/20/2023 28.8 (L)  30.0 - 100.0 ng/ml Final    Testosterone, Total 10/20/2023 550  264 - 916 ng/dL Final    Adult male reference interval is based on a population of  healthy nonobese males (BMI <30) between 19 and 39 years old.  Day et.al. JCEM 2017,102;3181-9227. PMID: [id number].    Testosterone, Free 10/20/2023 9.4  7.2 - 24.0 pg/mL Final    TSH 10/20/2023 1.690  0.270 - 4.200 uIU/mL Final    Free T4 10/20/2023 1.01  0.93 - 1.70 ng/dL Final    Total Cholesterol 10/20/2023 139  0 - 200 mg/dL Final    Triglycerides 10/20/2023 152 (H)  0 - 150 mg/dL Final    HDL Cholesterol 10/20/2023 33 (L)  40 - 60 mg/dL Final    LDL Cholesterol  10/20/2023 79  0 - 100 mg/dL Final    VLDL Cholesterol 10/20/2023 27  5 - 40 mg/dL Final    LDL/HDL Ratio 10/20/2023 2.29   Final    Glucose 10/20/2023 87  65 - 99 mg/dL Final    BUN 10/20/2023 15  6 - 20 mg/dL Final    Creatinine 10/20/2023 1.14  0.76 - 1.27 mg/dL Final    Sodium 10/20/2023 139  136 - 145 mmol/L Final    Potassium 10/20/2023 4.5  3.5 - 5.2 mmol/L Final    Slight hemolysis detected by analyzer. Results may be affected.    Chloride 10/20/2023 105  98 - 107 mmol/L Final    CO2 10/20/2023 25.8  22.0 - 29.0 mmol/L Final    Calcium 10/20/2023 9.1  8.6 - 10.5 mg/dL Final    Total Protein 10/20/2023 6.9  6.0 - 8.5 g/dL Final    Albumin 10/20/2023 4.3  3.5 - 5.2 g/dL Final    ALT (SGPT) 10/20/2023 26  1 - 41 U/L Final    AST (SGOT)  10/20/2023 25  1 - 40 U/L Final    Alkaline Phosphatase 10/20/2023 75  39 - 117 U/L Final    Total Bilirubin 10/20/2023 0.4  0.0 - 1.2 mg/dL Final    Globulin 10/20/2023 2.6  gm/dL Final    A/G Ratio 10/20/2023 1.7  g/dL Final    BUN/Creatinine Ratio 10/20/2023 13.2  7.0 - 25.0 Final    Anion Gap 10/20/2023 8.2  5.0 - 15.0 mmol/L Final    eGFR 10/20/2023 74.5  >60.0 mL/min/1.73 Final    WBC 10/20/2023 7.37  3.40 - 10.80 10*3/mm3 Final    RBC 10/20/2023 5.79  4.14 - 5.80 10*6/mm3 Final    Hemoglobin 10/20/2023 15.0  13.0 - 17.7 g/dL Final    Hematocrit 10/20/2023 45.9  37.5 - 51.0 % Final    MCV 10/20/2023 79.3  79.0 - 97.0 fL Final    MCH 10/20/2023 25.9 (L)  26.6 - 33.0 pg Final    MCHC 10/20/2023 32.7  31.5 - 35.7 g/dL Final    RDW 10/20/2023 13.1  12.3 - 15.4 % Final    RDW-SD 10/20/2023 36.9 (L)  37.0 - 54.0 fl Final    MPV 10/20/2023 9.0  6.0 - 12.0 fL Final    Platelets 10/20/2023 238  140 - 450 10*3/mm3 Final    Neutrophil % 10/20/2023 59.1  42.7 - 76.0 % Final    Lymphocyte % 10/20/2023 27.0  19.6 - 45.3 % Final    Monocyte % 10/20/2023 9.2  5.0 - 12.0 % Final    Eosinophil % 10/20/2023 3.7  0.3 - 6.2 % Final    Basophil % 10/20/2023 0.7  0.0 - 1.5 % Final    Immature Grans % 10/20/2023 0.3  0.0 - 0.5 % Final    Neutrophils, Absolute 10/20/2023 4.36  1.70 - 7.00 10*3/mm3 Final    Lymphocytes, Absolute 10/20/2023 1.99  0.70 - 3.10 10*3/mm3 Final    Monocytes, Absolute 10/20/2023 0.68  0.10 - 0.90 10*3/mm3 Final    Eosinophils, Absolute 10/20/2023 0.27  0.00 - 0.40 10*3/mm3 Final    Basophils, Absolute 10/20/2023 0.05  0.00 - 0.20 10*3/mm3 Final    Immature Grans, Absolute 10/20/2023 0.02  0.00 - 0.05 10*3/mm3 Final    nRBC 10/20/2023 0.0  0.0 - 0.2 /100 WBC Final   Admission on 10/18/2023, Discharged on 10/18/2023   Component Date Value Ref Range Status    Extra Tube 10/18/2023 Hold for add-ons.   Final    Auto resulted.    Extra Tube 10/18/2023 hold for add-on   Final    Auto resulted    Extra Tube  10/18/2023 Hold for add-ons.   Final    Auto resulted.    Extra Tube 10/18/2023 Hold for add-ons.   Final    Auto resulted    QT Interval 10/18/2023 312  ms Final    QTC Interval 10/18/2023 408  ms Final    Glucose 10/18/2023 110 (H)  65 - 99 mg/dL Final    BUN 10/18/2023 12  6 - 20 mg/dL Final    Creatinine 10/18/2023 1.14  0.76 - 1.27 mg/dL Final    Sodium 10/18/2023 138  136 - 145 mmol/L Final    Potassium 10/18/2023 4.4  3.5 - 5.2 mmol/L Final    Slight hemolysis detected by analyzer. Results may be affected.    Chloride 10/18/2023 103  98 - 107 mmol/L Final    CO2 10/18/2023 27.3  22.0 - 29.0 mmol/L Final    Calcium 10/18/2023 9.5  8.6 - 10.5 mg/dL Final    Total Protein 10/18/2023 7.0  6.0 - 8.5 g/dL Final    Albumin 10/18/2023 4.4  3.5 - 5.2 g/dL Final    ALT (SGPT) 10/18/2023 8  1 - 41 U/L Final    AST (SGOT) 10/18/2023 26  1 - 40 U/L Final    Alkaline Phosphatase 10/18/2023 80  39 - 117 U/L Final    Total Bilirubin 10/18/2023 0.3  0.0 - 1.2 mg/dL Final    Globulin 10/18/2023 2.6  gm/dL Final    A/G Ratio 10/18/2023 1.7  g/dL Final    BUN/Creatinine Ratio 10/18/2023 10.5  7.0 - 25.0 Final    Anion Gap 10/18/2023 7.7  5.0 - 15.0 mmol/L Final    eGFR 10/18/2023 74.5  >60.0 mL/min/1.73 Final    HS Troponin T 10/18/2023 <6  <15 ng/L Final    Extra Tube 10/18/2023 Hold for add-ons.   Final    Auto resulted.    Extra Tube 10/18/2023 hold for add-on   Final    Auto resulted    WBC 10/18/2023 7.81  3.40 - 10.80 10*3/mm3 Final    RBC 10/18/2023 5.48  4.14 - 5.80 10*6/mm3 Final    Hemoglobin 10/18/2023 14.3  13.0 - 17.7 g/dL Final    Hematocrit 10/18/2023 44.4  37.5 - 51.0 % Final    MCV 10/18/2023 81.0  79.0 - 97.0 fL Final    MCH 10/18/2023 26.1 (L)  26.6 - 33.0 pg Final    MCHC 10/18/2023 32.2  31.5 - 35.7 g/dL Final    RDW 10/18/2023 13.1  12.3 - 15.4 % Final    RDW-SD 10/18/2023 38.1  37.0 - 54.0 fl Final    MPV 10/18/2023 8.9  6.0 - 12.0 fL Final    Platelets 10/18/2023 234  140 - 450 10*3/mm3 Final     Neutrophil % 10/18/2023 56.3  42.7 - 76.0 % Final    Lymphocyte % 10/18/2023 29.2  19.6 - 45.3 % Final    Monocyte % 10/18/2023 9.7  5.0 - 12.0 % Final    Eosinophil % 10/18/2023 3.7  0.3 - 6.2 % Final    Basophil % 10/18/2023 0.8  0.0 - 1.5 % Final    Immature Grans % 10/18/2023 0.3  0.0 - 0.5 % Final    Neutrophils, Absolute 10/18/2023 4.40  1.70 - 7.00 10*3/mm3 Final    Lymphocytes, Absolute 10/18/2023 2.28  0.70 - 3.10 10*3/mm3 Final    Monocytes, Absolute 10/18/2023 0.76  0.10 - 0.90 10*3/mm3 Final    Eosinophils, Absolute 10/18/2023 0.29  0.00 - 0.40 10*3/mm3 Final    Basophils, Absolute 10/18/2023 0.06  0.00 - 0.20 10*3/mm3 Final    Immature Grans, Absolute 10/18/2023 0.02  0.00 - 0.05 10*3/mm3 Final    nRBC 10/18/2023 0.0  0.0 - 0.2 /100 WBC Final    HS Troponin T 10/18/2023 <6  <15 ng/L Final    Troponin T Delta 10/18/2023    Final    Unable to calculate.        Procedure:       Assessment/Plan:   Prostatitis-we discussed the differential diagnosis of prostatitis including the National Institutes of Health classification system I through IV.  I discussed that type I prostatitis is acute bacterial prostatitis and associated with high fevers, typically hematuria, and severe pain with voiding.  We discussed the fact that it necessitates 6 weeks of chronic antibiotics to be sure it doesn't turn into a chronic bacterial prostatitis that can have lifelong ramifications.  We discussed National Institutes of Health type II chronic bacterial prostatitis.  We discussed the fact that this a chronic bacterial infection of the prostate that often requires suppressive antibiotics.  We discussed type III being related more to nonspecific urethritis, as well as tight for being related to finding inflammation and a biopsy in the absence of symptomatology.  I discussed the diagnostic strategies including the VB 1 through 4 urine culture and discussed empiric therapy.  I emphasized that with the use of chronic antibiotics, I  strongly recommended the concomitant use of probiotics.  Additionally, we discussed the various antibiotics used and the risks and benefits associated with each, particularly the use of long-term ciprofloxacin and the effect on joints and collagen in human beings.  Continue Septra  Elevated prostate specific antigen-we discussed the diagnosis of elevated prostate-specific antigen.  I explained the pathophysiology of PSA.  It is a serine protease that's function in the male reproductive tract is to facilitate the liquefaction of semen.  It is for this reason the body does not want it freely floating in the serum and why typically bound tightly to albumin.  We discussed why we used both a PSA free and total to determine the need for more aggressive therapy. I discussed the normal range.  Additionally, it was in the range of 1 to 4, but more recently has been downgraded to something less than 2 or even approaching 1.  I discussed the risk of family history, particularly the fact that the average male has a 14% risk of prostate cancer and that in the face of a positive diagnosis in a father it will tablet and any other first-generation relative continued tablet insofar that a father and brother with prostate cancer will produce almost a 50% risk of prostate cancer.  I discussed the use of the temporal use of PSA as the best option for monitoring.  We also discussed the fact that an elevated PSA is an isolated event does not mean that this is prostate cancer and should not engender worry in this regard. I discussed other things that can elevate PSA including constipation, prostatitis, infection, recent intercourse etc., as well as the risks and benefits associated with this.  Also discussed the fact that this is with a dilutional test and as a consequence of such were present produce slight variations on a single specimen.  Further discussed the risks and benefits of a prostate biopsy as well.  PSA took a dramatic rise  and when repeated I suspect its infection related              This document has been electronically signed by LYNDA CANNON MD December 28, 2023 08:05 EST    Dictated Utilizing Dragon Dictation: Part of this note may be an electronic transcription/translation of spoken language to printed text using the Dragon Dictation System.

## 2023-12-29 NOTE — PROGRESS NOTES
"Chief Complaint  GERD    Subjective          Avery Watson presents to [facility]  Heartburn  He complains of abdominal pain and belching. This is a recurrent problem. The current episode started more than 1 year ago. The problem has been gradually worsening. The symptoms are aggravated by certain foods and ETOH. He has tried a PPI for the symptoms. The treatment provided moderate relief.       Review of Systems   Gastrointestinal:  Positive for abdominal pain.         Objective   Vital Signs:   /74 (BP Location: Right arm, Patient Position: Sitting, Cuff Size: Adult)   Pulse 86   Temp 98.2 °F (36.8 °C) (Temporal)   Ht 182.9 cm (72\")   Wt 121 kg (267 lb 6.4 oz)   SpO2 96%   BMI 36.27 kg/m²     Physical Exam  Constitutional:       General: He is not in acute distress.     Appearance: Normal appearance. He is well-developed and well-groomed. He is not ill-appearing, toxic-appearing or diaphoretic.   HENT:      Head: Normocephalic.      Nose: Nose normal. No congestion or rhinorrhea.      Mouth/Throat:      Mouth: Mucous membranes are moist.      Pharynx: Oropharynx is clear. No oropharyngeal exudate or posterior oropharyngeal erythema.   Eyes:      General: Lids are normal.         Right eye: No discharge.         Left eye: No discharge.      Extraocular Movements: Extraocular movements intact.      Pupils: Pupils are equal, round, and reactive to light.   Neck:      Vascular: No carotid bruit.   Cardiovascular:      Rate and Rhythm: Normal rate and regular rhythm.      Pulses: Normal pulses.      Heart sounds: Normal heart sounds. No murmur heard.     No friction rub. No gallop.   Pulmonary:      Effort: Pulmonary effort is normal. No respiratory distress.      Breath sounds: Normal breath sounds. No stridor. No wheezing, rhonchi or rales.   Chest:      Chest wall: No tenderness.   Abdominal:      General: Bowel sounds are normal. There is no distension.      " Palpations: Abdomen is soft. There is no mass.      Tenderness: There is no abdominal tenderness. There is no right CVA tenderness, left CVA tenderness, guarding or rebound.      Hernia: No hernia is present.   Musculoskeletal:         General: No swelling or tenderness. Normal range of motion.      Cervical back: Normal range of motion and neck supple. No rigidity or tenderness.      Right lower leg: No edema.      Left lower leg: No edema.   Lymphadenopathy:      Cervical: No cervical adenopathy.   Skin:     General: Skin is warm.      Capillary Refill: Capillary refill takes less than 2 seconds.      Coloration: Skin is not jaundiced.      Findings: No bruising, erythema or rash.   Neurological:      General: No focal deficit present.      Mental Status: He is alert and oriented to person, place, and time.      Motor: Motor function is intact. No weakness.      Coordination: Coordination is intact.      Gait: Gait is intact. Gait normal.   Psychiatric:         Attention and Perception: Attention normal.         Mood and Affect: Mood normal.         Speech: Speech normal.         Behavior: Behavior normal.         Cognition and Memory: Cognition normal.         Judgment: Judgment normal.        Result Review :                 Assessment and Plan    Diagnoses and all orders for this visit:    1. Gastroesophageal reflux disease with esophagitis without hemorrhage (Primary)  -     Ambulatory Referral to Gastroenterology    Other orders  -     dexlansoprazole (DEXILANT) 60 MG capsule; Take 1 capsule by mouth Daily.  Dispense: 90 capsule; Refill: 5      Patient's Body mass index is 36.27 kg/m². indicating that he is obese (BMI >30). Obesity-related health conditions include the following: hypertension and dyslipidemias. Obesity is unchanged. BMI is is above average; BMI management plan is completed. We discussed low calorie, low carb based diet program, portion control, and increasing exercise..    Follow Up   Return  if symptoms worsen or fail to improve.  Patient was given instructions and counseling regarding his condition or for health maintenance advice. Please see specific information pulled into the AVS if appropriate.     This document has been electronically signed by EDDIE Mead  December 28, 2023 21:16 EST

## 2024-01-02 ENCOUNTER — HOSPITAL ENCOUNTER (OUTPATIENT)
Dept: NUTRITION | Facility: HOSPITAL | Age: 59
Setting detail: RECURRING SERIES
Discharge: HOME | End: 2024-01-02

## 2024-01-02 NOTE — CONSULTS
[facility]          Initial 60 Minute Nutrition Visit    Date: 2024   Patient Name: Avery Watson  : 1965   MRN: [id number]   Referring Provider: Flaco Gallego MD    Reason for Visit: Weight loss  Visit Format: Teams    Nutrition Assessment       Social History:   Social History     Socioeconomic History    Marital status:    Tobacco Use    Smoking status: Never    Smokeless tobacco: Former     Types: Chew    Tobacco comments:     uses chewing tobacco   Vaping Use    Vaping Use: Never used   Substance and Sexual Activity    Alcohol use: No    Drug use: No    Sexual activity: Yes     Partners: Female     Birth control/protection: Same-sex partner     Active Problem List:   Patient Active Problem List    Diagnosis     Prostatitis, chronic [N41.1]     Lumbar postlaminectomy syndrome [M96.1]     BMI 35.0-35.9,adult [Z68.35]     Moderate obesity [E66.8]     Physical deconditioning [R53.81]     Dyslipidemia [E78.5]     Essential hypertension [I10]     Low testosterone [R79.89]     Chronic lumbar radiculopathy [M54.16]     Chest pain [R07.9]     Carpal tunnel syndrome, left [G56.02]     Spondylosis of lumbar region without myelopathy or radiculopathy [M47.816]     Degeneration of lumbar or lumbosacral intervertebral disc [M51.37]     Myofascial pain [M79.18]     Bilateral carpal tunnel syndrome [G56.03]     Displacement of intervertebral disc of mid-cervical region [M50.220]     Neuroforaminal stenosis of spine, right C6-C7 [M48.00]     Migraine [G43.909]     Shoulder pain [M25.519]     Multilevel degenerative disc disease [M53.9]     Benign prostatic hyperplasia with urinary obstruction [N40.1, N13.8]     Fatigue [R53.83]       Current Medications:   Current Outpatient Medications:     atorvastatin (LIPITOR) 10 MG tablet, Take 1 tablet by mouth Daily., Disp: 90 tablet, Rfl: 1    buPROPion (WELLBUTRIN) 75 MG tablet, Take 1 tablet by mouth every day., Disp: 30  "tablet, Rfl: 2    busPIRone (BUSPAR) 5 MG tablet, Take 1 tablet by mouth 2 (Two) Times a Day As Needed., Disp: 60 tablet, Rfl: 2    celecoxib (CeleBREX) 200 MG capsule, Take 1 capsule by mouth 2 times daily as needed for joint pain/stiffness., Disp: 180 capsule, Rfl: 2    dexlansoprazole (DEXILANT) 60 MG capsule, Take 1 capsule by mouth Daily., Disp: 90 capsule, Rfl: 5    DULoxetine (Cymbalta) 60 MG capsule, , Disp: , Rfl:     FLUoxetine (PROzac) 40 MG capsule, Take 1 capsule by mouth Every Morning., Disp: 90 capsule, Rfl: 1    fluticasone (FLONASE) 50 MCG/ACT nasal spray, Use 2 sprays in nostril(s) daily as directed by provider., Disp: 16 mL, Rfl: 0    metoprolol tartrate (LOPRESSOR) 25 MG tablet, Take 1 tablet by mouth 2 (Two) Times a Day., Disp: 60 tablet, Rfl: 11    ondansetron (Zofran) 4 MG tablet, Take 1 tablet by mouth Every 12 (Twelve) Hours As Needed for Nausea., Disp: 20 tablet, Rfl: 0    phenazopyridine (PYRIDIUM) 100 MG tablet, Take 1 tablet by mouth 3 (Three) Times a Day As Needed for Bladder Spasms., Disp: 20 tablet, Rfl: 0    sulfamethoxazole-trimethoprim (Bactrim DS) 800-160 MG per tablet, Take 1 tablet by mouth 2 (Two) Times a Day for 84 days., Disp: 56 tablet, Rfl: 2    Syringe 25G X 5/8\" 3 ML misc, Use as directed 2 times weekly (use with testosterone injection), Disp: 24 each, Rfl: 3    tadalafil (Cialis) 5 MG tablet, Take 1 tablet by mouth daily as needed for erectile dysfunction., Disp: 30 tablet, Rfl: 6    tamsulosin (FLOMAX) 0.4 MG capsule 24 hr capsule, Take 2 capsules by mouth Daily for prostate., Disp: 180 capsule, Rfl: 1    terbinafine (lamiSIL) 250 MG tablet, Take 1 tablet by mouth once daily on the first 7 days of each month., Disp: 84 tablet, Rfl: 0    Testosterone Cypionate (Depo-Testosterone) 200 MG/ML injection, Inject 0.5 mL under the skin every Monday and Thursday., Disp: 10 mL, Rfl: 2    traZODone (DESYREL) 50 MG tablet, Take 1 or 2 tablets by mouth every day at bedtime., Disp: " "60 tablet, Rfl: 2    vitamin D (ERGOCALCIFEROL) 1.25 MG (45109 UT) capsule capsule, Take 1 capsule by mouth Every 7 (Seven) Days., Disp: 12 capsule, Rfl: 2    Labs: n/a    Hunger Vital Sign Food Insecurity Assessment:  Within the past 12 months I/we worried whether our food would run out before I/we got money to buy more: no   Within the past 12 months the food I/we bought just didn't last and I/we didn't have money to get more: no   Use of food assistance programs (WIC, food stamps, food rivera) no       Food & Nutrition Related History       Food Allergies: none  Food Intolerances: none  Food Behavior: n/a  Nutrition Impact Symptoms:  n/a  Gastrointestinal conditions that impact intake or food choices: GERD  Details at home: n/a  Who prepares most meals: spouse  Who does grocery shopping: spouse  How many meals are purchased from fast food/sit down restaurants per week: 8  Difficulty chewin - Normal  Difficulty swallowin - Normal  Diet requirement related to personal preference or cultural belief: in general, a \"healthy\" diet    History of eating disorder/disordered eating habits: None  Language/communication details: English  Barriers to learning: No barriers identified at this time    24 Hour Recall:   Time Food/beverages consumed    Cereal bar     Bowl of cereal with banana    Ham sandwich     [place] with chips                     Additional comments: States he eats out for 70% of meals. Drinks coffee, water, and diet soda    Anthropometrics      Height:   Ht Readings from Last 1 Encounters:   23 182.9 cm (72.01\")     Weight:   Wt Readings from Last 3 Encounters:   23 121 kg (267 lb)   23 121 kg (267 lb 6.4 oz)   23 120 kg (264 lb 12.8 oz)     BMI: There is no height or weight on file to calculate BMI.   Weight Change: n/a     Physical Activity         Physical activity comments: daily activities      Estimated Needs     Estimated Energy Needs: not discussed    Estimated Protein " Needs: not discussed     Estimated Fluid Needs: not discussed     Discussion / Education      Pt is a 58-year-old male referred for weight loss. He states that he has trouble with GERD daily. RD discussed nutrition therapy for GERD including limiting spicy foods, high fat greasy foods, and caffeine. He states that he eats 3 meals a day with snacks. He states that his biggest challenge with his diet is sweets. RD provided patient with lists of healthy snacks with protein to include in diet instead of sweets.  He states that he eats out for approximately 70% of his meals. RD encouraged him to cook more meals at home and gave advice on choosing healthier options at restaurants. RD educated patient on the three macronutrients and what each do for our body. RD discussed how to build a healthy plate by using the plate method. RD explained the importance of portion control and balanced meals. RD discussed the importance of fiber for GI health and satiety. RD discussed healthy snack options and quick meal ideas. RD encouraged adding physical activity to daily routine and gave examples of how to incorporate that into routine. RD encouraged patient to reach out with any additional questions or concerns.     Assessment of patient engagement: Engaged    Measurement of understanding: Patient verbalized understanding    Resources Provided: 3 Macronutrients, Plate method handout, healthy snack ideas handouts, weight management packet, GERD     Goal (s)      Goal 1: More mindful of meals     Goal 2: Limit sweets         Plan of Care     PES Statement:   Overweight / Obesity related to diet and lifestye as evidenced by BMI.     Follow Up Visit      Follow Up:   February 7th @ 1pm    Total of 60 minutes spent with patient on nutrition counseling. Education based on Academy of Nutrition and Dietetics guidelines. Patient was provided with RD's contact information. Thank you for this referral.

## 2024-01-09 ENCOUNTER — HOSPITAL ENCOUNTER (OUTPATIENT)
Dept: MRI IMAGING | Facility: HOSPITAL | Age: 59
Discharge: HOME | End: 2024-01-09
Payer: COMMERCIAL

## 2024-01-09 ENCOUNTER — HOSPITAL ENCOUNTER (OUTPATIENT)
Dept: GENERAL RADIOLOGY | Facility: HOSPITAL | Age: 59
Discharge: HOME | End: 2024-01-09
Payer: COMMERCIAL

## 2024-01-09 DIAGNOSIS — M96.1 LUMBAR POSTLAMINECTOMY SYNDROME: ICD-10-CM

## 2024-01-09 DIAGNOSIS — Z01.818 PRE-PROCEDURAL EXAMINATION: ICD-10-CM

## 2024-01-09 DIAGNOSIS — N42.9 DISORDER OF PROSTATE: ICD-10-CM

## 2024-01-09 PROCEDURE — 72158 MRI LUMBAR SPINE W/O & W/DYE: CPT

## 2024-01-09 PROCEDURE — 72146 MRI CHEST SPINE W/O DYE: CPT

## 2024-01-09 PROCEDURE — A9577 INJ MULTIHANCE: HCPCS | Performed by: ANESTHESIOLOGY

## 2024-01-09 PROCEDURE — 72120 X-RAY BEND ONLY L-S SPINE: CPT

## 2024-01-09 PROCEDURE — 0 GADOBENATE DIMEGLUMINE 529 MG/ML SOLUTION: Performed by: ANESTHESIOLOGY

## 2024-01-09 RX ORDER — TAMSULOSIN HYDROCHLORIDE 0.4 MG/1
0.8 CAPSULE ORAL DAILY
Qty: 180 CAPSULE | Refills: 1 | Status: SHIPPED | OUTPATIENT
Start: 2024-01-09

## 2024-01-09 RX ADMIN — GADOBENATE DIMEGLUMINE 20 ML: 529 INJECTION, SOLUTION INTRAVENOUS at 11:22

## 2024-01-10 ENCOUNTER — OFFICE VISIT (OUTPATIENT)
Dept: CARDIOLOGY | Facility: CLINIC | Age: 59
End: 2024-01-10
Payer: COMMERCIAL

## 2024-01-10 ENCOUNTER — LAB (OUTPATIENT)
Dept: LAB | Facility: HOSPITAL | Age: 59
End: 2024-01-10
Payer: COMMERCIAL

## 2024-01-10 VITALS
DIASTOLIC BLOOD PRESSURE: 71 MMHG | HEIGHT: 72 IN | SYSTOLIC BLOOD PRESSURE: 127 MMHG | WEIGHT: 256.8 LBS | OXYGEN SATURATION: 97 % | HEART RATE: 71 BPM | BODY MASS INDEX: 34.78 KG/M2

## 2024-01-10 DIAGNOSIS — Z01.812 PRE-PROCEDURAL LABORATORY EXAMINATIONS: ICD-10-CM

## 2024-01-10 DIAGNOSIS — I10 ESSENTIAL HYPERTENSION: Chronic | ICD-10-CM

## 2024-01-10 DIAGNOSIS — R07.2 PRECORDIAL PAIN: Primary | ICD-10-CM

## 2024-01-10 DIAGNOSIS — E78.5 DYSLIPIDEMIA: Chronic | ICD-10-CM

## 2024-01-10 LAB
APTT PPP: 26.6 SECONDS (ref 26.5–34.5)
DEPRECATED RDW RBC AUTO: 46 FL (ref 37–54)
ERYTHROCYTE [DISTWIDTH] IN BLOOD BY AUTOMATED COUNT: 16.4 % (ref 12.3–15.4)
HCT VFR BLD AUTO: 44.3 % (ref 37.5–51)
HGB BLD-MCNC: 14.3 G/DL (ref 13–17.7)
INR PPP: 1.02 (ref 0.9–1.1)
MCH RBC QN AUTO: 25.9 PG (ref 26.6–33)
MCHC RBC AUTO-ENTMCNC: 32.3 G/DL (ref 31.5–35.7)
MCV RBC AUTO: 80.3 FL (ref 79–97)
PLATELET # BLD AUTO: 206 10*3/MM3 (ref 140–450)
PMV BLD AUTO: 8.8 FL (ref 6–12)
PROTHROMBIN TIME: 13.9 SECONDS (ref 12.1–14.7)
RBC # BLD AUTO: 5.52 10*6/MM3 (ref 4.14–5.8)
WBC NRBC COR # BLD AUTO: 6.92 10*3/MM3 (ref 3.4–10.8)

## 2024-01-10 PROCEDURE — 99213 OFFICE O/P EST LOW 20 MIN: CPT | Performed by: INTERNAL MEDICINE

## 2024-01-10 PROCEDURE — 85730 THROMBOPLASTIN TIME PARTIAL: CPT

## 2024-01-10 PROCEDURE — 85027 COMPLETE CBC AUTOMATED: CPT

## 2024-01-10 PROCEDURE — 36415 COLL VENOUS BLD VENIPUNCTURE: CPT

## 2024-01-10 PROCEDURE — 85610 PROTHROMBIN TIME: CPT

## 2024-01-10 RX ORDER — ATORVASTATIN CALCIUM 10 MG/1
10 TABLET, FILM COATED ORAL DAILY
Qty: 90 TABLET | Refills: 3 | Status: SHIPPED | OUTPATIENT
Start: 2024-01-10

## 2024-01-10 NOTE — PROGRESS NOTES
[facility]  [address] [address]  [place] [address]  Phone: [phone]  Fax: [phone]    01/10/2024    Chief Complaint   Patient presents with    Chest Pain     Patient states random chest pain , denies soa, swelling or palpitations today         History:     Avery Watson is a 58 y.o. male presenting for  follow-up evaluation   Clinically stable from cardiac standpoint   Symptoms:  CP stable   SOB no    Orthopnea No  Lower extremity edema no   Palpitations no   Compliant with medications yes  Claudication no      Past Medical History:   Diagnosis Date    Acid reflux     Chronic pain disorder     Depression     Essential hypertension 10/27/2023    Extremity pain     Joint pain     Low back pain     Lumbosacral disc disease     Migraine     Muscle spasm     Neck pain     Osteoarthritis     PONV (postoperative nausea and vomiting)     Rheumatoid arthritis        Past Surgical History:   Procedure Laterality Date    BACK SURGERY      CARPAL TUNNEL RELEASE Right 08/04/2016    Procedure: CARPAL TUNNEL RELEASE;  Surgeon: Yoel Lua MD;  Location: [facility];  Service:     CARPAL TUNNEL RELEASE Left 11/09/2017    Procedure: CARPAL TUNNEL RELEASE;  Surgeon: Yoel Lua MD;  Location: [facility];  Service:     COLONOSCOPY      LUMBAR DISCECTOMY Right 09/22/2022    Procedure: LUMBAR DISCECTOMY L4-5 RIGHT;  Surgeon: Aquiles Grigsby MD;  Location: [facility];  Service: Neurosurgery;  Laterality: Right;    LUMBAR FACET INJECTION      TOE SURGERY Right 03/04/2015        Past Social History:  Social History     Socioeconomic History    Marital status:    Tobacco Use    Smoking status: Never    Smokeless tobacco: Former     Types: Chew    Tobacco comments:     uses chewing tobacco   Vaping Use    Vaping Use: Never used   Substance and Sexual Activity    Alcohol use: No    Drug use: No    Sexual activity: Yes     Partners: Female     Birth  "control/protection: Same-sex partner       Past Family History:  Family History   Problem Relation Age of Onset    Lung cancer Mother     Cancer Mother         ovarian    Diabetes Sister     Cancer Sister         breast    Heart disease Brother     Rheum arthritis Brother     Arthritis Brother        Review of Systems:   ROS       Current Outpatient Medications   Medication Sig Dispense Refill    atorvastatin (LIPITOR) 10 MG tablet Take 1 tablet by mouth Daily. 90 tablet 3    buPROPion (WELLBUTRIN) 75 MG tablet Take 1 tablet by mouth every day. 30 tablet 2    busPIRone (BUSPAR) 5 MG tablet Take 1 tablet by mouth 2 (Two) Times a Day As Needed. 60 tablet 2    dexlansoprazole (DEXILANT) 60 MG capsule Take 1 capsule by mouth Daily. 90 capsule 5    DULoxetine (Cymbalta) 60 MG capsule       fluticasone (FLONASE) 50 MCG/ACT nasal spray Use 2 sprays in nostril(s) daily as directed by provider. 16 mL 0    metoprolol tartrate (LOPRESSOR) 25 MG tablet Take 1 tablet by mouth 2 (Two) Times a Day. 60 tablet 11    sulfamethoxazole-trimethoprim (Bactrim DS) 800-160 MG per tablet Take 1 tablet by mouth 2 (Two) Times a Day for 84 days. 56 tablet 2    Syringe 25G X 5/8\" 3 ML misc Use as directed 2 times weekly (use with testosterone injection) 24 each 3    tadalafil (Cialis) 5 MG tablet Take 1 tablet by mouth daily as needed for erectile dysfunction. 30 tablet 6    tamsulosin (FLOMAX) 0.4 MG capsule 24 hr capsule Take 2 capsules (0.8 mg total) by mouth daily for prostate. 180 capsule 1    terbinafine (lamiSIL) 250 MG tablet Take 1 tablet by mouth once daily on the first 7 days of each month. 84 tablet 0    Testosterone Cypionate (Depo-Testosterone) 200 MG/ML injection Inject 0.5 mL under the skin every Monday and Thursday. 10 mL 2    traZODone (DESYREL) 50 MG tablet Take 1 or 2 tablets by mouth every day at bedtime. 60 tablet 2    vitamin D (ERGOCALCIFEROL) 1.25 MG (22390 UT) capsule capsule Take 1 capsule by mouth Every 7 (Seven) " "Days. 12 capsule 2    celecoxib (CeleBREX) 200 MG capsule Take 1 capsule by mouth 2 times daily as needed for joint pain/stiffness. (Patient not taking: Reported on 1/10/2024) 180 capsule 2    FLUoxetine (PROzac) 40 MG capsule Take 1 capsule by mouth Every Morning. 90 capsule 1    ondansetron (Zofran) 4 MG tablet Take 1 tablet by mouth Every 12 (Twelve) Hours As Needed for Nausea. (Patient not taking: Reported on 1/10/2024) 20 tablet 0    phenazopyridine (PYRIDIUM) 100 MG tablet Take 1 tablet by mouth 3 (Three) Times a Day As Needed for Bladder Spasms. (Patient not taking: Reported on 1/10/2024) 20 tablet 0     No current facility-administered medications for this visit.        No Known Allergies    Objective     /71 (BP Location: Left arm, Patient Position: Sitting, Cuff Size: Adult)   Pulse 71   Ht 182.9 cm (72\")   Wt 116 kg (256 lb 12.8 oz)   SpO2 97%   BMI 34.83 kg/m²     Physical Exam       DATA:  Results for orders placed during the hospital encounter of 05/03/21    SCANNED - TELEMETRY     Results for orders placed during the hospital encounter of 12/06/23    Adult Transthoracic Echo Complete W/ Cont if Necessary Per Protocol    Interpretation Summary    Left ventricular systolic function is normal. Calculated left ventricular EF = 54% Left ventricular ejection fraction appears to be 51 - 55%.    Left ventricular diastolic function was normal.   Results for orders placed during the hospital encounter of 10/20/23    Stress test with myocardial perfusion one day    Interpretation Summary    Myocardial perfusion imaging indicates a normal myocardial perfusion study with no evidence of ischemia.    Left ventricular ejection fraction is normal (Calculated EF = 50%).    Impressions are consistent with a low risk study.    Diaphragmatic attenuation artifact is present.    Findings consistent with a normal ECG stress test.   Results for orders placed during the hospital encounter of 10/20/23    Stress test " "with myocardial perfusion one day    Interpretation Summary    Myocardial perfusion imaging indicates a normal myocardial perfusion study with no evidence of ischemia.    Left ventricular ejection fraction is normal (Calculated EF = 50%).    Impressions are consistent with a low risk study.    Diaphragmatic attenuation artifact is present.    Findings consistent with a normal ECG stress test.     Procedures     Lab Results   Component Value Date    CHOL 139 10/20/2023    CHOL 168 01/17/2023    CHOL 235 (H) 03/25/2022    CHLPL 205 (H) 03/04/2015    CHLPL 206 (H) 09/12/2014    CHLPL 230 (H) 02/22/2014     Lab Results   Component Value Date    TRIG 152 (H) 10/20/2023    TRIG 246 (H) 01/17/2023    TRIG 159 (H) 03/25/2022     Lab Results   Component Value Date    HDL 33 (L) 10/20/2023    HDL 37 (L) 01/17/2023    HDL 37 (L) 03/25/2022     Lab Results   Component Value Date    LDL 79 10/20/2023    LDL 90 01/17/2023     (H) 03/25/2022       Lab Results   Component Value Date    TSH 1.690 10/20/2023               Invalid input(s): \"LABALBU\", \"PROT\"        Assessment and Plan    Assessment and Plan    Problem List Items Addressed This Visit          Cardiac and Vasculature    Dyslipidemia (Chronic)    Essential hypertension (Chronic)    Chest pain - Primary           Recommended increase activity to 30 minutes of walking daily, most days of the week    Thank you for allowing me to participate in the care of Avery Watson. Feel free to contact me directly with any further questions or concerns.          Hebert Jordan MD, FACC  Interventional Cardiology    "

## 2024-01-11 DIAGNOSIS — M96.1 LUMBAR POSTLAMINECTOMY SYNDROME: Primary | ICD-10-CM

## 2024-01-15 ENCOUNTER — OUTSIDE FACILITY SERVICE (OUTPATIENT)
Dept: PAIN MEDICINE | Facility: CLINIC | Age: 59
End: 2024-01-15
Payer: COMMERCIAL

## 2024-01-15 RX ORDER — BACLOFEN 10 MG/1
5-10 TABLET ORAL 4 TIMES DAILY PRN
Qty: 30 TABLET | Refills: 0 | Status: SHIPPED | OUTPATIENT
Start: 2024-01-15

## 2024-01-16 ENCOUNTER — TELEPHONE (OUTPATIENT)
Dept: PAIN MEDICINE | Facility: CLINIC | Age: 59
End: 2024-01-16
Payer: COMMERCIAL

## 2024-01-16 NOTE — TELEPHONE ENCOUNTER
Spoke with pt regarding how they're doing after their SCS Trial procedure 1/15/2024. Pt advised they are having no complications, or side effects.     Pt had 9/10 prior procedure, 0-1/10 post procedure. Pt rates pain 3/10 today.    Advised pt of follow up Thursday with EDDIE Dong at 1/18/2024 @ 0830 am.     Pt verbalized understanding, no further needs expressed.

## 2024-01-18 ENCOUNTER — OFFICE VISIT (OUTPATIENT)
Dept: PAIN MEDICINE | Facility: CLINIC | Age: 59
End: 2024-01-18
Payer: COMMERCIAL

## 2024-01-18 VITALS — TEMPERATURE: 96.9 F | BODY MASS INDEX: 35.27 KG/M2 | WEIGHT: 260.4 LBS | HEIGHT: 72 IN

## 2024-01-18 DIAGNOSIS — Z46.2 ENCOUNTER FOR FITTING AND ADJUSTMENT OF NEUROPACEMAKER OF SPINAL CORD: ICD-10-CM

## 2024-01-18 DIAGNOSIS — M54.16 CHRONIC LUMBAR RADICULOPATHY: ICD-10-CM

## 2024-01-18 DIAGNOSIS — M96.1 LUMBAR POSTLAMINECTOMY SYNDROME: ICD-10-CM

## 2024-01-18 DIAGNOSIS — R53.81 PHYSICAL DECONDITIONING: ICD-10-CM

## 2024-01-18 DIAGNOSIS — E66.8 MODERATE OBESITY: ICD-10-CM

## 2024-01-18 PROCEDURE — 95970 ALYS NPGT W/O PRGRMG: CPT | Performed by: NURSE PRACTITIONER

## 2024-01-18 PROCEDURE — 99214 OFFICE O/P EST MOD 30 MIN: CPT | Performed by: NURSE PRACTITIONER

## 2024-01-18 NOTE — PROGRESS NOTES
"Chief Complaint: \"I did very well during the stimulator trial\"      Brief History: Mr. Avery Watson is a 58 y.o. male, who returns to the clinic for possible spinal cord stimulator reprogramming and removal of spinal cord stimulator trial leads placed on 01/15/2024. Mr. Avery Watson reports 75% relief of his chronic lower back and left lower extremity pain along with remarkable functional improvement with the use of his stimulator device. Patient reports significant relief of his previously severe neurogenic claudication. In addition, patient reports improvement of his nocturnal pain with the use of the SCS device.   Avery Watson did not require SCS reprogramming during the trial. Patient was able to operate his stimulator device without difficulties.  Patient did not take additional analgesics throughout his spinal cord stimulator trial. He has remained afebrile throughout the trial. Dressings are dry and intact. Patient denies any complications related to the procedure.   Patient is very satisfied with the trial and would like to move forward with implantation of a spinal cord stimulator device.   Pain level is rated as 0/10 with the stimulator \"turned on.”   Patient level ranges from 0/10 to 2/10 with the use of the spinal cord stimulator.    Patient denies  pain, numbness, and weakness in the and  lower extremities.  Patient denies  any new bladder or bowel problems.     Pain History:  Mr. Avery Watson, 58 y.o. male originally referred by Dr. Aquiles Grigsby.  Patient reports a greater than 30-year history of chronic intractable lower back and lower extremity pain. Avery Watson is a former patient from 2016. He has been suffering from lower back and lower extremity pain since his late teens. Ultimately, he presented with severe lower back and right lower extremity pain and on 09/22/2022, he underwent right L4-L5 lumbar discectomy with Dr. Grigsby.  His right lower extremity " pain has resolved. He has continued experiencing lower back pain that radiates into his left lower extremity. Avery Watson underwent neurosurgical consultation with Dr. Aquiles Grigsby on 12/9/2022, and was found not to be a surgical candidate as patient presents with chronic left-sided pain for which he did not have a surgical solution and suggested spinal cord stimulation.  MRI of the lumbar spine prior to surgery did not reveal significant nerve root compromise on the left side at any level. CT of the lumbar spine w/o contrast on 12/24/2022 revealed moderate disc space narrowing at L5-S1 with mild posterior osteophyte formations. No significant spinal stenosis. Pain has progressed in intensity over the past years. Avery Watson has failed to obtain pain relief with conservative measures for more than 30 years including oral analgesics, opioids, topical analgesics, ice, heat, TENs, physical therapy (last visit within the past 18 months, got worse), physical therapist directed home exercise program HEP (ongoing), chiropractic therapy (in the past), to name a few  Pain Description:  Constant lower back pain with intermittent exacerbation, described as aching, dull, sharp, stabbing, throbbing,  burning, shooting, numbing, and tingling sensation.   Radiation of Pain: The pain radiates into the left lower extremity to left foot  Pain intensity today: 6/10   Average pain intensity last week: 8/10  Pain intensity ranges from: 5/10 to 10/10  Aggravating factors: Pain increases with lifting, pushing, pulling, movement, bending forward, arching the back, twisting, protracted sitting, standing, walking. Patient describes neurogenic claudication.  Patient reports limitations and general mobility deficits. Patient does not use a cane or walker   Alleviating factors: Pain decreases with lying down flat in bed  Associated Symptoms:   Patient reports pain and numbness but denies weakness in the left lower extremity.  "Patient denies symptoms in the opposite limb  Patient denies any new bladder or bowel problems.   Patient reports difficulties with his balance but denies recent falls.   Pain interferes with ADLs, general activities (ability to walk, stand, transition from different positions), and affects patient's quality of life  Pain interferes with sleep: falling asleep and causing sleep fragmentation   Stiffness    Review of previous therapies and additional medical records:   Avery Watson has already failed the following measures, including:   Conservative Measures:  Oral analgesics, opioids, topical analgesics, ice, heat, TENs, physical therapy (last visit within the past 18 months, got worse), physical therapist directed home exercise program HEP (ongoing), chiropractic therapy (in the past)  Interventional Measures:   Pain clinic in [place] 4-5 injections prior to surgery: No relief  2016 (MBBs: No relief) and 2017 (LES: No relief)  Surgical Measures: 09/22/2022: right L4-L5 lumbar discectomy with Dr. Grigsby  Avery Watson underwent neurosurgical consultation with Dr. Aquiles Grigsby on 12/9/2022, and was found not to be a surgical candidate.  Avery Watson underwent psychological consultation on 10/17/2023 & 11/07/2023 with Yee Farnsworth PhD & Delaney iFnley PhD: \"From a psychological perspective patient is considered to be an appropriate candidate for a SCS.\"   Avery Watson presents with significant comorbidities including hypertension, dyslipidemia, GERD, migraines, osteoarthritis, benign prostatic hyperplasia with urinary obstruction,   In terms of current analgesics, Avery Watson takes: celecoxib. Patient also takes trazodone, bupropion, buspirone, duloxetine, Prozac, ondansetron   I have reviewed Juan Report consistent with medication reconciliation.  SOAPP/ORT: Low Risk          Global Pain Scale 12-14  2023 01-18 2024               Pain 16  4               Feelings 4  0        "        Clinical outcomes 14  1               Activities 14  0               GPS Total: 48  5                   Review of New Diagnostic Studies:    MRI of the lumbar spine with and without contrast 1/9/2024:  L1-L2: Disc desiccation with a broad-based disc bulge, no significant spinal stenosis or neuroforaminal stenosis.  L2-L3: Disc desiccation with a broad-based disc bulge, without significant spinal canal neuroforaminal stenosis.  L3-L4: Broad-based disc bulge with facet hypertrophy, no significant spinal canal neuroforaminal stenosis.  L4-L5: Posterior disc osteophyte complex with facet hypertrophy with mild right neuroforaminal stenosis, no significant spinal stenosis.  L5-S1: Posterior disc osteophyte complex with facet hypertrophy with moderate bilateral neuroforaminal stenosis no significant spinal canal stenosis.  MRI of the thoracic spine without contrast 1/9/2024: Vertebral bodies maintain normal height and alignment.  Mild loss of disc space in the mid and lower thoracic spine.  No disc protrusions or bulge.  No significant spinal canal neuroforaminal stenosis.  Spinal cord appears normal in signal throughout.  Lumbar spine x-rays with flexion-extension view.  Loss of disc space height and facet arthritis diffusely throughout, most significant at L5-S1.  No instability or listhesis in flexion-extension views.        The following portions of the patient's history were reviewed and updated as appropriate: problem list, past medical history, past surgery history, social history, family history, medications, and allergies    Review of Systems   Musculoskeletal:  Positive for arthralgias and back pain.   All other systems reviewed and are negative.        Patient Active Problem List   Diagnosis    Benign prostatic hyperplasia with urinary obstruction    Fatigue    Multilevel degenerative disc disease    Migraine    Shoulder pain    Spondylosis of lumbar region without myelopathy or radiculopathy     Degeneration of lumbar or lumbosacral intervertebral disc    Myofascial pain    Bilateral carpal tunnel syndrome    Displacement of intervertebral disc of mid-cervical region    Neuroforaminal stenosis of spine, right C6-C7    Carpal tunnel syndrome, left    Chest pain    Chronic lumbar radiculopathy    Low testosterone    Dyslipidemia    Essential hypertension    Lumbar postlaminectomy syndrome    BMI 35.0-35.9,adult    Moderate obesity    Physical deconditioning    Prostatitis, chronic       Past Medical History:   Diagnosis Date    Acid reflux     Chronic pain disorder     Depression     Essential hypertension 10/27/2023    Extremity pain     Joint pain     Low back pain     Lumbosacral disc disease     Migraine     Muscle spasm     Neck pain     Osteoarthritis     PONV (postoperative nausea and vomiting)     Rheumatoid arthritis          Past Surgical History:   Procedure Laterality Date    BACK SURGERY      CARPAL TUNNEL RELEASE Right 08/04/2016    Procedure: CARPAL TUNNEL RELEASE;  Surgeon: Yoel Lua MD;  Location:  [facility];  Service:     CARPAL TUNNEL RELEASE Left 11/09/2017    Procedure: CARPAL TUNNEL RELEASE;  Surgeon: Yoel Lua MD;  Location:  [facility];  Service:     COLONOSCOPY      LUMBAR DISCECTOMY Right 09/22/2022    Procedure: LUMBAR DISCECTOMY L4-5 RIGHT;  Surgeon: Aquiles Grigsby MD;  Location:  [facility];  Service: Neurosurgery;  Laterality: Right;    LUMBAR FACET INJECTION      TOE SURGERY Right 03/04/2015         Family History   Problem Relation Age of Onset    Lung cancer Mother     Cancer Mother         ovarian    Diabetes Sister     Cancer Sister         breast    Heart disease Brother     Rheum arthritis Brother     Arthritis Brother          Social History     Socioeconomic History    Marital status:    Tobacco Use    Smoking status: Never    Smokeless tobacco: Former     Types: Chew    Tobacco comments:     uses chewing tobacco   Vaping Use    Vaping Use:  "Never used   Substance and Sexual Activity    Alcohol use: No    Drug use: No    Sexual activity: Yes     Partners: Female     Birth control/protection: Same-sex partner           Current Outpatient Medications:     atorvastatin (LIPITOR) 10 MG tablet, Take 1 tablet by mouth Daily., Disp: 90 tablet, Rfl: 3    baclofen (LIORESAL) 10 MG tablet, Take 0.5 (one-half) to 1 tablets by mouth 4 (Four) Times a Day As Needed for muscle spasms., Disp: 30 tablet, Rfl: 0    buPROPion (WELLBUTRIN) 75 MG tablet, Take 1 tablet by mouth every day., Disp: 30 tablet, Rfl: 2    busPIRone (BUSPAR) 5 MG tablet, Take 1 tablet by mouth 2 (Two) Times a Day As Needed., Disp: 60 tablet, Rfl: 2    metoprolol tartrate (LOPRESSOR) 25 MG tablet, Take 1 tablet by mouth 2 (Two) Times a Day., Disp: 60 tablet, Rfl: 11    sulfamethoxazole-trimethoprim (Bactrim DS) 800-160 MG per tablet, Take 1 tablet by mouth 2 (Two) Times a Day for 84 days., Disp: 56 tablet, Rfl: 2    Syringe 25G X 5/8\" 3 ML misc, Use as directed 2 times weekly (use with testosterone injection), Disp: 24 each, Rfl: 3    tadalafil (Cialis) 5 MG tablet, Take 1 tablet by mouth daily as needed for erectile dysfunction., Disp: 30 tablet, Rfl: 6    tamsulosin (FLOMAX) 0.4 MG capsule 24 hr capsule, Take 2 capsules (0.8 mg total) by mouth daily for prostate., Disp: 180 capsule, Rfl: 1    terbinafine (lamiSIL) 250 MG tablet, Take 1 tablet by mouth once daily on the first 7 days of each month., Disp: 84 tablet, Rfl: 0    Testosterone Cypionate (Depo-Testosterone) 200 MG/ML injection, Inject 0.5 mL under the skin every Monday and Thursday., Disp: 10 mL, Rfl: 2    traZODone (DESYREL) 50 MG tablet, Take 1 or 2 tablets by mouth every day at bedtime., Disp: 60 tablet, Rfl: 2    vitamin D (ERGOCALCIFEROL) 1.25 MG (01036 UT) capsule capsule, Take 1 capsule by mouth Every 7 (Seven) Days., Disp: 12 capsule, Rfl: 2    dexlansoprazole (DEXILANT) 60 MG capsule, Take 1 capsule by mouth Daily. (Patient not " "taking: Reported on 1/18/2024), Disp: 90 capsule, Rfl: 5    DULoxetine (Cymbalta) 60 MG capsule, , Disp: , Rfl:     FLUoxetine (PROzac) 40 MG capsule, Take 1 capsule by mouth Every Morning., Disp: 90 capsule, Rfl: 1    fluticasone (FLONASE) 50 MCG/ACT nasal spray, Use 2 sprays in nostril(s) daily as directed by provider. (Patient not taking: Reported on 1/18/2024), Disp: 16 mL, Rfl: 0    ondansetron (Zofran) 4 MG tablet, Take 1 tablet by mouth Every 12 (Twelve) Hours As Needed for Nausea. (Patient not taking: Reported on 1/10/2024), Disp: 20 tablet, Rfl: 0    phenazopyridine (PYRIDIUM) 100 MG tablet, Take 1 tablet by mouth 3 (Three) Times a Day As Needed for Bladder Spasms. (Patient not taking: Reported on 1/10/2024), Disp: 20 tablet, Rfl: 0      No Known Allergies      Temp 96.9 °F (36.1 °C)   Ht 182.9 cm (72\")   Wt 118 kg (260 lb 6.4 oz)   BMI 35.32 kg/m²       Physical Exam   Constitutional: Patient appears well-developed, well-nourished, well-hydrated  HEENT: Head: Normocephalic and atraumatic  Eyes: Conjunctivae and lids are normal  Pupils: Equal, round, reactive to light  Musculoskeletal   Gait and station: Gait evaluation demonstrated a normal gait   Neurological:   Patient is alert and oriented to person, place, and time.   Speech: Normal.   Cortical function: Normal mental status.   Motor strength:  5/5  Motor Tone: Normal .   Involuntary movements: None.   Skin and subcutaneous tissue: Skin is warm and intact. No rash noted. No cyanosis.The stimulator placement site looks unremarkable without erythema, drainage or fluid accumulation  Psychiatric: Judgment and insight: Normal. Recent and remote memory: Intact. Mood and affect: Normal.     PROCEDURES:   Procedure #1: Analysis of the spinal cord stimulator device without spinal cord stimulator reprogramming   Patient used the [employer] spinal cord stimulator device 100 % of the time since initiation of the trial phase.      Program ONE " (paresthesia):    Electrode polarities: LL: 4+, 5-, 7-, 8+, 9+ RL: 6-, 9+   Amplitude: 10.3 mA     Pulse width: 290 mcs  Rate: 40 Hz  A copy of the telemetry report will be scanned in the patient's chart.    Procedure #2: Removal of spinal cord stimulator trial leads.   Two leads were removed with tips intact. There is no erythema, drainage, or fluid accumulation at the site. The area was cleansed with chlorhexidine.  A small Covaderm was applied.     ASSESSMENT:   1. Lumbar postlaminectomy syndrome    2. Chronic lumbar radiculopathy    3. Moderate obesity    4. Physical deconditioning    5. Encounter for fitting and adjustment of neuropacemaker of spinal cord        PLAN/MEDICAL DECISION MAKING:  Mr. Avery Watson, 58 y.o. male  presents with a greater than 30-year history of chronic intractable lower back and lower extremity pain.  He has a history of undergoing a right L4-L5 lumbar discectomy with Dr. Grigsby on 9/22/2022.  His right lower extremity pain resolved thereafter.  Unfortunately, he is continue to experience severe lower back pain with radiation into his left lower extremity.  He underwent neurosurgical consultation with Dr. Grigsby on 12/9/2022, and was found not to be surgical candidate.  Dr. Grigsby recommended spinal cord stimulation.  A recent MRI of the lumbar spine with and without contrast on 1/9/2024, revealed multilevel degenerative disc disease, with areas of facet hypertrophy and neuroforaminal stenosis.  No evidence of high-grade spinal stenosis or nerve impingement. Avery Watson has failed to obtain pain relief with conservative measures for more than 30 years including oral analgesics, opioids, topical analgesics, ice, heat, TENs, physical therapy (last visit within the past 18 months, got worse), physical therapist directed home exercise program HEP (ongoing), chiropractic therapy (in the past), to name a few  Patient is very satisfied with the trial and would like to move  forward with implantation of a spinal cord stimulator device.  Therefore, I have proposed the following plan:  1. Referral to Dr. Aquiles Grigsby in consultation for implantation of a spinal cord stimulator device. I have recommended [employer] [id number] surgical lead with the top electrodes projecting at the level of the superior endplate of the T7 vertebral level. I have recommended [employer] Alpha Wave Writer rechargeable Full Body Compatible .  I will see him back after implant.  2. The patient has been instructed to contact my office with any questions or difficulties. The patient understands the plan and agrees to proceed accordingly.      Pain Medications               baclofen (LIORESAL) 10 MG tablet Take 0.5 (one-half) to 1 tablets by mouth 4 (Four) Times a Day As Needed for muscle spasms.    buPROPion (WELLBUTRIN) 75 MG tablet Take 1 tablet by mouth every day.    traZODone (DESYREL) 50 MG tablet Take 1 or 2 tablets by mouth every day at bedtime.    DULoxetine (Cymbalta) 60 MG capsule     FLUoxetine (PROzac) 40 MG capsule Take 1 capsule by mouth Every Morning.             Orders Placed This Encounter   Procedures    Ambulatory Referral to Neurosurgery     Referral Priority:   Routine     Referral Type:   Consultation     Referral Reason:   Specialty Services Required     Referral Location:   [facility]     Referred to Provider:   Aquiles Grigsby MD     Requested Specialty:   Neurosurgery     Number of Visits Requested:   1        No orders of the defined types were placed in this encounter.        Future Appointments   Date Time Provider Department Center   2/7/2024  1:00 PM Francisco Javier Gifford RD [facility]   2/8/2024  8:00 AM Benedicto Mckeon MD [facility]   7/10/2024  2:00 PM Hebert Jordan MD [facility]        Please note that portions of this note were completed with a voice recognition program.     EDDIE Chase    Patient  Care Team:  Luly Jimenez APRN as PCP - General (Nurse Practitioner)  Flaco Gallego MD as Consulting Physician (Pain Medicine)

## 2024-02-02 ENCOUNTER — OFFICE VISIT (OUTPATIENT)
Dept: NEUROSURGERY | Facility: CLINIC | Age: 59
End: 2024-02-02
Payer: COMMERCIAL

## 2024-02-02 ENCOUNTER — PREP FOR SURGERY (OUTPATIENT)
Dept: OTHER | Facility: HOSPITAL | Age: 59
End: 2024-02-02
Payer: COMMERCIAL

## 2024-02-02 VITALS — BODY MASS INDEX: 35 KG/M2 | HEIGHT: 72 IN | TEMPERATURE: 98.2 F | WEIGHT: 258.4 LBS

## 2024-02-02 DIAGNOSIS — M96.1 POSTLAMINECTOMY SYNDROME, LUMBAR REGION: Primary | ICD-10-CM

## 2024-02-02 DIAGNOSIS — M96.1 LUMBAR POST-LAMINECTOMY SYNDROME: Primary | ICD-10-CM

## 2024-02-02 PROCEDURE — 99214 OFFICE O/P EST MOD 30 MIN: CPT | Performed by: NEUROLOGICAL SURGERY

## 2024-02-02 RX ORDER — CHLORHEXIDINE GLUCONATE 4 G/100ML
SOLUTION TOPICAL
Qty: 236 ML | Refills: 0 | Status: SHIPPED | OUTPATIENT
Start: 2024-02-02

## 2024-02-02 RX ORDER — FAMOTIDINE 20 MG/1
20 TABLET, FILM COATED ORAL
OUTPATIENT
Start: 2024-02-02

## 2024-02-02 NOTE — H&P (VIEW-ONLY)
Patient: Avery Watson  : 1965     Primary Care Provider: Luly Jimenez APRN     Requesting Provider: As above           History     Chief Complaint: Low back and left leg pain.     History of Present Illness: Mr. Watson is a 58-year-old gentleman who is known to my service.  On 2022 underwent right L4-5 discectomy and did quite well.  His right leg pain resolved.  However since his teenage years he has had bothersome back pain extending into his left leg and that persists.  Recently a trial of a spinal cord stimulator helped significantly with his pain and he is referred here for [employer] epidural spinal cord stimulator placement.     Review of Systems   Constitutional:  Negative for activity change, appetite change, chills, diaphoresis, fatigue, fever and unexpected weight change.   HENT:  Negative for congestion, dental problem, drooling, ear discharge, ear pain, facial swelling, hearing loss, mouth sores, nosebleeds, postnasal drip, rhinorrhea, sinus pressure, sinus pain, sneezing, sore throat, tinnitus, trouble swallowing and voice change.    Eyes:  Negative for photophobia, pain, discharge, redness, itching and visual disturbance.   Respiratory:  Negative for apnea, cough, choking, chest tightness, shortness of breath, wheezing and stridor.    Cardiovascular:  Negative for chest pain, palpitations and leg swelling.   Gastrointestinal:  Negative for abdominal distention, abdominal pain, anal bleeding, blood in stool, constipation, diarrhea, nausea, rectal pain and vomiting.   Endocrine: Negative for cold intolerance, heat intolerance, polydipsia, polyphagia and polyuria.   Genitourinary:  Negative for decreased urine volume, difficulty urinating, dysuria, enuresis, flank pain, frequency, genital sores, hematuria, penile discharge, penile pain, penile swelling, scrotal swelling, testicular pain and urgency.   Musculoskeletal:  Positive for back pain. Negative for arthralgias,  gait problem, joint swelling, myalgias, neck pain and neck stiffness.   Skin:  Negative for color change, pallor, rash and wound.   Allergic/Immunologic: Negative for environmental allergies, food allergies and immunocompromised state.   Neurological:  Positive for numbness. Negative for dizziness, tremors, seizures, syncope, facial asymmetry, speech difficulty, weakness, light-headedness and headaches.   Hematological:  Negative for adenopathy. Does not bruise/bleed easily.   Psychiatric/Behavioral:  Negative for agitation, behavioral problems, confusion, decreased concentration, dysphoric mood, hallucinations, self-injury, sleep disturbance and suicidal ideas. The patient is not nervous/anxious and is not hyperactive.       The patient's past medical history, past surgical history, family history, and social history have been reviewed at length in the electronic medical record.     Past Medical History:   Diagnosis Date    Acid reflux     Chronic pain disorder     Depression     Essential hypertension 10/27/2023    Extremity pain     Joint pain     Low back pain     Lumbosacral disc disease     Migraine     Muscle spasm     Neck pain     Osteoarthritis     PONV (postoperative nausea and vomiting)     Rheumatoid arthritis      Past Surgical History:   Procedure Laterality Date    BACK SURGERY      CARPAL TUNNEL RELEASE Right 08/04/2016    Procedure: CARPAL TUNNEL RELEASE;  Surgeon: Yoel Lua MD;  Location: [facility] OR;  Service:     CARPAL TUNNEL RELEASE Left 11/09/2017    Procedure: CARPAL TUNNEL RELEASE;  Surgeon: Yoel Lua MD;  Location:  [facility] OR;  Service:     COLONOSCOPY      LUMBAR DISCECTOMY Right 09/22/2022    Procedure: LUMBAR DISCECTOMY L4-5 RIGHT;  Surgeon: Aquiles Grigsby MD;  Location:  [facility] OR;  Service: Neurosurgery;  Laterality: Right;    LUMBAR FACET INJECTION      TOE SURGERY Right 03/04/2015     Family History   Problem Relation Age of Onset    Lung cancer Mother      "Cancer Mother         ovarian    Diabetes Sister     Cancer Sister         breast    Heart disease Brother     Rheum arthritis Brother     Arthritis Brother      Social History     Socioeconomic History    Marital status:    Tobacco Use    Smoking status: Never    Smokeless tobacco: Former     Types: Chew    Tobacco comments:     uses chewing tobacco   Vaping Use    Vaping Use: Never used   Substance and Sexual Activity    Alcohol use: No    Drug use: No    Sexual activity: Yes     Partners: Female     Birth control/protection: Same-sex partner           No Known Allergies    Current Outpatient Medications on File Prior to Visit   Medication Sig Dispense Refill    atorvastatin (LIPITOR) 10 MG tablet Take 1 tablet by mouth Daily. 90 tablet 3    baclofen (LIORESAL) 10 MG tablet Take 0.5 (one-half) to 1 tablets by mouth 4 (Four) Times a Day As Needed for muscle spasms. 30 tablet 0    baclofen (LIORESAL) 10 MG tablet Take 1 tablet by mouth at bedtime. 90 tablet 4    buPROPion (WELLBUTRIN) 75 MG tablet Take 1 tablet by mouth every day. 30 tablet 2    busPIRone (BUSPAR) 5 MG tablet Take 1 tablet by mouth 2 (Two) Times a Day As Needed. 60 tablet 2    famotidine (Pepcid) 40 MG tablet Take 1 tablet by mouth twice daily. 180 tablet 4    FLUoxetine (PROzac) 40 MG capsule Take 1 capsule by mouth Every Morning. 90 capsule 1    metoprolol tartrate (LOPRESSOR) 25 MG tablet Take 1 tablet by mouth 2 (Two) Times a Day. 60 tablet 11    RABEprazole (ACIPHEX) 20 MG EC tablet Take 1 tablet by mouth 15 minutes before breakfast and supper. 60 tablet 11    sulfamethoxazole-trimethoprim (Bactrim DS) 800-160 MG per tablet Take 1 tablet by mouth 2 (Two) Times a Day for 84 days. 56 tablet 2    Syringe 25G X 5/8\" 3 ML misc Use as directed 2 times weekly (use with testosterone injection) 24 each 3    tadalafil (Cialis) 5 MG tablet Take 1 tablet by mouth daily as needed for erectile dysfunction. 30 tablet 6    tamsulosin (FLOMAX) 0.4 MG " "capsule 24 hr capsule Take 2 capsules (0.8 mg total) by mouth daily for prostate. 180 capsule 1    terbinafine (lamiSIL) 250 MG tablet Take 1 tablet by mouth once daily on the first 7 days of each month. 84 tablet 0    Testosterone Cypionate (Depo-Testosterone) 200 MG/ML injection Inject 0.5 mL under the skin every Monday and Thursday. 10 mL 2    traZODone (DESYREL) 50 MG tablet Take 1 or 2 tablets by mouth every day at bedtime. 60 tablet 2    vitamin D (ERGOCALCIFEROL) 1.25 MG (01487 UT) capsule capsule Take 1 capsule by mouth Every 7 (Seven) Days. 12 capsule 2    [DISCONTINUED] dexlansoprazole (DEXILANT) 60 MG capsule Take 1 capsule by mouth Daily. (Patient not taking: Reported on 1/18/2024) 90 capsule 5    [DISCONTINUED] DULoxetine (Cymbalta) 60 MG capsule  (Patient not taking: Reported on 1/18/2024)      [DISCONTINUED] fluticasone (FLONASE) 50 MCG/ACT nasal spray Use 2 sprays in nostril(s) daily as directed by provider. (Patient not taking: Reported on 1/18/2024) 16 mL 0    [DISCONTINUED] ondansetron (Zofran) 4 MG tablet Take 1 tablet by mouth Every 12 (Twelve) Hours As Needed for Nausea. (Patient not taking: Reported on 1/10/2024) 20 tablet 0    [DISCONTINUED] phenazopyridine (PYRIDIUM) 100 MG tablet Take 1 tablet by mouth 3 (Three) Times a Day As Needed for Bladder Spasms. (Patient not taking: Reported on 1/10/2024) 20 tablet 0    [DISCONTINUED] Vonoprazan Fumarate (Voquezna) 20 MG tablet Take 1 tablet by mouth daily 30 tablet 5     No current facility-administered medications on file prior to visit.          Physical Exam:   Temp 98.2 °F (36.8 °C) (Infrared)   Ht 182.9 cm (72\")   Wt 117 kg (258 lb 6.4 oz)   BMI 35.05 kg/m²   MUSCULOSKELETAL:  Straight leg raising is negative.  Bo's Sign is negative.  ROM in the low back is normal.  Tenderness in the back to palpation is not observed.  NEUROLOGICAL:  Strength is intact in the lower extremities to direct testing.  Muscle tone is normal " throughout.  Station and gait are normal.  Sensation is intact to light touch testing throughout.  Deep tendon reflexes are 1+ and symmetrical.  Coordination is intact.        Medical Decision Making     Data Review:   (All imaging studies were personally reviewed unless stated otherwise)  Lumbar MRI study dated 1/9/2024 demonstrates some diffuse degenerative disc disease and facet arthropathy.  There is no high-grade root or canal compromise.     MRI of the thoracic spine from the same date demonstrates a capacious canal with some diffuse mild degenerative change.     Diagnosis:   1.  Lumbar postlaminectomy syndrome.  2.  Chronic mechanical back pain.     Treatment Options:   I have recommended [employer] epidural spinal cord stimulator placement as requested by Dr. Gallego.  The paddle lead will be placed to the superior T7 vertebral endplate level.  Rechargeable MRI compatible system will be utilized.  The nature of the procedure as well as the potential risks, complications, limitations, and alternatives to the procedure were discussed at length with the patient and the patient has agreed to proceed with surgery.

## 2024-02-02 NOTE — H&P
Patient: Avery Watson  : 1965     Primary Care Provider: Luly Jimenez APRN     Requesting Provider: As above           History     Chief Complaint: Low back and left leg pain.     History of Present Illness: Mr. Watson is a 58-year-old gentleman who is known to my service.  On 2022 underwent right L4-5 discectomy and did quite well.  His right leg pain resolved.  However since his teenage years he has had bothersome back pain extending into his left leg and that persists.  Recently a trial of a spinal cord stimulator helped significantly with his pain and he is referred here for [email or web address] epidural spinal cord stimulator placement.     Review of Systems   Constitutional:  Negative for activity change, appetite change, chills, diaphoresis, fatigue, fever and unexpected weight change.   HENT:  Negative for congestion, dental problem, drooling, ear discharge, ear pain, facial swelling, hearing loss, mouth sores, nosebleeds, postnasal drip, rhinorrhea, sinus pressure, sinus pain, sneezing, sore throat, tinnitus, trouble swallowing and voice change.    Eyes:  Negative for photophobia, pain, discharge, redness, itching and visual disturbance.   Respiratory:  Negative for apnea, cough, choking, chest tightness, shortness of breath, wheezing and stridor.    Cardiovascular:  Negative for chest pain, palpitations and leg swelling.   Gastrointestinal:  Negative for abdominal distention, abdominal pain, anal bleeding, blood in stool, constipation, diarrhea, nausea, rectal pain and vomiting.   Endocrine: Negative for cold intolerance, heat intolerance, polydipsia, polyphagia and polyuria.   Genitourinary:  Negative for decreased urine volume, difficulty urinating, dysuria, enuresis, flank pain, frequency, genital sores, hematuria, penile discharge, penile pain, penile swelling, scrotal swelling, testicular pain and urgency.   Musculoskeletal:  Positive for back pain. Negative for arthralgias,  gait problem, joint swelling, myalgias, neck pain and neck stiffness.   Skin:  Negative for color change, pallor, rash and wound.   Allergic/Immunologic: Negative for environmental allergies, food allergies and immunocompromised state.   Neurological:  Positive for numbness. Negative for dizziness, tremors, seizures, syncope, facial asymmetry, speech difficulty, weakness, light-headedness and headaches.   Hematological:  Negative for adenopathy. Does not bruise/bleed easily.   Psychiatric/Behavioral:  Negative for agitation, behavioral problems, confusion, decreased concentration, dysphoric mood, hallucinations, self-injury, sleep disturbance and suicidal ideas. The patient is not nervous/anxious and is not hyperactive.       The patient's past medical history, past surgical history, family history, and social history have been reviewed at length in the electronic medical record.     Past Medical History:   Diagnosis Date    Acid reflux     Chronic pain disorder     Depression     Essential hypertension 10/27/2023    Extremity pain     Joint pain     Low back pain     Lumbosacral disc disease     Migraine     Muscle spasm     Neck pain     Osteoarthritis     PONV (postoperative nausea and vomiting)     Rheumatoid arthritis      Past Surgical History:   Procedure Laterality Date    BACK SURGERY      CARPAL TUNNEL RELEASE Right 08/04/2016    Procedure: CARPAL TUNNEL RELEASE;  Surgeon: Yoel Lua MD;  Location: [facility] OR;  Service:     CARPAL TUNNEL RELEASE Left 11/09/2017    Procedure: CARPAL TUNNEL RELEASE;  Surgeon: Yoel Lua MD;  Location:  [facility] OR;  Service:     COLONOSCOPY      LUMBAR DISCECTOMY Right 09/22/2022    Procedure: LUMBAR DISCECTOMY L4-5 RIGHT;  Surgeon: Aquiles Grigsby MD;  Location:  [facility] OR;  Service: Neurosurgery;  Laterality: Right;    LUMBAR FACET INJECTION      TOE SURGERY Right 03/04/2015     Family History   Problem Relation Age of Onset    Lung cancer Mother      "Cancer Mother         ovarian    Diabetes Sister     Cancer Sister         breast    Heart disease Brother     Rheum arthritis Brother     Arthritis Brother      Social History     Socioeconomic History    Marital status:    Tobacco Use    Smoking status: Never    Smokeless tobacco: Former     Types: Chew    Tobacco comments:     uses chewing tobacco   Vaping Use    Vaping Use: Never used   Substance and Sexual Activity    Alcohol use: No    Drug use: No    Sexual activity: Yes     Partners: Female     Birth control/protection: Same-sex partner           No Known Allergies    Current Outpatient Medications on File Prior to Visit   Medication Sig Dispense Refill    atorvastatin (LIPITOR) 10 MG tablet Take 1 tablet by mouth Daily. 90 tablet 3    baclofen (LIORESAL) 10 MG tablet Take 0.5 (one-half) to 1 tablets by mouth 4 (Four) Times a Day As Needed for muscle spasms. 30 tablet 0    baclofen (LIORESAL) 10 MG tablet Take 1 tablet by mouth at bedtime. 90 tablet 4    buPROPion (WELLBUTRIN) 75 MG tablet Take 1 tablet by mouth every day. 30 tablet 2    busPIRone (BUSPAR) 5 MG tablet Take 1 tablet by mouth 2 (Two) Times a Day As Needed. 60 tablet 2    famotidine (Pepcid) 40 MG tablet Take 1 tablet by mouth twice daily. 180 tablet 4    FLUoxetine (PROzac) 40 MG capsule Take 1 capsule by mouth Every Morning. 90 capsule 1    metoprolol tartrate (LOPRESSOR) 25 MG tablet Take 1 tablet by mouth 2 (Two) Times a Day. 60 tablet 11    RABEprazole (ACIPHEX) 20 MG EC tablet Take 1 tablet by mouth 15 minutes before breakfast and supper. 60 tablet 11    sulfamethoxazole-trimethoprim (Bactrim DS) 800-160 MG per tablet Take 1 tablet by mouth 2 (Two) Times a Day for 84 days. 56 tablet 2    Syringe 25G X 5/8\" 3 ML misc Use as directed 2 times weekly (use with testosterone injection) 24 each 3    tadalafil (Cialis) 5 MG tablet Take 1 tablet by mouth daily as needed for erectile dysfunction. 30 tablet 6    tamsulosin (FLOMAX) 0.4 MG " "capsule 24 hr capsule Take 2 capsules (0.8 mg total) by mouth daily for prostate. 180 capsule 1    terbinafine (lamiSIL) 250 MG tablet Take 1 tablet by mouth once daily on the first 7 days of each month. 84 tablet 0    Testosterone Cypionate (Depo-Testosterone) 200 MG/ML injection Inject 0.5 mL under the skin every Monday and Thursday. 10 mL 2    traZODone (DESYREL) 50 MG tablet Take 1 or 2 tablets by mouth every day at bedtime. 60 tablet 2    vitamin D (ERGOCALCIFEROL) 1.25 MG (25471 UT) capsule capsule Take 1 capsule by mouth Every 7 (Seven) Days. 12 capsule 2    [DISCONTINUED] dexlansoprazole (DEXILANT) 60 MG capsule Take 1 capsule by mouth Daily. (Patient not taking: Reported on 1/18/2024) 90 capsule 5    [DISCONTINUED] DULoxetine (Cymbalta) 60 MG capsule  (Patient not taking: Reported on 1/18/2024)      [DISCONTINUED] fluticasone (FLONASE) 50 MCG/ACT nasal spray Use 2 sprays in nostril(s) daily as directed by provider. (Patient not taking: Reported on 1/18/2024) 16 mL 0    [DISCONTINUED] ondansetron (Zofran) 4 MG tablet Take 1 tablet by mouth Every 12 (Twelve) Hours As Needed for Nausea. (Patient not taking: Reported on 1/10/2024) 20 tablet 0    [DISCONTINUED] phenazopyridine (PYRIDIUM) 100 MG tablet Take 1 tablet by mouth 3 (Three) Times a Day As Needed for Bladder Spasms. (Patient not taking: Reported on 1/10/2024) 20 tablet 0    [DISCONTINUED] Vonoprazan Fumarate (Voquezna) 20 MG tablet Take 1 tablet by mouth daily 30 tablet 5     No current facility-administered medications on file prior to visit.          Physical Exam:   Temp 98.2 °F (36.8 °C) (Infrared)   Ht 182.9 cm (72\")   Wt 117 kg (258 lb 6.4 oz)   BMI 35.05 kg/m²   MUSCULOSKELETAL:  Straight leg raising is negative.  Bo's Sign is negative.  ROM in the low back is normal.  Tenderness in the back to palpation is not observed.  NEUROLOGICAL:  Strength is intact in the lower extremities to direct testing.  Muscle tone is normal " throughout.  Station and gait are normal.  Sensation is intact to light touch testing throughout.  Deep tendon reflexes are 1+ and symmetrical.  Coordination is intact.        Medical Decision Making     Data Review:   (All imaging studies were personally reviewed unless stated otherwise)  Lumbar MRI study dated 1/9/2024 demonstrates some diffuse degenerative disc disease and facet arthropathy.  There is no high-grade root or canal compromise.     MRI of the thoracic spine from the same date demonstrates a capacious canal with some diffuse mild degenerative change.     Diagnosis:   1.  Lumbar postlaminectomy syndrome.  2.  Chronic mechanical back pain.     Treatment Options:   I have recommended [employer] epidural spinal cord stimulator placement as requested by Dr. Gallego.  The paddle lead will be placed to the superior T7 vertebral endplate level.  Rechargeable MRI compatible system will be utilized.  The nature of the procedure as well as the potential risks, complications, limitations, and alternatives to the procedure were discussed at length with the patient and the patient has agreed to proceed with surgery.

## 2024-02-02 NOTE — PROGRESS NOTES
Patient: Avery Watson  : 1965    Primary Care Provider: Luly Jimenez APRN    Requesting Provider: As above        History    Chief Complaint: Low back and left leg pain.    History of Present Illness: Mr. Watson is a 58-year-old gentleman who is known to my service.  On 2022 underwent right L4-5 discectomy and did quite well.  His right leg pain resolved.  However since his teenage years he has had bothersome back pain extending into his left leg and that persists.  Recently a trial of a spinal cord stimulator helped significantly with his pain and he is referred here for [employer] epidural spinal cord stimulator placement.    Review of Systems   Constitutional:  Negative for activity change, appetite change, chills, diaphoresis, fatigue, fever and unexpected weight change.   HENT:  Negative for congestion, dental problem, drooling, ear discharge, ear pain, facial swelling, hearing loss, mouth sores, nosebleeds, postnasal drip, rhinorrhea, sinus pressure, sinus pain, sneezing, sore throat, tinnitus, trouble swallowing and voice change.    Eyes:  Negative for photophobia, pain, discharge, redness, itching and visual disturbance.   Respiratory:  Negative for apnea, cough, choking, chest tightness, shortness of breath, wheezing and stridor.    Cardiovascular:  Negative for chest pain, palpitations and leg swelling.   Gastrointestinal:  Negative for abdominal distention, abdominal pain, anal bleeding, blood in stool, constipation, diarrhea, nausea, rectal pain and vomiting.   Endocrine: Negative for cold intolerance, heat intolerance, polydipsia, polyphagia and polyuria.   Genitourinary:  Negative for decreased urine volume, difficulty urinating, dysuria, enuresis, flank pain, frequency, genital sores, hematuria, penile discharge, penile pain, penile swelling, scrotal swelling, testicular pain and urgency.   Musculoskeletal:  Positive for back pain. Negative for arthralgias, gait  "problem, joint swelling, myalgias, neck pain and neck stiffness.   Skin:  Negative for color change, pallor, rash and wound.   Allergic/Immunologic: Negative for environmental allergies, food allergies and immunocompromised state.   Neurological:  Positive for numbness. Negative for dizziness, tremors, seizures, syncope, facial asymmetry, speech difficulty, weakness, light-headedness and headaches.   Hematological:  Negative for adenopathy. Does not bruise/bleed easily.   Psychiatric/Behavioral:  Negative for agitation, behavioral problems, confusion, decreased concentration, dysphoric mood, hallucinations, self-injury, sleep disturbance and suicidal ideas. The patient is not nervous/anxious and is not hyperactive.      The patient's past medical history, past surgical history, family history, and social history have been reviewed at length in the electronic medical record.      Physical Exam:   Temp 98.2 °F (36.8 °C) (Infrared)   Ht 182.9 cm (72\")   Wt 117 kg (258 lb 6.4 oz)   BMI 35.05 kg/m²   MUSCULOSKELETAL:  Straight leg raising is negative.  Bo's Sign is negative.  ROM in the low back is normal.  Tenderness in the back to palpation is not observed.  NEUROLOGICAL:  Strength is intact in the lower extremities to direct testing.  Muscle tone is normal throughout.  Station and gait are normal.  Sensation is intact to light touch testing throughout.  Deep tendon reflexes are 1+ and symmetrical.  Coordination is intact.      Medical Decision Making    Data Review:   (All imaging studies were personally reviewed unless stated otherwise)  Lumbar MRI study dated 1/9/2024 demonstrates some diffuse degenerative disc disease and facet arthropathy.  There is no high-grade root or canal compromise.    MRI of the thoracic spine from the same date demonstrates a capacious canal with some diffuse mild degenerative change.    Diagnosis:   1.  Lumbar postlaminectomy syndrome.  2.  Chronic mechanical back " pain.    Treatment Options:   I have recommended [employer] epidural spinal cord stimulator placement as requested by Dr. Gallego.  The paddle lead will be placed to the superior T7 vertebral endplate level.  Rechargeable MRI compatible system will be utilized.  The nature of the procedure as well as the potential risks, complications, limitations, and alternatives to the procedure were discussed at length with the patient and the patient has agreed to proceed with surgery.      Scribed for Aquiles Grigsby MD by Kamilla Acosta CMA on 2/2/2024 09:45 EST       I, Dr. Grigsby, personally performed the services described in the documentation, as scribed in my presence, and it is both accurate and complete.

## 2024-02-07 RX ORDER — BUSPIRONE HYDROCHLORIDE 5 MG/1
5 TABLET ORAL 2 TIMES DAILY PRN
Qty: 60 TABLET | Refills: 2 | Status: SHIPPED | OUTPATIENT
Start: 2024-02-07

## 2024-02-07 RX ORDER — FLUOXETINE HYDROCHLORIDE 40 MG/1
40 CAPSULE ORAL EVERY MORNING
Qty: 90 CAPSULE | Refills: 1 | Status: SHIPPED | OUTPATIENT
Start: 2024-02-07

## 2024-02-07 RX ORDER — TRAZODONE HYDROCHLORIDE 50 MG/1
TABLET ORAL
Qty: 60 TABLET | Refills: 2 | Status: SHIPPED | OUTPATIENT
Start: 2024-02-07

## 2024-02-07 RX ORDER — BUPROPION HYDROCHLORIDE 75 MG/1
75 TABLET ORAL DAILY
Qty: 30 TABLET | Refills: 2 | Status: SHIPPED | OUTPATIENT
Start: 2024-02-07

## 2024-02-07 NOTE — TELEPHONE ENCOUNTER
Caller: Walter Averyraimundo Heredia    Relationship: Self    Best call back number:  A MESSAGE CAN BE LEFT    Requested Prescriptions:   Requested Prescriptions     Pending Prescriptions Disp Refills    FLUoxetine (PROzac) 40 MG capsule 90 capsule 1     Sig: Take 1 capsule by mouth Every Morning.    busPIRone (BUSPAR) 5 MG tablet 60 tablet 2     Sig: Take 1 tablet by mouth 2 (Two) Times a Day As Needed.    traZODone (DESYREL) 50 MG tablet 60 tablet 2     Sig: Take 1 or 2 tablets by mouth every day at bedtime.    buPROPion (WELLBUTRIN) 75 MG tablet 30 tablet 2     Sig: Take 1 tablet by mouth every day.        Pharmacy where request should be sent: [facility]      Last office visit with prescribing clinician: 12/21/2023   Last telemedicine visit with prescribing clinician: Visit date not found   Next office visit with prescribing clinician: Visit date not found     Additional details provided by patient: PATIENT GOES TO [facility] FOR ALL OF HIS MEDICATION'S NOW. THE PATIENT WOULD ALSO LIKE TO GET 90 PRESCRIPTION'S IF POSSIBLE.  THE PATIENT IS OUT OF FLUOXETINE.     Does the patient have less than a 3 day supply:  [x] Yes  [] No    Would you like a call back once the refill request has been completed: [x] Yes [] No    If the office needs to give you a call back, can they leave a voicemail: [x] Yes [] No    Nehemiah Gunn Rep   02/07/24 10:32 EST

## 2024-02-08 ENCOUNTER — OFFICE VISIT (OUTPATIENT)
Dept: UROLOGY | Facility: CLINIC | Age: 59
End: 2024-02-08
Payer: COMMERCIAL

## 2024-02-08 VITALS
HEIGHT: 72 IN | WEIGHT: 254.4 LBS | HEART RATE: 67 BPM | SYSTOLIC BLOOD PRESSURE: 135 MMHG | BODY MASS INDEX: 34.46 KG/M2 | DIASTOLIC BLOOD PRESSURE: 82 MMHG

## 2024-02-08 DIAGNOSIS — N13.8 BENIGN PROSTATIC HYPERPLASIA WITH URINARY OBSTRUCTION: ICD-10-CM

## 2024-02-08 DIAGNOSIS — N40.1 BENIGN PROSTATIC HYPERPLASIA WITH URINARY OBSTRUCTION: ICD-10-CM

## 2024-02-08 DIAGNOSIS — N41.1 PROSTATITIS, CHRONIC: ICD-10-CM

## 2024-02-08 DIAGNOSIS — N32.81 DETRUSOR INSTABILITY: ICD-10-CM

## 2024-02-08 DIAGNOSIS — N42.9 DISORDER OF PROSTATE: Primary | ICD-10-CM

## 2024-02-08 LAB — PSA SERPL-MCNC: 5.3 NG/ML (ref 0–4)

## 2024-02-08 PROCEDURE — 84153 ASSAY OF PSA TOTAL: CPT | Performed by: UROLOGY

## 2024-02-08 NOTE — PROGRESS NOTES
Chief Complaint:      Chief Complaint   Patient presents with    Frequency of micturition       HPI:   58 y.o. male here for 6-week follow-up of frequency.  He is due to get a permanent spinal cord stimulator on February 26.  He still has frequency and dysuria.  He has what feels like prostatitis his PSA took a jump up of 0.669-4.450 he was on finasteride already he did not think it was effective.  I would repeat the PSA today.  I am to set him up for cystoscopy I did inform him there is the slight possibility that this may have a relationship to the spinal cord stimulator his digital rectal was less than 10 g small smooth firm prostate so we will set him up for cystoscopy we will follow-up with him based on this.    Past Medical History:     Past Medical History:   Diagnosis Date    Acid reflux     Chronic pain disorder     Depression     Essential hypertension 10/27/2023    Extremity pain     Joint pain     Low back pain     Lumbosacral disc disease     Migraine     Muscle spasm     Neck pain     Osteoarthritis     PONV (postoperative nausea and vomiting)     Rheumatoid arthritis        Current Meds:     Current Outpatient Medications   Medication Sig Dispense Refill    atorvastatin (LIPITOR) 10 MG tablet Take 1 tablet by mouth Daily. 90 tablet 3    baclofen (LIORESAL) 10 MG tablet Take 0.5 (one-half) to 1 tablets by mouth 4 (Four) Times a Day As Needed for muscle spasms. 30 tablet 0    baclofen (LIORESAL) 10 MG tablet Take 1 tablet by mouth at bedtime. 90 tablet 4    buPROPion (WELLBUTRIN) 75 MG tablet Take 1 tablet by mouth every day. 30 tablet 2    busPIRone (BUSPAR) 5 MG tablet Take 1 tablet by mouth 2 (Two) Times a Day As Needed (anxiety). 60 tablet 2    chlorhexidine (HIBICLENS) 4 % external liquid Shower each day with solution for 5 days beginning 5 days before surgery. 236 mL 0    famotidine (Pepcid) 40 MG tablet Take 1 tablet by mouth twice daily. 180 tablet 4    FLUoxetine (PROzac) 40 MG capsule Take 1  "capsule by mouth Every Morning. 90 capsule 1    metoprolol tartrate (LOPRESSOR) 25 MG tablet Take 1 tablet by mouth 2 (Two) Times a Day. 60 tablet 11    mupirocin (BACTROBAN) 2 % ointment Apply to the inside of each nostril with a cotton swab 2 times daily, morning and evening, for 5 days before surgery. 22 g 0    RABEprazole (ACIPHEX) 20 MG EC tablet Take 1 tablet by mouth 15 minutes before breakfast and supper. 60 tablet 11    sulfamethoxazole-trimethoprim (Bactrim DS) 800-160 MG per tablet Take 1 tablet by mouth 2 (Two) Times a Day for 84 days. 56 tablet 2    Syringe 25G X 5/8\" 3 ML misc Use as directed 2 times weekly (use with testosterone injection) 24 each 3    tadalafil (Cialis) 5 MG tablet Take 1 tablet by mouth daily as needed for erectile dysfunction. 30 tablet 6    tamsulosin (FLOMAX) 0.4 MG capsule 24 hr capsule Take 2 capsules (0.8 mg total) by mouth daily for prostate. 180 capsule 1    terbinafine (lamiSIL) 250 MG tablet Take 1 tablet by mouth once daily on the first 7 days of each month. 84 tablet 0    Testosterone Cypionate (Depo-Testosterone) 200 MG/ML injection Inject 0.5 mL under the skin every Monday and Thursday. 10 mL 2    traZODone (DESYREL) 50 MG tablet Take 1 or 2 tablets by mouth every day at bedtime. 60 tablet 2    vitamin D (ERGOCALCIFEROL) 1.25 MG (71466 UT) capsule capsule Take 1 capsule by mouth Every 7 (Seven) Days. 12 capsule 2     No current facility-administered medications for this visit.        Allergies:      No Known Allergies     Past Surgical History:     Past Surgical History:   Procedure Laterality Date    BACK SURGERY      CARPAL TUNNEL RELEASE Right 08/04/2016    Procedure: CARPAL TUNNEL RELEASE;  Surgeon: Yoel Lua MD;  Location: [facility] OR;  Service:     CARPAL TUNNEL RELEASE Left 11/09/2017    Procedure: CARPAL TUNNEL RELEASE;  Surgeon: Yoel Lua MD;  Location: [facility] OR;  Service:     COLONOSCOPY      LUMBAR DISCECTOMY Right 09/22/2022    " Procedure: LUMBAR DISCECTOMY L4-5 RIGHT;  Surgeon: Aquiles Grigsby MD;  Location: [facility];  Service: Neurosurgery;  Laterality: Right;    LUMBAR FACET INJECTION      TOE SURGERY Right 03/04/2015       Social History:     Social History     Socioeconomic History    Marital status:    Tobacco Use    Smoking status: Never    Smokeless tobacco: Former     Types: Chew    Tobacco comments:     uses chewing tobacco   Vaping Use    Vaping Use: Never used   Substance and Sexual Activity    Alcohol use: No    Drug use: No    Sexual activity: Yes     Partners: Female     Birth control/protection: Same-sex partner       Family History:     Family History   Problem Relation Age of Onset    Lung cancer Mother     Cancer Mother         ovarian    Diabetes Sister     Cancer Sister         breast    Heart disease Brother     Rheum arthritis Brother     Arthritis Brother        Review of Systems:     Review of Systems   Constitutional: Negative.    HENT: Negative.     Eyes: Negative.    Respiratory: Negative.     Cardiovascular: Negative.    Gastrointestinal: Negative.    Endocrine: Negative.    Genitourinary:  Positive for difficulty urinating, dysuria and urgency.   Musculoskeletal: Negative.    Allergic/Immunologic: Negative.    Neurological: Negative.    Hematological: Negative.    Psychiatric/Behavioral: Negative.         Physical Exam:     Physical Exam  Vitals and nursing note reviewed.   Constitutional:       Appearance: He is well-developed.   HENT:      Head: Normocephalic and atraumatic.   Eyes:      Conjunctiva/sclera: Conjunctivae normal.      Pupils: Pupils are equal, round, and reactive to light.   Cardiovascular:      Rate and Rhythm: Normal rate and regular rhythm.      Heart sounds: Normal heart sounds.   Pulmonary:      Effort: Pulmonary effort is normal.      Breath sounds: Normal breath sounds.   Abdominal:      General: Bowel sounds are normal.      Palpations: Abdomen is soft.   Genitourinary:      Comments: Send 10 g small smooth firm prostate  Musculoskeletal:         General: Normal range of motion.      Cervical back: Normal range of motion.   Skin:     General: Skin is warm and dry.   Neurological:      Mental Status: He is alert and oriented to person, place, and time.      Deep Tendon Reflexes: Reflexes are normal and symmetric.   Psychiatric:         Behavior: Behavior normal.         Thought Content: Thought content normal.         Judgment: Judgment normal.         I have reviewed the following portions of the patient's history: Allergies, current medications, past family history, past medical history, past social history, past surgical history, problem list, and ROS and confirm it is accurate.    Recent Image (CT and/or KUB):      CT Abdomen and Pelvis: No results found for this or any previous visit.       CT Stone Protocol: Results for orders placed during the hospital encounter of 08/14/22    CT Abdomen Pelvis Stone Protocol    Narrative  EXAM: CT ABDOMEN PELVIS STONE PROTOCOL-      TECHNIQUE: Multiple axial CT images were obtained from lung bases  through pubic symphysis WITHOUT administration of IV contrast.  Reformatted images in the coronal and/or sagittal plane(s) were  generated from the axial data set to facilitate diagnostic accuracy  and/or surgical planning.  Oral Contrast:NONE.    Radiation dose reduction techniques were utilized per ALARA protocol.  Automated exposure control was initiated through either or CareDose or  DoseRigZ Plane software packages by  protocol.  DOSE:    Clinical information Flank pain, kidney stone suspected    Comparison 01/21/2019    FINDINGS:    Lower thorax: Clear. No effusions.    Abdomen:    Liver: Homogeneous. No focal hepatic mass or ductal dilatation.    Gallbladder: No dilation or stone identified.    Pancreas: Unremarkable. No mass or ductal dilatation.    Spleen: Homogeneous. No splenomegaly.    Adrenals: No mass.    Kidneys/ureters: No mass.  No obstructive uropathy.  No evidence of  urolithiasis.    GI tract: Moderate to large stool burden. There is no evidence of  appendicitis    MESENTERY: No free fluid, walled off fluid collections, mesenteric  stranding, or enlarged lymph nodes      Vasculature: Evidence of atherosclerotic vascular disease    Abdominal wall: No focal hernia or mass.      Bladder: Mild thickening of the urinary bladder wall    Reproductive: Prostate enlargement    Bones: Arthritic change in the spine    Impression  1. No obstructive uropathy. Mild thickening of the urinary bladder wall    2.Prostate enlargement  3. Other findings as above              This report was finalized on 8/14/2022 1:46 PM by Dr. Kvng Thomas MD.       KUB: No results found for this or any previous visit.       Labs (past 3 months):      Lab on 01/10/2024   Component Date Value Ref Range Status    PTT 01/10/2024 26.6  26.5 - 34.5 seconds Final    WBC 01/10/2024 6.92  3.40 - 10.80 10*3/mm3 Final    RBC 01/10/2024 5.52  4.14 - 5.80 10*6/mm3 Final    Hemoglobin 01/10/2024 14.3  13.0 - 17.7 g/dL Final    Hematocrit 01/10/2024 44.3  37.5 - 51.0 % Final    MCV 01/10/2024 80.3  79.0 - 97.0 fL Final    MCH 01/10/2024 25.9 (L)  26.6 - 33.0 pg Final    MCHC 01/10/2024 32.3  31.5 - 35.7 g/dL Final    RDW 01/10/2024 16.4 (H)  12.3 - 15.4 % Final    RDW-SD 01/10/2024 46.0  37.0 - 54.0 fl Final    MPV 01/10/2024 8.8  6.0 - 12.0 fL Final    Platelets 01/10/2024 206  140 - 450 10*3/mm3 Final    Protime 01/10/2024 13.9  12.1 - 14.7 Seconds Final    INR 01/10/2024 1.02  0.90 - 1.10 Final   Hospital Outpatient Visit on 12/06/2023   Component Date Value Ref Range Status    LVIDd 12/06/2023 5.2  cm Final    LVIDs 12/06/2023 3.6  cm Final    IVSd 12/06/2023 0.90  cm Final    LVPWd 12/06/2023 1.20  cm Final    FS 12/06/2023 30.8  % Final    IVS/LVPW 12/06/2023 0.75  cm Final    ESV(cubed) 12/06/2023 46.7  ml Final    LV Sys Vol (BSA corrected) 12/06/2023 17.6  cm2 Final     EDV(cubed) 12/06/2023 140.6  ml Final    LV Kim Vol (BSA corrected) 12/06/2023 38.3  cm2 Final    LV mass(C)d 12/06/2023 207.3  grams Final    LVOT area 12/06/2023 4.5  cm2 Final    LVOT diam 12/06/2023 2.40  cm Final    EDV(MOD-sp4) 12/06/2023 91.3  ml Final    ESV(MOD-sp4) 12/06/2023 42.0  ml Final    SV(MOD-sp4) 12/06/2023 49.3  ml Final    SI(MOD-sp4) 12/06/2023 20.7  ml/m2 Final    EF(MOD-sp4) 12/06/2023 54.0  % Final    MV E max wilmar 12/06/2023 82.6  cm/sec Final    MV A max wilmar 12/06/2023 76.5  cm/sec Final    MV dec time 12/06/2023 0.19  sec Final    MV E/A 12/06/2023 1.08   Final    LA ESV Index (BP) 12/06/2023 16.4  ml/m2 Final    Med Peak E' Wilmar 12/06/2023 6.2  cm/sec Final    Lat Peak E' Wilmar 12/06/2023 8.6  cm/sec Final    Avg E/e' ratio 12/06/2023 11.16   Final    TAPSE (>1.6) 12/06/2023 2.8  cm Final    LA dimension (2D)  12/06/2023 4.4  cm Final    Ao pk wilmar 12/06/2023 155.0  cm/sec Final    Ao max PG 12/06/2023 9.6  mmHg Final    Ao mean PG 12/06/2023 5.0  mmHg Final    Ao V2 VTI 12/06/2023 27.5  cm Final    MV dec slope 12/06/2023 446.0  cm/sec2 Final    PA acc time 12/06/2023 0.15  sec Final    Ao root diam 12/06/2023 3.6  cm Final    ACS 12/06/2023 1.80  cm Final    EF(MOD-bp) 12/06/2023 54.0  % Final   Office Visit on 12/01/2023   Component Date Value Ref Range Status    Color 12/01/2023 Yellow  Yellow, Straw, Dark Yellow, Lisy Final    Clarity, UA 12/01/2023 Clear  Clear Final    Specific Gravity  12/01/2023 1.015  1.005 - 1.030 Final    pH, Urine 12/01/2023 6.0  5.0 - 8.0 Final    Leukocytes 12/01/2023 Negative  Negative Final    Nitrite, UA 12/01/2023 Negative  Negative Final    Protein, POC 12/01/2023 Negative  Negative mg/dL Final    Glucose, UA 12/01/2023 Negative  Negative mg/dL Final    Ketones, UA 12/01/2023 Negative  Negative Final    Urobilinogen, UA 12/01/2023 Normal  Normal, 0.2 E.U./dL Final    Bilirubin 12/01/2023 Negative  Negative Final    Blood, UA 12/01/2023 Negative  Negative  Final    Lot Number 12/01/2023 [id number]   Final    Expiration Date 12/01/2023 10/25/24   Final    Urine Culture 12/01/2023 No growth   Final   Office Visit on 11/30/2023   Component Date Value Ref Range Status    Rapid Strep A Screen 11/30/2023 Negative  Negative, VALID, INVALID, Not Performed Final    Internal Control 11/30/2023 Passed  Passed Final    Lot Number 11/30/2023 [id number]   Final    Expiration Date 11/30/2023 10/18/2024   Final    Throat Culture, Beta Strep 11/30/2023 No Beta Hemolytic Streptococcus Isolated   Final        Procedure:       Assessment/Plan:   Detrusor instability-patient has been diagnosed with detrusor instability which is an irritative bladder symptomatology most likely related to factors such as intake of bladder irritants, postinfectious irritation, prolapse, with a very large differential diagnosis.  The mainstay of treatment has been tight cholinergics which basically cause the bladder to have decreased contractility.  We have discussed the side effects of these treatments including dry mouth, double vision, and increasing constipation.  Set up cystoscopy  Prostatitis-we discussed the differential diagnosis of prostatitis including the National Institutes of Health classification system I through IV.  I discussed that type I prostatitis is acute bacterial prostatitis and associated with high fevers, typically hematuria, and severe pain with voiding.  We discussed the fact that it necessitates 6 weeks of chronic antibiotics to be sure it doesn't turn into a chronic bacterial prostatitis that can have lifelong ramifications.  We discussed National Institutes of Health type II chronic bacterial prostatitis.  We discussed the fact that this a chronic bacterial infection of the prostate that often requires suppressive antibiotics.  We discussed type III being related more to nonspecific urethritis, as well as tight for being related to finding inflammation and a biopsy in the  absence of symptomatology.  I discussed the diagnostic strategies including the VB 1 through 4 urine culture and discussed empiric therapy.  I emphasized that with the use of chronic antibiotics, I strongly recommended the concomitant use of probiotics.  Additionally, we discussed the various antibiotics used and the risks and benefits associated with each, particularly the use of long-term ciprofloxacin and the effect on joints and collagen in human beings.  Treated appropriately  PSA testing-I am recommending a PSA blood test that stands for prostate specific antigen.  I discussed the pathophysiology of PSA testing indicating its use in the diagnosis and management of prostate cancer.  I discussed the normal range being 0 to 4, but more appropriately being much closer to 0 to 2 in a normal male.  I discussed the fact that after a certain age we don't recommend PSA testing especially in view of numerous comorbidities, that this will not be a useful test.  I discussed many of the things that can artificially raise PSA including a recent infection, urinary tract infection, and recent sexual intercourse, or even the type of movement such as manipulation of the prostate from riding a bicycle.  After all this is taken into account when the test is reviewed, the most important use of PSA is the velocity measurement.  In other words, the change of PSA with time is a very important factor in the use and that we look for greater than 20% rise over a year to help us make the prediction of prostate cancer.  I also discussed that the use with prostate cancer indicating that after a radical prostatectomy, the PSA should be 0 and any rise indicates an early biochemical recurrence.            This document has been electronically signed by LYNDA CANNON MD February 8, 2024 08:00 EST    Dictated Utilizing Dragon Dictation: Part of this note may be an electronic transcription/translation of spoken language to printed text  using the Dragon Dictation System.

## 2024-02-09 PROBLEM — N32.81 DETRUSOR INSTABILITY: Status: ACTIVE | Noted: 2024-02-09

## 2024-02-12 ENCOUNTER — TELEPHONE (OUTPATIENT)
Dept: UROLOGY | Facility: CLINIC | Age: 59
End: 2024-02-12
Payer: COMMERCIAL

## 2024-02-12 DIAGNOSIS — N40.0 BPH WITH ELEVATED PSA: Primary | ICD-10-CM

## 2024-02-12 DIAGNOSIS — R97.20 BPH WITH ELEVATED PSA: Primary | ICD-10-CM

## 2024-02-13 ENCOUNTER — TELEPHONE (OUTPATIENT)
Dept: UROLOGY | Facility: CLINIC | Age: 59
End: 2024-02-13
Payer: COMMERCIAL

## 2024-02-19 ENCOUNTER — PRE-ADMISSION TESTING (OUTPATIENT)
Dept: PREADMISSION TESTING | Facility: HOSPITAL | Age: 59
End: 2024-02-19
Payer: COMMERCIAL

## 2024-02-19 VITALS — WEIGHT: 253.42 LBS | BODY MASS INDEX: 34.32 KG/M2 | HEIGHT: 72 IN

## 2024-02-19 DIAGNOSIS — M96.1 POSTLAMINECTOMY SYNDROME, LUMBAR REGION: ICD-10-CM

## 2024-02-19 LAB
DEPRECATED RDW RBC AUTO: 44.4 FL (ref 37–54)
ERYTHROCYTE [DISTWIDTH] IN BLOOD BY AUTOMATED COUNT: 14.6 % (ref 12.3–15.4)
HCT VFR BLD AUTO: 46.6 % (ref 37.5–51)
HGB BLD-MCNC: 15.1 G/DL (ref 13–17.7)
MCH RBC QN AUTO: 26.8 PG (ref 26.6–33)
MCHC RBC AUTO-ENTMCNC: 32.4 G/DL (ref 31.5–35.7)
MCV RBC AUTO: 82.6 FL (ref 79–97)
PLATELET # BLD AUTO: 202 10*3/MM3 (ref 140–450)
PMV BLD AUTO: 9 FL (ref 6–12)
POTASSIUM SERPL-SCNC: 4.5 MMOL/L (ref 3.5–5.2)
RBC # BLD AUTO: 5.64 10*6/MM3 (ref 4.14–5.8)
WBC NRBC COR # BLD AUTO: 7.95 10*3/MM3 (ref 3.4–10.8)

## 2024-02-19 PROCEDURE — 84132 ASSAY OF SERUM POTASSIUM: CPT

## 2024-02-19 PROCEDURE — 85027 COMPLETE CBC AUTOMATED: CPT

## 2024-02-19 PROCEDURE — 36415 COLL VENOUS BLD VENIPUNCTURE: CPT

## 2024-02-19 PROCEDURE — 87081 CULTURE SCREEN ONLY: CPT

## 2024-02-19 RX ORDER — CELECOXIB 200 MG/1
200 CAPSULE ORAL 2 TIMES DAILY
COMMUNITY
End: 2024-02-19 | Stop reason: SDUPTHER

## 2024-02-19 NOTE — PAT
An arrival time for procedure was not provided during PAT visit. If patient had any questions or concerns about their arrival time, they were instructed to contact their surgeon/physician.  Additionally, if the patient referred to an arrival time that was acquired from their my chart account, patient was encouraged to verify that time with their surgeon/physician. Arrival times are NOT provided in Pre Admission Testing Department.    Patient viewed general PAT education video as instructed in their preoperative information received from their surgeon.  Patient stated the general PAT education video was viewed in its entirety and survey completed.  Copies of PAT general education handouts (Incentive Spirometry, Meds to Beds Program, Patient Belongings, Pre-op skin preparation instructions, Blood Glucose testing, Visitor policy, Surgery FAQ, Code H) distributed to patient if not printed. Education related to the PAT pass and skin preparation for surgery (if applicable) completed in PAT as a reinforcement to PAT education video. Patient instructed to return PAT pass provided today as well as completed skin preparation sheet (if applicable) on the day of procedure.     Additionally if patient had not viewed video yet but intended to view it at home or in our waiting area, then referred them to the handout with QR code/link provided during PAT visit.  Instructed patient to complete survey after viewing the video in its entirety.  Encouraged patient/family to read PAT general education handouts thoroughly and notify PAT staff with any questions or concerns. Patient verbalized understanding of all information and priority content.    Patient denies any current skin issues.     Bactroban (if prescribed) and Chlorhexidine Prescription prescribed by physician before PAT visit.  Verified with patient that medication(s) were picked up from their pharmacy.  Written instructions given to patient during PAT visit.  Patient/family  also instructed to complete skin prep checklist and return the checklist on the day of surgery to preoperative staff.  Patient/family verbalized understanding.    Post-Surgery Information Instruction Sheet given to patient during Pre-Admission Testing Visit with verbal instructions to patient to return with PAT PASS on the day of surgery. Additionally, encouraged patient to review the information provided.    Pt stated Dr. Grigsby's office told him to drink Gatorade the morning of surgery. Pt given ERAS instructions handout in PAT:   Patient instructed to drink 20 ounces of Gatorade or Gatorlyte (if diabetic) and it needs to be completed 1 hour (for Main OR patients) or 2 hours (scheduled  section & [facility] patients) before given arrival time for procedure (NO RED Gatorade and NO Gatorade Zero).    Patient verbalized understanding.    EKG from 10/25/23 on chart. Pt denies CP/SOA.

## 2024-02-20 ENCOUNTER — HOSPITAL ENCOUNTER (OUTPATIENT)
Dept: MRI IMAGING | Facility: HOSPITAL | Age: 59
Discharge: HOME | End: 2024-02-20
Admitting: UROLOGY
Payer: COMMERCIAL

## 2024-02-20 DIAGNOSIS — R97.20 BPH WITH ELEVATED PSA: ICD-10-CM

## 2024-02-20 DIAGNOSIS — N40.0 BPH WITH ELEVATED PSA: ICD-10-CM

## 2024-02-20 LAB — MRSA SPEC QL CULT: NORMAL

## 2024-02-20 PROCEDURE — 72197 MRI PELVIS W/O & W/DYE: CPT

## 2024-02-20 PROCEDURE — A9577 INJ MULTIHANCE: HCPCS | Performed by: UROLOGY

## 2024-02-20 PROCEDURE — 0 GADOBENATE DIMEGLUMINE 529 MG/ML SOLUTION: Performed by: UROLOGY

## 2024-02-20 PROCEDURE — 72197 MRI PELVIS W/O & W/DYE: CPT | Performed by: RADIOLOGY

## 2024-02-20 RX ADMIN — GADOBENATE DIMEGLUMINE 20 ML: 529 INJECTION, SOLUTION INTRAVENOUS at 16:35

## 2024-02-21 ENCOUNTER — TELEPHONE (OUTPATIENT)
Dept: UROLOGY | Facility: CLINIC | Age: 59
End: 2024-02-21
Payer: COMMERCIAL

## 2024-02-21 RX ORDER — CELECOXIB 200 MG/1
200 CAPSULE ORAL 2 TIMES DAILY
Qty: 180 CAPSULE | Refills: 0 | Status: SHIPPED | OUTPATIENT
Start: 2024-02-21

## 2024-02-21 NOTE — TELEPHONE ENCOUNTER
Routing to Mike- I called the pt and let him know that Dr. Mckeon reviewed  his MRI and it showed a very low risk of Prostate cancer and that we would just follow up with him at his April follow up. The pt expressed understanding.

## 2024-02-21 NOTE — TELEPHONE ENCOUNTER
Provider: DR CANNON    Caller: AGUILA SEQUEIRA    Relationship to Patient: SELF    Reason for Call: PT CALLED TO CONFIRM RESULTS OF MRI.  PLEASE CALL TO CONFIRM.

## 2024-02-25 ENCOUNTER — ANESTHESIA EVENT (OUTPATIENT)
Dept: PERIOP | Facility: HOSPITAL | Age: 59
End: 2024-02-25
Payer: COMMERCIAL

## 2024-02-25 RX ORDER — FAMOTIDINE 20 MG/1
20 TABLET, FILM COATED ORAL ONCE
Status: CANCELLED | OUTPATIENT
Start: 2024-02-25 | End: 2024-02-25

## 2024-02-25 RX ORDER — SODIUM CHLORIDE 0.9 % (FLUSH) 0.9 %
10 SYRINGE (ML) INJECTION AS NEEDED
Status: CANCELLED | OUTPATIENT
Start: 2024-02-25

## 2024-02-25 RX ORDER — FAMOTIDINE 10 MG/ML
20 INJECTION, SOLUTION INTRAVENOUS ONCE
Status: CANCELLED | OUTPATIENT
Start: 2024-02-25 | End: 2024-02-25

## 2024-02-25 RX ORDER — SODIUM CHLORIDE 0.9 % (FLUSH) 0.9 %
10 SYRINGE (ML) INJECTION EVERY 12 HOURS SCHEDULED
Status: CANCELLED | OUTPATIENT
Start: 2024-02-25

## 2024-02-25 RX ORDER — SODIUM CHLORIDE 9 MG/ML
40 INJECTION, SOLUTION INTRAVENOUS AS NEEDED
Status: CANCELLED | OUTPATIENT
Start: 2024-02-25

## 2024-02-26 ENCOUNTER — HOSPITAL ENCOUNTER (OUTPATIENT)
Facility: HOSPITAL | Age: 59
Discharge: HOME | End: 2024-02-26
Attending: NEUROLOGICAL SURGERY | Admitting: NEUROLOGICAL SURGERY
Payer: COMMERCIAL

## 2024-02-26 ENCOUNTER — APPOINTMENT (OUTPATIENT)
Dept: GENERAL RADIOLOGY | Facility: HOSPITAL | Age: 59
End: 2024-02-26
Payer: COMMERCIAL

## 2024-02-26 ENCOUNTER — ANESTHESIA (OUTPATIENT)
Dept: PERIOP | Facility: HOSPITAL | Age: 59
End: 2024-02-26
Payer: COMMERCIAL

## 2024-02-26 VITALS
TEMPERATURE: 97.8 F | SYSTOLIC BLOOD PRESSURE: 125 MMHG | HEIGHT: 72 IN | HEART RATE: 81 BPM | OXYGEN SATURATION: 91 % | RESPIRATION RATE: 14 BRPM | WEIGHT: 253.42 LBS | BODY MASS INDEX: 34.32 KG/M2 | DIASTOLIC BLOOD PRESSURE: 77 MMHG

## 2024-02-26 DIAGNOSIS — M96.1 POSTLAMINECTOMY SYNDROME, LUMBAR REGION: ICD-10-CM

## 2024-02-26 PROCEDURE — 25010000002 VANCOMYCIN 1 G RECONSTITUTED SOLUTION: Performed by: NEUROLOGICAL SURGERY

## 2024-02-26 PROCEDURE — C1820 GENERATOR NEURO RECHG BAT SY: HCPCS | Performed by: NEUROLOGICAL SURGERY

## 2024-02-26 PROCEDURE — 25010000002 SUGAMMADEX 200 MG/2ML SOLUTION

## 2024-02-26 PROCEDURE — 25010000002 HYDROMORPHONE 1 MG/ML SOLUTION

## 2024-02-26 PROCEDURE — 25010000002 PROPOFOL 10 MG/ML EMULSION

## 2024-02-26 PROCEDURE — 63655 IMPLANT NEUROELECTRODES: CPT

## 2024-02-26 PROCEDURE — 25010000002 FENTANYL CITRATE (PF) 100 MCG/2ML SOLUTION

## 2024-02-26 PROCEDURE — 76000 FLUOROSCOPY <1 HR PHYS/QHP: CPT

## 2024-02-26 PROCEDURE — 25810000003 LACTATED RINGERS PER 1000 ML: Performed by: ANESTHESIOLOGY

## 2024-02-26 PROCEDURE — 25010000002 ONDANSETRON PER 1 MG

## 2024-02-26 PROCEDURE — C1787 PATIENT PROGR, NEUROSTIM: HCPCS | Performed by: NEUROLOGICAL SURGERY

## 2024-02-26 PROCEDURE — 25010000002 FENTANYL CITRATE (PF) 50 MCG/ML SOLUTION

## 2024-02-26 PROCEDURE — 25010000002 DEXAMETHASONE PER 1 MG

## 2024-02-26 PROCEDURE — 25010000002 CEFAZOLIN PER 500 MG: Performed by: NEUROLOGICAL SURGERY

## 2024-02-26 PROCEDURE — 63685 INS/RPLC SPI NPG/RCVR POCKET: CPT

## 2024-02-26 PROCEDURE — L8689 EXTERNAL RECHARG SYS INTERN: HCPCS | Performed by: NEUROLOGICAL SURGERY

## 2024-02-26 PROCEDURE — C1778 LEAD, NEUROSTIMULATOR: HCPCS | Performed by: NEUROLOGICAL SURGERY

## 2024-02-26 PROCEDURE — 63685 INS/RPLC SPI NPG/RCVR POCKET: CPT | Performed by: NEUROLOGICAL SURGERY

## 2024-02-26 PROCEDURE — 25810000003 SODIUM CHLORIDE PER 500 ML: Performed by: NEUROLOGICAL SURGERY

## 2024-02-26 PROCEDURE — 63655 IMPLANT NEUROELECTRODES: CPT | Performed by: NEUROLOGICAL SURGERY

## 2024-02-26 DEVICE — HEMOST ABS SURGIFOAM SZ100 8X12 10MM: Type: IMPLANTABLE DEVICE | Site: SPINE THORACIC | Status: FUNCTIONAL

## 2024-02-26 DEVICE — IMPLANTABLE DEVICE: Type: IMPLANTABLE DEVICE | Site: SPINE THORACIC | Status: FUNCTIONAL

## 2024-02-26 DEVICE — KT SEAL HEMOS ABS FLOSEAL MATRX 1.5/FAST/PREP 5000/IU 10ML: Type: IMPLANTABLE DEVICE | Site: SPINE THORACIC | Status: FUNCTIONAL

## 2024-02-26 RX ORDER — EPHEDRINE SULFATE 50 MG/ML
INJECTION INTRAVENOUS AS NEEDED
Status: DISCONTINUED | OUTPATIENT
Start: 2024-02-26 | End: 2024-02-26 | Stop reason: SURG

## 2024-02-26 RX ORDER — OXYCODONE HYDROCHLORIDE AND ACETAMINOPHEN 5; 325 MG/1; MG/1
1 TABLET ORAL 3 TIMES DAILY PRN
Qty: 15 TABLET | Refills: 0 | Status: SHIPPED | OUTPATIENT
Start: 2024-02-26

## 2024-02-26 RX ORDER — DEXAMETHASONE SODIUM PHOSPHATE 4 MG/ML
INJECTION, SOLUTION INTRA-ARTICULAR; INTRALESIONAL; INTRAMUSCULAR; INTRAVENOUS; SOFT TISSUE AS NEEDED
Status: DISCONTINUED | OUTPATIENT
Start: 2024-02-26 | End: 2024-02-26 | Stop reason: SURG

## 2024-02-26 RX ORDER — OXYCODONE HYDROCHLORIDE AND ACETAMINOPHEN 5; 325 MG/1; MG/1
TABLET ORAL
Status: COMPLETED
Start: 2024-02-26 | End: 2024-02-26

## 2024-02-26 RX ORDER — LIDOCAINE HYDROCHLORIDE 10 MG/ML
0.5 INJECTION, SOLUTION EPIDURAL; INFILTRATION; INTRACAUDAL; PERINEURAL ONCE AS NEEDED
Status: COMPLETED | OUTPATIENT
Start: 2024-02-26 | End: 2024-02-26

## 2024-02-26 RX ORDER — ROCURONIUM BROMIDE 10 MG/ML
INJECTION, SOLUTION INTRAVENOUS AS NEEDED
Status: DISCONTINUED | OUTPATIENT
Start: 2024-02-26 | End: 2024-02-26 | Stop reason: SURG

## 2024-02-26 RX ORDER — ONDANSETRON 2 MG/ML
INJECTION INTRAMUSCULAR; INTRAVENOUS AS NEEDED
Status: DISCONTINUED | OUTPATIENT
Start: 2024-02-26 | End: 2024-02-26 | Stop reason: SURG

## 2024-02-26 RX ORDER — DROPERIDOL 2.5 MG/ML
0.62 INJECTION, SOLUTION INTRAMUSCULAR; INTRAVENOUS
Status: DISCONTINUED | OUTPATIENT
Start: 2024-02-26 | End: 2024-02-26 | Stop reason: HOSPADM

## 2024-02-26 RX ORDER — FAMOTIDINE 20 MG/1
20 TABLET, FILM COATED ORAL
Status: COMPLETED | OUTPATIENT
Start: 2024-02-26 | End: 2024-02-26

## 2024-02-26 RX ORDER — IPRATROPIUM BROMIDE AND ALBUTEROL SULFATE 2.5; .5 MG/3ML; MG/3ML
3 SOLUTION RESPIRATORY (INHALATION) ONCE AS NEEDED
Status: DISCONTINUED | OUTPATIENT
Start: 2024-02-26 | End: 2024-02-26 | Stop reason: HOSPADM

## 2024-02-26 RX ORDER — FENTANYL CITRATE 50 UG/ML
INJECTION, SOLUTION INTRAMUSCULAR; INTRAVENOUS
Status: COMPLETED
Start: 2024-02-26 | End: 2024-02-26

## 2024-02-26 RX ORDER — SODIUM CHLORIDE, SODIUM LACTATE, POTASSIUM CHLORIDE, CALCIUM CHLORIDE 600; 310; 30; 20 MG/100ML; MG/100ML; MG/100ML; MG/100ML
9 INJECTION, SOLUTION INTRAVENOUS CONTINUOUS
Status: DISCONTINUED | OUTPATIENT
Start: 2024-02-26 | End: 2024-02-26 | Stop reason: HOSPADM

## 2024-02-26 RX ORDER — MAGNESIUM HYDROXIDE 1200 MG/15ML
LIQUID ORAL AS NEEDED
Status: DISCONTINUED | OUTPATIENT
Start: 2024-02-26 | End: 2024-02-26 | Stop reason: HOSPADM

## 2024-02-26 RX ORDER — DROPERIDOL 2.5 MG/ML
0.62 INJECTION, SOLUTION INTRAMUSCULAR; INTRAVENOUS ONCE AS NEEDED
Status: DISCONTINUED | OUTPATIENT
Start: 2024-02-26 | End: 2024-02-26 | Stop reason: HOSPADM

## 2024-02-26 RX ORDER — NALOXONE HCL 0.4 MG/ML
0.4 VIAL (ML) INJECTION AS NEEDED
Status: DISCONTINUED | OUTPATIENT
Start: 2024-02-26 | End: 2024-02-26 | Stop reason: HOSPADM

## 2024-02-26 RX ORDER — MIDAZOLAM HYDROCHLORIDE 1 MG/ML
1 INJECTION INTRAMUSCULAR; INTRAVENOUS
Status: DISCONTINUED | OUTPATIENT
Start: 2024-02-26 | End: 2024-02-26 | Stop reason: HOSPADM

## 2024-02-26 RX ORDER — PHENYLEPHRINE HCL IN 0.9% NACL 1 MG/10 ML
SYRINGE (ML) INTRAVENOUS AS NEEDED
Status: DISCONTINUED | OUTPATIENT
Start: 2024-02-26 | End: 2024-02-26 | Stop reason: SURG

## 2024-02-26 RX ORDER — PROPOFOL 10 MG/ML
VIAL (ML) INTRAVENOUS AS NEEDED
Status: DISCONTINUED | OUTPATIENT
Start: 2024-02-26 | End: 2024-02-26 | Stop reason: SURG

## 2024-02-26 RX ORDER — SODIUM CHLORIDE 0.9 % (FLUSH) 0.9 %
3-10 SYRINGE (ML) INJECTION AS NEEDED
Status: DISCONTINUED | OUTPATIENT
Start: 2024-02-26 | End: 2024-02-26 | Stop reason: HOSPADM

## 2024-02-26 RX ORDER — VANCOMYCIN HYDROCHLORIDE 1 G/20ML
INJECTION, POWDER, LYOPHILIZED, FOR SOLUTION INTRAVENOUS AS NEEDED
Status: DISCONTINUED | OUTPATIENT
Start: 2024-02-26 | End: 2024-02-26 | Stop reason: HOSPADM

## 2024-02-26 RX ORDER — SODIUM CHLORIDE 0.9 % (FLUSH) 0.9 %
3 SYRINGE (ML) INJECTION EVERY 12 HOURS SCHEDULED
Status: DISCONTINUED | OUTPATIENT
Start: 2024-02-26 | End: 2024-02-26 | Stop reason: HOSPADM

## 2024-02-26 RX ORDER — FENTANYL CITRATE 50 UG/ML
50 INJECTION, SOLUTION INTRAMUSCULAR; INTRAVENOUS
Status: DISCONTINUED | OUTPATIENT
Start: 2024-02-26 | End: 2024-02-26 | Stop reason: HOSPADM

## 2024-02-26 RX ORDER — LIDOCAINE HYDROCHLORIDE 10 MG/ML
INJECTION, SOLUTION EPIDURAL; INFILTRATION; INTRACAUDAL; PERINEURAL AS NEEDED
Status: DISCONTINUED | OUTPATIENT
Start: 2024-02-26 | End: 2024-02-26 | Stop reason: SURG

## 2024-02-26 RX ORDER — ONDANSETRON 2 MG/ML
4 INJECTION INTRAMUSCULAR; INTRAVENOUS ONCE AS NEEDED
Status: DISCONTINUED | OUTPATIENT
Start: 2024-02-26 | End: 2024-02-26 | Stop reason: HOSPADM

## 2024-02-26 RX ORDER — SODIUM CHLORIDE 9 MG/ML
INJECTION, SOLUTION INTRAVENOUS AS NEEDED
Status: DISCONTINUED | OUTPATIENT
Start: 2024-02-26 | End: 2024-02-26 | Stop reason: HOSPADM

## 2024-02-26 RX ORDER — HYDROMORPHONE HYDROCHLORIDE 1 MG/ML
0.5 INJECTION, SOLUTION INTRAMUSCULAR; INTRAVENOUS; SUBCUTANEOUS
Status: DISCONTINUED | OUTPATIENT
Start: 2024-02-26 | End: 2024-02-26 | Stop reason: HOSPADM

## 2024-02-26 RX ORDER — SODIUM CHLORIDE 9 MG/ML
40 INJECTION, SOLUTION INTRAVENOUS AS NEEDED
Status: DISCONTINUED | OUTPATIENT
Start: 2024-02-26 | End: 2024-02-26 | Stop reason: HOSPADM

## 2024-02-26 RX ORDER — OXYCODONE HYDROCHLORIDE AND ACETAMINOPHEN 5; 325 MG/1; MG/1
1 TABLET ORAL ONCE AS NEEDED
Status: COMPLETED | OUTPATIENT
Start: 2024-02-26 | End: 2024-02-26

## 2024-02-26 RX ORDER — FENTANYL CITRATE 50 UG/ML
INJECTION, SOLUTION INTRAMUSCULAR; INTRAVENOUS AS NEEDED
Status: DISCONTINUED | OUTPATIENT
Start: 2024-02-26 | End: 2024-02-26 | Stop reason: SURG

## 2024-02-26 RX ADMIN — FENTANYL CITRATE 50 MCG: 50 INJECTION, SOLUTION INTRAMUSCULAR; INTRAVENOUS at 16:24

## 2024-02-26 RX ADMIN — EPHEDRINE SULFATE 10 MG: 50 INJECTION INTRAVENOUS at 14:53

## 2024-02-26 RX ADMIN — PROPOFOL 250 MG: 10 INJECTION, EMULSION INTRAVENOUS at 14:28

## 2024-02-26 RX ADMIN — FENTANYL CITRATE 50 MCG: 50 INJECTION, SOLUTION INTRAMUSCULAR; INTRAVENOUS at 16:57

## 2024-02-26 RX ADMIN — DEXAMETHASONE SODIUM PHOSPHATE 4 MG: 4 INJECTION INTRA-ARTICULAR; INTRALESIONAL; INTRAMUSCULAR; INTRAVENOUS; SOFT TISSUE at 14:34

## 2024-02-26 RX ADMIN — Medication 100 MCG: at 14:53

## 2024-02-26 RX ADMIN — FENTANYL CITRATE 50 MCG: 50 INJECTION, SOLUTION INTRAMUSCULAR; INTRAVENOUS at 17:09

## 2024-02-26 RX ADMIN — HYDROMORPHONE HYDROCHLORIDE 0.5 MG: 1 INJECTION, SOLUTION INTRAMUSCULAR; INTRAVENOUS; SUBCUTANEOUS at 17:24

## 2024-02-26 RX ADMIN — HYDROMORPHONE HYDROCHLORIDE 0.5 MG: 1 INJECTION, SOLUTION INTRAMUSCULAR; INTRAVENOUS; SUBCUTANEOUS at 16:39

## 2024-02-26 RX ADMIN — OXYCODONE AND ACETAMINOPHEN 1 TABLET: 5; 325 TABLET ORAL at 17:18

## 2024-02-26 RX ADMIN — ROCURONIUM BROMIDE 10 MG: 10 INJECTION INTRAVENOUS at 15:28

## 2024-02-26 RX ADMIN — HYDROMORPHONE HYDROCHLORIDE 0.5 MG: 1 INJECTION, SOLUTION INTRAMUSCULAR; INTRAVENOUS; SUBCUTANEOUS at 16:48

## 2024-02-26 RX ADMIN — EPHEDRINE SULFATE 10 MG: 50 INJECTION INTRAVENOUS at 14:50

## 2024-02-26 RX ADMIN — FENTANYL CITRATE 100 MCG: 50 INJECTION, SOLUTION INTRAMUSCULAR; INTRAVENOUS at 14:28

## 2024-02-26 RX ADMIN — ONDANSETRON 4 MG: 2 INJECTION INTRAMUSCULAR; INTRAVENOUS at 15:48

## 2024-02-26 RX ADMIN — OXYCODONE HYDROCHLORIDE AND ACETAMINOPHEN 1 TABLET: 5; 325 TABLET ORAL at 17:18

## 2024-02-26 RX ADMIN — SUGAMMADEX 200 MG: 100 INJECTION, SOLUTION INTRAVENOUS at 15:54

## 2024-02-26 RX ADMIN — SODIUM CHLORIDE 2 G: 900 INJECTION INTRAVENOUS at 14:35

## 2024-02-26 RX ADMIN — LIDOCAINE HYDROCHLORIDE 0.5 ML: 10 INJECTION, SOLUTION EPIDURAL; INFILTRATION; INTRACAUDAL; PERINEURAL at 11:58

## 2024-02-26 RX ADMIN — SODIUM CHLORIDE, POTASSIUM CHLORIDE, SODIUM LACTATE AND CALCIUM CHLORIDE 9 ML/HR: 600; 310; 30; 20 INJECTION, SOLUTION INTRAVENOUS at 11:59

## 2024-02-26 RX ADMIN — ROCURONIUM BROMIDE 70 MG: 10 INJECTION INTRAVENOUS at 14:29

## 2024-02-26 RX ADMIN — FAMOTIDINE 20 MG: 20 TABLET, FILM COATED ORAL at 11:58

## 2024-02-26 RX ADMIN — LIDOCAINE HYDROCHLORIDE 100 MG: 10 INJECTION, SOLUTION EPIDURAL; INFILTRATION; INTRACAUDAL; PERINEURAL at 14:28

## 2024-02-26 NOTE — ANESTHESIA PREPROCEDURE EVALUATION
Anesthesia Evaluation     Patient summary reviewed and Nursing notes reviewed   history of anesthetic complications:  PONV  NPO Solid Status: > 8 hours  NPO Liquid Status: > 2 hours           Airway   Mallampati: II  TM distance: >3 FB  Neck ROM: full  No difficulty expected  Dental - normal exam     Pulmonary - negative pulmonary ROS and normal exam   Cardiovascular - normal exam    ECG reviewed    (+) hypertension, hyperlipidemia  (-) dysrhythmias, angina    ROS comment: 12/6/23  Interpretation Summary       ·  Left ventricular systolic function is normal. Calculated left ventricular EF = 54% Left ventricular ejection fraction appears to be 51 - 55%.  ·  Left ventricular diastolic function was normal.      Neuro/Psych  (+) headaches, psychiatric history Depression  (-) seizures, CVA  GI/Hepatic/Renal/Endo    (+) obesity, GERD well controlled  (-) liver disease, no renal disease, diabetes, no thyroid disorder    Musculoskeletal     (+) neck pain  Abdominal    Substance History      OB/GYN          Other   arthritis,                 Anesthesia Plan    ASA 2     general     intravenous induction     Anesthetic plan, risks, benefits, and alternatives have been provided, discussed and informed consent has been obtained with: patient.    Plan discussed with CRNA.    CODE STATUS:

## 2024-02-26 NOTE — INTERVAL H&P NOTE
"  H&P reviewed. The patient was examined and there are no changes to the H&P.    /78 (BP Location: Right arm, Patient Position: Lying)   Pulse 66   Temp 97.6 °F (36.4 °C) (Temporal)   Resp 16   Ht 182.9 cm (72\")   Wt 115 kg (253 lb 6.7 oz)   SpO2 96%   BMI 34.37 kg/m²   Lungs clear  heart regular rhythm without murmur   abdomen soft without tenderness  Patient has no known allergies.  No allergy to latex or contrast dye  Tobacco  had chewed, quit   ETOH  neg  Immunizations  pneumo neg  flu neg  tetanus?  Covid x2  Zenaida Chowdhury PA-C  8/26/24  12:09      "

## 2024-02-26 NOTE — ANESTHESIA PROCEDURE NOTES
Airway  Urgency: elective    Date/Time: 2/26/2024 2:30 PM  Airway not difficult    General Information and Staff    Patient location during procedure: OR  CRNA/CAA: Ramesh Mary CRNA    Indications and Patient Condition  Indications for airway management: airway protection    Preoxygenated: yes  MILS not maintained throughout  Mask difficulty assessment: 1 - vent by mask    Final Airway Details  Final airway type: endotracheal airway      Successful airway: ETT  Cuffed: yes   Successful intubation technique: video laryngoscopy  Facilitating devices/methods: intubating stylet  Endotracheal tube insertion site: oral  Blade: Lange  Blade size: 4  ETT size (mm): 7.5  Cormack-Lehane Classification: grade I - full view of glottis  Placement verified by: chest auscultation and capnometry   Cuff volume (mL): 10  Measured from: lips  ETT/EBT  to lips (cm): 22  Number of attempts at approach: 1  Assessment: lips, teeth, and gum same as pre-op and atraumatic intubation    Additional Comments  Negative epigastric sounds, Breath sound equal bilaterally with symmetric chest rise and fall

## 2024-02-26 NOTE — DISCHARGE INSTRUCTIONS
Patient is to contact my office or go to the emergency room if he develops any new lower extremity weakness or sensory alteration.

## 2024-02-26 NOTE — OP NOTE
NEUROSURGICAL OPERATIVE NOTE        PREOPERATIVE DIAGNOSIS:    Lumbar postlaminectomy syndrome      POSTOPERATIVE DIAGNOSIS:  Same      PROCEDURE:  Thoracic laminotomy for placement of [employer] epidural spinal cord stimulator      SURGEON:  Aquiles Grigsby M.D.      ASSISTANT: ROHINI Mcintyre and Sonya Alex PA-C    PAC assisted with:   Suctioning   Retraction   Tying   Suturing   Closing   Application of dressing   Skilled neurosurgery PA assistance was necessary to perform this procedure.        ANESTHESIA:  General      ESTIMATED BLOOD LOSS: Minimal      SPECIMEN: None      DRAINS: None      COMPLICATIONS:  None      CLINICAL NOTE:  The patient is a 58-year-old gentleman who has undergone previous lumbar surgery but had chronic back and left leg pain despite that.  A recent trial of a spinal cord stimulator has helped significantly with his symptoms and as such he presents at this time for [employer] epidural spinal cord stimulator placement.  The nature of the procedure as well as the potential risks, complications, limitations, and alternatives to the procedure were discussed at length with the patient and the patient has agreed to proceed with surgery.      TECHNICAL NOTE:  The patient was brought to the operating room and while on his cart general endotracheal anesthesia was achieved. He was then turned prone onto blanket rolls which were maintained longitudinally under his chest and abdomen. Special care was ensured to protect pressure points. His mid back, low back and upper buttocks were then prepared and draped in the usual fashion. A localizing radiograph was obtained using C-arm fluoroscopy and counting up from the 12th rib. Midline incision was fashioned. Underlying tissues were divided to provide exposure to the T9 level. Another radiograph confirmed the operative level. Central laminotomy was fashioned using Leksell rongeur, drill, and small punches. The dural sac was  exposed. A [place] #3 dissector was utilized to define the dorsal epidural space in a cephalad fashion. The [employer] lead was then advanced in multiple passes, ultimately projecting just left of the midline to the mid T7 vertebral level. Lead tethers were utilized to secure the lead wires. A transverse incision was then made on the left upper buttock where a subcutaneous pocket was fashioned using cautery and finger dissection. Lead passer was utilized to pass the lead wires from the thoracic incision to the upper buttock incision. The lead wires were affixed to the [employer] IPG. Impedances were appropriate. The IPG was secured in the pocket with 2-0 silk sutures. Vancomycin powder was sprinkled in the pocket and then the subcutaneous tissues were closed in a single layer in interrupted fashion with 2-0 Vicryl suture. The thoracic incision was washed out with saline solution. It was quite dry. Gelfoam was left over the exposed dura. Vancomycin powder was sprinkled in the depths and then more superficially as the wound was closed. The paraspinous muscle and fascia were reapproximated in interrupted fashion with #0 Vicryl suture. The subcutaneous tissues were closed in layers with 3-0 Vicryl suture. The skin at each site was closed in a running subcuticular fashion with 3-0 Vicryl suture. A dermal sealant and sterile dressings were applied. The patient was subsequently rolled onto his cart, extubated, and taken to recovery room in satisfactory condition.             Aquiles Grigsby M.D.

## 2024-02-27 ENCOUNTER — TELEPHONE (OUTPATIENT)
Dept: NEUROSURGERY | Facility: CLINIC | Age: 59
End: 2024-02-27
Payer: COMMERCIAL

## 2024-02-27 NOTE — TELEPHONE ENCOUNTER
Provider:  Seb  Surgery/Procedure:  Spinal cord stimulator insertion Phase 1  Surgery/Procedure Date:  2/26/24  Last visit:   2/2/24  Next visit: 3/12/24     Reason for call:  Mr. Watson called and complains of a shooting/stabbing/burning pain in his stomach radiating to his back. The pain originates in his naval, and he states it feels like it shoots inward and to his back. It began after he returned to the recovery room yesterday, he was given some pain medication and it did ease up, but has returned today and is quite intense, he's not getting much relief with Percocet, he did state that he has not taken any Celebrex, he is out but his wife is going to his pharmacy today to pick that up. He denies any radiating pain to his legs, he also denies any numbness or tingling. The pain persists and is unchanged with the stimulator turned on or off.

## 2024-02-28 DIAGNOSIS — Z96.89 S/P INSERTION OF SPINAL CORD STIMULATOR: Primary | ICD-10-CM

## 2024-02-28 RX ORDER — METHADONE HYDROCHLORIDE 5 MG/1
5 TABLET ORAL EVERY 8 HOURS PRN
Qty: 15 TABLET | Refills: 0 | Status: SHIPPED | OUTPATIENT
Start: 2024-02-28

## 2024-02-28 RX ORDER — METHADONE HYDROCHLORIDE 5 MG/5ML
5 SOLUTION ORAL EVERY 8 HOURS PRN
Qty: 75 ML | Refills: 0 | OUTPATIENT
Start: 2024-02-28 | End: 2024-03-04

## 2024-02-28 NOTE — TELEPHONE ENCOUNTER
Mr. Watson is 2 days s/p a spinal cord stimulator insertion. He has noted pain in his stomach and abdomen that radiate around to his back. Percocet and Celebrex are not really helping with the pain. I discussed this with Dr. Grigsby and we are going to try methadone to see if he can get some relief. I discussed with the patient that he should discontinue his Percocet while taking this medication. Patient understood and was agreeable to this plan.

## 2024-03-12 ENCOUNTER — HOSPITAL ENCOUNTER (OUTPATIENT)
Dept: GENERAL RADIOLOGY | Facility: HOSPITAL | Age: 59
Discharge: HOME | End: 2024-03-12
Payer: COMMERCIAL

## 2024-03-12 ENCOUNTER — OFFICE VISIT (OUTPATIENT)
Dept: NEUROSURGERY | Facility: CLINIC | Age: 59
End: 2024-03-12
Payer: COMMERCIAL

## 2024-03-12 VITALS
BODY MASS INDEX: 33.86 KG/M2 | TEMPERATURE: 97.7 F | HEIGHT: 72 IN | WEIGHT: 250 LBS | DIASTOLIC BLOOD PRESSURE: 70 MMHG | SYSTOLIC BLOOD PRESSURE: 118 MMHG

## 2024-03-12 DIAGNOSIS — Z96.89 S/P INSERTION OF SPINAL CORD STIMULATOR: Primary | ICD-10-CM

## 2024-03-12 DIAGNOSIS — Z96.89 S/P INSERTION OF SPINAL CORD STIMULATOR: ICD-10-CM

## 2024-03-12 DIAGNOSIS — G89.29 CHRONIC LOW BACK PAIN WITH LEFT-SIDED SCIATICA, UNSPECIFIED BACK PAIN LATERALITY: ICD-10-CM

## 2024-03-12 DIAGNOSIS — M54.42 CHRONIC LOW BACK PAIN WITH LEFT-SIDED SCIATICA, UNSPECIFIED BACK PAIN LATERALITY: ICD-10-CM

## 2024-03-12 PROCEDURE — 99024 POSTOP FOLLOW-UP VISIT: CPT

## 2024-03-12 PROCEDURE — 72072 X-RAY EXAM THORAC SPINE 3VWS: CPT

## 2024-03-12 PROCEDURE — 72100 X-RAY EXAM L-S SPINE 2/3 VWS: CPT

## 2024-03-12 NOTE — PROGRESS NOTES
Patient: Avery Watson  : 1965  Chart #: [id number]    Date of Service: 2024    Chief Complaint   Patient presents with    Post-op       HPI  Mr. Watson is a 58 year old man with protracted back and leg pain despite lumbar previous surgery. He presented for placement of a [employer] spinal cord stimulator by Dr. Grigsby on 2024. He experienced pain in his stomach and abdomen that radiated around to his spine, attributed to the placement of the stimulator lead that improved with methadone. He says there was a small amount of drainage on thoracic incision, but otherwise no complications with the wounds. He has no complaints at this time.     Chronic Illnesses:    Past Medical History:   Diagnosis Date    Acid reflux     Chronic pain disorder     Depression     Elevated cholesterol     Essential hypertension 10/27/2023    Extremity pain     Joint pain     Low back pain     Lumbosacral disc disease     Migraine     Muscle spasm     Neck pain     Osteoarthritis     PONV (postoperative nausea and vomiting)     Rheumatoid arthritis          Past Surgical History:   Procedure Laterality Date    CARPAL TUNNEL RELEASE Right 2016    Procedure: CARPAL TUNNEL RELEASE;  Surgeon: Yoel Lua MD;  Location:  [facility];  Service:     CARPAL TUNNEL RELEASE Left 2017    Procedure: CARPAL TUNNEL RELEASE;  Surgeon: Yoel Lua MD;  Location:  [facility];  Service:     COLONOSCOPY      LUMBAR DISCECTOMY Right 2022    Procedure: LUMBAR DISCECTOMY L4-5 RIGHT;  Surgeon: Aquiles Grigsby MD;  Location:  [facility];  Service: Neurosurgery;  Laterality: Right;    LUMBAR FACET INJECTION      RHIZOTOMY      SPINAL CORD STIMULATOR IMPLANT N/A 2024    Procedure: SPINAL CORD STIMULATOR INSERTION PHASE 1;  Surgeon: Aquiles Grigsby MD;  Location:  [facility];  Service: Neurosurgery;  Laterality: N/A;    SPINAL CORD STIMULATOR TRIAL W/ LAMINOTOMY      TOE SURGERY Right  "03/04/2015    \"flexible joint\" per pt -- no hardware       No Known Allergies      Current Outpatient Medications:     atorvastatin (LIPITOR) 10 MG tablet, Take 1 tablet by mouth Daily., Disp: 90 tablet, Rfl: 3    baclofen (LIORESAL) 10 MG tablet, Take 0.5 (one-half) to 1 tablets by mouth 4 (Four) Times a Day As Needed for muscle spasms., Disp: 30 tablet, Rfl: 0    baclofen (LIORESAL) 10 MG tablet, Take 2 tablets by mouth at bedtime, Disp: 180 tablet, Rfl: 4    buPROPion (WELLBUTRIN) 75 MG tablet, Take 1 tablet by mouth every day., Disp: 30 tablet, Rfl: 2    busPIRone (BUSPAR) 5 MG tablet, Take 1 tablet by mouth 2 (Two) Times a Day As Needed (anxiety)., Disp: 60 tablet, Rfl: 2    celecoxib (CeleBREX) 200 MG capsule, Take 1 capsule by mouth 2 (Two) Times a Day., Disp: 180 capsule, Rfl: 0    famotidine (Pepcid) 40 MG tablet, Take 1 tablet by mouth twice daily., Disp: 180 tablet, Rfl: 4    FLUoxetine (PROzac) 40 MG capsule, Take 1 capsule by mouth Every Morning., Disp: 90 capsule, Rfl: 1    hydrocortisone (ANUSOL-HC) 25 MG suppository, Use 1 suppository rectally 2 times a day  as needed., Disp: 30 suppository, Rfl: 11    methadone (DOLOPHINE) 5 MG tablet, Take 1 tablet by mouth Every 8 (Eight) Hours As Needed for Moderate Pain., Disp: 15 tablet, Rfl: 0    metoprolol tartrate (LOPRESSOR) 25 MG tablet, Take 1 tablet by mouth 2 (Two) Times a Day., Disp: 60 tablet, Rfl: 11    oxyCODONE-acetaminophen (PERCOCET) 5-325 MG per tablet, Take 1 tablet by mouth 3 (Three) Times a Day As Needed (Pain)., Disp: 15 tablet, Rfl: 0    RABEprazole (ACIPHEX) 20 MG EC tablet, Take 1 tablet by mouth 15 minutes before breakfast and supper., Disp: 60 tablet, Rfl: 11    sulfamethoxazole-trimethoprim (Bactrim DS) 800-160 MG per tablet, Take 1 tablet by mouth 2 (Two) Times a Day for 84 days., Disp: 56 tablet, Rfl: 2    Syringe 25G X 5/8\" 3 ML misc, Use as directed 2 times weekly (use with testosterone injection), Disp: 24 each, Rfl: 3    " "tadalafil (Cialis) 5 MG tablet, Take 1 tablet by mouth daily as needed for erectile dysfunction., Disp: 30 tablet, Rfl: 6    tamsulosin (FLOMAX) 0.4 MG capsule 24 hr capsule, Take 2 capsules (0.8 mg total) by mouth daily for prostate., Disp: 180 capsule, Rfl: 1    terbinafine (lamiSIL) 250 MG tablet, Take 1 tablet by mouth once daily on the first 7 days of each month., Disp: 84 tablet, Rfl: 0    Testosterone Cypionate (Depo-Testosterone) 200 MG/ML injection, Inject 0.5 mL under the skin every Monday and Thursday., Disp: 10 mL, Rfl: 2    traZODone (DESYREL) 50 MG tablet, Take 1 or 2 tablets by mouth every day at bedtime., Disp: 60 tablet, Rfl: 2    vitamin D (ERGOCALCIFEROL) 1.25 MG (38365 UT) capsule capsule, Take 1 capsule by mouth Every 7 (Seven) Days., Disp: 12 capsule, Rfl: 2    Social History     Socioeconomic History    Marital status:    Tobacco Use    Smoking status: Never    Smokeless tobacco: Former     Types: Chew    Tobacco comments:     uses chewing tobacco   Vaping Use    Vaping status: Never Used   Substance and Sexual Activity    Alcohol use: No    Drug use: No    Sexual activity: Defer       Family History   Problem Relation Age of Onset    Lung cancer Mother     Cancer Mother         ovarian    Diabetes Sister     Cancer Sister         breast    Heart disease Brother     Rheum arthritis Brother     Arthritis Brother                Social History    Tobacco Use      Smoking status: Never      Smokeless tobacco: Former        Types: Chew      Tobacco comments: uses chewing tobacco       Review of Systems       Physical examination:  Blood pressure 118/70, temperature 97.7 °F (36.5 °C), temperature source Infrared, height 182.9 cm (72\"), weight 113 kg (250 lb).  HEENT- normocephalic, atraumatic, sclera clear  Lungs-normal expansion, no wheezing  Heart-regular rate and rhythm  Extremities-positive pulses, no edema    Neurologic Exam  WDWN.  A/A/C, speech clear, attention normal, conversant, " answers questions appropriately, good historian.  Gait is normal, balance is normal.   No tremors are noted.    Surgical incisions are clean, dry, with no evidence of erythema.     Medical Decision Making  Diagnoses and all orders for this visit:    1. S/P insertion of spinal cord stimulator (Primary)    2. Chronic low back pain with left-sided sciatica, unspecified back pain laterality    Mr. Watson is recovering well from surgery. He is still working on optimizing the settings on his spinal cord stimulator. We discussed things to be aware of with his wound care, and to contact us if he has any issues with drainage. Patient was agreeable to this plan.    Addendum: I just spoke with Evelyn from [employer] who is working with Mr. Watson on programming his stimulator.  She states that during programming, Mr. Watson had minimal to no perception across the paddle.  She is set him on a similar program to what he was using during his spinal cord stimulator trial when he found relief and will communicate her findings with Dr. Gallego.    Addendum to the addendum: Dr. Gallego's office ordered x-rays of the thoracic and lumbar spine to assess placement of spinal cord stimulator.  The paddle does not appear to be lying down properly in the spinal canal.  He cannot attribute any activity that could have caused the paddle to become dislodged.  The patient and I discussed that surgery would be necessary to put the paddle back in place.  I have asked that the patient be seen by Dr. Grigsby and an afternoon appointment as soon as possible to facilitate the patient's wife, who drives him from [place] when she is off work; the patient has requested speaking over the phone to save a trip.  I will discuss this case with Dr. Grigsby and what needs to be done for surgical planning.    Any copied data from previous notes included in the (1) HPI, (2) PE, (3) MDM and/or assessment and plan has been reviewed and is accurate as of this  day.    Sara Willett PA-C     Patient Care Team:  Luly Jimenez APRN as PCP - General (Nurse Practitioner)  Flaco Gallego MD as Consulting Physician (Pain Medicine)  Hebert Jordan MD as Consulting Physician (Interventional Cardiology)

## 2024-03-13 ENCOUNTER — PREP FOR SURGERY (OUTPATIENT)
Dept: OTHER | Facility: HOSPITAL | Age: 59
End: 2024-03-13
Payer: COMMERCIAL

## 2024-03-13 ENCOUNTER — OFFICE VISIT (OUTPATIENT)
Dept: NEUROSURGERY | Facility: CLINIC | Age: 59
End: 2024-03-13
Payer: COMMERCIAL

## 2024-03-13 VITALS — TEMPERATURE: 97.8 F | HEIGHT: 72 IN | WEIGHT: 249 LBS | BODY MASS INDEX: 33.72 KG/M2

## 2024-03-13 DIAGNOSIS — T85.192A MALFUNCTION OF SPINAL CORD STIMULATOR, INITIAL ENCOUNTER: Primary | ICD-10-CM

## 2024-03-13 DIAGNOSIS — M54.42 CHRONIC LOW BACK PAIN WITH LEFT-SIDED SCIATICA, UNSPECIFIED BACK PAIN LATERALITY: ICD-10-CM

## 2024-03-13 DIAGNOSIS — Z96.89 S/P INSERTION OF SPINAL CORD STIMULATOR: Primary | ICD-10-CM

## 2024-03-13 DIAGNOSIS — G89.29 CHRONIC LOW BACK PAIN WITH LEFT-SIDED SCIATICA, UNSPECIFIED BACK PAIN LATERALITY: ICD-10-CM

## 2024-03-13 PROCEDURE — 99024 POSTOP FOLLOW-UP VISIT: CPT | Performed by: NEUROLOGICAL SURGERY

## 2024-03-13 RX ORDER — CHLORHEXIDINE GLUCONATE 40 MG/ML
SOLUTION TOPICAL
Qty: 236 ML | Refills: 0 | Status: SHIPPED | OUTPATIENT
Start: 2024-03-13 | End: 2024-03-18 | Stop reason: HOSPADM

## 2024-03-13 RX ORDER — CHLORHEXIDINE GLUCONATE 40 MG/ML
SOLUTION TOPICAL
Qty: 237 ML | Refills: 0 | Status: SHIPPED | OUTPATIENT
Start: 2024-03-13 | End: 2024-03-13 | Stop reason: SDUPTHER

## 2024-03-13 RX ORDER — FAMOTIDINE 20 MG/1
20 TABLET, FILM COATED ORAL
Status: CANCELLED | OUTPATIENT
Start: 2024-03-13

## 2024-03-13 NOTE — H&P
Patient: Avery Watson  : 1965     Primary Care Provider: Luly Jimenez APRN     Requesting Provider: As above           History     Chief Complaint: Low back and left leg pain.     History of Present Illness: Mr. Watson is a 58-year-old gentleman with chronic back and left leg pain.  On 2024 I placed a [employer] epidural spinal cord stimulator.  He did develop some abdominal and flank pain that has improved.  His unit however is not providing relief and x-rays have demonstrated backing out of his lead.     Review of Systems     The patient's past medical history, past surgical history, family history, and social history have been reviewed at length in the electronic medical record.     Past Medical History:   Diagnosis Date    Acid reflux     Chronic pain disorder     Depression     Elevated cholesterol     Essential hypertension 10/27/2023    Extremity pain     Joint pain     Low back pain     Lumbosacral disc disease     Migraine     Muscle spasm     Neck pain     Osteoarthritis     PONV (postoperative nausea and vomiting)     Rheumatoid arthritis      Past Surgical History:   Procedure Laterality Date    CARPAL TUNNEL RELEASE Right 2016    Procedure: CARPAL TUNNEL RELEASE;  Surgeon: Yoel Lua MD;  Location:  [facility];  Service:     CARPAL TUNNEL RELEASE Left 2017    Procedure: CARPAL TUNNEL RELEASE;  Surgeon: Yoel Lua MD;  Location:  [facility];  Service:     COLONOSCOPY      LUMBAR DISCECTOMY Right 2022    Procedure: LUMBAR DISCECTOMY L4-5 RIGHT;  Surgeon: Aquiles Grigsby MD;  Location:  [facility];  Service: Neurosurgery;  Laterality: Right;    LUMBAR FACET INJECTION      RHIZOTOMY      SPINAL CORD STIMULATOR IMPLANT N/A 2024    Procedure: SPINAL CORD STIMULATOR INSERTION PHASE 1;  Surgeon: Aquiles Grigsby MD;  Location:  [facility];  Service: Neurosurgery;  Laterality: N/A;    SPINAL CORD STIMULATOR TRIAL W/ LAMINOTOMY      TOE  "SURGERY Right 03/04/2015    \"flexible joint\" per pt -- no hardware     Family History   Problem Relation Age of Onset    Lung cancer Mother     Cancer Mother         ovarian    Diabetes Sister     Cancer Sister         breast    Heart disease Brother     Rheum arthritis Brother     Arthritis Brother      Social History     Socioeconomic History    Marital status:    Tobacco Use    Smoking status: Never    Smokeless tobacco: Former     Types: Chew    Tobacco comments:     uses chewing tobacco   Vaping Use    Vaping status: Never Used   Substance and Sexual Activity    Alcohol use: No    Drug use: No    Sexual activity: Defer           No Known Allergies    Current Outpatient Medications on File Prior to Visit   Medication Sig Dispense Refill    atorvastatin (LIPITOR) 10 MG tablet Take 1 tablet by mouth Daily. 90 tablet 3    baclofen (LIORESAL) 10 MG tablet Take 0.5 (one-half) to 1 tablets by mouth 4 (Four) Times a Day As Needed for muscle spasms. 30 tablet 0    baclofen (LIORESAL) 10 MG tablet Take 2 tablets by mouth at bedtime 180 tablet 4    buPROPion (WELLBUTRIN) 75 MG tablet Take 1 tablet by mouth every day. 30 tablet 2    busPIRone (BUSPAR) 5 MG tablet Take 1 tablet by mouth 2 (Two) Times a Day As Needed (anxiety). 60 tablet 2    celecoxib (CeleBREX) 200 MG capsule Take 1 capsule by mouth 2 (Two) Times a Day. 180 capsule 0    famotidine (Pepcid) 40 MG tablet Take 1 tablet by mouth twice daily. 180 tablet 4    FLUoxetine (PROzac) 40 MG capsule Take 1 capsule by mouth Every Morning. 90 capsule 1    hydrocortisone (ANUSOL-HC) 25 MG suppository Use 1 suppository rectally 2 times a day  as needed. 30 suppository 11    methadone (DOLOPHINE) 5 MG tablet Take 1 tablet by mouth Every 8 (Eight) Hours As Needed for Moderate Pain. 15 tablet 0    metoprolol tartrate (LOPRESSOR) 25 MG tablet Take 1 tablet by mouth 2 (Two) Times a Day. 60 tablet 11    oxyCODONE-acetaminophen (PERCOCET) 5-325 MG per tablet Take 1 tablet " "by mouth 3 (Three) Times a Day As Needed (Pain). 15 tablet 0    RABEprazole (ACIPHEX) 20 MG EC tablet Take 1 tablet by mouth 15 minutes before breakfast and supper. 60 tablet 11    sulfamethoxazole-trimethoprim (Bactrim DS) 800-160 MG per tablet Take 1 tablet by mouth 2 (Two) Times a Day for 84 days. 56 tablet 2    Syringe 25G X 5/8\" 3 ML misc Use as directed 2 times weekly (use with testosterone injection) 24 each 3    tadalafil (Cialis) 5 MG tablet Take 1 tablet by mouth daily as needed for erectile dysfunction. 30 tablet 6    tamsulosin (FLOMAX) 0.4 MG capsule 24 hr capsule Take 2 capsules (0.8 mg total) by mouth daily for prostate. 180 capsule 1    terbinafine (lamiSIL) 250 MG tablet Take 1 tablet by mouth once daily on the first 7 days of each month. 84 tablet 0    Testosterone Cypionate (Depo-Testosterone) 200 MG/ML injection Inject 0.5 mL under the skin every Monday and Thursday. 10 mL 2    traZODone (DESYREL) 50 MG tablet Take 1 or 2 tablets by mouth every day at bedtime. 60 tablet 2    vitamin D (ERGOCALCIFEROL) 1.25 MG (41667 UT) capsule capsule Take 1 capsule by mouth Every 7 (Seven) Days. 12 capsule 2     No current facility-administered medications on file prior to visit.          Physical Exam:   Temp 97.8 °F (36.6 °C) (Infrared)   Ht 182.9 cm (72.01\")   Wt 113 kg (249 lb)   BMI 33.76 kg/m²   Visions look good.     Medical Decision Making     Data Review:   (All imaging studies were personally reviewed unless stated otherwise)  Thoracic plain films demonstrate only a portion of the lead actually being within the canal.  The lead has backed out.     Diagnosis:   Displaced [employer] epidural spinal cord stimulator lead.     Treatment Options:   I discussed this with the patient and his wife.  I will make arrangements over the next several days to reposition his lead.  I discussed the potential risks of this redo intervention.  He agrees to proceed.        "

## 2024-03-13 NOTE — H&P (VIEW-ONLY)
Patient: Avery Watson  : 1965     Primary Care Provider: Luly Jimenez APRN     Requesting Provider: As above           History     Chief Complaint: Low back and left leg pain.     History of Present Illness: Mr. Watson is a 58-year-old gentleman with chronic back and left leg pain.  On 2024 I placed a [employer] epidural spinal cord stimulator.  He did develop some abdominal and flank pain that has improved.  His unit however is not providing relief and x-rays have demonstrated backing out of his lead.     Review of Systems     The patient's past medical history, past surgical history, family history, and social history have been reviewed at length in the electronic medical record.     Past Medical History:   Diagnosis Date    Acid reflux     Chronic pain disorder     Depression     Elevated cholesterol     Essential hypertension 10/27/2023    Extremity pain     Joint pain     Low back pain     Lumbosacral disc disease     Migraine     Muscle spasm     Neck pain     Osteoarthritis     PONV (postoperative nausea and vomiting)     Rheumatoid arthritis      Past Surgical History:   Procedure Laterality Date    CARPAL TUNNEL RELEASE Right 2016    Procedure: CARPAL TUNNEL RELEASE;  Surgeon: Yoel Lua MD;  Location:  [facility];  Service:     CARPAL TUNNEL RELEASE Left 2017    Procedure: CARPAL TUNNEL RELEASE;  Surgeon: Yoel Lua MD;  Location:  [facility];  Service:     COLONOSCOPY      LUMBAR DISCECTOMY Right 2022    Procedure: LUMBAR DISCECTOMY L4-5 RIGHT;  Surgeon: Aquiles Grigsby MD;  Location:  [facility];  Service: Neurosurgery;  Laterality: Right;    LUMBAR FACET INJECTION      RHIZOTOMY      SPINAL CORD STIMULATOR IMPLANT N/A 2024    Procedure: SPINAL CORD STIMULATOR INSERTION PHASE 1;  Surgeon: Aquiles Grigsby MD;  Location:  [facility];  Service: Neurosurgery;  Laterality: N/A;    SPINAL CORD STIMULATOR TRIAL W/ LAMINOTOMY      TOE  "SURGERY Right 03/04/2015    \"flexible joint\" per pt -- no hardware     Family History   Problem Relation Age of Onset    Lung cancer Mother     Cancer Mother         ovarian    Diabetes Sister     Cancer Sister         breast    Heart disease Brother     Rheum arthritis Brother     Arthritis Brother      Social History     Socioeconomic History    Marital status:    Tobacco Use    Smoking status: Never    Smokeless tobacco: Former     Types: Chew    Tobacco comments:     uses chewing tobacco   Vaping Use    Vaping status: Never Used   Substance and Sexual Activity    Alcohol use: No    Drug use: No    Sexual activity: Defer           No Known Allergies    Current Outpatient Medications on File Prior to Visit   Medication Sig Dispense Refill    atorvastatin (LIPITOR) 10 MG tablet Take 1 tablet by mouth Daily. 90 tablet 3    baclofen (LIORESAL) 10 MG tablet Take 0.5 (one-half) to 1 tablets by mouth 4 (Four) Times a Day As Needed for muscle spasms. 30 tablet 0    baclofen (LIORESAL) 10 MG tablet Take 2 tablets by mouth at bedtime 180 tablet 4    buPROPion (WELLBUTRIN) 75 MG tablet Take 1 tablet by mouth every day. 30 tablet 2    busPIRone (BUSPAR) 5 MG tablet Take 1 tablet by mouth 2 (Two) Times a Day As Needed (anxiety). 60 tablet 2    celecoxib (CeleBREX) 200 MG capsule Take 1 capsule by mouth 2 (Two) Times a Day. 180 capsule 0    famotidine (Pepcid) 40 MG tablet Take 1 tablet by mouth twice daily. 180 tablet 4    FLUoxetine (PROzac) 40 MG capsule Take 1 capsule by mouth Every Morning. 90 capsule 1    hydrocortisone (ANUSOL-HC) 25 MG suppository Use 1 suppository rectally 2 times a day  as needed. 30 suppository 11    methadone (DOLOPHINE) 5 MG tablet Take 1 tablet by mouth Every 8 (Eight) Hours As Needed for Moderate Pain. 15 tablet 0    metoprolol tartrate (LOPRESSOR) 25 MG tablet Take 1 tablet by mouth 2 (Two) Times a Day. 60 tablet 11    oxyCODONE-acetaminophen (PERCOCET) 5-325 MG per tablet Take 1 tablet " "by mouth 3 (Three) Times a Day As Needed (Pain). 15 tablet 0    RABEprazole (ACIPHEX) 20 MG EC tablet Take 1 tablet by mouth 15 minutes before breakfast and supper. 60 tablet 11    sulfamethoxazole-trimethoprim (Bactrim DS) 800-160 MG per tablet Take 1 tablet by mouth 2 (Two) Times a Day for 84 days. 56 tablet 2    Syringe 25G X 5/8\" 3 ML misc Use as directed 2 times weekly (use with testosterone injection) 24 each 3    tadalafil (Cialis) 5 MG tablet Take 1 tablet by mouth daily as needed for erectile dysfunction. 30 tablet 6    tamsulosin (FLOMAX) 0.4 MG capsule 24 hr capsule Take 2 capsules (0.8 mg total) by mouth daily for prostate. 180 capsule 1    terbinafine (lamiSIL) 250 MG tablet Take 1 tablet by mouth once daily on the first 7 days of each month. 84 tablet 0    Testosterone Cypionate (Depo-Testosterone) 200 MG/ML injection Inject 0.5 mL under the skin every Monday and Thursday. 10 mL 2    traZODone (DESYREL) 50 MG tablet Take 1 or 2 tablets by mouth every day at bedtime. 60 tablet 2    vitamin D (ERGOCALCIFEROL) 1.25 MG (42283 UT) capsule capsule Take 1 capsule by mouth Every 7 (Seven) Days. 12 capsule 2     No current facility-administered medications on file prior to visit.          Physical Exam:   Temp 97.8 °F (36.6 °C) (Infrared)   Ht 182.9 cm (72.01\")   Wt 113 kg (249 lb)   BMI 33.76 kg/m²   Visions look good.     Medical Decision Making     Data Review:   (All imaging studies were personally reviewed unless stated otherwise)  Thoracic plain films demonstrate only a portion of the lead actually being within the canal.  The lead has backed out.     Diagnosis:   Displaced [employer] epidural spinal cord stimulator lead.     Treatment Options:   I discussed this with the patient and his wife.  I will make arrangements over the next several days to reposition his lead.  I discussed the potential risks of this redo intervention.  He agrees to proceed.        "

## 2024-03-13 NOTE — PROGRESS NOTES
"Patient: Avery Watson  : 1965    Primary Care Provider: Luly Jimenez APRN    Requesting Provider: As above        History    Chief Complaint: Low back and left leg pain.    History of Present Illness: Mr. Watson is a 58-year-old gentleman with chronic back and left leg pain.  On 2024 I placed a [employer] epidural spinal cord stimulator.  He did develop some abdominal and flank pain that has improved.  His unit however is not providing relief and x-rays have demonstrated backing out of his lead.    Review of Systems    The patient's past medical history, past surgical history, family history, and social history have been reviewed at length in the electronic medical record.      Physical Exam:   Temp 97.8 °F (36.6 °C) (Infrared)   Ht 182.9 cm (72.01\")   Wt 113 kg (249 lb)   BMI 33.76 kg/m²   Visions look good.    Medical Decision Making    Data Review:   (All imaging studies were personally reviewed unless stated otherwise)  Thoracic plain films demonstrate only a portion of the lead actually being within the canal.  The lead has backed out.    Diagnosis:   Displaced [employer] epidural spinal cord stimulator lead.    Treatment Options:   I discussed this with the patient and his wife.  I will make arrangements over the next several days to reposition his lead.  I discussed the potential risks of this redo intervention.  He agrees to proceed.      Scribed for Aquiles Grigsby MD by Octavia Kam MA on 3/13/2024 08:08 EDT      I, Dr. Grigsby, personally performed the services described in the documentation, as scribed in my presence, and it is both accurate and complete.  "

## 2024-03-18 ENCOUNTER — ANESTHESIA EVENT (OUTPATIENT)
Dept: PERIOP | Facility: HOSPITAL | Age: 59
End: 2024-03-18
Payer: COMMERCIAL

## 2024-03-18 ENCOUNTER — ANESTHESIA (OUTPATIENT)
Dept: PERIOP | Facility: HOSPITAL | Age: 59
End: 2024-03-18
Payer: COMMERCIAL

## 2024-03-18 ENCOUNTER — HOSPITAL ENCOUNTER (OUTPATIENT)
Facility: HOSPITAL | Age: 59
Discharge: HOME | End: 2024-03-18
Attending: NEUROLOGICAL SURGERY | Admitting: NEUROLOGICAL SURGERY
Payer: COMMERCIAL

## 2024-03-18 ENCOUNTER — APPOINTMENT (OUTPATIENT)
Dept: GENERAL RADIOLOGY | Facility: HOSPITAL | Age: 59
End: 2024-03-18
Payer: COMMERCIAL

## 2024-03-18 VITALS
TEMPERATURE: 98 F | SYSTOLIC BLOOD PRESSURE: 115 MMHG | RESPIRATION RATE: 16 BRPM | OXYGEN SATURATION: 95 % | HEART RATE: 73 BPM | DIASTOLIC BLOOD PRESSURE: 80 MMHG

## 2024-03-18 DIAGNOSIS — T85.192A MALFUNCTION OF SPINAL CORD STIMULATOR, INITIAL ENCOUNTER: ICD-10-CM

## 2024-03-18 PROCEDURE — 25810000003 LACTATED RINGERS PER 1000 ML: Performed by: ANESTHESIOLOGY

## 2024-03-18 PROCEDURE — 25010000002 VANCOMYCIN 1 G RECONSTITUTED SOLUTION: Performed by: NEUROLOGICAL SURGERY

## 2024-03-18 PROCEDURE — 25010000002 CEFAZOLIN PER 500 MG: Performed by: NEUROLOGICAL SURGERY

## 2024-03-18 PROCEDURE — 25010000002 FENTANYL CITRATE (PF) 50 MCG/ML SOLUTION

## 2024-03-18 PROCEDURE — 63664 REVISE SPINE ELTRD PLATE: CPT | Performed by: NEUROLOGICAL SURGERY

## 2024-03-18 PROCEDURE — 25010000002 DEXAMETHASONE PER 1 MG

## 2024-03-18 PROCEDURE — 25010000002 HYDROMORPHONE 1 MG/ML SOLUTION

## 2024-03-18 PROCEDURE — 25010000002 SUGAMMADEX 200 MG/2ML SOLUTION

## 2024-03-18 PROCEDURE — 63664 REVISE SPINE ELTRD PLATE: CPT

## 2024-03-18 PROCEDURE — 25010000002 PROPOFOL 10 MG/ML EMULSION

## 2024-03-18 PROCEDURE — 25010000002 ONDANSETRON PER 1 MG

## 2024-03-18 PROCEDURE — 25010000002 FENTANYL CITRATE (PF) 100 MCG/2ML SOLUTION

## 2024-03-18 PROCEDURE — 76000 FLUOROSCOPY <1 HR PHYS/QHP: CPT

## 2024-03-18 PROCEDURE — 25010000002 PHENYLEPHRINE 10 MG/ML SOLUTION

## 2024-03-18 DEVICE — HEMOST ABS SURGIFOAM SZ100 8X12 10MM: Type: IMPLANTABLE DEVICE | Site: BACK | Status: FUNCTIONAL

## 2024-03-18 DEVICE — FLOSEAL HEMOSTATIC MATRIX, 10ML
Type: IMPLANTABLE DEVICE | Site: BACK | Status: FUNCTIONAL
Brand: FLOSEAL HEMOSTATIC MATRIX

## 2024-03-18 RX ORDER — LABETALOL HYDROCHLORIDE 5 MG/ML
5 INJECTION, SOLUTION INTRAVENOUS
Status: DISCONTINUED | OUTPATIENT
Start: 2024-03-18 | End: 2024-03-18 | Stop reason: HOSPADM

## 2024-03-18 RX ORDER — FENTANYL CITRATE 50 UG/ML
INJECTION, SOLUTION INTRAMUSCULAR; INTRAVENOUS
Status: COMPLETED
Start: 2024-03-18 | End: 2024-03-18

## 2024-03-18 RX ORDER — SODIUM CHLORIDE 0.9 % (FLUSH) 0.9 %
3-10 SYRINGE (ML) INJECTION AS NEEDED
Status: DISCONTINUED | OUTPATIENT
Start: 2024-03-18 | End: 2024-03-18 | Stop reason: HOSPADM

## 2024-03-18 RX ORDER — HYDRALAZINE HYDROCHLORIDE 20 MG/ML
5 INJECTION INTRAMUSCULAR; INTRAVENOUS
Status: DISCONTINUED | OUTPATIENT
Start: 2024-03-18 | End: 2024-03-18 | Stop reason: HOSPADM

## 2024-03-18 RX ORDER — PHENYLEPHRINE HYDROCHLORIDE 10 MG/ML
INJECTION INTRAVENOUS AS NEEDED
Status: DISCONTINUED | OUTPATIENT
Start: 2024-03-18 | End: 2024-03-18 | Stop reason: SURG

## 2024-03-18 RX ORDER — MAGNESIUM HYDROXIDE 1200 MG/15ML
LIQUID ORAL AS NEEDED
Status: DISCONTINUED | OUTPATIENT
Start: 2024-03-18 | End: 2024-03-18 | Stop reason: HOSPADM

## 2024-03-18 RX ORDER — SODIUM CHLORIDE 9 MG/ML
40 INJECTION, SOLUTION INTRAVENOUS AS NEEDED
Status: DISCONTINUED | OUTPATIENT
Start: 2024-03-18 | End: 2024-03-18 | Stop reason: HOSPADM

## 2024-03-18 RX ORDER — PROMETHAZINE HYDROCHLORIDE 25 MG/1
25 TABLET ORAL ONCE AS NEEDED
Status: DISCONTINUED | OUTPATIENT
Start: 2024-03-18 | End: 2024-03-18 | Stop reason: HOSPADM

## 2024-03-18 RX ORDER — OXYCODONE HYDROCHLORIDE AND ACETAMINOPHEN 5; 325 MG/1; MG/1
1 TABLET ORAL 3 TIMES DAILY PRN
Qty: 12 TABLET | Refills: 0 | Status: SHIPPED | OUTPATIENT
Start: 2024-03-18

## 2024-03-18 RX ORDER — NALOXONE HYDROCHLORIDE 4 MG/.1ML
SPRAY NASAL
Qty: 2 EACH | Refills: 0 | Status: SHIPPED | OUTPATIENT
Start: 2024-03-18

## 2024-03-18 RX ORDER — ONDANSETRON 2 MG/ML
INJECTION INTRAMUSCULAR; INTRAVENOUS AS NEEDED
Status: DISCONTINUED | OUTPATIENT
Start: 2024-03-18 | End: 2024-03-18 | Stop reason: SURG

## 2024-03-18 RX ORDER — SODIUM CHLORIDE 0.9 % (FLUSH) 0.9 %
10 SYRINGE (ML) INJECTION AS NEEDED
Status: DISCONTINUED | OUTPATIENT
Start: 2024-03-18 | End: 2024-03-18 | Stop reason: HOSPADM

## 2024-03-18 RX ORDER — VANCOMYCIN HYDROCHLORIDE 1 G/20ML
INJECTION, POWDER, LYOPHILIZED, FOR SOLUTION INTRAVENOUS AS NEEDED
Status: DISCONTINUED | OUTPATIENT
Start: 2024-03-18 | End: 2024-03-18 | Stop reason: HOSPADM

## 2024-03-18 RX ORDER — DROPERIDOL 2.5 MG/ML
0.62 INJECTION, SOLUTION INTRAMUSCULAR; INTRAVENOUS ONCE AS NEEDED
Status: DISCONTINUED | OUTPATIENT
Start: 2024-03-18 | End: 2024-03-18 | Stop reason: HOSPADM

## 2024-03-18 RX ORDER — MEPERIDINE HYDROCHLORIDE 25 MG/ML
12.5 INJECTION INTRAMUSCULAR; INTRAVENOUS; SUBCUTANEOUS
Status: DISCONTINUED | OUTPATIENT
Start: 2024-03-18 | End: 2024-03-18 | Stop reason: HOSPADM

## 2024-03-18 RX ORDER — DROPERIDOL 2.5 MG/ML
0.62 INJECTION, SOLUTION INTRAMUSCULAR; INTRAVENOUS
Status: DISCONTINUED | OUTPATIENT
Start: 2024-03-18 | End: 2024-03-18 | Stop reason: HOSPADM

## 2024-03-18 RX ORDER — HYDROMORPHONE HYDROCHLORIDE 1 MG/ML
0.5 INJECTION, SOLUTION INTRAMUSCULAR; INTRAVENOUS; SUBCUTANEOUS
Status: DISCONTINUED | OUTPATIENT
Start: 2024-03-18 | End: 2024-03-18 | Stop reason: HOSPADM

## 2024-03-18 RX ORDER — LIDOCAINE HYDROCHLORIDE 10 MG/ML
INJECTION, SOLUTION EPIDURAL; INFILTRATION; INTRACAUDAL; PERINEURAL AS NEEDED
Status: DISCONTINUED | OUTPATIENT
Start: 2024-03-18 | End: 2024-03-18 | Stop reason: SURG

## 2024-03-18 RX ORDER — LIDOCAINE HYDROCHLORIDE 10 MG/ML
0.5 INJECTION, SOLUTION EPIDURAL; INFILTRATION; INTRACAUDAL; PERINEURAL ONCE AS NEEDED
Status: COMPLETED | OUTPATIENT
Start: 2024-03-18 | End: 2024-03-18

## 2024-03-18 RX ORDER — FAMOTIDINE 10 MG/ML
20 INJECTION, SOLUTION INTRAVENOUS ONCE
Status: CANCELLED | OUTPATIENT
Start: 2024-03-18 | End: 2024-03-18

## 2024-03-18 RX ORDER — SODIUM CHLORIDE 0.9 % (FLUSH) 0.9 %
10 SYRINGE (ML) INJECTION EVERY 12 HOURS SCHEDULED
Status: DISCONTINUED | OUTPATIENT
Start: 2024-03-18 | End: 2024-03-18 | Stop reason: HOSPADM

## 2024-03-18 RX ORDER — PROPOFOL 10 MG/ML
VIAL (ML) INTRAVENOUS AS NEEDED
Status: DISCONTINUED | OUTPATIENT
Start: 2024-03-18 | End: 2024-03-18 | Stop reason: SURG

## 2024-03-18 RX ORDER — FAMOTIDINE 20 MG/1
20 TABLET, FILM COATED ORAL ONCE
Status: COMPLETED | OUTPATIENT
Start: 2024-03-18 | End: 2024-03-18

## 2024-03-18 RX ORDER — MIDAZOLAM HYDROCHLORIDE 1 MG/ML
1 INJECTION INTRAMUSCULAR; INTRAVENOUS
Status: DISCONTINUED | OUTPATIENT
Start: 2024-03-18 | End: 2024-03-18 | Stop reason: HOSPADM

## 2024-03-18 RX ORDER — ROCURONIUM BROMIDE 10 MG/ML
INJECTION, SOLUTION INTRAVENOUS AS NEEDED
Status: DISCONTINUED | OUTPATIENT
Start: 2024-03-18 | End: 2024-03-18 | Stop reason: SURG

## 2024-03-18 RX ORDER — FENTANYL CITRATE 50 UG/ML
INJECTION, SOLUTION INTRAMUSCULAR; INTRAVENOUS AS NEEDED
Status: DISCONTINUED | OUTPATIENT
Start: 2024-03-18 | End: 2024-03-18 | Stop reason: SURG

## 2024-03-18 RX ORDER — SODIUM CHLORIDE 0.9 % (FLUSH) 0.9 %
3 SYRINGE (ML) INJECTION EVERY 12 HOURS SCHEDULED
Status: DISCONTINUED | OUTPATIENT
Start: 2024-03-18 | End: 2024-03-18 | Stop reason: HOSPADM

## 2024-03-18 RX ORDER — DEXAMETHASONE SODIUM PHOSPHATE 4 MG/ML
INJECTION, SOLUTION INTRA-ARTICULAR; INTRALESIONAL; INTRAMUSCULAR; INTRAVENOUS; SOFT TISSUE AS NEEDED
Status: DISCONTINUED | OUTPATIENT
Start: 2024-03-18 | End: 2024-03-18 | Stop reason: SURG

## 2024-03-18 RX ORDER — HYDROCODONE BITARTRATE AND ACETAMINOPHEN 5; 325 MG/1; MG/1
1 TABLET ORAL ONCE AS NEEDED
Status: DISCONTINUED | OUTPATIENT
Start: 2024-03-18 | End: 2024-03-18 | Stop reason: HOSPADM

## 2024-03-18 RX ORDER — ONDANSETRON 2 MG/ML
4 INJECTION INTRAMUSCULAR; INTRAVENOUS ONCE AS NEEDED
Status: DISCONTINUED | OUTPATIENT
Start: 2024-03-18 | End: 2024-03-18 | Stop reason: HOSPADM

## 2024-03-18 RX ORDER — NALOXONE HCL 0.4 MG/ML
0.4 VIAL (ML) INJECTION AS NEEDED
Status: DISCONTINUED | OUTPATIENT
Start: 2024-03-18 | End: 2024-03-18 | Stop reason: HOSPADM

## 2024-03-18 RX ORDER — HYDROCODONE BITARTRATE AND ACETAMINOPHEN 5; 325 MG/1; MG/1
TABLET ORAL
Status: COMPLETED
Start: 2024-03-18 | End: 2024-03-18

## 2024-03-18 RX ORDER — SODIUM CHLORIDE, SODIUM LACTATE, POTASSIUM CHLORIDE, CALCIUM CHLORIDE 600; 310; 30; 20 MG/100ML; MG/100ML; MG/100ML; MG/100ML
9 INJECTION, SOLUTION INTRAVENOUS CONTINUOUS
Status: DISCONTINUED | OUTPATIENT
Start: 2024-03-18 | End: 2024-03-18 | Stop reason: HOSPADM

## 2024-03-18 RX ORDER — IPRATROPIUM BROMIDE AND ALBUTEROL SULFATE 2.5; .5 MG/3ML; MG/3ML
3 SOLUTION RESPIRATORY (INHALATION) ONCE AS NEEDED
Status: DISCONTINUED | OUTPATIENT
Start: 2024-03-18 | End: 2024-03-18 | Stop reason: HOSPADM

## 2024-03-18 RX ORDER — FENTANYL CITRATE 50 UG/ML
50 INJECTION, SOLUTION INTRAMUSCULAR; INTRAVENOUS
Status: DISCONTINUED | OUTPATIENT
Start: 2024-03-18 | End: 2024-03-18 | Stop reason: HOSPADM

## 2024-03-18 RX ORDER — PROMETHAZINE HYDROCHLORIDE 25 MG/1
25 SUPPOSITORY RECTAL ONCE AS NEEDED
Status: DISCONTINUED | OUTPATIENT
Start: 2024-03-18 | End: 2024-03-18 | Stop reason: HOSPADM

## 2024-03-18 RX ADMIN — PHENYLEPHRINE HYDROCHLORIDE 100 MCG: 10 INJECTION INTRAVENOUS at 14:12

## 2024-03-18 RX ADMIN — SUGAMMADEX 200 MG: 100 INJECTION, SOLUTION INTRAVENOUS at 14:59

## 2024-03-18 RX ADMIN — HYDROCODONE BITARTRATE AND ACETAMINOPHEN 1 TABLET: 5; 325 TABLET ORAL at 16:14

## 2024-03-18 RX ADMIN — ROCURONIUM BROMIDE 50 MG: 10 INJECTION INTRAVENOUS at 14:03

## 2024-03-18 RX ADMIN — LIDOCAINE HYDROCHLORIDE 50 MG: 10 INJECTION, SOLUTION EPIDURAL; INFILTRATION; INTRACAUDAL; PERINEURAL at 14:03

## 2024-03-18 RX ADMIN — FENTANYL CITRATE 50 MCG: 50 INJECTION, SOLUTION INTRAMUSCULAR; INTRAVENOUS at 16:35

## 2024-03-18 RX ADMIN — FAMOTIDINE 20 MG: 20 TABLET, FILM COATED ORAL at 13:03

## 2024-03-18 RX ADMIN — SODIUM CHLORIDE, POTASSIUM CHLORIDE, SODIUM LACTATE AND CALCIUM CHLORIDE 9 ML/HR: 600; 310; 30; 20 INJECTION, SOLUTION INTRAVENOUS at 13:04

## 2024-03-18 RX ADMIN — DEXAMETHASONE SODIUM PHOSPHATE 4 MG: 4 INJECTION INTRA-ARTICULAR; INTRALESIONAL; INTRAMUSCULAR; INTRAVENOUS; SOFT TISSUE at 14:07

## 2024-03-18 RX ADMIN — PROPOFOL 200 MG: 10 INJECTION, EMULSION INTRAVENOUS at 14:03

## 2024-03-18 RX ADMIN — FENTANYL CITRATE 50 MCG: 50 INJECTION, SOLUTION INTRAMUSCULAR; INTRAVENOUS at 15:34

## 2024-03-18 RX ADMIN — PHENYLEPHRINE HYDROCHLORIDE 100 MCG: 10 INJECTION INTRAVENOUS at 14:42

## 2024-03-18 RX ADMIN — FENTANYL CITRATE 100 MCG: 50 INJECTION, SOLUTION INTRAMUSCULAR; INTRAVENOUS at 14:03

## 2024-03-18 RX ADMIN — LIDOCAINE HYDROCHLORIDE 0.2 ML: 10 INJECTION, SOLUTION EPIDURAL; INFILTRATION; INTRACAUDAL; PERINEURAL at 13:04

## 2024-03-18 RX ADMIN — HYDROMORPHONE HYDROCHLORIDE 0.5 MG: 1 INJECTION, SOLUTION INTRAMUSCULAR; INTRAVENOUS; SUBCUTANEOUS at 15:40

## 2024-03-18 RX ADMIN — FENTANYL CITRATE 50 MCG: 50 INJECTION, SOLUTION INTRAMUSCULAR; INTRAVENOUS at 15:54

## 2024-03-18 RX ADMIN — HYDROMORPHONE HYDROCHLORIDE 0.5 MG: 1 INJECTION, SOLUTION INTRAMUSCULAR; INTRAVENOUS; SUBCUTANEOUS at 16:04

## 2024-03-18 RX ADMIN — HYDROMORPHONE HYDROCHLORIDE 0.5 MG: 1 INJECTION, SOLUTION INTRAMUSCULAR; INTRAVENOUS; SUBCUTANEOUS at 16:19

## 2024-03-18 RX ADMIN — PHENYLEPHRINE HYDROCHLORIDE 100 MCG: 10 INJECTION INTRAVENOUS at 14:14

## 2024-03-18 RX ADMIN — ONDANSETRON 4 MG: 2 INJECTION INTRAMUSCULAR; INTRAVENOUS at 13:57

## 2024-03-18 RX ADMIN — SODIUM CHLORIDE 2000 MG: 900 INJECTION INTRAVENOUS at 14:09

## 2024-03-18 NOTE — ANESTHESIA POSTPROCEDURE EVALUATION
Patient: Avery Watson    Procedure Summary       Date: 03/18/24 [facility] / [facility]    Anesthesia Start: 1359 Anesthesia Stop: 1511    Procedure: SPINAL CORD STIMULATOR LEAD REVISION (Back) Diagnosis:       Malfunction of spinal cord stimulator, initial encounter      (Malfunction of spinal cord stimulator, initial encounter [T85.192A])    Surgeons: Aquiles Grigsby MD Provider: Flavio Ron MD    Anesthesia Type: general ASA Status: 3            Anesthesia Type: general    Vitals  Vitals Value Taken Time   /71 03/18/24 1509   Temp     Pulse 81 03/18/24 1516   Resp     SpO2 95 % 03/18/24 1516   Vitals shown include unfiled device data.    Temp: 97.2 F  RR: 13    Post Anesthesia Care and Evaluation    Patient location during evaluation: PACU  Patient participation: complete - patient participated  Level of consciousness: awake and alert  Pain score: 0  Pain management: adequate    Airway patency: patent  Anesthetic complications: No anesthetic complications  PONV Status: none  Cardiovascular status: hemodynamically stable and acceptable  Respiratory status: nonlabored ventilation, acceptable and nasal cannula  Hydration status: acceptable

## 2024-03-18 NOTE — INTERVAL H&P NOTE
[facility] Pre-op    Full history and physical note from office is attached.    /92 (BP Location: Right arm, Patient Position: Lying)   Pulse 70   Temp 98.6 °F (37 °C) (Temporal)   Resp 18   SpO2 95%     LAB Results:  Lab Results   Component Value Date    WBC 7.95 02/19/2024    HGB 15.1 02/19/2024    HCT 46.6 02/19/2024    MCV 82.6 02/19/2024     02/19/2024    NEUTROABS 4.36 10/20/2023    GLUCOSE 87 10/20/2023    BUN 15 10/20/2023    CREATININE 1.14 10/20/2023    EGFRIFNONA 71 05/03/2021     10/20/2023    K 4.5 02/19/2024     10/20/2023    CO2 25.8 10/20/2023    MG 2.3 08/13/2020    CALCIUM 9.1 10/20/2023    ALBUMIN 4.3 10/20/2023    AST 25 10/20/2023    ALT 26 10/20/2023    BILITOT 0.4 10/20/2023    PTT 26.6 01/10/2024    INR 1.02 01/10/2024       Cancer Staging (if applicable)  Cancer Patient: __ yes __no __unknown__N/A; If yes, clinical stage T:__ N:__M:__, stage group or __N/A      Impression: Malfunction of spinal cord stimulator, initial encounter       Plan: SPINAL CORD STIMULATOR LEAD REVISION       EDDIE Brito   3/18/2024   13:17 EDT

## 2024-03-18 NOTE — OP NOTE
NEUROSURGICAL OPERATIVE NOTE        PREOPERATIVE DIAGNOSIS:    1.  Displaced epidural spinal cord stimulator lead  2.  Lumbar postlaminectomy syndrome.      POSTOPERATIVE DIAGNOSIS:  Same      PROCEDURE:  Revision of displaced epidural spinal cord stimulator lead      SURGEON:  Aquiles Grigsby M.D.      ASSISTANT: ROHINI Mcintyre    PAC assisted with:   Suctioning   Retraction   Tying   Suturing   Closing   Application of dressing   Skilled neurosurgery PA assistance was necessary to perform this procedure.        ANESTHESIA:  General      ESTIMATED BLOOD LOSS: Minimal      SPECIMEN: None      DRAINS: None      COMPLICATIONS:  None      CLINICAL NOTE:  Patient is a 58-year-old gentleman who last month underwent uncomplicated [employer] epidural spinal cord stimulator placement.  That ceased to be ineffective and plain films demonstrated backing out of his lead so the only a small portion was within the spinal canal.  The patient had experienced a vigorous course of coughing and it is felt that this could have potentially contributed to his lead displacement.  He now was brought to surgery for repositioning of his displaced spinal cord stimulator lead.  The nature of the procedure as well as the potential risks, complications, limitations, and alternatives to the procedure were discussed at length with the patient and the patient has agreed to proceed with surgery.      TECHNICAL NOTE:  The patient was brought to the operating room and while on his cart general endotracheal anesthesia was achieved.  He was then turned prone onto blanket rolls.  Special care was ensured to protect pressure points.  His mid back, lower back and upper buttocks were prepared and draped in the usual fashion.  Underlying thoracic incision was reopened.  Lead wires were identified and followed down to the dura.   As expected, the lead itself had backed out.  The lead tethers had become detached from the muscle.  The sutures  were intact but the sutures had actually pulled through the plastic rings on the lead tethers, suggesting some degree of force had occurred to displace the lead.  The dorsal epidural space was defined with a [place]field 3 dissector.  The lead was resinerted into the epidural space dorsally in several passes.  The lead was skewed to the left, a bit more than even previously.  It was not possible to get the lead more midline.  The lead extended up to the lower T7 level.  The lead tethers were once again affixed to the surrounding musculature.  I used 2 sutures at each tether site to securely stabilize the lead wires.  The wound was washed out with saline solution.  Some Gelfoam was placed over the exposed dura.  Vancomycin powder was sprinkled in the depths and then more superficially as the wound was closed.  The paraspinal muscle and fascia were reapproximated in interrupted fashion with 2-0 Vicryl suture.  The subcutaneous tissues closed in layers with 3-0 Vicryl suture.  The skin was closed in a running subcuticular fashion with 3-0 Vicryl suture.  A dermal sealant and sterile dressing were applied.  The patient was subsequently rolled onto his cart, extubated, and taken to the recovery room in satisfactory condition.              Aquiles Grigsby M.D.  .LANAOTE

## 2024-03-18 NOTE — ANESTHESIA PREPROCEDURE EVALUATION
Anesthesia Evaluation     Patient summary reviewed and Nursing notes reviewed   history of anesthetic complications:  PONV  NPO Solid Status: > 8 hours  NPO Liquid Status: > 8 hours           Airway   Mallampati: II  TM distance: >3 FB  Neck ROM: full  No difficulty expected  Dental      Pulmonary - negative pulmonary ROS and normal exam   Cardiovascular - normal exam    (+) hypertension, hyperlipidemia      Neuro/Psych  (+) headaches, numbness, psychiatric history  GI/Hepatic/Renal/Endo    (+) morbid obesity, GERD    Musculoskeletal     (+) neck pain  Abdominal    Substance History      OB/GYN          Other   arthritis,                 Anesthesia Plan    ASA 3     general     intravenous induction     Anesthetic plan, risks, benefits, and alternatives have been provided, discussed and informed consent has been obtained with: patient.    Plan discussed with CRNA.    CODE STATUS:

## 2024-03-18 NOTE — ANESTHESIA PROCEDURE NOTES
Airway  Urgency: elective    Date/Time: 3/18/2024 2:06 PM  Airway not difficult    General Information and Staff    Patient location during procedure: OR    Indications and Patient Condition  Indications for airway management: airway protection    Preoxygenated: yes  MILS not maintained throughout  Mask difficulty assessment: 2 - vent by mask + OA or adjuvant +/- NMBA    Final Airway Details  Final airway type: endotracheal airway      Successful airway: ETT  Cuffed: yes   Successful intubation technique: video laryngoscopy  Facilitating devices/methods: intubating stylet  Endotracheal tube insertion site: oral  Blade: Felisha  Blade size: 3  ETT size (mm): 7.5  Cormack-Lehane Classification: grade IIa - partial view of glottis  Placement verified by: chest auscultation and capnometry   Measured from: lips  ETT/EBT  to lips (cm): 22  Number of attempts at approach: 1  Assessment: lips, teeth, and gum same as pre-op and atraumatic intubation    Additional Comments  Negative epigastric sounds, Breath sound equal bilaterally with symmetric chest rise and fall

## 2024-03-28 ENCOUNTER — TELEPHONE (OUTPATIENT)
Dept: CARDIOLOGY | Facility: CLINIC | Age: 59
End: 2024-03-28
Payer: COMMERCIAL

## 2024-04-02 ENCOUNTER — OFFICE VISIT (OUTPATIENT)
Dept: NEUROSURGERY | Facility: CLINIC | Age: 59
End: 2024-04-02
Payer: COMMERCIAL

## 2024-04-02 VITALS
HEIGHT: 72 IN | TEMPERATURE: 98 F | WEIGHT: 249 LBS | BODY MASS INDEX: 33.72 KG/M2 | DIASTOLIC BLOOD PRESSURE: 90 MMHG | SYSTOLIC BLOOD PRESSURE: 124 MMHG

## 2024-04-02 DIAGNOSIS — M54.42 CHRONIC LOW BACK PAIN WITH LEFT-SIDED SCIATICA, UNSPECIFIED BACK PAIN LATERALITY: Primary | ICD-10-CM

## 2024-04-02 DIAGNOSIS — M96.1 LUMBAR POST-LAMINECTOMY SYNDROME: ICD-10-CM

## 2024-04-02 DIAGNOSIS — Z96.89 S/P INSERTION OF SPINAL CORD STIMULATOR: ICD-10-CM

## 2024-04-02 DIAGNOSIS — G89.29 CHRONIC LOW BACK PAIN WITH LEFT-SIDED SCIATICA, UNSPECIFIED BACK PAIN LATERALITY: Primary | ICD-10-CM

## 2024-04-02 PROCEDURE — 99024 POSTOP FOLLOW-UP VISIT: CPT | Performed by: PHYSICIAN ASSISTANT

## 2024-04-02 NOTE — PROGRESS NOTES
Patient: Avery Watson  : 1965  Chart #: [id number]    Date of Service: 2024    CHIEF COMPLAINT: Displaced epidural spinal cord stimulator lead    History of Present Illness Patient is a 58-year-old gentleman who underwent an uncomplicated placement of [employer] epidural spinal cord stimulator earlier this year.  That seems to be effective and plain films demonstrated backing out of the lead.  Only a small portion was within the spinal canal.  The patient had experienced a vigorous course of coughing and it was felt that this could have potentially contributed to his lead displacement.  On 3/18/2024 he presented for revision.    Today patient is two weeks post-op. He is doing well. Incisions have healed nicely. No complaints.     Past Medical History:   Diagnosis Date    Acid reflux     Chronic pain disorder     Depression     Elevated cholesterol     Essential hypertension 10/27/2023    Extremity pain     Joint pain     Low back pain     Lumbosacral disc disease     Migraine     Muscle spasm     Neck pain     Osteoarthritis     PONV (postoperative nausea and vomiting)     Rheumatoid arthritis          Current Outpatient Medications:     atorvastatin (LIPITOR) 10 MG tablet, Take 1 tablet by mouth Daily., Disp: 90 tablet, Rfl: 3    baclofen (LIORESAL) 10 MG tablet, Take 0.5 (one-half) to 1 tablets by mouth 4 (Four) Times a Day As Needed for muscle spasms., Disp: 30 tablet, Rfl: 0    baclofen (LIORESAL) 10 MG tablet, Take 2 tablets by mouth at bedtime, Disp: 180 tablet, Rfl: 4    buPROPion (WELLBUTRIN) 75 MG tablet, Take 1 tablet by mouth every day., Disp: 30 tablet, Rfl: 2    busPIRone (BUSPAR) 5 MG tablet, Take 1 tablet by mouth 2 (Two) Times a Day As Needed (anxiety)., Disp: 60 tablet, Rfl: 2    celecoxib (CeleBREX) 200 MG capsule, Take 1 capsule by mouth 2 (Two) Times a Day., Disp: 180 capsule, Rfl: 0    famotidine (Pepcid) 40 MG tablet, Take 1 tablet by mouth twice daily., Disp: 180  "tablet, Rfl: 4    FLUoxetine (PROzac) 40 MG capsule, Take 1 capsule by mouth Every Morning., Disp: 90 capsule, Rfl: 1    hydrocortisone (ANUSOL-HC) 25 MG suppository, Use 1 suppository rectally 2 times a day  as needed., Disp: 30 suppository, Rfl: 11    metoprolol tartrate (LOPRESSOR) 25 MG tablet, Take 1 tablet by mouth 2 (Two) Times a Day., Disp: 60 tablet, Rfl: 11    naloxone (NARCAN) 4 MG/0.1ML nasal spray, Call 911. Don't prime. [place] in 1 nostril for overdose. Repeat in 2-3 minutes in other nostril if no or minimal breathing/responsiveness., Disp: 2 each, Rfl: 0    oxyCODONE-acetaminophen (PERCOCET) 5-325 MG per tablet, Take 1 tablet by mouth 3 (Three) Times a Day As Needed (Pain)., Disp: 15 tablet, Rfl: 0    oxyCODONE-acetaminophen (PERCOCET) 5-325 MG per tablet, Take 1 tablet by mouth 3 (Three) Times a Day As Needed (Pain)., Disp: 12 tablet, Rfl: 0    RABEprazole (ACIPHEX) 20 MG EC tablet, Take 1 tablet by mouth 15 minutes before breakfast and supper., Disp: 60 tablet, Rfl: 11    sulfamethoxazole-trimethoprim (Bactrim DS) 800-160 MG per tablet, Take 1 tablet by mouth 2 (Two) Times a Day for 84 days., Disp: 56 tablet, Rfl: 2    Syringe 25G X 5/8\" 3 ML misc, Use as directed 2 times weekly (use with testosterone injection), Disp: 24 each, Rfl: 3    tadalafil (Cialis) 5 MG tablet, Take 1 tablet by mouth daily as needed for erectile dysfunction., Disp: 30 tablet, Rfl: 6    tamsulosin (FLOMAX) 0.4 MG capsule 24 hr capsule, Take 2 capsules (0.8 mg total) by mouth daily for prostate., Disp: 180 capsule, Rfl: 1    terbinafine (lamiSIL) 250 MG tablet, Take 1 tablet by mouth once daily on the first 7 days of each month., Disp: 84 tablet, Rfl: 0    Testosterone Cypionate (Depo-Testosterone) 200 MG/ML injection, Inject 0.5 mL under the skin every Monday and Thursday., Disp: 10 mL, Rfl: 2    traZODone (DESYREL) 50 MG tablet, Take 1 or 2 tablets by mouth every day at bedtime., Disp: 60 tablet, Rfl: 2    vitamin D " "(ERGOCALCIFEROL) 1.25 MG (14099 UT) capsule capsule, Take 1 capsule by mouth Every 7 (Seven) Days., Disp: 12 capsule, Rfl: 2    Past Surgical History:   Procedure Laterality Date    CARPAL TUNNEL RELEASE Right 08/04/2016    Procedure: CARPAL TUNNEL RELEASE;  Surgeon: Yoel Lua MD;  Location:  [facility];  Service:     CARPAL TUNNEL RELEASE Left 11/09/2017    Procedure: CARPAL TUNNEL RELEASE;  Surgeon: Yoel Lua MD;  Location:  [facility];  Service:     COLONOSCOPY      LUMBAR DISCECTOMY Right 09/22/2022    Procedure: LUMBAR DISCECTOMY L4-5 RIGHT;  Surgeon: Aquiles Grigsby MD;  Location:  [facility];  Service: Neurosurgery;  Laterality: Right;    LUMBAR FACET INJECTION      RHIZOTOMY      SPINAL CORD STIMULATOR IMPLANT N/A 2/26/2024    Procedure: SPINAL CORD STIMULATOR INSERTION PHASE 1;  Surgeon: Aquiles Grigsby MD;  Location:  [facility];  Service: Neurosurgery;  Laterality: N/A;    SPINAL CORD STIMULATOR IMPLANT N/A 3/18/2024    Procedure: SPINAL CORD STIMULATOR LEAD REVISION;  Surgeon: Aquiles Grigsby MD;  Location:  [facility];  Service: Neurosurgery;  Laterality: N/A;    SPINAL CORD STIMULATOR TRIAL W/ LAMINOTOMY      TOE SURGERY Right 03/04/2015    \"flexible joint\" per pt -- no hardware       Social History     Socioeconomic History    Marital status:    Tobacco Use    Smoking status: Never    Smokeless tobacco: Former     Types: Chew    Tobacco comments:     uses chewing tobacco   Vaping Use    Vaping status: Never Used   Substance and Sexual Activity    Alcohol use: No    Drug use: No    Sexual activity: Defer         Review of Systems    Objective   Vital Signs: There were no vitals taken for this visit.  Physical Exam  Vitals and nursing note reviewed.   Constitutional:       General: He is not in acute distress.     Appearance: He is well-developed.   HENT:      Head: Normocephalic and atraumatic.   Psychiatric:         Behavior: Behavior normal.         Thought Content: " Thought content normal.            Assessment & Plan   Diagnosis: Displaced epidural spinal cord stimulator lead status post revision    Medical Decision Making: Patient is doing well. Further wound care instructions were discussed. He will avoid excessive reaching, pushing, or pulling over the next month. Follow up in our clinic as needed.     Diagnoses and all orders for this visit:    1. Chronic low back pain with left-sided sciatica, unspecified back pain laterality (Primary)    2. Lumbar post-laminectomy syndrome    3. S/P insertion of spinal cord stimulator                                  Vero Dodge PA-C  Patient Care Team:  Luyl Jimenez APRN as PCP - General (Nurse Practitioner)  Flaco Gallego MD as Consulting Physician (Pain Medicine)  Hebert Jordan MD as Consulting Physician (Interventional Cardiology)  Luly Jimenez APRN as Nurse Practitioner (Nurse Practitioner)  Aquiles Grigsby MD as Consulting Physician (Neurosurgery)

## 2024-04-04 ENCOUNTER — PROCEDURE VISIT (OUTPATIENT)
Dept: UROLOGY | Facility: CLINIC | Age: 59
End: 2024-04-04
Payer: COMMERCIAL

## 2024-04-04 VITALS
DIASTOLIC BLOOD PRESSURE: 86 MMHG | WEIGHT: 254 LBS | BODY MASS INDEX: 34.4 KG/M2 | SYSTOLIC BLOOD PRESSURE: 137 MMHG | HEIGHT: 72 IN | HEART RATE: 80 BPM

## 2024-04-04 DIAGNOSIS — R97.20 ELEVATED PROSTATE SPECIFIC ANTIGEN (PSA): ICD-10-CM

## 2024-04-04 DIAGNOSIS — N40.0 BPH WITH ELEVATED PSA: ICD-10-CM

## 2024-04-04 DIAGNOSIS — Z48.816 AFTERCARE FOLLOWING SURGERY OF THE GENITOURINARY SYSTEM: Primary | ICD-10-CM

## 2024-04-04 DIAGNOSIS — N13.8 BENIGN PROSTATIC HYPERPLASIA WITH URINARY OBSTRUCTION: ICD-10-CM

## 2024-04-04 DIAGNOSIS — R97.20 BPH WITH ELEVATED PSA: ICD-10-CM

## 2024-04-04 DIAGNOSIS — N40.1 BENIGN PROSTATIC HYPERPLASIA WITH URINARY OBSTRUCTION: ICD-10-CM

## 2024-04-04 PROCEDURE — 84153 ASSAY OF PSA TOTAL: CPT | Performed by: UROLOGY

## 2024-04-04 RX ORDER — GENTAMICIN SULFATE 40 MG/ML
80 INJECTION, SOLUTION INTRAMUSCULAR; INTRAVENOUS ONCE
Status: COMPLETED | OUTPATIENT
Start: 2024-04-04 | End: 2024-04-04

## 2024-04-04 RX ADMIN — GENTAMICIN SULFATE 80 MG: 40 INJECTION, SOLUTION INTRAMUSCULAR; INTRAVENOUS at 10:51

## 2024-04-04 NOTE — PROGRESS NOTES
Chief Complaint:      Chief Complaint   Patient presents with    Disorder of prostate       HPI:   58 y.o. male here for cystoscopy. here for 6-week follow-up of frequency. He is due to get a permanent spinal cord stimulator on February 26. He still has frequency and dysuria. He has what feels like prostatitis his PSA took a jump up of 0.669-4.450 he was on finasteride already he did not think it was effective. I would repeat the PSA today. I am to set him up for cystoscopy I did inform him there is the slight possibility that this may have a relationship to the spinal cord stimulator his digital rectal was less than 10 g small smooth firm prostate so we will set him up for cystoscopy we will follow-up with him based on this.  Symptomatology has dramatically improved.  His MRI was equivocal    Past Medical History:     Past Medical History:   Diagnosis Date    Acid reflux     Chronic pain disorder     Depression     Elevated cholesterol     Essential hypertension 10/27/2023    Extremity pain     Joint pain     Low back pain     Lumbosacral disc disease     Migraine     Muscle spasm     Neck pain     Osteoarthritis     PONV (postoperative nausea and vomiting)     Rheumatoid arthritis        Current Meds:     Current Outpatient Medications   Medication Sig Dispense Refill    atorvastatin (LIPITOR) 10 MG tablet Take 1 tablet by mouth Daily. 90 tablet 3    baclofen (LIORESAL) 10 MG tablet Take 2 tablets by mouth at bedtime 180 tablet 4    buPROPion (WELLBUTRIN) 75 MG tablet Take 1 tablet by mouth every day. 30 tablet 2    busPIRone (BUSPAR) 5 MG tablet Take 1 tablet by mouth 2 (Two) Times a Day As Needed (anxiety). 60 tablet 2    celecoxib (CeleBREX) 200 MG capsule Take 1 capsule by mouth 2 (Two) Times a Day. 180 capsule 0    famotidine (Pepcid) 40 MG tablet Take 1 tablet by mouth twice daily. 180 tablet 4    FLUoxetine (PROzac) 40 MG capsule Take 1 capsule by mouth Every Morning. 90 capsule 1    hydrocortisone  (ANUSOL-HC) 25 MG suppository Use 1 suppository rectally 2 times a day  as needed. 30 suppository 11    metoprolol tartrate (LOPRESSOR) 25 MG tablet Take 1 tablet by mouth 2 (Two) Times a Day. 60 tablet 11    RABEprazole (ACIPHEX) 20 MG EC tablet Take 1 tablet by mouth 15 minutes before breakfast and supper. 60 tablet 11    sulfamethoxazole-trimethoprim (Bactrim DS) 800-160 MG per tablet Take 1 tablet by mouth 2 (Two) Times a Day for 84 days. 56 tablet 2    tamsulosin (FLOMAX) 0.4 MG capsule 24 hr capsule Take 2 capsules (0.8 mg total) by mouth daily for prostate. 180 capsule 1    traZODone (DESYREL) 50 MG tablet Take 1 or 2 tablets by mouth every day at bedtime. 60 tablet 2    vitamin D (ERGOCALCIFEROL) 1.25 MG (48030 UT) capsule capsule Take 1 capsule by mouth Every 7 (Seven) Days. 12 capsule 2     No current facility-administered medications for this visit.        Allergies:      No Known Allergies     Past Surgical History:     Past Surgical History:   Procedure Laterality Date    CARPAL TUNNEL RELEASE Right 08/04/2016    Procedure: CARPAL TUNNEL RELEASE;  Surgeon: Yoel Lua MD;  Location:  [facility];  Service:     CARPAL TUNNEL RELEASE Left 11/09/2017    Procedure: CARPAL TUNNEL RELEASE;  Surgeon: Yoel Lua MD;  Location:  [facility];  Service:     COLONOSCOPY      LUMBAR DISCECTOMY Right 09/22/2022    Procedure: LUMBAR DISCECTOMY L4-5 RIGHT;  Surgeon: Aquiles Grigsby MD;  Location:  [facility];  Service: Neurosurgery;  Laterality: Right;    LUMBAR FACET INJECTION      RHIZOTOMY      SPINAL CORD STIMULATOR IMPLANT N/A 2/26/2024    Procedure: SPINAL CORD STIMULATOR INSERTION PHASE 1;  Surgeon: Aquiles Grigsby MD;  Location:  [facility];  Service: Neurosurgery;  Laterality: N/A;    SPINAL CORD STIMULATOR IMPLANT N/A 3/18/2024    Procedure: SPINAL CORD STIMULATOR LEAD REVISION;  Surgeon: Aquiles Grigsby MD;  Location:  [facility];  Service: Neurosurgery;  Laterality: N/A;    SPINAL CORD  "STIMULATOR TRIAL W/ LAMINOTOMY      TOE SURGERY Right 03/04/2015    \"flexible joint\" per pt -- no hardware       Social History:     Social History     Socioeconomic History    Marital status:    Tobacco Use    Smoking status: Never    Smokeless tobacco: Former     Types: Chew    Tobacco comments:     uses chewing tobacco   Vaping Use    Vaping status: Never Used   Substance and Sexual Activity    Alcohol use: No    Drug use: No    Sexual activity: Defer       Family History:     Family History   Problem Relation Age of Onset    Lung cancer Mother     Cancer Mother         ovarian    Diabetes Sister     Cancer Sister         breast    Heart disease Brother     Rheum arthritis Brother     Arthritis Brother        Review of Systems:     Review of Systems   Constitutional: Negative.    HENT: Negative.     Eyes: Negative.    Respiratory: Negative.     Cardiovascular: Negative.    Gastrointestinal: Negative.    Endocrine: Negative.    Musculoskeletal: Negative.    Allergic/Immunologic: Negative.    Neurological: Negative.    Hematological: Negative.    Psychiatric/Behavioral: Negative.         Physical Exam:     Physical Exam  Vitals and nursing note reviewed.   Constitutional:       Appearance: He is well-developed.   HENT:      Head: Normocephalic and atraumatic.   Eyes:      Conjunctiva/sclera: Conjunctivae normal.      Pupils: Pupils are equal, round, and reactive to light.   Cardiovascular:      Rate and Rhythm: Normal rate and regular rhythm.      Heart sounds: Normal heart sounds.   Pulmonary:      Effort: Pulmonary effort is normal.      Breath sounds: Normal breath sounds.   Abdominal:      General: Bowel sounds are normal.      Palpations: Abdomen is soft.   Genitourinary:     Penis: Normal.       Testes: Normal.   Musculoskeletal:         General: Normal range of motion.      Cervical back: Normal range of motion.   Skin:     General: Skin is warm and dry.   Neurological:      Mental Status: He is alert " and oriented to person, place, and time.      Deep Tendon Reflexes: Reflexes are normal and symmetric.   Psychiatric:         Behavior: Behavior normal.         Thought Content: Thought content normal.         Judgment: Judgment normal.         I have reviewed the following portions of the patient's history: Allergies, current medications, past family history, past medical history, past social history, past surgical history, problem list, and ROS and confirm it is accurate.    Recent Image (CT and/or KUB):      CT Abdomen and Pelvis: No results found for this or any previous visit.       CT Stone Protocol: Results for orders placed during the hospital encounter of 08/14/22    CT Abdomen Pelvis Stone Protocol    Narrative  EXAM: CT ABDOMEN PELVIS STONE PROTOCOL-      TECHNIQUE: Multiple axial CT images were obtained from lung bases  through pubic symphysis WITHOUT administration of IV contrast.  Reformatted images in the coronal and/or sagittal plane(s) were  generated from the axial data set to facilitate diagnostic accuracy  and/or surgical planning.  Oral Contrast:NONE.    Radiation dose reduction techniques were utilized per ALARA protocol.  Automated exposure control was initiated through either or Markkit or  DoseRight software packages by  protocol.  DOSE:    Clinical information Flank pain, kidney stone suspected    Comparison 01/21/2019    FINDINGS:    Lower thorax: Clear. No effusions.    Abdomen:    Liver: Homogeneous. No focal hepatic mass or ductal dilatation.    Gallbladder: No dilation or stone identified.    Pancreas: Unremarkable. No mass or ductal dilatation.    Spleen: Homogeneous. No splenomegaly.    Adrenals: No mass.    Kidneys/ureters: No mass. No obstructive uropathy.  No evidence of  urolithiasis.    GI tract: Moderate to large stool burden. There is no evidence of  appendicitis    MESENTERY: No free fluid, walled off fluid collections, mesenteric  stranding, or enlarged lymph  nodes      Vasculature: Evidence of atherosclerotic vascular disease    Abdominal wall: No focal hernia or mass.      Bladder: Mild thickening of the urinary bladder wall    Reproductive: Prostate enlargement    Bones: Arthritic change in the spine    Impression  1. No obstructive uropathy. Mild thickening of the urinary bladder wall    2.Prostate enlargement  3. Other findings as above              This report was finalized on 8/14/2022 1:46 PM by Dr. Kvng Thomas MD.       KUB: No results found for this or any previous visit.       Labs (past 3 months):      Pre-Admission Testing on 02/19/2024   Component Date Value Ref Range Status    Potassium 02/19/2024 4.5  3.5 - 5.2 mmol/L Final    Slight hemolysis detected by analyzer. Result may be falsely elevated.    WBC 02/19/2024 7.95  3.40 - 10.80 10*3/mm3 Final    RBC 02/19/2024 5.64  4.14 - 5.80 10*6/mm3 Final    Hemoglobin 02/19/2024 15.1  13.0 - 17.7 g/dL Final    Hematocrit 02/19/2024 46.6  37.5 - 51.0 % Final    MCV 02/19/2024 82.6  79.0 - 97.0 fL Final    MCH 02/19/2024 26.8  26.6 - 33.0 pg Final    MCHC 02/19/2024 32.4  31.5 - 35.7 g/dL Final    RDW 02/19/2024 14.6  12.3 - 15.4 % Final    RDW-SD 02/19/2024 44.4  37.0 - 54.0 fl Final    MPV 02/19/2024 9.0  6.0 - 12.0 fL Final    Platelets 02/19/2024 202  140 - 450 10*3/mm3 Final    MRSA Screen Cx 02/19/2024 No Methicillin Resistant Staphylococcus aureus isolated   Final   Office Visit on 02/08/2024   Component Date Value Ref Range Status    PSA 02/08/2024 5.300 (H)  0.000 - 4.000 ng/mL Final   Lab on 01/10/2024   Component Date Value Ref Range Status    PTT 01/10/2024 26.6  26.5 - 34.5 seconds Final    WBC 01/10/2024 6.92  3.40 - 10.80 10*3/mm3 Final    RBC 01/10/2024 5.52  4.14 - 5.80 10*6/mm3 Final    Hemoglobin 01/10/2024 14.3  13.0 - 17.7 g/dL Final    Hematocrit 01/10/2024 44.3  37.5 - 51.0 % Final    MCV 01/10/2024 80.3  79.0 - 97.0 fL Final    MCH 01/10/2024 25.9 (L)  26.6 - 33.0 pg Final    MCHC  01/10/2024 32.3  31.5 - 35.7 g/dL Final    RDW 01/10/2024 16.4 (H)  12.3 - 15.4 % Final    RDW-SD 01/10/2024 46.0  37.0 - 54.0 fl Final    MPV 01/10/2024 8.8  6.0 - 12.0 fL Final    Platelets 01/10/2024 206  140 - 450 10*3/mm3 Final    Protime 01/10/2024 13.9  12.1 - 14.7 Seconds Final    INR 01/10/2024 1.02  0.90 - 1.10 Final        Procedure:     Cystoscopy:  Patient presents today for cystourethroscopy.  I went ahead and obtained an informed consent including the risks of anesthesia, bleeding, infection, etc.  After prepping and draping in a sterile fashion in the low dorsal lithotomy position, the urethra was gently anesthetized with 10 mL of 2% viscous Xylocaine jelly.  After an appropriate period of topical anesthesia, I used the Olympus digital 14 Sinhala flexible cystoscope to examine the anterior urethra which was completely normal.  The ureteral orifices were visualized and normal in position and configuration. There were no stones, tumors, or foreign bodies.  The blue light was enabled and was negative allowing us to see small mucosal lesions. The patient was given 80 mg of gentamicin in an intramuscular fashion as prophylaxis for the cystoscopy and released from the clinic.    Elevated prostate specific antigen-we discussed the diagnosis of elevated prostate-specific antigen.  I explained the pathophysiology of PSA.  It is a serine protease that's function in the male reproductive tract is to facilitate the liquefaction of semen.  It is for this reason the body does not want it freely floating in the serum and why typically bound tightly to albumin.  We discussed why we used both a PSA free and total to determine the need for more aggressive therapy. I discussed the normal range.  Additionally, it was in the range of 1 to 4, but more recently has been downgraded to something less than 2 or even approaching 1.  I discussed the risk of family history, particularly the fact that the average male has a 14%  risk of prostate cancer and that in the face of a positive diagnosis in a father it will tablet and any other first-generation relative continued tablet insofar that a father and brother with prostate cancer will produce almost a 50% risk of prostate cancer.  I discussed the use of the temporal use of PSA as the best option for monitoring.  We also discussed the fact that an elevated PSA is an isolated event does not mean that this is prostate cancer and should not engender worry in this regard. I discussed other things that can elevate PSA including constipation, prostatitis, infection, recent intercourse etc., as well as the risks and benefits associated with this.  Also discussed the fact that this is with a dilutional test and as a consequence of such were present produce slight variations on a single specimen.  Further discussed the risks and benefits of a prostate biopsy as well.  Despite a negative MRI his PSA continues to rise dramatically I am going to recommend probable ultrasound biopsy  BPH: Discussed the pathophysiology of BPH and obstruction.  We discussed the static and dynamic effects of BPH as well as using 5 alpha reductase inhibitors versus alpha blockade.  We discussed the indications for transurethral surgery as well and/ or other therapeutic options available including all of the newer techniques.  Cystoscopy was negative  Spinal stimulator-stable          This document has been electronically signed by LYNDA CANNON MD April 4, 2024 10:12 EDT    Dictated Utilizing Dragon Dictation: Part of this note may be an electronic transcription/translation of spoken language to printed text using the Dragon Dictation System.

## 2024-04-05 LAB — PSA SERPL-MCNC: 6.28 NG/ML (ref 0–4)

## 2024-04-07 PROBLEM — R97.20 ELEVATED PROSTATE SPECIFIC ANTIGEN (PSA): Status: ACTIVE | Noted: 2024-04-07

## 2024-04-08 ENCOUNTER — TELEPHONE (OUTPATIENT)
Dept: UROLOGY | Facility: CLINIC | Age: 59
End: 2024-04-08
Payer: COMMERCIAL

## 2024-04-08 NOTE — TELEPHONE ENCOUNTER
Routing to Mike- He said that  due to the jump and the fact that it has been persistently high, even with a negative MRI. He would like the pt to be scheduled for a Prostate BX. Routing to Marjan to scheduled. I routed to Marjan and she scheduled the pt

## 2024-04-08 NOTE — TELEPHONE ENCOUNTER
Caller: AGUILA SEQUEIRA    Relationship: SELF    Best call back number: [phone]    Caller requesting test results: AGUILA SEQUEIRA    What test was performed: PSA    When was the test performed: 4/4/24    Where was the test performed:     Additional notes: PT SAID THAT HE NOTICED HIS PSA WAS HIGHER THAN NORMAL AND IS WANTING A CALLBACK TO DISCUSS.

## 2024-05-14 ENCOUNTER — LAB (OUTPATIENT)
Dept: FAMILY MEDICINE CLINIC | Facility: CLINIC | Age: 59
End: 2024-05-14
Payer: COMMERCIAL

## 2024-05-14 ENCOUNTER — HOSPITAL ENCOUNTER (OUTPATIENT)
Facility: HOSPITAL | Age: 59
Discharge: HOME | End: 2024-05-14
Admitting: FAMILY MEDICINE
Payer: COMMERCIAL

## 2024-05-14 ENCOUNTER — TELEPHONE (OUTPATIENT)
Dept: FAMILY MEDICINE CLINIC | Facility: CLINIC | Age: 59
End: 2024-05-14

## 2024-05-14 ENCOUNTER — OFFICE VISIT (OUTPATIENT)
Dept: FAMILY MEDICINE CLINIC | Facility: CLINIC | Age: 59
End: 2024-05-14
Payer: COMMERCIAL

## 2024-05-14 VITALS
OXYGEN SATURATION: 97 % | HEIGHT: 72 IN | SYSTOLIC BLOOD PRESSURE: 124 MMHG | WEIGHT: 241 LBS | BODY MASS INDEX: 32.64 KG/M2 | TEMPERATURE: 97.8 F | HEART RATE: 72 BPM | DIASTOLIC BLOOD PRESSURE: 80 MMHG

## 2024-05-14 DIAGNOSIS — K21.00 GASTROESOPHAGEAL REFLUX DISEASE WITH ESOPHAGITIS WITHOUT HEMORRHAGE: ICD-10-CM

## 2024-05-14 DIAGNOSIS — R42 DIZZINESS: ICD-10-CM

## 2024-05-14 DIAGNOSIS — G43.E19 INTRACTABLE CHRONIC MIGRAINE WITH AURA AND WITHOUT STATUS MIGRAINOSUS: ICD-10-CM

## 2024-05-14 DIAGNOSIS — R42 DIZZINESS: Primary | ICD-10-CM

## 2024-05-14 LAB
25(OH)D3 SERPL-MCNC: 71.9 NG/ML (ref 30–100)
ALBUMIN SERPL-MCNC: 4.6 G/DL (ref 3.5–5.2)
ALBUMIN/GLOB SERPL: 1.9 G/DL
ALP SERPL-CCNC: 67 U/L (ref 39–117)
ALT SERPL W P-5'-P-CCNC: 40 U/L (ref 1–41)
ANION GAP SERPL CALCULATED.3IONS-SCNC: 9.3 MMOL/L (ref 5–15)
AST SERPL-CCNC: 32 U/L (ref 1–40)
BASOPHILS # BLD AUTO: 0.03 10*3/MM3 (ref 0–0.2)
BASOPHILS NFR BLD AUTO: 0.5 % (ref 0–1.5)
BILIRUB SERPL-MCNC: 0.5 MG/DL (ref 0–1.2)
BUN SERPL-MCNC: 15 MG/DL (ref 6–20)
BUN/CREAT SERPL: 12.7 (ref 7–25)
CALCIUM SPEC-SCNC: 9.5 MG/DL (ref 8.6–10.5)
CHLORIDE SERPL-SCNC: 103 MMOL/L (ref 98–107)
CO2 SERPL-SCNC: 24.7 MMOL/L (ref 22–29)
CREAT SERPL-MCNC: 1.18 MG/DL (ref 0.76–1.27)
DEPRECATED RDW RBC AUTO: 46.1 FL (ref 37–54)
EGFRCR SERPLBLD CKD-EPI 2021: 71.5 ML/MIN/1.73
EOSINOPHIL # BLD AUTO: 0.24 10*3/MM3 (ref 0–0.4)
EOSINOPHIL NFR BLD AUTO: 3.8 % (ref 0.3–6.2)
ERYTHROCYTE [DISTWIDTH] IN BLOOD BY AUTOMATED COUNT: 16.6 % (ref 12.3–15.4)
FERRITIN SERPL-MCNC: 92.2 NG/ML (ref 30–400)
GLOBULIN UR ELPH-MCNC: 2.4 GM/DL
GLUCOSE SERPL-MCNC: 95 MG/DL (ref 65–99)
HCT VFR BLD AUTO: 46.7 % (ref 37.5–51)
HGB BLD-MCNC: 15.6 G/DL (ref 13–17.7)
IMM GRANULOCYTES # BLD AUTO: 0.01 10*3/MM3 (ref 0–0.05)
IMM GRANULOCYTES NFR BLD AUTO: 0.2 % (ref 0–0.5)
IRON 24H UR-MRATE: 143 MCG/DL (ref 59–158)
IRON SATN MFR SERPL: 37 % (ref 20–50)
LYMPHOCYTES # BLD AUTO: 1.99 10*3/MM3 (ref 0.7–3.1)
LYMPHOCYTES NFR BLD AUTO: 31.7 % (ref 19.6–45.3)
MCH RBC QN AUTO: 26.8 PG (ref 26.6–33)
MCHC RBC AUTO-ENTMCNC: 33.4 G/DL (ref 31.5–35.7)
MCV RBC AUTO: 80.2 FL (ref 79–97)
MONOCYTES # BLD AUTO: 0.61 10*3/MM3 (ref 0.1–0.9)
MONOCYTES NFR BLD AUTO: 9.7 % (ref 5–12)
NEUTROPHILS NFR BLD AUTO: 3.4 10*3/MM3 (ref 1.7–7)
NEUTROPHILS NFR BLD AUTO: 54.1 % (ref 42.7–76)
NRBC BLD AUTO-RTO: 0 /100 WBC (ref 0–0.2)
PLATELET # BLD AUTO: 185 10*3/MM3 (ref 140–450)
PMV BLD AUTO: 9.1 FL (ref 6–12)
POTASSIUM SERPL-SCNC: 4.6 MMOL/L (ref 3.5–5.2)
PROT SERPL-MCNC: 7 G/DL (ref 6–8.5)
RBC # BLD AUTO: 5.82 10*6/MM3 (ref 4.14–5.8)
SODIUM SERPL-SCNC: 137 MMOL/L (ref 136–145)
T4 FREE SERPL-MCNC: 0.99 NG/DL (ref 0.93–1.7)
TIBC SERPL-MCNC: 390 MCG/DL (ref 298–536)
TRANSFERRIN SERPL-MCNC: 262 MG/DL (ref 200–360)
TSH SERPL DL<=0.05 MIU/L-ACNC: 2.47 UIU/ML (ref 0.27–4.2)
VIT B12 BLD-MCNC: 584 PG/ML (ref 211–946)
WBC NRBC COR # BLD AUTO: 6.28 10*3/MM3 (ref 3.4–10.8)

## 2024-05-14 PROCEDURE — 82306 VITAMIN D 25 HYDROXY: CPT | Performed by: FAMILY MEDICINE

## 2024-05-14 PROCEDURE — 70450 CT HEAD/BRAIN W/O DYE: CPT | Performed by: RADIOLOGY

## 2024-05-14 PROCEDURE — 36415 COLL VENOUS BLD VENIPUNCTURE: CPT

## 2024-05-14 PROCEDURE — 99214 OFFICE O/P EST MOD 30 MIN: CPT | Performed by: FAMILY MEDICINE

## 2024-05-14 PROCEDURE — 83540 ASSAY OF IRON: CPT | Performed by: FAMILY MEDICINE

## 2024-05-14 PROCEDURE — 82607 VITAMIN B-12: CPT | Performed by: FAMILY MEDICINE

## 2024-05-14 PROCEDURE — 84466 ASSAY OF TRANSFERRIN: CPT | Performed by: FAMILY MEDICINE

## 2024-05-14 PROCEDURE — 84402 ASSAY OF FREE TESTOSTERONE: CPT | Performed by: FAMILY MEDICINE

## 2024-05-14 PROCEDURE — 84439 ASSAY OF FREE THYROXINE: CPT | Performed by: FAMILY MEDICINE

## 2024-05-14 PROCEDURE — 80050 GENERAL HEALTH PANEL: CPT | Performed by: FAMILY MEDICINE

## 2024-05-14 PROCEDURE — 84403 ASSAY OF TOTAL TESTOSTERONE: CPT | Performed by: FAMILY MEDICINE

## 2024-05-14 PROCEDURE — 70450 CT HEAD/BRAIN W/O DYE: CPT

## 2024-05-14 PROCEDURE — 82728 ASSAY OF FERRITIN: CPT | Performed by: FAMILY MEDICINE

## 2024-05-14 RX ORDER — LORATADINE 10 MG/1
10 TABLET ORAL DAILY
Qty: 90 TABLET | Refills: 1 | Status: SHIPPED | OUTPATIENT
Start: 2024-05-14

## 2024-05-14 NOTE — PROGRESS NOTES
"Chief Complaint  Headache (Dizziness/)    Subjective          Avery Watson presents to [facility]  Heartburn  This is a chronic problem. The current episode started more than 1 year ago. He has tried a PPI for the symptoms. The treatment provided moderate relief.   Would like to see gastro for his persistent GERD.     States he has been having a increase in his migraines for the past 6 months. He has floaters as well as visual aura when he has them. Has seen the ophthalmologist with no improvement.     States he is having a lot of dizziness as well with this. States he is still having pressure in his ears. He did not see ENT and would like a new referral for this would like to stay within [facility] if possible.     Review of Systems      Objective   Vital Signs:   /80 (BP Location: Right arm, Patient Position: Sitting, Cuff Size: Large Adult)   Pulse 72   Temp 97.8 °F (36.6 °C) (Temporal)   Ht 182.9 cm (72.01\")   Wt 109 kg (241 lb)   SpO2 97%   BMI 32.68 kg/m²     Physical Exam   Result Review :                 Assessment and Plan    Diagnoses and all orders for this visit:    1. Dizziness (Primary)  -     CBC Auto Differential; Future  -     Comprehensive Metabolic Panel; Future  -     TSH; Future  -     T4, Free; Future  -     Vitamin D,25-Hydroxy; Future  -     Vitamin B12; Future  -     Iron Profile; Future  -     Ferritin; Future  -     Testosterone, Free, Total; Future    2. Intractable chronic migraine with aura and without status migrainosus  -     Cancel: CT Head Without Contrast; Future  -     CT Head Without Contrast; Future    3. Gastroesophageal reflux disease with esophagitis without hemorrhage  -     Ambulatory Referral to Gastroenterology    Other orders  -     loratadine (Claritin) 10 MG tablet; Take 1 tablet by mouth Daily.  Dispense: 90 tablet; Refill: 1      Patient's Body mass index is 32.68 kg/m². indicating that he is obese (BMI >30). " Obesity-related health conditions include the following: hypertension, dyslipidemias, and GERD. Obesity is unchanged. BMI is is above average; BMI management plan is completed. We discussed low calorie, low carb based diet program, portion control, and increasing exercise..    Follow Up   Return in about 3 months (around 8/14/2024), or labs today.  Patient was given instructions and counseling regarding his condition or for health maintenance advice. Please see specific information pulled into the AVS if appropriate.     This document has been electronically signed by EDDIE Mead  May 14, 2024 13:20 EDT

## 2024-05-14 NOTE — TELEPHONE ENCOUNTER
Luly Jimenez APRN [facility]  Please let patient know results are normal. Thank you.

## 2024-05-15 ENCOUNTER — TELEPHONE (OUTPATIENT)
Dept: FAMILY MEDICINE CLINIC | Facility: CLINIC | Age: 59
End: 2024-05-15
Payer: COMMERCIAL

## 2024-05-15 NOTE — TELEPHONE ENCOUNTER
Caller: Avery Watson    Relationship: Self    Best call back number: [phone]     Caller requesting test results: SELF    What test was performed: BLOOD TEST     When was the test performed: 5/14/24    Where was the test performed: IN OFFICE     Additional notes: PATIENT CAN VIEW HIS RESULTS ON [facility] BUT IS JUST NEEDING SOMEONE TO GO OVER THEM WITH HIM.

## 2024-05-21 LAB
TESTOST FREE SERPL-MCNC: 3.6 PG/ML (ref 7.2–24)
TESTOST SERPL-MCNC: 246 NG/DL (ref 264–916)

## 2024-05-28 ENCOUNTER — TELEPHONE (OUTPATIENT)
Dept: FAMILY MEDICINE CLINIC | Facility: CLINIC | Age: 59
End: 2024-05-28
Payer: COMMERCIAL

## 2024-05-28 NOTE — TELEPHONE ENCOUNTER
Luly Jimenez APRN [facility]  Please call the patient regarding his abnormal result. His testosterone is low, would he like to see urology in [place] to be treated for this? All other labs wnl.

## 2024-05-29 NOTE — TELEPHONE ENCOUNTER
Spoke with patient he reports he is seeing Dr. Mckeon & has some things going on & is scheduled for a biopsy with him in about a week,will follow with him.

## 2024-05-30 ENCOUNTER — TELEPHONE (OUTPATIENT)
Dept: UROLOGY | Facility: CLINIC | Age: 59
End: 2024-05-30
Payer: COMMERCIAL

## 2024-05-30 DIAGNOSIS — N40.0 BPH WITH ELEVATED PSA: Primary | ICD-10-CM

## 2024-05-30 DIAGNOSIS — R97.20 BPH WITH ELEVATED PSA: Primary | ICD-10-CM

## 2024-05-30 RX ORDER — CLINDAMYCIN HYDROCHLORIDE 300 MG/1
300 CAPSULE ORAL 2 TIMES DAILY
Qty: 6 CAPSULE | Refills: 0 | Status: SHIPPED | OUTPATIENT
Start: 2024-05-30 | End: 2024-06-03

## 2024-05-30 RX ORDER — CIPROFLOXACIN 500 MG/1
TABLET, FILM COATED ORAL
Qty: 4 TABLET | Refills: 0 | Status: SHIPPED | OUTPATIENT
Start: 2024-05-30

## 2024-05-30 RX ORDER — DIAZEPAM 10 MG/1
TABLET ORAL
Qty: 2 TABLET | Refills: 0 | Status: SHIPPED | OUTPATIENT
Start: 2024-05-30

## 2024-05-30 RX ORDER — FAMOTIDINE 20 MG
TABLET ORAL
Qty: 133 ML | Refills: 0 | Status: SHIPPED | OUTPATIENT
Start: 2024-05-30

## 2024-06-03 ENCOUNTER — PROCEDURE VISIT (OUTPATIENT)
Dept: UROLOGY | Facility: CLINIC | Age: 59
End: 2024-06-03
Payer: COMMERCIAL

## 2024-06-03 VITALS
HEIGHT: 72 IN | DIASTOLIC BLOOD PRESSURE: 82 MMHG | BODY MASS INDEX: 32.51 KG/M2 | SYSTOLIC BLOOD PRESSURE: 152 MMHG | HEART RATE: 64 BPM | WEIGHT: 240 LBS

## 2024-06-03 DIAGNOSIS — R97.20 BPH WITH ELEVATED PSA: Primary | ICD-10-CM

## 2024-06-03 DIAGNOSIS — N13.8 BENIGN PROSTATIC HYPERPLASIA WITH URINARY OBSTRUCTION: ICD-10-CM

## 2024-06-03 DIAGNOSIS — N40.0 BPH WITH ELEVATED PSA: Primary | ICD-10-CM

## 2024-06-03 DIAGNOSIS — R97.20 ELEVATED PROSTATE SPECIFIC ANTIGEN (PSA): ICD-10-CM

## 2024-06-03 DIAGNOSIS — R79.89 LOW TESTOSTERONE: ICD-10-CM

## 2024-06-03 DIAGNOSIS — N40.1 BENIGN PROSTATIC HYPERPLASIA WITH URINARY OBSTRUCTION: ICD-10-CM

## 2024-06-03 PROCEDURE — 96372 THER/PROPH/DIAG INJ SC/IM: CPT | Performed by: UROLOGY

## 2024-06-03 PROCEDURE — 99214 OFFICE O/P EST MOD 30 MIN: CPT | Performed by: UROLOGY

## 2024-06-03 RX ORDER — TESTOSTERONE CYPIONATE 200 MG/ML
INJECTION, SOLUTION INTRAMUSCULAR
Qty: 10 ML | Refills: 2 | Status: SHIPPED | OUTPATIENT
Start: 2024-06-03

## 2024-06-03 RX ORDER — FINASTERIDE 5 MG/1
5 TABLET, FILM COATED ORAL DAILY
Qty: 30 TABLET | Refills: 5 | Status: SHIPPED | OUTPATIENT
Start: 2024-06-03

## 2024-06-03 RX ORDER — GENTAMICIN 40 MG/ML
80 INJECTION, SOLUTION INTRAMUSCULAR; INTRAVENOUS ONCE
Status: COMPLETED | OUTPATIENT
Start: 2024-06-03 | End: 2024-06-03

## 2024-06-03 RX ORDER — TAMSULOSIN HYDROCHLORIDE 0.4 MG/1
1 CAPSULE ORAL NIGHTLY
Qty: 30 CAPSULE | Refills: 5 | Status: SHIPPED | OUTPATIENT
Start: 2024-06-03

## 2024-06-03 RX ADMIN — GENTAMICIN 80 MG: 40 INJECTION, SOLUTION INTRAMUSCULAR; INTRAVENOUS at 13:41

## 2024-06-03 NOTE — PROGRESS NOTES
Chief Complaint:      Chief Complaint   Patient presents with    Benign Prostatic Hypertrophy     US Bx only     Elevated PSA       HPI:   58 y.o. male presents for biopsy.  He has a negative family history.  He has a negative history of agent orange exposure in his sister and mother had ovarian and breast cancer respectively.  He has had a cystoscopy.  He has a history of recurrent prostatitis.  He always had a low PSA with a big jump.  He has a lot of calcification in his prostate consistent with prior prostatitis.  His MRI had a low probability and the volume was measured at 68.5 g.  Today presents for the possibility of biopsy.  Last PSA on 2/8/2024 was 5.3.    Past Medical History:     Past Medical History:   Diagnosis Date    Acid reflux     Chronic pain disorder     Depression     Elevated cholesterol     Essential hypertension 10/27/2023    Extremity pain     Joint pain     Low back pain     Lumbosacral disc disease     Migraine     Muscle spasm     Neck pain     Osteoarthritis     PONV (postoperative nausea and vomiting)     Rheumatoid arthritis        Current Meds:     Current Outpatient Medications   Medication Sig Dispense Refill    atorvastatin (LIPITOR) 10 MG tablet Take 1 tablet by mouth Daily. 90 tablet 3    baclofen (LIORESAL) 10 MG tablet Take 2 tablets by mouth at bedtime 180 tablet 4    buPROPion (WELLBUTRIN) 75 MG tablet Take 1 tablet by mouth every day. 30 tablet 2    busPIRone (BUSPAR) 5 MG tablet Take 1 tablet by mouth 2 (Two) Times a Day As Needed (anxiety). 60 tablet 2    celecoxib (CeleBREX) 200 MG capsule Take 1 capsule by mouth 2 (Two) Times a Day. 180 capsule 0    ciprofloxacin (Cipro) 500 MG tablet Take 1 tablet by mouth twice a day before procedure. 4 tablet 0    clindamycin (CLEOCIN) 300 MG capsule Take 1 capsule by mouth 2 (Two) Times a Day for 3 days (start after procedure). 6 capsule 0    diazePAM (VALIUM) 10 MG tablet Take 1 tablet by mouth the night before procedure at bedtime,  and take 1 tablet the morning of one hour prior to procedure. 2 tablet 0    Enema Mineral Oil enema Use the morning of the procedure. 133 mL 0    famotidine (Pepcid) 40 MG tablet Take 1 tablet by mouth twice daily. 180 tablet 4    FLUoxetine (PROzac) 40 MG capsule Take 1 capsule by mouth Every Morning. 90 capsule 1    hydrocortisone (ANUSOL-HC) 25 MG suppository Use 1 suppository rectally 2 times a day  as needed. 30 suppository 11    loratadine (Claritin) 10 MG tablet Take 1 tablet by mouth Daily. 90 tablet 1    metoprolol tartrate (LOPRESSOR) 25 MG tablet Take 1 tablet by mouth 2 (Two) Times a Day. 60 tablet 11    RABEprazole (ACIPHEX) 20 MG EC tablet Take 1 tablet by mouth 15 minutes before breakfast and supper. 60 tablet 11    tamsulosin (FLOMAX) 0.4 MG capsule 24 hr capsule Take 2 capsules (0.8 mg total) by mouth daily for prostate. 180 capsule 1    traZODone (DESYREL) 50 MG tablet Take 1 or 2 tablets by mouth every day at bedtime. 60 tablet 2    vitamin D (ERGOCALCIFEROL) 1.25 MG (30669 UT) capsule capsule Take 1 capsule by mouth Every 7 (Seven) Days. 12 capsule 2     No current facility-administered medications for this visit.        Allergies:      No Known Allergies     Past Surgical History:     Past Surgical History:   Procedure Laterality Date    CARPAL TUNNEL RELEASE Right 08/04/2016    Procedure: CARPAL TUNNEL RELEASE;  Surgeon: Yoel Lua MD;  Location:  [facility];  Service:     CARPAL TUNNEL RELEASE Left 11/09/2017    Procedure: CARPAL TUNNEL RELEASE;  Surgeon: Yoel Lua MD;  Location:  [facility];  Service:     COLONOSCOPY      LUMBAR DISCECTOMY Right 09/22/2022    Procedure: LUMBAR DISCECTOMY L4-5 RIGHT;  Surgeon: Aquiles Grigsby MD;  Location:  [facility];  Service: Neurosurgery;  Laterality: Right;    LUMBAR FACET INJECTION      RHIZOTOMY      SPINAL CORD STIMULATOR IMPLANT N/A 2/26/2024    Procedure: SPINAL CORD STIMULATOR INSERTION PHASE 1;  Surgeon: Aquiles Grigsby MD;   "Location:  [facility];  Service: Neurosurgery;  Laterality: N/A;    SPINAL CORD STIMULATOR IMPLANT N/A 3/18/2024    Procedure: SPINAL CORD STIMULATOR LEAD REVISION;  Surgeon: Aquiles Grigsby MD;  Location:  [facility];  Service: Neurosurgery;  Laterality: N/A;    SPINAL CORD STIMULATOR TRIAL W/ LAMINOTOMY      TOE SURGERY Right 03/04/2015    \"flexible joint\" per pt -- no hardware       Social History:     Social History     Socioeconomic History    Marital status:    Tobacco Use    Smoking status: Never    Smokeless tobacco: Former     Types: Chew    Tobacco comments:     uses chewing tobacco   Vaping Use    Vaping status: Never Used   Substance and Sexual Activity    Alcohol use: No    Drug use: No    Sexual activity: Defer       Family History:     Family History   Problem Relation Age of Onset    Lung cancer Mother     Cancer Mother         ovarian    Diabetes Sister     Cancer Sister         breast    Heart disease Brother     Rheum arthritis Brother     Arthritis Brother        Review of Systems:     Review of Systems   Constitutional: Negative.    HENT: Negative.     Eyes: Negative.    Respiratory: Negative.     Cardiovascular: Negative.    Gastrointestinal: Negative.    Endocrine: Negative.    Musculoskeletal: Negative.    Allergic/Immunologic: Negative.    Neurological: Negative.    Hematological: Negative.    Psychiatric/Behavioral: Negative.         Physical Exam:     Physical Exam  Vitals and nursing note reviewed.   Constitutional:       Appearance: He is well-developed.   HENT:      Head: Normocephalic and atraumatic.   Eyes:      Conjunctiva/sclera: Conjunctivae normal.      Pupils: Pupils are equal, round, and reactive to light.   Cardiovascular:      Rate and Rhythm: Normal rate and regular rhythm.      Heart sounds: Normal heart sounds.   Pulmonary:      Effort: Pulmonary effort is normal.      Breath sounds: Normal breath sounds.   Abdominal:      General: Bowel sounds are normal.      " Palpations: Abdomen is soft.   Musculoskeletal:         General: Normal range of motion.      Cervical back: Normal range of motion.   Skin:     General: Skin is warm and dry.   Neurological:      Mental Status: He is alert and oriented to person, place, and time.      Deep Tendon Reflexes: Reflexes are normal and symmetric.   Psychiatric:         Behavior: Behavior normal.         Thought Content: Thought content normal.         Judgment: Judgment normal.         I have reviewed the following portions of the patient's history: Allergies, current medications, past family history, past medical history, past social history, past surgical history, problem list, and ROS and confirm it is accurate.    Recent Image (CT and/or KUB):      CT Abdomen and Pelvis: No results found for this or any previous visit.       CT Stone Protocol: Results for orders placed during the hospital encounter of 08/14/22    CT Abdomen Pelvis Stone Protocol    Narrative  EXAM: CT ABDOMEN PELVIS STONE PROTOCOL-      TECHNIQUE: Multiple axial CT images were obtained from lung bases  through pubic symphysis WITHOUT administration of IV contrast.  Reformatted images in the coronal and/or sagittal plane(s) were  generated from the axial data set to facilitate diagnostic accuracy  and/or surgical planning.  Oral Contrast:NONE.    Radiation dose reduction techniques were utilized per ALARA protocol.  Automated exposure control was initiated through either or CareDose or  Dose[employer]ig[employer] software packages by  protocol.  DOSE:    Clinical information Flank pain, kidney stone suspected    Comparison 01/21/2019    FINDINGS:    Lower thorax: Clear. No effusions.    Abdomen:    Liver: Homogeneous. No focal hepatic mass or ductal dilatation.    Gallbladder: No dilation or stone identified.    Pancreas: Unremarkable. No mass or ductal dilatation.    Spleen: Homogeneous. No splenomegaly.    Adrenals: No mass.    Kidneys/ureters: No mass. No obstructive  uropathy.  No evidence of  urolithiasis.    GI tract: Moderate to large stool burden. There is no evidence of  appendicitis    MESENTERY: No free fluid, walled off fluid collections, mesenteric  stranding, or enlarged lymph nodes      Vasculature: Evidence of atherosclerotic vascular disease    Abdominal wall: No focal hernia or mass.      Bladder: Mild thickening of the urinary bladder wall    Reproductive: Prostate enlargement    Bones: Arthritic change in the spine    Impression  1. No obstructive uropathy. Mild thickening of the urinary bladder wall    2.Prostate enlargement  3. Other findings as above              This report was finalized on 8/14/2022 1:46 PM by Dr. Kvng Thomsa MD.       KUB: No results found for this or any previous visit.       Labs (past 3 months):      Lab on 05/14/2024   Component Date Value Ref Range Status    WBC 05/14/2024 6.28  3.40 - 10.80 10*3/mm3 Final    RBC 05/14/2024 5.82 (H)  4.14 - 5.80 10*6/mm3 Final    Hemoglobin 05/14/2024 15.6  13.0 - 17.7 g/dL Final    Hematocrit 05/14/2024 46.7  37.5 - 51.0 % Final    MCV 05/14/2024 80.2  79.0 - 97.0 fL Final    MCH 05/14/2024 26.8  26.6 - 33.0 pg Final    MCHC 05/14/2024 33.4  31.5 - 35.7 g/dL Final    RDW 05/14/2024 16.6 (H)  12.3 - 15.4 % Final    RDW-SD 05/14/2024 46.1  37.0 - 54.0 fl Final    MPV 05/14/2024 9.1  6.0 - 12.0 fL Final    Platelets 05/14/2024 185  140 - 450 10*3/mm3 Final    Neutrophil % 05/14/2024 54.1  42.7 - 76.0 % Final    Lymphocyte % 05/14/2024 31.7  19.6 - 45.3 % Final    Monocyte % 05/14/2024 9.7  5.0 - 12.0 % Final    Eosinophil % 05/14/2024 3.8  0.3 - 6.2 % Final    Basophil % 05/14/2024 0.5  0.0 - 1.5 % Final    Immature Grans % 05/14/2024 0.2  0.0 - 0.5 % Final    Neutrophils, Absolute 05/14/2024 3.40  1.70 - 7.00 10*3/mm3 Final    Lymphocytes, Absolute 05/14/2024 1.99  0.70 - 3.10 10*3/mm3 Final    Monocytes, Absolute 05/14/2024 0.61  0.10 - 0.90 10*3/mm3 Final    Eosinophils, Absolute 05/14/2024 0.24   0.00 - 0.40 10*3/mm3 Final    Basophils, Absolute 05/14/2024 0.03  0.00 - 0.20 10*3/mm3 Final    Immature Grans, Absolute 05/14/2024 0.01  0.00 - 0.05 10*3/mm3 Final    nRBC 05/14/2024 0.0  0.0 - 0.2 /100 WBC Final    Glucose 05/14/2024 95  65 - 99 mg/dL Final    BUN 05/14/2024 15  6 - 20 mg/dL Final    Creatinine 05/14/2024 1.18  0.76 - 1.27 mg/dL Final    Sodium 05/14/2024 137  136 - 145 mmol/L Final    Potassium 05/14/2024 4.6  3.5 - 5.2 mmol/L Final    Chloride 05/14/2024 103  98 - 107 mmol/L Final    CO2 05/14/2024 24.7  22.0 - 29.0 mmol/L Final    Calcium 05/14/2024 9.5  8.6 - 10.5 mg/dL Final    Total Protein 05/14/2024 7.0  6.0 - 8.5 g/dL Final    Albumin 05/14/2024 4.6  3.5 - 5.2 g/dL Final    ALT (SGPT) 05/14/2024 40  1 - 41 U/L Final    AST (SGOT) 05/14/2024 32  1 - 40 U/L Final    Alkaline Phosphatase 05/14/2024 67  39 - 117 U/L Final    Total Bilirubin 05/14/2024 0.5  0.0 - 1.2 mg/dL Final    Globulin 05/14/2024 2.4  gm/dL Final    A/G Ratio 05/14/2024 1.9  g/dL Final    BUN/Creatinine Ratio 05/14/2024 12.7  7.0 - 25.0 Final    Anion Gap 05/14/2024 9.3  5.0 - 15.0 mmol/L Final    eGFR 05/14/2024 71.5  >60.0 mL/min/1.73 Final    TSH 05/14/2024 2.470  0.270 - 4.200 uIU/mL Final    Free T4 05/14/2024 0.99  0.93 - 1.70 ng/dL Final    25 Hydroxy, Vitamin D 05/14/2024 71.9  30.0 - 100.0 ng/ml Final    Vitamin B-12 05/14/2024 584  211 - 946 pg/mL Final    Iron 05/14/2024 143  59 - 158 mcg/dL Final    Iron Saturation (TSAT) 05/14/2024 37  20 - 50 % Final    Transferrin 05/14/2024 262  200 - 360 mg/dL Final    TIBC 05/14/2024 390  298 - 536 mcg/dL Final    Ferritin 05/14/2024 92.20  30.00 - 400.00 ng/mL Final    Testosterone, Total 05/14/2024 246 (L)  264 - 916 ng/dL Final    Adult male reference interval is based on a population of  healthy nonobese males (BMI <30) between 19 and 39 years old.  Day et.al. JCEM 2017,102;6615-0759. PMID: [id number].    Testosterone, Free 05/14/2024 3.6 (L)  7.2 - 24.0 pg/mL  Final   Procedure visit on 04/04/2024   Component Date Value Ref Range Status    PSA 04/04/2024 6.280 (H)  0.000 - 4.000 ng/mL Final        Procedure:   Prostate ultrasound:  58 year-old white male with a history of an elevated PSA and a significant increase in his risk for prostate cancer.  We discussed the prostate biopsy at length.  We discussed the significant risks of anesthesia, bleeding, infection, urosepsis, purported effects on erectile function, and rectal bleeding requiring surgical intervention.  He was given a pre-procedural enema.  He was pretreated with ciprofloxacin for 3 days.  He was given periprocedural gentamicin at the dose of 80 mg in an intramuscular fashion and given post biopsy clindamycin for 3 days.  Following an extensive informed consent, he was brought to the operative suite, placed in the left lateral decubitus position.  He was given his prophylactic gentamicin.  The rectum was anesthetized with 20 mL of 2% viscous Xylocaine jelly.  I then used the BK biplanar probe inserted into the rectum and made measurements of the prostate.  The height was 5.32 cm, the width was 44.88 cm, and the length was 4.90 cm for a volume of 66.15 g.  There were no obvious hypoechoic areas.  There was no intravesical median lobe.  There was minimal calcification between the transition and peripheral zone.  After review of his MRI, I would hold off on a biopsy put him on finasteride see him back in 3 months.  Assessment/Plan:   Elevated prostate specific antigen-we discussed the diagnosis of elevated prostate-specific antigen.  I explained the pathophysiology of PSA.  It is a serine protease that's function in the male reproductive tract is to facilitate the liquefaction of semen.  It is for this reason the body does not want it freely floating in the serum and why typically bound tightly to albumin.  We discussed why we used both a PSA free and total to determine the need for more aggressive therapy. I  discussed the normal range.  Additionally, it was in the range of 1 to 4, but more recently has been downgraded to something less than 2 or even approaching 1.  I discussed the risk of family history, particularly the fact that the average male has a 14% risk of prostate cancer and that in the face of a positive diagnosis in a father it will tablet and any other first-generation relative continued tablet insofar that a father and brother with prostate cancer will produce almost a 50% risk of prostate cancer.  I discussed the use of the temporal use of PSA as the best option for monitoring.  We also discussed the fact that an elevated PSA is an isolated event does not mean that this is prostate cancer and should not engender worry in this regard. I discussed other things that can elevate PSA including constipation, prostatitis, infection, recent intercourse etc., as well as the risks and benefits associated with this.  Also discussed the fact that this is with a dilutional test and as a consequence of such were present produce slight variations on a single specimen.  Further discussed the risks and benefits of a prostate biopsy as well.  He has an elevated PSA although symptomatically did not improve with finasteride I am doing it for its PSA lowering effects.  Family history of breast and ovarian cancer  Low Testosterone: This pleasant male patient presents today with signs and symptoms that are consistent with low testosterone. He has positive Alvaro questionnaire by history, this includes both the sexual and nonsexual side effects.  Sexual side effects include inability to achieve and maintain an erection, inability to maintain his erection, and decreased interest in sexual activity.  Nonsexual symptomatology includes fatigue, difficulty completing a job, and tiredness.  We had a discussion of the various forms of testosterone available including parenteral, topical, and the form of a patch.  We discussed the  efficacy of the gels and the injections, as well as the cost and benefits analysis.  We discussed the studies and talked about heart disease and its effect on prostate cancer, both of which are negligible.  He gave verbal consent to proceed with treatment.  He understands the risks and benefits of length.  He also completed his attempts at fertility. He understands the partial effect on spermatogenesis.             This document has been electronically signed by LYNDA CANNON MD Arac[place] 3, 2024 14:04 EDT    Dictated Utilizing Dragon Dictation: Part of this note may be an electronic transcription/translation of spoken language to printed text using the Dragon Dictation System.

## 2024-06-27 ENCOUNTER — OFFICE VISIT (OUTPATIENT)
Dept: GASTROENTEROLOGY | Facility: CLINIC | Age: 59
End: 2024-06-27
Payer: COMMERCIAL

## 2024-06-27 VITALS
BODY MASS INDEX: 35.36 KG/M2 | HEIGHT: 70 IN | HEART RATE: 79 BPM | DIASTOLIC BLOOD PRESSURE: 80 MMHG | SYSTOLIC BLOOD PRESSURE: 125 MMHG | WEIGHT: 247 LBS

## 2024-06-27 DIAGNOSIS — K21.00 GASTROESOPHAGEAL REFLUX DISEASE WITH ESOPHAGITIS WITHOUT HEMORRHAGE: Primary | ICD-10-CM

## 2024-06-27 NOTE — PROGRESS NOTES
Date of Consultation:  6/27/2024  Referring Physician: No ref. provider found    Chief Complaint  Heartburn    Avery Watson is a 58 y.o. male who presents today to [facility] for Heartburn.    HPI:   58-year-old male who presents today for evaluation of GERD.  Patient is previously been followed by Dr. Pascual.  He had an EGD performed on 1/20/2024.  Was found to have reflux esophagitis with extensive erosions extending to-3 cm proximal to the Z-line, biopsies taken from the GE junction were suggestive of erosive esophagitis negative for intestinal metaplasia.  There was mild erosive antral gastritis and duodenitis with superficial duodenal ulcerations, biopsies taken from the antrum were negative for H. pylori.  Patient was initiated on Vonoprazan 20 mg once daily.  Unfortunately, this did not help.  Eventually he was initiated on Aciphex 20 mg daily and famotidine 40 mg twice daily.  Initial plan was to have a follow-up EGD to evaluate for healing of extensive esophageal erosions.  However, he states that his insurance would not for a follow-up appointment with Dr. Pascual.  States that his EGD will be covered if it is performed at [facility].    Today, he reports that he has been compliant with Aciphex and famotidine.  He is having multiple flares each day.  States that there are no certain times that the acid is worse.  He does drink 1-2 sodas and drinks coffee each morning.  States that he has tried to avoid trigger foods.  In the past, he has tried Protonix, omeprazole, and Dexilant all of which do not seem to help.  States that he will have a burning epigastric pain occasionally 1-2 times each week.  This pain is not correlated with food.  He denies any changes in his bowel movements.  He denies unintentional weight loss, nausea, vomiting, melena, and hematochezia.    Of note, he reports colonoscopy performed by Dr. Pascual earlier this year or last year.   "He reports that it was normal.  Will obtain these records.    Previous History:   Past Medical History:   Diagnosis Date    Acid reflux     Chronic pain disorder     Depression     Elevated cholesterol     Essential hypertension 10/27/2023    Extremity pain     Joint pain     Low back pain     Lumbosacral disc disease     Migraine     Muscle spasm     Neck pain     Osteoarthritis     PONV (postoperative nausea and vomiting)     Rheumatoid arthritis       Past Surgical History:   Procedure Laterality Date    CARPAL TUNNEL RELEASE Right 08/04/2016    Procedure: CARPAL TUNNEL RELEASE;  Surgeon: Yoel Lua MD;  Location:  [facility];  Service:     CARPAL TUNNEL RELEASE Left 11/09/2017    Procedure: CARPAL TUNNEL RELEASE;  Surgeon: Yoel Lua MD;  Location:  [facility];  Service:     COLONOSCOPY      LUMBAR DISCECTOMY Right 09/22/2022    Procedure: LUMBAR DISCECTOMY L4-5 RIGHT;  Surgeon: Aquiles Grigsby MD;  Location:  [facility];  Service: Neurosurgery;  Laterality: Right;    LUMBAR FACET INJECTION      RHIZOTOMY      SPINAL CORD STIMULATOR IMPLANT N/A 02/26/2024    Procedure: SPINAL CORD STIMULATOR INSERTION PHASE 1;  Surgeon: Aquiles Grigsby MD;  Location:  [facility];  Service: Neurosurgery;  Laterality: N/A;    SPINAL CORD STIMULATOR IMPLANT N/A 03/18/2024    Procedure: SPINAL CORD STIMULATOR LEAD REVISION;  Surgeon: Aquiles Grigsby MD;  Location:  [facility];  Service: Neurosurgery;  Laterality: N/A;    SPINAL CORD STIMULATOR TRIAL W/ LAMINOTOMY      TOE SURGERY Right 03/04/2015    \"flexible joint\" per pt -- no hardware    UPPER GASTROINTESTINAL ENDOSCOPY        Social History     Socioeconomic History    Marital status:    Tobacco Use    Smoking status: Never    Smokeless tobacco: Former     Types: Chew    Tobacco comments:     uses chewing tobacco   Vaping Use    Vaping status: Never Used   Substance and Sexual Activity    Alcohol use: No    Drug use: No    Sexual activity: Defer    "     Current Medications:  Current Outpatient Medications   Medication Sig Dispense Refill    atorvastatin (LIPITOR) 10 MG tablet Take 1 tablet by mouth Daily. 90 tablet 3    baclofen (LIORESAL) 10 MG tablet Take 2 tablets by mouth at bedtime 180 tablet 4    celecoxib (CeleBREX) 200 MG capsule Take 1 capsule by mouth 2 (Two) Times a Day. 180 capsule 0    famotidine (Pepcid) 40 MG tablet Take 1 tablet by mouth twice daily. 180 tablet 4    finasteride (PROSCAR) 5 MG tablet Take 1 tablet by mouth Daily. 30 tablet 5    FLUoxetine (PROzac) 40 MG capsule Take 1 capsule by mouth Every Morning. 90 capsule 1    hydrocortisone (ANUSOL-HC) 25 MG suppository Use 1 suppository rectally 2 times a day  as needed. 30 suppository 11    loratadine (Claritin) 10 MG tablet Take 1 tablet by mouth Daily. 90 tablet 1    metoprolol tartrate (LOPRESSOR) 25 MG tablet Take 1 tablet by mouth 2 (Two) Times a Day. 60 tablet 11    RABEprazole (ACIPHEX) 20 MG EC tablet Take 1 tablet by mouth 15 minutes before breakfast and supper. 60 tablet 11    tamsulosin (Flomax) 0.4 MG capsule 24 hr capsule Take 1 capsule by mouth Every Night. 30 capsule 5    Testosterone Cypionate (Depo-Testosterone) 200 MG/ML injection Inject 0.5 mL subcutaneously every Monday and Thursday. 10 mL 2    buPROPion (WELLBUTRIN) 75 MG tablet Take 1 tablet by mouth every day. (Patient not taking: Reported on 6/27/2024) 30 tablet 2    busPIRone (BUSPAR) 5 MG tablet Take 1 tablet by mouth 2 (Two) Times a Day As Needed (anxiety). (Patient not taking: Reported on 6/27/2024) 60 tablet 2    ciprofloxacin (Cipro) 500 MG tablet Take 1 tablet by mouth twice a day before procedure. (Patient not taking: Reported on 6/27/2024) 4 tablet 0    diazePAM (VALIUM) 10 MG tablet Take 1 tablet by mouth the night before procedure at bedtime, and take 1 tablet the morning of one hour prior to procedure. (Patient not taking: Reported on 6/27/2024) 2 tablet 0    Enema Mineral Oil enema Use the morning of  "the procedure. (Patient not taking: Reported on 6/27/2024) 133 mL 0    Syringe 25G X 5/8\" 3 ML misc Use as directed 2 x weekly 24 each 3    traZODone (DESYREL) 50 MG tablet Take 1 or 2 tablets by mouth every day at bedtime. (Patient not taking: Reported on 6/27/2024) 60 tablet 2    vitamin D (ERGOCALCIFEROL) 1.25 MG (02999 UT) capsule capsule Take 1 capsule by mouth Every 7 (Seven) Days. (Patient not taking: Reported on 6/27/2024) 12 capsule 2     No current facility-administered medications for this visit.       Allergies:   No Known Allergies    Vitals:   /80 (BP Location: Left arm, Patient Position: Sitting, Cuff Size: Adult)   Pulse 79   Ht 177.8 cm (70\")   Wt 112 kg (247 lb)   BMI 35.44 kg/m²   Estimated body mass index is 35.44 kg/m² as calculated from the following:    Height as of this encounter: 177.8 cm (70\").    Weight as of this encounter: 112 kg (247 lb).    Avery Watson  reports that he has never smoked. He has quit using smokeless tobacco.  His smokeless tobacco use included chew. I have educated him on the risk of diseases from using tobacco products such as cancer, COPD, and heart disease.     ROS:   Review of Systems   Constitutional: Negative.    HENT: Negative.     Respiratory: Negative.     Cardiovascular: Negative.    Gastrointestinal:  Positive for abdominal pain. Negative for abdominal distention, anal bleeding, blood in stool, constipation, diarrhea, nausea, rectal pain and vomiting.   All other systems reviewed and are negative.       Physical Exam:   Physical Exam  Vitals reviewed.   Constitutional:       Appearance: Normal appearance. He is obese.   HENT:      Head: Normocephalic and atraumatic.      Nose: Nose normal.      Mouth/Throat:      Mouth: Mucous membranes are moist.   Eyes:      Extraocular Movements: Extraocular movements intact.   Cardiovascular:      Rate and Rhythm: Normal rate and regular rhythm.      Pulses: Normal pulses.      Heart sounds: Normal heart " sounds.   Pulmonary:      Effort: Pulmonary effort is normal.      Breath sounds: Normal breath sounds.   Abdominal:      General: Abdomen is flat. Bowel sounds are normal. There is no distension.      Palpations: Abdomen is soft. There is no mass.      Tenderness: There is no abdominal tenderness. There is no guarding or rebound.      Hernia: No hernia is present.   Neurological:      Mental Status: He is alert and oriented to person, place, and time.   Psychiatric:         Mood and Affect: Mood normal.         Behavior: Behavior normal.         Thought Content: Thought content normal.          Lab Results:   Lab Results   Component Value Date    WBC 6.28 05/14/2024    HGB 15.6 05/14/2024    HCT 46.7 05/14/2024    MCV 80.2 05/14/2024    RDW 16.6 (H) 05/14/2024     05/14/2024    NEUTRORELPCT 54.1 05/14/2024    LYMPHORELPCT 31.7 05/14/2024    MONORELPCT 9.7 05/14/2024    EOSRELPCT 3.8 05/14/2024    BASORELPCT 0.5 05/14/2024    NEUTROABS 3.40 05/14/2024    LYMPHSABS 1.99 05/14/2024       Lab Results   Component Value Date     05/14/2024    K 4.6 05/14/2024    CO2 24.7 05/14/2024     05/14/2024    BUN 15 05/14/2024    CREATININE 1.18 05/14/2024    EGFRIFNONA 71 05/03/2021    GLUCOSE 95 05/14/2024    CALCIUM 9.5 05/14/2024    ALKPHOS 67 05/14/2024    AST 32 05/14/2024    ALT 40 05/14/2024    BILITOT 0.5 05/14/2024    ALBUMIN 4.6 05/14/2024    PROTEINTOT 7.0 05/14/2024    MG 2.3 08/13/2020       Pathology:        Endoscopy:        Imaging:  CT Head Without Contrast    Result Date: 5/14/2024    Unremarkable exam demonstrating no CT evidence of acute intracranial findings.   This report was finalized on 5/14/2024 10:55 AM by Dr. Remy Le MD.      XR Spine Thoracic 3 View    Result Date: 3/13/2024  Impression: Neurostimulator catheter terminates at level of mid T9. The electrode appears slightly retracted and may traverse the lamina. Per EMR this is a known finding and patient is scheduled for  revision. Electronically Signed: Robert Pardo MD  3/13/2024 5:53 PM EDT  Workstation ID: [id number]    XR Spine Lumbar 2 or 3 View    Result Date: 3/13/2024  Impression: Neurostimulator catheter terminates at level of mid T9. The electrode appears slightly retracted and may traverse the lamina. Per EMR this is a known finding and patient is scheduled for revision. Electronically Signed: Robert Pardo MD  3/13/2024 5:53 PM EDT  Workstation ID: [id number]          Results review: During today's encounter, all relevant clinical data has been reviewed.      Assessment and Plan    1. Gastroesophageal reflux disease with esophagitis without hemorrhage (Primary)  Patient had EGD with Dr. Pascual in January 2024 that was notable for extensive esophageal erosions.  He has been on Aciphex and famotidine twice daily.  Reports he still having significant acid reflux symptoms.  Will proceed with EGD at this point for further evaluation of esophagitis  -     Case Request; Standing  -     Follow Anesthesia Guidelines / Protocol; Standing  -     Obtain Informed Consent; Standing  -     Case Request  Patient will likely need referral to general surgery for possible Niesen fundoplication given that he is felt omeprazole, Protonix, Dexilant, and Vonoprazan.  Discussed management for GERD: encouraged weight loss, HOB elevation at night, avoidance of meals 2-3 hours before bedtime, avoid trigger foods (caffeine, alcohol, chocolate, acidic/spicy foods e.g oranges/tomatoes), and encouraged smoking cessation  New Medications:   No orders of the defined types were placed in this encounter.      Discontinued Medications:   There are no discontinued medications.     Visit Diagnoses:    ICD-10-CM ICD-9-CM   1. Gastroesophageal reflux disease with esophagitis without hemorrhage  K21.00 530.81     530.10            Follow Up:   Return in about 3 months (around 9/27/2024).    The patient was in agreement with the plan and all questions were  answered to patient's satisfaction.        This document has been electronically signed by Savannah Morales PA-C   June 27, 2024 14:56 EDT    Dictated Utilizing Dragon Dictation: Part of this note may be an electronic transcription/translation of spoken language to printed text using the Dragon Dictation System.    CC:  No ref. provider found  Luly Jimenez APRN

## 2024-06-27 NOTE — H&P (VIEW-ONLY)
Date of Consultation:  6/27/2024  Referring Physician: No ref. provider found    Chief Complaint  Heartburn    Avery Watson is a 58 y.o. male who presents today to [facility] for Heartburn.    HPI:   58-year-old male who presents today for evaluation of GERD.  Patient is previously been followed by Dr. Pascual.  He had an EGD performed on 1/20/2024.  Was found to have reflux esophagitis with extensive erosions extending to-3 cm proximal to the Z-line, biopsies taken from the GE junction were suggestive of erosive esophagitis negative for intestinal metaplasia.  There was mild erosive antral gastritis and duodenitis with superficial duodenal ulcerations, biopsies taken from the antrum were negative for H. pylori.  Patient was initiated on Vonoprazan 20 mg once daily.  Unfortunately, this did not help.  Eventually he was initiated on Aciphex 20 mg daily and famotidine 40 mg twice daily.  Initial plan was to have a follow-up EGD to evaluate for healing of extensive esophageal erosions.  However, he states that his insurance would not for a follow-up appointment with Dr. Pascual.  States that his EGD will be covered if it is performed at [facility].    Today, he reports that he has been compliant with Aciphex and famotidine.  He is having multiple flares each day.  States that there are no certain times that the acid is worse.  He does drink 1-2 sodas and drinks coffee each morning.  States that he has tried to avoid trigger foods.  In the past, he has tried Protonix, omeprazole, and Dexilant all of which do not seem to help.  States that he will have a burning epigastric pain occasionally 1-2 times each week.  This pain is not correlated with food.  He denies any changes in his bowel movements.  He denies unintentional weight loss, nausea, vomiting, melena, and hematochezia.    Of note, he reports colonoscopy performed by Dr. Pascual earlier this year or last year.   "He reports that it was normal.  Will obtain these records.    Previous History:   Past Medical History:   Diagnosis Date    Acid reflux     Chronic pain disorder     Depression     Elevated cholesterol     Essential hypertension 10/27/2023    Extremity pain     Joint pain     Low back pain     Lumbosacral disc disease     Migraine     Muscle spasm     Neck pain     Osteoarthritis     PONV (postoperative nausea and vomiting)     Rheumatoid arthritis       Past Surgical History:   Procedure Laterality Date    CARPAL TUNNEL RELEASE Right 08/04/2016    Procedure: CARPAL TUNNEL RELEASE;  Surgeon: Yoel Lua MD;  Location:  [facility];  Service:     CARPAL TUNNEL RELEASE Left 11/09/2017    Procedure: CARPAL TUNNEL RELEASE;  Surgeon: Yoel Lua MD;  Location:  [facility];  Service:     COLONOSCOPY      LUMBAR DISCECTOMY Right 09/22/2022    Procedure: LUMBAR DISCECTOMY L4-5 RIGHT;  Surgeon: Aquiles Grigsby MD;  Location:  [facility];  Service: Neurosurgery;  Laterality: Right;    LUMBAR FACET INJECTION      RHIZOTOMY      SPINAL CORD STIMULATOR IMPLANT N/A 02/26/2024    Procedure: SPINAL CORD STIMULATOR INSERTION PHASE 1;  Surgeon: Aquiles Grigsby MD;  Location:  [facility];  Service: Neurosurgery;  Laterality: N/A;    SPINAL CORD STIMULATOR IMPLANT N/A 03/18/2024    Procedure: SPINAL CORD STIMULATOR LEAD REVISION;  Surgeon: Aquiles Grigsby MD;  Location:  [facility];  Service: Neurosurgery;  Laterality: N/A;    SPINAL CORD STIMULATOR TRIAL W/ LAMINOTOMY      TOE SURGERY Right 03/04/2015    \"flexible joint\" per pt -- no hardware    UPPER GASTROINTESTINAL ENDOSCOPY        Social History     Socioeconomic History    Marital status:    Tobacco Use    Smoking status: Never    Smokeless tobacco: Former     Types: Chew    Tobacco comments:     uses chewing tobacco   Vaping Use    Vaping status: Never Used   Substance and Sexual Activity    Alcohol use: No    Drug use: No    Sexual activity: Defer    "     Current Medications:  Current Outpatient Medications   Medication Sig Dispense Refill    atorvastatin (LIPITOR) 10 MG tablet Take 1 tablet by mouth Daily. 90 tablet 3    baclofen (LIORESAL) 10 MG tablet Take 2 tablets by mouth at bedtime 180 tablet 4    celecoxib (CeleBREX) 200 MG capsule Take 1 capsule by mouth 2 (Two) Times a Day. 180 capsule 0    famotidine (Pepcid) 40 MG tablet Take 1 tablet by mouth twice daily. 180 tablet 4    finasteride (PROSCAR) 5 MG tablet Take 1 tablet by mouth Daily. 30 tablet 5    FLUoxetine (PROzac) 40 MG capsule Take 1 capsule by mouth Every Morning. 90 capsule 1    hydrocortisone (ANUSOL-HC) 25 MG suppository Use 1 suppository rectally 2 times a day  as needed. 30 suppository 11    loratadine (Claritin) 10 MG tablet Take 1 tablet by mouth Daily. 90 tablet 1    metoprolol tartrate (LOPRESSOR) 25 MG tablet Take 1 tablet by mouth 2 (Two) Times a Day. 60 tablet 11    RABEprazole (ACIPHEX) 20 MG EC tablet Take 1 tablet by mouth 15 minutes before breakfast and supper. 60 tablet 11    tamsulosin (Flomax) 0.4 MG capsule 24 hr capsule Take 1 capsule by mouth Every Night. 30 capsule 5    Testosterone Cypionate (Depo-Testosterone) 200 MG/ML injection Inject 0.5 mL subcutaneously every Monday and Thursday. 10 mL 2    buPROPion (WELLBUTRIN) 75 MG tablet Take 1 tablet by mouth every day. (Patient not taking: Reported on 6/27/2024) 30 tablet 2    busPIRone (BUSPAR) 5 MG tablet Take 1 tablet by mouth 2 (Two) Times a Day As Needed (anxiety). (Patient not taking: Reported on 6/27/2024) 60 tablet 2    ciprofloxacin (Cipro) 500 MG tablet Take 1 tablet by mouth twice a day before procedure. (Patient not taking: Reported on 6/27/2024) 4 tablet 0    diazePAM (VALIUM) 10 MG tablet Take 1 tablet by mouth the night before procedure at bedtime, and take 1 tablet the morning of one hour prior to procedure. (Patient not taking: Reported on 6/27/2024) 2 tablet 0    Enema Mineral Oil enema Use the morning of  "the procedure. (Patient not taking: Reported on 6/27/2024) 133 mL 0    Syringe 25G X 5/8\" 3 ML misc Use as directed 2 x weekly 24 each 3    traZODone (DESYREL) 50 MG tablet Take 1 or 2 tablets by mouth every day at bedtime. (Patient not taking: Reported on 6/27/2024) 60 tablet 2    vitamin D (ERGOCALCIFEROL) 1.25 MG (72879 UT) capsule capsule Take 1 capsule by mouth Every 7 (Seven) Days. (Patient not taking: Reported on 6/27/2024) 12 capsule 2     No current facility-administered medications for this visit.       Allergies:   No Known Allergies    Vitals:   /80 (BP Location: Left arm, Patient Position: Sitting, Cuff Size: Adult)   Pulse 79   Ht 177.8 cm (70\")   Wt 112 kg (247 lb)   BMI 35.44 kg/m²   Estimated body mass index is 35.44 kg/m² as calculated from the following:    Height as of this encounter: 177.8 cm (70\").    Weight as of this encounter: 112 kg (247 lb).    Avery Watson  reports that he has never smoked. He has quit using smokeless tobacco.  His smokeless tobacco use included chew. I have educated him on the risk of diseases from using tobacco products such as cancer, COPD, and heart disease.     ROS:   Review of Systems   Constitutional: Negative.    HENT: Negative.     Respiratory: Negative.     Cardiovascular: Negative.    Gastrointestinal:  Positive for abdominal pain. Negative for abdominal distention, anal bleeding, blood in stool, constipation, diarrhea, nausea, rectal pain and vomiting.   All other systems reviewed and are negative.       Physical Exam:   Physical Exam  Vitals reviewed.   Constitutional:       Appearance: Normal appearance. He is obese.   HENT:      Head: Normocephalic and atraumatic.      Nose: Nose normal.      Mouth/Throat:      Mouth: Mucous membranes are moist.   Eyes:      Extraocular Movements: Extraocular movements intact.   Cardiovascular:      Rate and Rhythm: Normal rate and regular rhythm.      Pulses: Normal pulses.      Heart sounds: Normal heart " sounds.   Pulmonary:      Effort: Pulmonary effort is normal.      Breath sounds: Normal breath sounds.   Abdominal:      General: Abdomen is flat. Bowel sounds are normal. There is no distension.      Palpations: Abdomen is soft. There is no mass.      Tenderness: There is no abdominal tenderness. There is no guarding or rebound.      Hernia: No hernia is present.   Neurological:      Mental Status: He is alert and oriented to person, place, and time.   Psychiatric:         Mood and Affect: Mood normal.         Behavior: Behavior normal.         Thought Content: Thought content normal.          Lab Results:   Lab Results   Component Value Date    WBC 6.28 05/14/2024    HGB 15.6 05/14/2024    HCT 46.7 05/14/2024    MCV 80.2 05/14/2024    RDW 16.6 (H) 05/14/2024     05/14/2024    NEUTRORELPCT 54.1 05/14/2024    LYMPHORELPCT 31.7 05/14/2024    MONORELPCT 9.7 05/14/2024    EOSRELPCT 3.8 05/14/2024    BASORELPCT 0.5 05/14/2024    NEUTROABS 3.40 05/14/2024    LYMPHSABS 1.99 05/14/2024       Lab Results   Component Value Date     05/14/2024    K 4.6 05/14/2024    CO2 24.7 05/14/2024     05/14/2024    BUN 15 05/14/2024    CREATININE 1.18 05/14/2024    EGFRIFNONA 71 05/03/2021    GLUCOSE 95 05/14/2024    CALCIUM 9.5 05/14/2024    ALKPHOS 67 05/14/2024    AST 32 05/14/2024    ALT 40 05/14/2024    BILITOT 0.5 05/14/2024    ALBUMIN 4.6 05/14/2024    PROTEINTOT 7.0 05/14/2024    MG 2.3 08/13/2020       Pathology:        Endoscopy:        Imaging:  CT Head Without Contrast    Result Date: 5/14/2024    Unremarkable exam demonstrating no CT evidence of acute intracranial findings.   This report was finalized on 5/14/2024 10:55 AM by Dr. Remy Le MD.      XR Spine Thoracic 3 View    Result Date: 3/13/2024  Impression: Neurostimulator catheter terminates at level of mid T9. The electrode appears slightly retracted and may traverse the lamina. Per EMR this is a known finding and patient is scheduled for  revision. Electronically Signed: Robert Pardo MD  3/13/2024 5:53 PM EDT  Workstation ID: [id number]    XR Spine Lumbar 2 or 3 View    Result Date: 3/13/2024  Impression: Neurostimulator catheter terminates at level of mid T9. The electrode appears slightly retracted and may traverse the lamina. Per EMR this is a known finding and patient is scheduled for revision. Electronically Signed: Robert Pardo MD  3/13/2024 5:53 PM EDT  Workstation ID: [id number]          Results review: During today's encounter, all relevant clinical data has been reviewed.      Assessment and Plan    1. Gastroesophageal reflux disease with esophagitis without hemorrhage (Primary)  Patient had EGD with Dr. Pascual in January 2024 that was notable for extensive esophageal erosions.  He has been on Aciphex and famotidine twice daily.  Reports he still having significant acid reflux symptoms.  Will proceed with EGD at this point for further evaluation of esophagitis  -     Case Request; Standing  -     Follow Anesthesia Guidelines / Protocol; Standing  -     Obtain Informed Consent; Standing  -     Case Request  Patient will likely need referral to general surgery for possible Niesen fundoplication given that he is felt omeprazole, Protonix, Dexilant, and Vonoprazan.  Discussed management for GERD: encouraged weight loss, HOB elevation at night, avoidance of meals 2-3 hours before bedtime, avoid trigger foods (caffeine, alcohol, chocolate, acidic/spicy foods e.g oranges/tomatoes), and encouraged smoking cessation  New Medications:   No orders of the defined types were placed in this encounter.      Discontinued Medications:   There are no discontinued medications.     Visit Diagnoses:    ICD-10-CM ICD-9-CM   1. Gastroesophageal reflux disease with esophagitis without hemorrhage  K21.00 530.81     530.10            Follow Up:   Return in about 3 months (around 9/27/2024).    The patient was in agreement with the plan and all questions were  answered to patient's satisfaction.        This document has been electronically signed by Savannah Morales PA-C   June 27, 2024 14:56 EDT    Dictated Utilizing Dragon Dictation: Part of this note may be an electronic transcription/translation of spoken language to printed text using the Dragon Dictation System.    CC:  No ref. provider found  Luly Jimenez APRN

## 2024-07-08 ENCOUNTER — TELEPHONE (OUTPATIENT)
Dept: UROLOGY | Facility: CLINIC | Age: 59
End: 2024-07-08
Payer: COMMERCIAL

## 2024-07-08 DIAGNOSIS — N40.0 BPH WITH ELEVATED PSA: Primary | ICD-10-CM

## 2024-07-08 DIAGNOSIS — R97.20 BPH WITH ELEVATED PSA: Primary | ICD-10-CM

## 2024-07-08 DIAGNOSIS — N40.0 BPH WITH ELEVATED PSA: ICD-10-CM

## 2024-07-08 DIAGNOSIS — R97.20 BPH WITH ELEVATED PSA: ICD-10-CM

## 2024-07-08 RX ORDER — TAMSULOSIN HYDROCHLORIDE 0.4 MG/1
1 CAPSULE ORAL NIGHTLY
Qty: 90 CAPSULE | Refills: 3 | Status: SHIPPED | OUTPATIENT
Start: 2024-07-08

## 2024-07-08 NOTE — TELEPHONE ENCOUNTER
The pt called and said that he take 2 flomaxs daily and that when it was sent in it was for one daily and he asked if I could be resent and with a 90 day supply.

## 2024-07-09 NOTE — TELEPHONE ENCOUNTER
Caller: Avery Watson    Relationship to patient: Self    Best call back number: [phone]    Patient is needing: RECEIVED  A CALL FROM PT. PREVIOUSLY, PT WAS PRESCRIBED FLOMAX 2 CAPSULE BY MOUTH EVERY NIGHT AND RECENTLY PRESCRIBED 1 CAPSULE AT NIGHT. HE WANTS TO KNOW IF DR. CANNON WANTS HIM TO TAKE ONE CAPSULE OR TWO CAPSULE?   tamsulosin (Flomax) 0.4 MG capsule 24 hr capsule        Sig: Take 1 capsule by mouth Every Night.

## 2024-07-09 NOTE — TELEPHONE ENCOUNTER
Routing to Mike to get confirmation, he said the pt was suppose to take 2 daily and he picked up the first scipt already and I sent in a new script with 2 daily.

## 2024-07-10 RX ORDER — TAMSULOSIN HYDROCHLORIDE 0.4 MG/1
2 CAPSULE ORAL DAILY
Qty: 180 CAPSULE | Refills: 3 | Status: SHIPPED | OUTPATIENT
Start: 2024-07-10

## 2024-07-17 PROBLEM — K21.00 GASTROESOPHAGEAL REFLUX DISEASE WITH ESOPHAGITIS WITHOUT HEMORRHAGE: Status: ACTIVE | Noted: 2024-06-27

## 2024-07-18 ENCOUNTER — OFFICE VISIT (OUTPATIENT)
Dept: FAMILY MEDICINE CLINIC | Facility: CLINIC | Age: 59
End: 2024-07-18
Payer: COMMERCIAL

## 2024-07-18 VITALS
TEMPERATURE: 97.8 F | SYSTOLIC BLOOD PRESSURE: 128 MMHG | HEIGHT: 70 IN | DIASTOLIC BLOOD PRESSURE: 78 MMHG | BODY MASS INDEX: 35.59 KG/M2 | HEART RATE: 99 BPM | OXYGEN SATURATION: 97 % | WEIGHT: 248.6 LBS

## 2024-07-18 DIAGNOSIS — G43.E19 INTRACTABLE CHRONIC MIGRAINE WITH AURA AND WITHOUT STATUS MIGRAINOSUS: ICD-10-CM

## 2024-07-18 DIAGNOSIS — M54.2 NECK PAIN: Primary | ICD-10-CM

## 2024-07-18 PROCEDURE — 99214 OFFICE O/P EST MOD 30 MIN: CPT | Performed by: FAMILY MEDICINE

## 2024-07-18 RX ORDER — TOPIRAMATE 25 MG/1
25 TABLET ORAL DAILY
Qty: 30 TABLET | Refills: 2 | Status: SHIPPED | OUTPATIENT
Start: 2024-07-18

## 2024-07-18 NOTE — PROGRESS NOTES
"Chief Complaint  Headache    Subjective          Avery Watson presents to [facility]  Headache        Here still complaining of headaches and worsening neck pain.  States he has previously done physical therapy in the past with no relief of the neck pain.  CT of head resulted normal.  States he has previously tried amitriptyline for headaches and this did not help.    Review of Systems   Neurological:  Positive for headaches.         Objective   Vital Signs:   /78 (BP Location: Right arm, Patient Position: Sitting, Cuff Size: Large Adult)   Pulse 99   Temp 97.8 °F (36.6 °C) (Temporal)   Ht 177.8 cm (70\")   Wt 113 kg (248 lb 9.6 oz)   SpO2 97%   BMI 35.67 kg/m²     Physical Exam  Constitutional:       General: He is not in acute distress.     Appearance: Normal appearance. He is well-developed and well-groomed. He is not ill-appearing, toxic-appearing or diaphoretic.   HENT:      Head: Normocephalic.      Nose: Nose normal. No congestion or rhinorrhea.      Mouth/Throat:      Mouth: Mucous membranes are moist.      Pharynx: Oropharynx is clear. No oropharyngeal exudate or posterior oropharyngeal erythema.   Eyes:      General: Lids are normal.         Right eye: No discharge.         Left eye: No discharge.      Extraocular Movements: Extraocular movements intact.      Pupils: Pupils are equal, round, and reactive to light.   Neck:      Vascular: No carotid bruit.   Cardiovascular:      Rate and Rhythm: Normal rate and regular rhythm.      Pulses: Normal pulses.      Heart sounds: Normal heart sounds. No murmur heard.     No friction rub. No gallop.   Pulmonary:      Effort: Pulmonary effort is normal. No respiratory distress.      Breath sounds: Normal breath sounds. No stridor. No wheezing, rhonchi or rales.   Chest:      Chest wall: No tenderness.   Abdominal:      General: Bowel sounds are normal. There is no distension.      Palpations: Abdomen is soft. There " is no mass.      Tenderness: There is no abdominal tenderness. There is no right CVA tenderness, left CVA tenderness, guarding or rebound.      Hernia: No hernia is present.   Musculoskeletal:         General: No swelling or tenderness.      Cervical back: Normal range of motion and neck supple. Tenderness present. No rigidity. Pain with movement present. Decreased range of motion.      Thoracic back: Decreased range of motion.      Lumbar back: Decreased range of motion.      Right lower leg: No edema.      Left lower leg: No edema.   Lymphadenopathy:      Cervical: No cervical adenopathy.   Skin:     General: Skin is warm.      Capillary Refill: Capillary refill takes less than 2 seconds.      Coloration: Skin is not jaundiced.      Findings: No bruising, erythema or rash.   Neurological:      General: No focal deficit present.      Mental Status: He is alert and oriented to person, place, and time.      Motor: Motor function is intact. No weakness.      Coordination: Coordination is intact.      Gait: Gait is intact. Gait normal.   Psychiatric:         Attention and Perception: Attention normal.         Mood and Affect: Mood normal.         Speech: Speech normal.         Behavior: Behavior normal.         Cognition and Memory: Cognition normal.         Judgment: Judgment normal.        Result Review :                 Assessment and Plan    Diagnoses and all orders for this visit:    1. Neck pain (Primary)  -     MRI Cervical Spine Without Contrast; Future    2. Intractable chronic migraine with aura and without status migrainosus  Comments:  nurtec sampes given   lot [id number]  exp 5/26  Orders:  -     topiramate (Topamax) 25 MG tablet; Take 1 tablet by mouth Daily.  Dispense: 30 tablet; Refill: 2      Patient's Body mass index is 35.67 kg/m². indicating that he is obese (BMI >30). Obesity-related health conditions include the following: hypertension and dyslipidemias. Obesity is unchanged. BMI is is above average;  BMI management plan is completed. We discussed low calorie, low carb based diet program, portion control, and increasing exercise..    Follow Up   Return if symptoms worsen or fail to improve, for Next scheduled follow up.  Patient was given instructions and counseling regarding his condition or for health maintenance advice. Please see specific information pulled into the AVS if appropriate.     This document has been electronically signed by EDDIE Mead  July 18, 2024 16:35 EDT

## 2024-07-22 ENCOUNTER — OFFICE VISIT (OUTPATIENT)
Dept: FAMILY MEDICINE CLINIC | Facility: CLINIC | Age: 59
End: 2024-07-22
Payer: COMMERCIAL

## 2024-07-22 VITALS
BODY MASS INDEX: 35.39 KG/M2 | TEMPERATURE: 97.8 F | SYSTOLIC BLOOD PRESSURE: 130 MMHG | DIASTOLIC BLOOD PRESSURE: 80 MMHG | HEART RATE: 84 BPM | OXYGEN SATURATION: 96 % | HEIGHT: 70 IN | WEIGHT: 247.2 LBS

## 2024-07-22 DIAGNOSIS — H66.002 NON-RECURRENT ACUTE SUPPURATIVE OTITIS MEDIA OF LEFT EAR WITHOUT SPONTANEOUS RUPTURE OF TYMPANIC MEMBRANE: Primary | ICD-10-CM

## 2024-07-22 DIAGNOSIS — J32.9 CHRONIC SINUSITIS, UNSPECIFIED LOCATION: ICD-10-CM

## 2024-07-22 PROCEDURE — 96372 THER/PROPH/DIAG INJ SC/IM: CPT | Performed by: FAMILY MEDICINE

## 2024-07-22 PROCEDURE — 99214 OFFICE O/P EST MOD 30 MIN: CPT | Performed by: FAMILY MEDICINE

## 2024-07-22 RX ORDER — DEXAMETHASONE SODIUM PHOSPHATE 4 MG/ML
4 INJECTION, SOLUTION INTRA-ARTICULAR; INTRALESIONAL; INTRAMUSCULAR; INTRAVENOUS; SOFT TISSUE ONCE
Status: DISCONTINUED | OUTPATIENT
Start: 2024-07-22 | End: 2024-07-22

## 2024-07-22 RX ORDER — AMOXICILLIN AND CLAVULANATE POTASSIUM 875; 125 MG/1; MG/1
1 TABLET, FILM COATED ORAL 2 TIMES DAILY
Qty: 20 TABLET | Refills: 0 | Status: SHIPPED | OUTPATIENT
Start: 2024-07-22

## 2024-07-22 RX ORDER — AMOXICILLIN AND CLAVULANATE POTASSIUM 875; 125 MG/1; MG/1
1 TABLET, FILM COATED ORAL 2 TIMES DAILY
Qty: 20 TABLET | Refills: 0 | Status: SHIPPED | OUTPATIENT
Start: 2024-07-22 | End: 2024-07-22

## 2024-07-22 RX ORDER — CEFTRIAXONE 1 G/1
1 INJECTION, POWDER, FOR SOLUTION INTRAMUSCULAR; INTRAVENOUS ONCE
Status: DISCONTINUED | OUTPATIENT
Start: 2024-07-22 | End: 2024-07-22

## 2024-07-22 RX ADMIN — DEXAMETHASONE SODIUM PHOSPHATE 4 MG: 4 INJECTION, SOLUTION INTRA-ARTICULAR; INTRALESIONAL; INTRAMUSCULAR; INTRAVENOUS; SOFT TISSUE at 13:57

## 2024-07-22 RX ADMIN — CEFTRIAXONE 1 G: 1 INJECTION, POWDER, FOR SOLUTION INTRAMUSCULAR; INTRAVENOUS at 13:56

## 2024-07-22 NOTE — PROGRESS NOTES
"Chief Complaint  Ear pain    Subjective          Avery Watson presents to [facility]  Earache   There is pain in the left ear. This is a new problem. The current episode started in the past 7 days. The pain is moderate. Associated symptoms include headaches and rhinorrhea. He has tried NSAIDs for the symptoms. The treatment provided mild relief.       Review of Systems   HENT:  Positive for ear pain and rhinorrhea.    Neurological:  Positive for headaches.         Objective   Vital Signs:   /80 (BP Location: Right arm, Patient Position: Sitting, Cuff Size: Adult)   Pulse 84   Temp 97.8 °F (36.6 °C) (Temporal)   Ht 177.8 cm (70\")   Wt 112 kg (247 lb 3.2 oz)   SpO2 96%   BMI 35.47 kg/m²     Physical Exam   Result Review :                 Assessment and Plan    Diagnoses and all orders for this visit:    1. Non-recurrent acute suppurative otitis media of left ear without spontaneous rupture of tympanic membrane (Primary)  -     amoxicillin-clavulanate (AUGMENTIN) 875-125 MG per tablet; Take 1 tablet by mouth 2 (Two) Times a Day.  Dispense: 20 tablet; Refill: 0  -     cefTRIAXone (ROCEPHIN) injection 1 g  -     dexAMETHasone (DECADRON) injection 4 mg    2. Chronic sinusitis, unspecified location  -     Ambulatory Referral to ENT (Otolaryngology)    Other orders  -     Discontinue: amoxicillin-clavulanate (AUGMENTIN) 875-125 MG per tablet; Take 1 tablet by mouth 2 (Two) Times a Day.  Dispense: 20 tablet; Refill: 0      Patient's Body mass index is 35.47 kg/m². indicating that he is obese (BMI >30). Obesity-related health conditions include the following: dyslipidemias. Obesity is unchanged. BMI is is above average; BMI management plan is completed. We discussed low calorie, low carb based diet program, portion control, and increasing exercise..    Follow Up   No follow-ups on file.  Patient was given instructions and counseling regarding his condition or for health " maintenance advice. Please see specific information pulled into the AVS if appropriate.     This document has been electronically signed by EDDIE Mead  July 22, 2024 16:41 EDT

## 2024-07-24 ENCOUNTER — ANESTHESIA EVENT (OUTPATIENT)
Dept: PERIOP | Facility: HOSPITAL | Age: 59
End: 2024-07-24
Payer: COMMERCIAL

## 2024-07-24 ENCOUNTER — ANESTHESIA (OUTPATIENT)
Dept: PERIOP | Facility: HOSPITAL | Age: 59
End: 2024-07-24
Payer: COMMERCIAL

## 2024-07-24 ENCOUNTER — HOSPITAL ENCOUNTER (OUTPATIENT)
Facility: HOSPITAL | Age: 59
Setting detail: HOSPITAL OUTPATIENT SURGERY
Discharge: HOME | End: 2024-07-24
Attending: INTERNAL MEDICINE | Admitting: INTERNAL MEDICINE
Payer: COMMERCIAL

## 2024-07-24 VITALS
HEIGHT: 72 IN | OXYGEN SATURATION: 98 % | RESPIRATION RATE: 18 BRPM | BODY MASS INDEX: 33.46 KG/M2 | HEART RATE: 61 BPM | DIASTOLIC BLOOD PRESSURE: 87 MMHG | SYSTOLIC BLOOD PRESSURE: 115 MMHG | WEIGHT: 247 LBS | TEMPERATURE: 97.9 F

## 2024-07-24 DIAGNOSIS — K21.00 GASTROESOPHAGEAL REFLUX DISEASE WITH ESOPHAGITIS WITHOUT HEMORRHAGE: ICD-10-CM

## 2024-07-24 DIAGNOSIS — R11.0 NAUSEA: Primary | ICD-10-CM

## 2024-07-24 PROCEDURE — 43239 EGD BIOPSY SINGLE/MULTIPLE: CPT | Performed by: INTERNAL MEDICINE

## 2024-07-24 PROCEDURE — 25010000002 PROPOFOL 200 MG/20ML EMULSION: Performed by: NURSE ANESTHETIST, CERTIFIED REGISTERED

## 2024-07-24 RX ORDER — SODIUM CHLORIDE 9 MG/ML
40 INJECTION, SOLUTION INTRAVENOUS AS NEEDED
Status: DISCONTINUED | OUTPATIENT
Start: 2024-07-24 | End: 2024-07-24 | Stop reason: HOSPADM

## 2024-07-24 RX ORDER — MIDAZOLAM HYDROCHLORIDE 1 MG/ML
1 INJECTION INTRAMUSCULAR; INTRAVENOUS
Status: DISCONTINUED | OUTPATIENT
Start: 2024-07-24 | End: 2024-07-24 | Stop reason: HOSPADM

## 2024-07-24 RX ORDER — ONDANSETRON 2 MG/ML
4 INJECTION INTRAMUSCULAR; INTRAVENOUS AS NEEDED
Status: DISCONTINUED | OUTPATIENT
Start: 2024-07-24 | End: 2024-07-24 | Stop reason: HOSPADM

## 2024-07-24 RX ORDER — FENTANYL CITRATE 50 UG/ML
50 INJECTION, SOLUTION INTRAMUSCULAR; INTRAVENOUS
Status: DISCONTINUED | OUTPATIENT
Start: 2024-07-24 | End: 2024-07-24 | Stop reason: HOSPADM

## 2024-07-24 RX ORDER — SODIUM CHLORIDE, SODIUM LACTATE, POTASSIUM CHLORIDE, CALCIUM CHLORIDE 600; 310; 30; 20 MG/100ML; MG/100ML; MG/100ML; MG/100ML
125 INJECTION, SOLUTION INTRAVENOUS ONCE
Status: DISCONTINUED | OUTPATIENT
Start: 2024-07-24 | End: 2024-07-24 | Stop reason: HOSPADM

## 2024-07-24 RX ORDER — MEPERIDINE HYDROCHLORIDE 25 MG/ML
12.5 INJECTION INTRAMUSCULAR; INTRAVENOUS; SUBCUTANEOUS
Status: DISCONTINUED | OUTPATIENT
Start: 2024-07-24 | End: 2024-07-24 | Stop reason: HOSPADM

## 2024-07-24 RX ORDER — SODIUM CHLORIDE 0.9 % (FLUSH) 0.9 %
10 SYRINGE (ML) INJECTION AS NEEDED
Status: DISCONTINUED | OUTPATIENT
Start: 2024-07-24 | End: 2024-07-24 | Stop reason: HOSPADM

## 2024-07-24 RX ORDER — SODIUM CHLORIDE 0.9 % (FLUSH) 0.9 %
10 SYRINGE (ML) INJECTION EVERY 12 HOURS SCHEDULED
Status: DISCONTINUED | OUTPATIENT
Start: 2024-07-24 | End: 2024-07-24 | Stop reason: HOSPADM

## 2024-07-24 RX ORDER — IPRATROPIUM BROMIDE AND ALBUTEROL SULFATE 2.5; .5 MG/3ML; MG/3ML
3 SOLUTION RESPIRATORY (INHALATION) ONCE AS NEEDED
Status: DISCONTINUED | OUTPATIENT
Start: 2024-07-24 | End: 2024-07-24 | Stop reason: HOSPADM

## 2024-07-24 RX ORDER — OXYCODONE HYDROCHLORIDE AND ACETAMINOPHEN 5; 325 MG/1; MG/1
1 TABLET ORAL ONCE AS NEEDED
Status: DISCONTINUED | OUTPATIENT
Start: 2024-07-24 | End: 2024-07-24 | Stop reason: HOSPADM

## 2024-07-24 RX ORDER — SODIUM CHLORIDE, SODIUM LACTATE, POTASSIUM CHLORIDE, CALCIUM CHLORIDE 600; 310; 30; 20 MG/100ML; MG/100ML; MG/100ML; MG/100ML
100 INJECTION, SOLUTION INTRAVENOUS ONCE AS NEEDED
Status: DISCONTINUED | OUTPATIENT
Start: 2024-07-24 | End: 2024-07-24 | Stop reason: HOSPADM

## 2024-07-24 RX ORDER — PROPOFOL 10 MG/ML
INJECTION, EMULSION INTRAVENOUS AS NEEDED
Status: DISCONTINUED | OUTPATIENT
Start: 2024-07-24 | End: 2024-07-24 | Stop reason: SURG

## 2024-07-24 RX ADMIN — PROPOFOL 200 MCG/KG/MIN: 10 INJECTION, EMULSION INTRAVENOUS at 09:40

## 2024-07-24 RX ADMIN — PROPOFOL 50 MG: 10 INJECTION, EMULSION INTRAVENOUS at 09:39

## 2024-07-24 NOTE — ANESTHESIA POSTPROCEDURE EVALUATION
Patient: Avery Watson    Procedure Summary       Date: 07/24/24 Room / Location: [facility] OR  /  [facility] OR    Anesthesia Start: 0937 Anesthesia Stop: 0948    Procedure: ESOPHAGOGASTRODUODENOSCOPY WITH BIOPSY (Esophagus) Diagnosis:       Gastroesophageal reflux disease with esophagitis without hemorrhage      (Gastroesophageal reflux disease with esophagitis without hemorrhage [K21.00])    Surgeons: Valerie Miranda MD Provider: Chi Vergara DO    Anesthesia Type: general ASA Status: 3            Anesthesia Type: general    Vitals  Vitals Value Taken Time   /87 07/24/24 1020   Temp 97.9 °F (36.6 °C) 07/24/24 0950   Pulse 61 07/24/24 1020   Resp 18 07/24/24 1020   SpO2 98 % 07/24/24 1020           Post Anesthesia Care and Evaluation    Patient location during evaluation: PHASE II  Patient participation: complete - patient participated  Level of consciousness: awake and alert  Pain score: 1  Pain management: adequate    Airway patency: patent  Anesthetic complications: No anesthetic complications  PONV Status: controlled  Cardiovascular status: acceptable  Respiratory status: acceptable  Hydration status: acceptable     English

## 2024-07-24 NOTE — ANESTHESIA PREPROCEDURE EVALUATION
Anesthesia Evaluation     Patient summary reviewed and Nursing notes reviewed   history of anesthetic complications:  PONV  NPO Solid Status: > 8 hours  NPO Liquid Status: > 8 hours           Airway   Mallampati: II  TM distance: >3 FB  Neck ROM: full  No difficulty expected  Dental - normal exam     Pulmonary - negative pulmonary ROS and normal exam   Cardiovascular - normal exam  Exercise tolerance: good (4-7 METS)    NYHA Classification: II    (+) hypertension, hyperlipidemia    ROS comment: ·  Myocardial perfusion imaging indicates a normal myocardial perfusion study with no evidence of ischemia.  ·  Left ventricular ejection fraction is normal (Calculated EF = 50%).  ·  Impressions are consistent with a low risk study.  ·  Diaphragmatic attenuation artifact is present.  ·  Findings consistent with a normal ECG stress test.      Neuro/Psych  (+) headaches, numbness, psychiatric history  GI/Hepatic/Renal/Endo    (+) obesity, morbid obesity, GERD    Musculoskeletal     (+) neck pain  Abdominal  - normal exam    Bowel sounds: normal.   Substance History - negative use     OB/GYN negative ob/gyn ROS         Other   arthritis,                 Anesthesia Plan    ASA 3     general     intravenous induction     Anesthetic plan, risks, benefits, and alternatives have been provided, discussed and informed consent has been obtained with: patient.    Plan discussed with CRNA.    CODE STATUS:

## 2024-07-25 LAB — REF LAB TEST METHOD: NORMAL

## 2024-07-29 ENCOUNTER — TELEPHONE (OUTPATIENT)
Dept: FAMILY MEDICINE CLINIC | Facility: CLINIC | Age: 59
End: 2024-07-29

## 2024-07-29 NOTE — TELEPHONE ENCOUNTER
Caller: Avery Watson    Relationship: Self    Best call back number: [phone]    What is the medical concern/diagnosis: HEADACHE, SORE THROAT, EAR ACHES    What specialty or service is being requested: ENT    What is the provider, practice or medical service name: [facility]    What is the office location:     What is the office phone number:     Any additional details:

## 2024-07-30 NOTE — PROGRESS NOTES
At the time of your recent upper endoscopy, biopsies were taken of the esophagus.  Biopsies revealed mild chronic inflammation from acid reflux.  The remainder of the exam was normal.  Please continue Aciphex.  Please keep your follow-up appointment.

## 2024-08-02 ENCOUNTER — HOSPITAL ENCOUNTER (OUTPATIENT)
Dept: MRI IMAGING | Facility: HOSPITAL | Age: 59
Discharge: HOME | End: 2024-08-02
Admitting: FAMILY MEDICINE
Payer: COMMERCIAL

## 2024-08-02 DIAGNOSIS — M54.2 NECK PAIN: ICD-10-CM

## 2024-08-02 PROCEDURE — 72141 MRI NECK SPINE W/O DYE: CPT | Performed by: RADIOLOGY

## 2024-08-02 PROCEDURE — 72141 MRI NECK SPINE W/O DYE: CPT

## 2024-08-07 ENCOUNTER — TELEPHONE (OUTPATIENT)
Dept: FAMILY MEDICINE CLINIC | Facility: CLINIC | Age: 59
End: 2024-08-07

## 2024-08-07 NOTE — TELEPHONE ENCOUNTER
Caller: Avery Watson    Relationship: Self    Best call back number: [phone]     What test was performed: MRI OF THE NECK    When was the test performed: 08.02.24    Where was the test performed: [facility]    Additional notes: CALL WITH RESULTS      
98

## 2024-08-08 ENCOUNTER — TELEPHONE (OUTPATIENT)
Dept: FAMILY MEDICINE CLINIC | Facility: CLINIC | Age: 59
End: 2024-08-08
Payer: COMMERCIAL

## 2024-08-08 NOTE — TELEPHONE ENCOUNTER
----- Message from Luly Jimenez sent at 8/8/2024 12:55 PM EDT -----  Please call the patient regarding his abnormal result.  Showed some bulging disc and from his C5-C7.  Does he have a follow-up scheduled with the neurosurgeon?

## 2024-08-08 NOTE — TELEPHONE ENCOUNTER
Spoke with patient & he verbalized understanding,has not seen Neurosurgery in awhile stated he will need a referral ,requests Dr. Grigsby.

## 2024-08-12 ENCOUNTER — HOSPITAL ENCOUNTER (OUTPATIENT)
Dept: NUCLEAR MEDICINE | Facility: HOSPITAL | Age: 59
Discharge: HOME | End: 2024-08-12
Payer: COMMERCIAL

## 2024-08-12 DIAGNOSIS — M50.30 BULGING OF CERVICAL INTERVERTEBRAL DISC: Primary | ICD-10-CM

## 2024-08-12 DIAGNOSIS — R11.0 NAUSEA: ICD-10-CM

## 2024-08-12 PROCEDURE — 78264 GASTRIC EMPTYING IMG STUDY: CPT

## 2024-08-12 PROCEDURE — 0 TECHNETIUM SULFUR COLLOID: Performed by: INTERNAL MEDICINE

## 2024-08-12 PROCEDURE — 78264 GASTRIC EMPTYING IMG STUDY: CPT | Performed by: RADIOLOGY

## 2024-08-12 PROCEDURE — A9541 TC99M SULFUR COLLOID: HCPCS | Performed by: INTERNAL MEDICINE

## 2024-08-12 RX ADMIN — TECHNETIUM TC 99M SULFUR COLLOID 1 DOSE: KIT at 14:10

## 2024-08-14 ENCOUNTER — OFFICE VISIT (OUTPATIENT)
Dept: FAMILY MEDICINE CLINIC | Facility: CLINIC | Age: 59
End: 2024-08-14
Payer: COMMERCIAL

## 2024-08-14 VITALS
SYSTOLIC BLOOD PRESSURE: 120 MMHG | HEART RATE: 89 BPM | DIASTOLIC BLOOD PRESSURE: 80 MMHG | TEMPERATURE: 97.8 F | OXYGEN SATURATION: 95 % | WEIGHT: 250.6 LBS | BODY MASS INDEX: 33.94 KG/M2 | HEIGHT: 72 IN

## 2024-08-14 DIAGNOSIS — F41.9 ANXIETY: ICD-10-CM

## 2024-08-14 DIAGNOSIS — H65.93 MIDDLE EAR EFFUSION, BILATERAL: Primary | ICD-10-CM

## 2024-08-14 PROCEDURE — 99214 OFFICE O/P EST MOD 30 MIN: CPT | Performed by: FAMILY MEDICINE

## 2024-08-14 RX ORDER — FLUTICASONE PROPIONATE 50 MCG
2 SPRAY, SUSPENSION (ML) NASAL DAILY
Qty: 16 ML | Refills: 2 | Status: SHIPPED | OUTPATIENT
Start: 2024-08-14

## 2024-08-14 RX ORDER — FLUOXETINE HYDROCHLORIDE 40 MG/1
40 CAPSULE ORAL EVERY MORNING
Qty: 90 CAPSULE | Refills: 1 | Status: SHIPPED | OUTPATIENT
Start: 2024-08-14

## 2024-08-14 RX ORDER — LEVOCETIRIZINE DIHYDROCHLORIDE 5 MG/1
5 TABLET, FILM COATED ORAL EVERY EVENING
Qty: 90 TABLET | Refills: 0 | Status: SHIPPED | OUTPATIENT
Start: 2024-08-14

## 2024-08-14 NOTE — PROGRESS NOTES
"Chief Complaint  Ear pain    Subjective          Avery Watson presents to [facility]  Earache   There is pain in both ears. This is a chronic problem. The current episode started more than 1 year ago. The problem has been unchanged. There has been no fever. Associated symptoms include headaches. Pertinent negatives include no coughing or drainage. He has tried antibiotics and NSAIDs for the symptoms. The treatment provided mild relief.   Anxiety  Presents for follow-up visit.  Symptoms include nervous/anxious behavior. Symptoms occur occasionally. Past treatments include SSRIs. The treatment provided significant relief. Compliance with medications is %.       Review of Systems   HENT:  Positive for ear pain. Negative for tinnitus.    Respiratory:  Negative for cough.    Neurological:  Positive for headaches.   Psychiatric/Behavioral:  The patient is nervous/anxious.          Objective   Vital Signs:   /80 (BP Location: Right arm, Patient Position: Sitting, Cuff Size: Large Adult)   Pulse 89   Temp 97.8 °F (36.6 °C) (Temporal)   Ht 182.9 cm (72\")   Wt 114 kg (250 lb 9.6 oz)   SpO2 95%   BMI 33.99 kg/m²     Physical Exam  Constitutional:       General: He is not in acute distress.     Appearance: Normal appearance. He is well-developed and well-groomed. He is not ill-appearing, toxic-appearing or diaphoretic.   HENT:      Head: Normocephalic.      Nose: Nose normal. No congestion or rhinorrhea.      Mouth/Throat:      Mouth: Mucous membranes are moist.      Pharynx: Oropharynx is clear. No oropharyngeal exudate or posterior oropharyngeal erythema.   Eyes:      General: Lids are normal.         Right eye: No discharge.         Left eye: No discharge.      Extraocular Movements: Extraocular movements intact.      Pupils: Pupils are equal, round, and reactive to light.   Neck:      Vascular: No carotid bruit.   Cardiovascular:      Rate and Rhythm: Normal rate " and regular rhythm.      Pulses: Normal pulses.      Heart sounds: Normal heart sounds. No murmur heard.     No friction rub. No gallop.   Pulmonary:      Effort: Pulmonary effort is normal. No respiratory distress.      Breath sounds: Normal breath sounds. No stridor. No wheezing, rhonchi or rales.   Chest:      Chest wall: No tenderness.   Abdominal:      General: Bowel sounds are normal. There is no distension.      Palpations: Abdomen is soft. There is no mass.      Tenderness: There is no abdominal tenderness. There is no right CVA tenderness, left CVA tenderness, guarding or rebound.      Hernia: No hernia is present.   Musculoskeletal:         General: No swelling or tenderness. Normal range of motion.      Cervical back: Normal range of motion and neck supple. No rigidity or tenderness.      Right lower leg: No edema.      Left lower leg: No edema.   Lymphadenopathy:      Cervical: No cervical adenopathy.   Skin:     General: Skin is warm.      Capillary Refill: Capillary refill takes less than 2 seconds.      Coloration: Skin is not jaundiced.      Findings: No bruising, erythema or rash.   Neurological:      General: No focal deficit present.      Mental Status: He is alert and oriented to person, place, and time.      Motor: Motor function is intact. No weakness.      Coordination: Coordination is intact.      Gait: Gait is intact. Gait normal.   Psychiatric:         Attention and Perception: Attention normal.         Mood and Affect: Mood normal.         Speech: Speech normal.         Behavior: Behavior normal.         Cognition and Memory: Cognition normal.         Judgment: Judgment normal.        Result Review :                 Assessment and Plan    Diagnoses and all orders for this visit:    1. Middle ear effusion, bilateral (Primary)  -     levocetirizine (XYZAL) 5 MG tablet; Take 1 tablet by mouth Every Evening.  Dispense: 90 tablet; Refill: 0  -     fluticasone (FLONASE) 50 MCG/ACT nasal spray;  Instill 2 sprays in each nostril daily as directed by provider.  Dispense: 16 mL; Refill: 2    2. Anxiety  -     FLUoxetine (PROzac) 40 MG capsule; Take 1 capsule by mouth Every Morning.  Dispense: 90 capsule; Refill: 1      Patient's Body mass index is 33.99 kg/m². indicating that he is obese (BMI >30). Obesity-related health conditions include the following: hypertension and GERD. Obesity is unchanged. BMI is is above average; BMI management plan is completed. We discussed low calorie, low carb based diet program, portion control, and increasing exercise..    Follow Up   No follow-ups on file.  Patient was given instructions and counseling regarding his condition or for health maintenance advice. Please see specific information pulled into the AVS if appropriate.     This document has been electronically signed by EDDIE Mead  August 14, 2024 09:57 EDT

## 2024-08-27 ENCOUNTER — OFFICE VISIT (OUTPATIENT)
Dept: NEUROSURGERY | Facility: CLINIC | Age: 59
End: 2024-08-27
Payer: COMMERCIAL

## 2024-08-27 VITALS — HEIGHT: 72 IN | BODY MASS INDEX: 33.72 KG/M2 | TEMPERATURE: 98.2 F | WEIGHT: 249 LBS

## 2024-08-27 DIAGNOSIS — M47.22 CERVICAL SPONDYLOSIS WITH RADICULOPATHY: ICD-10-CM

## 2024-08-27 DIAGNOSIS — M54.2 NECK PAIN: Primary | ICD-10-CM

## 2024-08-27 DIAGNOSIS — M47.819 FACET ARTHROPATHY: ICD-10-CM

## 2024-08-27 DIAGNOSIS — M50.30 DDD (DEGENERATIVE DISC DISEASE), CERVICAL: ICD-10-CM

## 2024-08-27 PROCEDURE — 99214 OFFICE O/P EST MOD 30 MIN: CPT | Performed by: NEUROLOGICAL SURGERY

## 2024-08-27 RX ORDER — MELOXICAM 15 MG/1
15 TABLET ORAL DAILY
Qty: 30 TABLET | Refills: 2 | Status: SHIPPED | OUTPATIENT
Start: 2024-08-27

## 2024-08-27 NOTE — PROGRESS NOTES
Patient: Avery Watson  : 1965    Primary Care Provider: Luly Jimenez APRN    Requesting Provider: As above        History    Chief Complaint: Neck and left arm pain.    History of Present Illness: Mr. Watson is a 59-year-old gentleman who is known to our service.  Earlier in the year I placed a thoracic [employer] epidural spinal cord stimulator.  After a bout of coughing it was noted that his lead had backed out and that was repositioned on 3/18/2024.  His main complaint today is a several year history of progressive neck pain.  That has been worse over the last 3 months in particular.  Pain will extend into the left shoulder and occasionally more distally into the arm.  He has no numbness or tingling but does report some diminished  at times.  He has no right arm symptoms.  Physical therapy with traction was not particularly helpful.  In general he is better lying down.    Review of Systems   Constitutional:  Positive for activity change. Negative for appetite change, chills, diaphoresis, fatigue, fever and unexpected weight change.   HENT:  Negative for congestion, dental problem, drooling, ear discharge, ear pain, facial swelling, hearing loss, mouth sores, nosebleeds, postnasal drip, rhinorrhea, sinus pressure, sinus pain, sneezing, sore throat, tinnitus, trouble swallowing and voice change.    Eyes:  Negative for photophobia, pain, discharge, redness, itching and visual disturbance.   Respiratory:  Negative for apnea, cough, choking, chest tightness, shortness of breath, wheezing and stridor.    Cardiovascular:  Negative for chest pain, palpitations and leg swelling.   Gastrointestinal:  Negative for abdominal distention, abdominal pain, anal bleeding, blood in stool, constipation, diarrhea, nausea, rectal pain and vomiting.   Endocrine: Negative for cold intolerance, heat intolerance, polydipsia, polyphagia and polyuria.   Genitourinary:  Negative for decreased urine volume,  "difficulty urinating, dysuria, enuresis, flank pain, frequency, genital sores, hematuria, penile discharge, penile pain, penile swelling, scrotal swelling, testicular pain and urgency.   Musculoskeletal:  Positive for neck pain and neck stiffness. Negative for arthralgias, back pain, gait problem, joint swelling and myalgias.   Skin:  Negative for color change, pallor, rash and wound.   Allergic/Immunologic: Negative for environmental allergies, food allergies and immunocompromised state.   Neurological:  Positive for dizziness, light-headedness, numbness and headaches. Negative for tremors, seizures, syncope, facial asymmetry, speech difficulty and weakness.   Hematological:  Negative for adenopathy. Does not bruise/bleed easily.   Psychiatric/Behavioral:  Positive for sleep disturbance. Negative for agitation, behavioral problems, confusion, decreased concentration, dysphoric mood, hallucinations, self-injury and suicidal ideas. The patient is not nervous/anxious and is not hyperactive.        The patient's past medical history, past surgical history, family history, and social history have been reviewed at length in the electronic medical record.      Physical Exam:   Temp 98.2 °F (36.8 °C) (Infrared)   Ht 182.9 cm (72\")   Wt 113 kg (249 lb)   BMI 33.77 kg/m²   CONSTITUTIONAL: Patient is well-nourished, pleasant and appears stated age.  MUSCULOSKELETAL:  Neck tenderness to palpation is not observed.   ROM in the neck is normal.  NEUROLOGICAL:  Orientation, memory, attention span, language function, and cognition have been examined and are intact.  Strength is intact in the upper and lower extremities to direct testing.  Muscle tone is normal throughout.  Station and gait are normal.  Sensation is intact to light touch testing throughout.  Deep tendon reflexes are 2+ and symmetrical.  Kamari's Sign is negative bilaterally. No clonus is elicited.  Coordination is intact.      Medical Decision Making    Data " Review:   (All imaging studies were personally reviewed unless stated otherwise)  MRI of the cervical spine dated 8/2/2024 demonstrates some diffuse spondylosis and disc disease.  There are some broad-based spurring that narrows the recesses at C5-6 and C6-7.    Diagnosis:   1.  Cervical spondylosis with mechanical neck pain.  2.  Cervical radiculopathy.    Treatment Options:   I discussed treatment options with the patient.  Ultimately I have prescribed meloxicam.  He is going to sit tight other than that.  If his symptoms become more intolerable then he will call me and I met we will make a referral to Dr. Gallego for some cervical blocks.  Surgery on his neck is an option but this would be more geared towards his arm pain as opposed to his mechanical neck pain.      Scribed for Aquiles Grigsby MD by Kamilla Acosta CMA on 8/27/2024 11:35 EDT       I, Dr. Grigsby, personally performed the services described in the documentation, as scribed in my presence, and it is both accurate and complete.

## 2024-09-03 ENCOUNTER — OFFICE VISIT (OUTPATIENT)
Dept: UROLOGY | Facility: CLINIC | Age: 59
End: 2024-09-03
Payer: COMMERCIAL

## 2024-09-03 VITALS
HEIGHT: 72 IN | HEART RATE: 69 BPM | SYSTOLIC BLOOD PRESSURE: 143 MMHG | DIASTOLIC BLOOD PRESSURE: 89 MMHG | BODY MASS INDEX: 34.81 KG/M2 | WEIGHT: 257 LBS

## 2024-09-03 DIAGNOSIS — R97.20 BPH WITH ELEVATED PSA: Primary | ICD-10-CM

## 2024-09-03 DIAGNOSIS — N40.0 BPH WITH ELEVATED PSA: Primary | ICD-10-CM

## 2024-09-03 DIAGNOSIS — R97.20 ELEVATED PROSTATE SPECIFIC ANTIGEN (PSA): ICD-10-CM

## 2024-09-03 PROCEDURE — 84153 ASSAY OF PSA TOTAL: CPT | Performed by: UROLOGY

## 2024-09-03 NOTE — PROGRESS NOTES
Chief Complaint:    BPH    HPI:   59 y.o. male turns today he is here for follow-up and elevated PSA reports no complaints or problems negative family history for prostate cancer he reports no lower urinary tract symptomatology, particularly irritative symptoms such as frequency, urgency, dysuria, and obstructive symptomatology, particularly dribbling, hesitancy, and intermittency.  His MRI was a low probability of prostate cancer.    Past Medical History:     Past Medical History:   Diagnosis Date    Acid reflux     Chronic pain disorder     Depression     Elevated cholesterol     Essential hypertension 10/27/2023    Extremity pain     Joint pain     Low back pain     Lumbosacral disc disease     Migraine     Muscle spasm     Neck pain     Osteoarthritis     PONV (postoperative nausea and vomiting)     Rheumatoid arthritis        Current Meds:     Current Outpatient Medications   Medication Sig Dispense Refill    atorvastatin (LIPITOR) 10 MG tablet Take 1 tablet by mouth Daily. 90 tablet 3    baclofen (LIORESAL) 10 MG tablet Take 2 tablets by mouth at bedtime 180 tablet 4    famotidine (Pepcid) 40 MG tablet Take 1 tablet by mouth twice daily. 180 tablet 4    finasteride (PROSCAR) 5 MG tablet Take 1 tablet by mouth Daily. 30 tablet 5    FLUoxetine (PROzac) 40 MG capsule Take 1 capsule by mouth Every Morning. 90 capsule 1    fluticasone (FLONASE) 50 MCG/ACT nasal spray Instill 2 sprays in each nostril daily as directed by provider. 16 mL 2    hydrocortisone (ANUSOL-HC) 25 MG suppository Use 1 suppository rectally 2 times a day  as needed. 30 suppository 11    levocetirizine (XYZAL) 5 MG tablet Take 1 tablet by mouth Every Evening. 90 tablet 0    meloxicam (MOBIC) 15 MG tablet Take 1 tablet by mouth Daily. 30 tablet 2    metoprolol tartrate (LOPRESSOR) 25 MG tablet Take 1 tablet by mouth 2 (Two) Times a Day. 60 tablet 11    RABEprazole (ACIPHEX) 20 MG EC tablet Take 1 tablet by mouth 15 minutes before breakfast and  "supper. 60 tablet 11    Syringe 25G X 5/8\" 3 ML misc Use as directed 2 x weekly 24 each 3    tamsulosin (FLOMAX) 0.4 MG capsule 24 hr capsule Take 2 capsules by mouth Daily. 180 capsule 3    Testosterone Cypionate (Depo-Testosterone) 200 MG/ML injection Inject 0.5 mL subcutaneously every Monday and Thursday. 10 mL 2    topiramate (Topamax) 25 MG tablet Take 1 tablet by mouth Daily. 30 tablet 2     No current facility-administered medications for this visit.        Allergies:      No Known Allergies     Past Surgical History:     Past Surgical History:   Procedure Laterality Date    CARPAL TUNNEL RELEASE Right 08/04/2016    Procedure: CARPAL TUNNEL RELEASE;  Surgeon: Yoel Lua MD;  Location:  [facility];  Service:     CARPAL TUNNEL RELEASE Left 11/09/2017    Procedure: CARPAL TUNNEL RELEASE;  Surgeon: Yoel Lua MD;  Location:  [facility];  Service:     COLONOSCOPY      ENDOSCOPY N/A 7/24/2024    Procedure: ESOPHAGOGASTRODUODENOSCOPY WITH BIOPSY;  Surgeon: Valerie Miranda MD;  Location:  [facility];  Service: Gastroenterology;  Laterality: N/A;    LUMBAR DISCECTOMY Right 09/22/2022    Procedure: LUMBAR DISCECTOMY L4-5 RIGHT;  Surgeon: Aquiles Grigsby MD;  Location:  [facility];  Service: Neurosurgery;  Laterality: Right;    LUMBAR FACET INJECTION      RHIZOTOMY      SPINAL CORD STIMULATOR IMPLANT N/A 02/26/2024    Procedure: SPINAL CORD STIMULATOR INSERTION PHASE 1;  Surgeon: Aquiles Grigsby MD;  Location:  [facility];  Service: Neurosurgery;  Laterality: N/A;    SPINAL CORD STIMULATOR IMPLANT N/A 03/18/2024    Procedure: SPINAL CORD STIMULATOR LEAD REVISION;  Surgeon: Aquiles Grigsby MD;  Location:  [facility];  Service: Neurosurgery;  Laterality: N/A;    SPINAL CORD STIMULATOR TRIAL W/ LAMINOTOMY      TOE SURGERY Right 03/04/2015    \"flexible joint\" per pt -- no hardware    UPPER GASTROINTESTINAL ENDOSCOPY         Social History:     Social History     Socioeconomic History    Marital " status:    Tobacco Use    Smoking status: Never    Smokeless tobacco: Former     Types: Chew    Tobacco comments:     uses chewing tobacco   Vaping Use    Vaping status: Never Used   Substance and Sexual Activity    Alcohol use: No    Drug use: No    Sexual activity: Defer       Family History:     Family History   Problem Relation Age of Onset    Lung cancer Mother     Cancer Mother         ovarian    Diabetes Sister     Cancer Sister         breast    Heart disease Brother     Rheum arthritis Brother     Arthritis Brother        Review of Systems:     Review of Systems   Constitutional: Negative.    HENT: Negative.     Eyes: Negative.    Respiratory: Negative.     Cardiovascular: Negative.    Gastrointestinal: Negative.    Endocrine: Negative.    Musculoskeletal: Negative.    Allergic/Immunologic: Negative.    Neurological: Negative.    Hematological: Negative.    Psychiatric/Behavioral: Negative.         Physical Exam:     Physical Exam  Vitals and nursing note reviewed.   Constitutional:       Appearance: He is well-developed.   HENT:      Head: Normocephalic and atraumatic.   Eyes:      Conjunctiva/sclera: Conjunctivae normal.      Pupils: Pupils are equal, round, and reactive to light.   Cardiovascular:      Rate and Rhythm: Normal rate and regular rhythm.      Heart sounds: Normal heart sounds.   Pulmonary:      Effort: Pulmonary effort is normal.      Breath sounds: Normal breath sounds.   Abdominal:      General: Bowel sounds are normal.      Palpations: Abdomen is soft.   Musculoskeletal:         General: Normal range of motion.      Cervical back: Normal range of motion.   Skin:     General: Skin is warm and dry.   Neurological:      Mental Status: He is alert and oriented to person, place, and time.      Deep Tendon Reflexes: Reflexes are normal and symmetric.   Psychiatric:         Behavior: Behavior normal.         Thought Content: Thought content normal.         Judgment: Judgment normal.          I have reviewed the following portions of the patient's history: Allergies, current medications, past family history, past medical history, past social history, past surgical history, problem list, and ROS and confirm it is accurate.    Recent Image (CT and/or KUB):      CT Abdomen and Pelvis: No results found for this or any previous visit.       CT Stone Protocol: Results for orders placed during the hospital encounter of 08/14/22    CT Abdomen Pelvis Stone Protocol    Narrative  EXAM: CT ABDOMEN PELVIS STONE PROTOCOL-      TECHNIQUE: Multiple axial CT images were obtained from lung bases  through pubic symphysis WITHOUT administration of IV contrast.  Reformatted images in the coronal and/or sagittal plane(s) were  generated from the axial data set to facilitate diagnostic accuracy  and/or surgical planning.  Oral Contrast:NONE.    Radiation dose reduction techniques were utilized per ALARA protocol.  Automated exposure control was initiated through either or [employer] or  [employer] software packages by  protocol.  DOSE:    Clinical information Flank pain, kidney stone suspected    Comparison 01/21/2019    FINDINGS:    Lower thorax: Clear. No effusions.    Abdomen:    Liver: Homogeneous. No focal hepatic mass or ductal dilatation.    Gallbladder: No dilation or stone identified.    Pancreas: Unremarkable. No mass or ductal dilatation.    Spleen: Homogeneous. No splenomegaly.    Adrenals: No mass.    Kidneys/ureters: No mass. No obstructive uropathy.  No evidence of  urolithiasis.    GI tract: Moderate to large stool burden. There is no evidence of  appendicitis    MESENTERY: No free fluid, walled off fluid collections, mesenteric  stranding, or enlarged lymph nodes      Vasculature: Evidence of atherosclerotic vascular disease    Abdominal wall: No focal hernia or mass.      Bladder: Mild thickening of the urinary bladder wall    Reproductive: Prostate enlargement    Bones: Arthritic change in  the spine    Impression  1. No obstructive uropathy. Mild thickening of the urinary bladder wall    2.Prostate enlargement  3. Other findings as above              This report was finalized on 2022 1:46 PM by Dr. Kvng Thomas MD.       KUB: No results found for this or any previous visit.       Labs (past 3 months):      Admission on 2024, Discharged on 2024   Component Date Value Ref Range Status    Reference Lab Report 2024    Final                    Value:[facility]  [address] [place] [address]  Phone: [phone] or [phone]  Fax: [phone]  James Benton M.D., Medical Director    PATIENT NAME                                     LABORATORY NO.  AGUILA REAVES.                           [id number]  [id number]                                 AGE                    SEX   SSN              CLIENT REF #  [facility]                      58        1965           [id number]      [id number]     [employer]                             REQUESTING M.D.           ATTENDING [place] [address]                           LEILA MCCULLOUGH  DATE COLLECTED            DATE RECEIVED          DATE REPORTED  2024    DIAGNOSIS:  ESOPHAGUS, BIOPSY, DISTAL:  Reactive esophageal mucosa with scant chronic inflammation  No intestinal metaplasia, dysplasia or increased eosinophilic                           inflammation  identified    CAM    CLINICAL HISTORY:  Gastroesophageal reflux disease with esophagitis without hemorrhage    SPECIMENS RECEIVED:  ESOPHAGUS, BIOPSY, DISTAL    MICROSCOPIC DESCRIPTION:  Tissue blocks are prepared and slides are examined microscopically. See  diagnosis for details.    Professional interpretation rendered by Karine Reyes M.D., F.C.A.P. at  [facility],  [place] [address].    GROSS DESCRIPTION:  Labeled as  "\"distal esophagus\", consisting of 2 pieces of tan soft tissue measuring  0.6 x 0.4 x 0.1 cm, submitted entirely in 1 cassette.  SOG    REVIEWED, DIAGNOSED AND ELECTRONICALLY  SIGNED BY:    Karine Reyes M.D., JOSE  CPT CODES:  72943          Procedure:       Assessment/Plan:   Elevated prostate specific antigen-we discussed the diagnosis of elevated prostate-specific antigen.  I explained the pathophysiology of PSA.  It is a serine protease that's function in the male reproductive tract is to facilitate the liquefaction of semen.  It is for this reason the body does not want it freely floating in the serum and why typically bound tightly to albumin.  We discussed why we used both a PSA free and total to determine the need for more aggressive therapy. I discussed the normal range.  Additionally, it was in the range of 1 to 4, but more recently has been downgraded to something less than 2 or even approaching 1.  I discussed the risk of family history, particularly the fact that the average male has a 14% risk of prostate cancer and that in the face of a positive diagnosis in a father it will tablet and any other first-generation relative continued tablet insofar that a father and brother with prostate cancer will produce almost a 50% risk of prostate cancer.  I discussed the use of the temporal use of PSA as the best option for monitoring.  We also discussed the fact that an elevated PSA is an isolated event does not mean that this is prostate cancer and should not engender worry in this regard. I discussed other things that can elevate PSA including constipation, prostatitis, infection, recent intercourse etc., as well as the risks and benefits associated with this.  Also discussed the fact that this is with a dilutional test and as a consequence of such were present produce slight variations on a single specimen.  Further discussed the risks and benefits of a prostate biopsy as well.          This document " has been electronically signed by LYNDA CANNON MD September 3, 2024 11:06 EDT    Dictated Utilizing Dragon Dictation: Part of this note may be an electronic transcription/translation of spoken language to printed text using the Dragon Dictation System.

## 2024-09-04 LAB — PSA SERPL-MCNC: 3.57 NG/ML (ref 0–4)

## 2024-09-17 ENCOUNTER — OFFICE VISIT (OUTPATIENT)
Dept: FAMILY MEDICINE CLINIC | Facility: CLINIC | Age: 59
End: 2024-09-17
Payer: COMMERCIAL

## 2024-09-17 ENCOUNTER — TELEPHONE (OUTPATIENT)
Dept: UROLOGY | Facility: CLINIC | Age: 59
End: 2024-09-17

## 2024-09-17 VITALS
WEIGHT: 256.8 LBS | BODY MASS INDEX: 34.78 KG/M2 | OXYGEN SATURATION: 98 % | HEIGHT: 72 IN | SYSTOLIC BLOOD PRESSURE: 132 MMHG | DIASTOLIC BLOOD PRESSURE: 80 MMHG | HEART RATE: 65 BPM | TEMPERATURE: 97.5 F

## 2024-09-17 DIAGNOSIS — N41.1 PROSTATITIS, CHRONIC: ICD-10-CM

## 2024-09-17 DIAGNOSIS — R30.0 DYSURIA: Primary | ICD-10-CM

## 2024-09-17 LAB
BILIRUB BLD-MCNC: NEGATIVE MG/DL
CLARITY, POC: CLEAR
COLOR UR: YELLOW
EXPIRATION DATE: NORMAL
GLUCOSE UR STRIP-MCNC: NEGATIVE MG/DL
KETONES UR QL: NEGATIVE
LEUKOCYTE EST, POC: NEGATIVE
Lab: NORMAL
NITRITE UR-MCNC: NEGATIVE MG/ML
PH UR: 6 [PH] (ref 5–8)
PROT UR STRIP-MCNC: NEGATIVE MG/DL
RBC # UR STRIP: NEGATIVE /UL
SP GR UR: 1 (ref 1–1.03)
UROBILINOGEN UR QL: NORMAL

## 2024-09-17 PROCEDURE — 99213 OFFICE O/P EST LOW 20 MIN: CPT | Performed by: FAMILY MEDICINE

## 2024-09-17 PROCEDURE — 87086 URINE CULTURE/COLONY COUNT: CPT | Performed by: FAMILY MEDICINE

## 2024-09-17 PROCEDURE — 81003 URINALYSIS AUTO W/O SCOPE: CPT | Performed by: FAMILY MEDICINE

## 2024-09-17 RX ORDER — NITROFURANTOIN 25; 75 MG/1; MG/1
100 CAPSULE ORAL 2 TIMES DAILY
Qty: 6 CAPSULE | Refills: 0 | Status: SHIPPED | OUTPATIENT
Start: 2024-09-17

## 2024-09-17 NOTE — TELEPHONE ENCOUNTER
Caller: Avery Watson    Relationship: Self    Best call back number: [phone]    Requested Prescriptions: TESTOSTERONE CYPIONATE 200MG/ML INJECTION  Requested Prescriptions      No prescriptions requested or ordered in this encounter        Pharmacy where request should be sent:   PHARMACY IN [place]    Last office visit with prescribing clinician: 9/3/2024   Last telemedicine visit with prescribing clinician: Visit date not found   Next office visit with prescribing clinician: Visit date not found     Additional details provided by patient: PT WENT TO PICK THIS UP AND THEY WERE GOING TO CHARGE MORE THAN USUAL. THEY INFORMED HIM IT NEEDS A PRIOR AUTH. THANK YOU    Does the patient have less than a 3 day supply:  [x] Yes  [] No    Would you like a call back once the refill request has been completed: [] Yes [x] No    If the office needs to give you a call back, can they leave a voicemail: [] Yes [x] No    Nehemiah Ellison Rep   09/17/24 11:57 EDT

## 2024-09-18 ENCOUNTER — TELEPHONE (OUTPATIENT)
Dept: UROLOGY | Facility: CLINIC | Age: 59
End: 2024-09-18
Payer: COMMERCIAL

## 2024-09-18 LAB — BACTERIA SPEC AEROBE CULT: NO GROWTH

## 2024-09-25 ENCOUNTER — TELEPHONE (OUTPATIENT)
Dept: FAMILY MEDICINE CLINIC | Facility: CLINIC | Age: 59
End: 2024-09-25

## 2024-09-25 NOTE — TELEPHONE ENCOUNTER
Caller: ANITHA( [facility])    Relationship: Provider    Best call back number: [phone]     Who are you requesting to speak with (clinical staff, provider,  specific staff member): CLINICAL STAFF      What was the call regarding: ANITHA WITH [facility] CALLED TO ADVISE SHE WILL BE CLOSING REFERRAL PLACED BY JYOTI VELIZ, DUE TO PATIENT DENYING CARE.

## 2024-09-30 ENCOUNTER — OFFICE VISIT (OUTPATIENT)
Dept: GASTROENTEROLOGY | Facility: CLINIC | Age: 59
End: 2024-09-30
Payer: COMMERCIAL

## 2024-09-30 VITALS
DIASTOLIC BLOOD PRESSURE: 89 MMHG | BODY MASS INDEX: 34.81 KG/M2 | HEIGHT: 72 IN | HEART RATE: 68 BPM | WEIGHT: 257 LBS | SYSTOLIC BLOOD PRESSURE: 138 MMHG

## 2024-09-30 DIAGNOSIS — R10.13 EPIGASTRIC PAIN: ICD-10-CM

## 2024-09-30 DIAGNOSIS — K21.00 GASTROESOPHAGEAL REFLUX DISEASE WITH ESOPHAGITIS WITHOUT HEMORRHAGE: Primary | ICD-10-CM

## 2024-09-30 RX ORDER — SUCRALFATE 1 G/1
1 TABLET ORAL 2 TIMES DAILY
Qty: 28 TABLET | Refills: 0 | Status: SHIPPED | OUTPATIENT
Start: 2024-09-30

## 2024-09-30 NOTE — PROGRESS NOTES
Chief Complaint  GERD    Avery Watson is a 59 y.o. male who presents today to [facility] for GERD.    HPI:   58-year-old male who presents today for evaluation of GERD.  Patient is previously been followed by Dr. Pascual.  He had an EGD performed on 1/20/2024.  Was found to have reflux esophagitis with extensive erosions extending to-3 cm proximal to the Z-line, biopsies taken from the GE junction were suggestive of erosive esophagitis negative for intestinal metaplasia.  There was mild erosive antral gastritis and duodenitis with superficial duodenal ulcerations, biopsies taken from the antrum were negative for H. pylori.  Patient was initiated on Vonoprazan 20 mg once daily.  Unfortunately, this did not help.  Eventually he was initiated on Aciphex 20 mg daily and famotidine 40 mg twice daily.  Initial plan was to have a follow-up EGD to evaluate for healing of extensive esophageal erosions.  However, he states that his insurance would not for a follow-up appointment with Dr. Pascual.  States that his EGD will be covered if it is performed at [facility].     Today, he reports that he has been compliant with Aciphex and famotidine.  He is having multiple flares each day.  States that there are no certain times that the acid is worse.  He does drink 1-2 sodas and drinks coffee each morning.  States that he has tried to avoid trigger foods.  In the past, he has tried Protonix, omeprazole, and Dexilant all of which do not seem to help.  States that he will have a burning epigastric pain occasionally 1-2 times each week.  This pain is not correlated with food.  He denies any changes in his bowel movements.  He denies unintentional weight loss, nausea, vomiting, melena, and hematochezia.     Of note, he reports colonoscopy performed by Dr. Pascual earlier this year or last year.  He reports that it was normal.  Will obtain these records.         Interval  "History:    Today, patient reports that he is not doing any better.  He reports that he is continued to have \"hourly acid reflux flares.\"  States that occurs at different times and feels like \"my stomach is on fire.\"  The acid often comes up into his throat close to his pharynx.  He states that he feels that tickled the back of his throat and often has to clear his throat to have this sensation alleviated.  He is currently on Aciphex and Pepcid daily.  He states this is not helping.  He was started on baclofen at bedtime.  He has not noticed a change.  He had an EGD performed on 7/24/2024 that was largely unremarkable.  Sequently, gastric emptying study was performed on 8/12/2024 and was within normal limits.  Patient reports in the past he has tried Protonix 40 mg twice daily, omeprazole twice daily, Nexium, Prevacid, Dexilant, Voquezna, Pepcid, and now Aciphex.  All of which poorly controlled his acid reflux.  He denies unintentional weight loss, nausea, vomiting, hematemesis, changes in bowel habits, melena, and hematochezia.    Previous History:   Past Medical History:   Diagnosis Date    Acid reflux     Chronic pain disorder     Depression     Elevated cholesterol     Essential hypertension 10/27/2023    Extremity pain     Joint pain     Low back pain     Lumbosacral disc disease     Migraine     Muscle spasm     Neck pain     Osteoarthritis     PONV (postoperative nausea and vomiting)     Rheumatoid arthritis       Past Surgical History:   Procedure Laterality Date    CARPAL TUNNEL RELEASE Right 08/04/2016    Procedure: CARPAL TUNNEL RELEASE;  Surgeon: Yoel Lua MD;  Location: [facility];  Service:     CARPAL TUNNEL RELEASE Left 11/09/2017    Procedure: CARPAL TUNNEL RELEASE;  Surgeon: Yoel Lua MD;  Location: [facility] OR;  Service:     COLONOSCOPY      ENDOSCOPY N/A 7/24/2024    Procedure: ESOPHAGOGASTRODUODENOSCOPY WITH BIOPSY;  Surgeon: Valerie Miranda MD;  Location: [facility]" "OR;  Service: Gastroenterology;  Laterality: N/A;    LUMBAR DISCECTOMY Right 09/22/2022    Procedure: LUMBAR DISCECTOMY L4-5 RIGHT;  Surgeon: Aquiles Grigsby MD;  Location:  [facility] OR;  Service: Neurosurgery;  Laterality: Right;    LUMBAR FACET INJECTION      RHIZOTOMY      SPINAL CORD STIMULATOR IMPLANT N/A 02/26/2024    Procedure: SPINAL CORD STIMULATOR INSERTION PHASE 1;  Surgeon: Aquiles Grigsby MD;  Location:  [facility] OR;  Service: Neurosurgery;  Laterality: N/A;    SPINAL CORD STIMULATOR IMPLANT N/A 03/18/2024    Procedure: SPINAL CORD STIMULATOR LEAD REVISION;  Surgeon: Aquiles Grigsby MD;  Location:  [facility] OR;  Service: Neurosurgery;  Laterality: N/A;    SPINAL CORD STIMULATOR TRIAL W/ LAMINOTOMY      TOE SURGERY Right 03/04/2015    \"flexible joint\" per pt -- no hardware    UPPER GASTROINTESTINAL ENDOSCOPY        Social History     Socioeconomic History    Marital status:    Tobacco Use    Smoking status: Never    Smokeless tobacco: Former     Types: Chew    Tobacco comments:     uses chewing tobacco   Vaping Use    Vaping status: Never Used   Substance and Sexual Activity    Alcohol use: No    Drug use: No    Sexual activity: Defer        Current Medications:  Current Outpatient Medications   Medication Sig Dispense Refill    atorvastatin (LIPITOR) 10 MG tablet Take 1 tablet by mouth Daily. 90 tablet 3    baclofen (LIORESAL) 10 MG tablet Take 2 tablets by mouth at bedtime 180 tablet 4    famotidine (Pepcid) 40 MG tablet Take 1 tablet by mouth twice daily. 180 tablet 4    finasteride (PROSCAR) 5 MG tablet Take 1 tablet by mouth Daily. 30 tablet 5    FLUoxetine (PROzac) 40 MG capsule Take 1 capsule by mouth Every Morning. 90 capsule 1    fluticasone (FLONASE) 50 MCG/ACT nasal spray Instill 2 sprays into each nostril daily as directed by provider 16 g 1    hydrocortisone (ANUSOL-HC) 25 MG suppository Use 1 suppository rectally 2 times a day  as needed. 30 suppository 11    levocetirizine (XYZAL) 5 MG " "tablet Take 1 tablet by mouth Every Evening. 90 tablet 0    meloxicam (MOBIC) 15 MG tablet Take 1 tablet by mouth Daily. 30 tablet 2    metoprolol tartrate (LOPRESSOR) 25 MG tablet Take 1 tablet by mouth 2 (Two) Times a Day. 60 tablet 11    nitrofurantoin, macrocrystal-monohydrate, (Macrobid) 100 MG capsule Take 1 capsule by mouth 2 (Two) Times a Day. 6 capsule 0    RABEprazole (ACIPHEX) 20 MG EC tablet Take 1 tablet by mouth 15 minutes before breakfast and supper. 60 tablet 11    Syringe 25G X 5/8\" 3 ML misc Use as directed 2 x weekly 24 each 3    tamsulosin (FLOMAX) 0.4 MG capsule 24 hr capsule Take 2 capsules by mouth Daily. 180 capsule 3    Testosterone Cypionate (Depo-Testosterone) 200 MG/ML injection Inject 0.5 mL subcutaneously every Monday and Thursday. 10 mL 2    topiramate (Topamax) 25 MG tablet Take 1 tablet by mouth Daily. 30 tablet 2    sucralfate (Carafate) 1 g tablet Take 1 tablet by mouth 2 (Two) Times a Day. 28 tablet 0     No current facility-administered medications for this visit.       Allergies:   No Known Allergies    Vitals:   /89   Pulse 68   Ht 182.9 cm (72\")   Wt 117 kg (257 lb)   BMI 34.86 kg/m²   Estimated body mass index is 34.86 kg/m² as calculated from the following:    Height as of this encounter: 182.9 cm (72\").    Weight as of this encounter: 117 kg (257 lb).    ROS:   Review of Systems   Constitutional: Negative.    HENT: Negative.     Respiratory: Negative.     Cardiovascular: Negative.    Gastrointestinal: Negative.         Acid reflux.   All other systems reviewed and are negative.       Physical Exam:   Physical Exam  Vitals reviewed.   Constitutional:       Appearance: Normal appearance. He is obese.   HENT:      Head: Normocephalic and atraumatic.      Nose: Nose normal.      Mouth/Throat:      Mouth: Mucous membranes are moist.   Eyes:      Extraocular Movements: Extraocular movements intact.   Cardiovascular:      Rate and Rhythm: Normal rate and regular rhythm. "      Pulses: Normal pulses.      Heart sounds: Normal heart sounds.   Pulmonary:      Effort: Pulmonary effort is normal.      Breath sounds: Normal breath sounds.   Abdominal:      General: Abdomen is flat. Bowel sounds are normal. There is no distension.      Palpations: Abdomen is soft. There is no mass.      Tenderness: There is no abdominal tenderness. There is no guarding or rebound.      Hernia: No hernia is present.   Musculoskeletal:      Cervical back: Normal range of motion.   Skin:     General: Skin is warm and dry.   Neurological:      Mental Status: He is alert and oriented to person, place, and time.   Psychiatric:         Mood and Affect: Mood normal.         Behavior: Behavior normal.         Thought Content: Thought content normal.          Lab Results:   Lab Results   Component Value Date    WBC 6.28 05/14/2024    HGB 15.6 05/14/2024    HCT 46.7 05/14/2024    MCV 80.2 05/14/2024    RDW 16.6 (H) 05/14/2024     05/14/2024    NEUTRORELPCT 54.1 05/14/2024    LYMPHORELPCT 31.7 05/14/2024    MONORELPCT 9.7 05/14/2024    EOSRELPCT 3.8 05/14/2024    BASORELPCT 0.5 05/14/2024    NEUTROABS 3.40 05/14/2024    LYMPHSABS 1.99 05/14/2024       Lab Results   Component Value Date     05/14/2024    K 4.6 05/14/2024    CO2 24.7 05/14/2024     05/14/2024    BUN 15 05/14/2024    CREATININE 1.18 05/14/2024    EGFRIFNONA 71 05/03/2021    GLUCOSE 95 05/14/2024    CALCIUM 9.5 05/14/2024    ALKPHOS 67 05/14/2024    AST 32 05/14/2024    ALT 40 05/14/2024    BILITOT 0.5 05/14/2024    ALBUMIN 4.6 05/14/2024    PROTEINTOT 7.0 05/14/2024    MG 2.3 08/13/2020       Pathology:        Endoscopy:        Imaging:   NM Gastric Emptying    Result Date: 8/12/2024    Gastric time to half MD 64 minutes and at 90 minutes there is 34% gastric retention.   This report was finalized on 8/12/2024 3:59 PM by Dr. Remy Le MD.      MRI Cervical Spine Without Contrast    Result Date: 8/2/2024  Disc bulges at C5-6 and C6-7  causing moderate bilateral foraminal narrowing    This report was finalized on 8/2/2024 8:35 AM by Dr. Kvng Thomas MD.         Results review: During today's encounter, all relevant clinical data has been reviewed.      Assessment and Plan    1. Gastroesophageal reflux disease with esophagitis without hemorrhage (Primary)  At this point, all resources have been exhausted.  Patient has tried and failed Protonix, omeprazole, Nexium, Prevacid, Pepcid, Aciphex, Voquezna, and Dexilant.  Will refer patient to general surgery for pH studies and for consultation for possible Niesen fundoplication.  -     Ambulatory Referral to General Surgery  -     FL ESOPHAGRAM SINGLE CONTRAST; Future  -     sucralfate (Carafate) 1 g tablet; Take 1 tablet by mouth 2 (Two) Times a Day.  Dispense: 28 tablet; Refill: 0    2. Epigastric pain  Given ongoing epigastric pain, will test for H. pylori.  Patient has been provided with stool sample kit.  Will trial Carafate 1 g twice daily x 2 weeks.  -     H. Pylori Antigen, Stool - Stool, Per Rectum  -     sucralfate (Carafate) 1 g tablet; Take 1 tablet by mouth 2 (Two) Times a Day.  Dispense: 28 tablet; Refill: 0    New Medications:   New Medications Ordered This Visit   Medications    sucralfate (Carafate) 1 g tablet     Sig: Take 1 tablet by mouth 2 (Two) Times a Day.     Dispense:  28 tablet     Refill:  0       Discontinued Medications:   There are no discontinued medications.     Visit Diagnoses:    ICD-10-CM ICD-9-CM   1. Gastroesophageal reflux disease with esophagitis without hemorrhage  K21.00 530.81     530.10   2. Epigastric pain  R10.13 789.06            Follow Up:   Return in about 6 weeks (around 11/11/2024).    The patient was in agreement with the plan and all questions were answered to patient's satisfaction.        This document has been electronically signed by Savannah Morales PA-C   September 30, 2024 15:02 EDT    Dictated Utilizing Dragon Dictation: Part of this note may be  an electronic transcription/translation of spoken language to printed text using the Dragon Dictation System.    CC:  No ref. provider found  Luly Jimenez APRN

## 2024-10-02 ENCOUNTER — OFFICE VISIT (OUTPATIENT)
Dept: SURGERY | Facility: CLINIC | Age: 59
End: 2024-10-02
Payer: COMMERCIAL

## 2024-10-02 VITALS
HEIGHT: 72 IN | DIASTOLIC BLOOD PRESSURE: 89 MMHG | SYSTOLIC BLOOD PRESSURE: 138 MMHG | WEIGHT: 255.4 LBS | BODY MASS INDEX: 34.59 KG/M2

## 2024-10-02 DIAGNOSIS — K21.00 GASTROESOPHAGEAL REFLUX DISEASE WITH ESOPHAGITIS WITHOUT HEMORRHAGE: Primary | ICD-10-CM

## 2024-10-02 NOTE — PROGRESS NOTES
Subjective   Avery Watson is a 59 y.o. male is being seen for consultation today at the request of Luly Jimenez APRN    Avery Watson is a 59 y.o. male     History of Present Illness  The patient presents for evaluation of heartburn and acid reflux.    He has been experiencing heartburn and acid reflux for the past 20 to 30 years. A previous endoscopy revealed esophagitis. He is currently on AcipHex and sucralfate for management of his symptoms. He does not smoke or chew tobacco.    Past Medical History:   Diagnosis Date    Acid reflux     Chronic pain disorder     Depression     Elevated cholesterol     Essential hypertension 10/27/2023    Extremity pain     Joint pain     Low back pain     Lumbosacral disc disease     Migraine     Muscle spasm     Neck pain     Osteoarthritis     PONV (postoperative nausea and vomiting)     Rheumatoid arthritis        Family History   Problem Relation Age of Onset    Lung cancer Mother     Cancer Mother         ovarian    Diabetes Sister     Cancer Sister         breast    Heart disease Brother     Rheum arthritis Brother     Arthritis Brother        Social History     Socioeconomic History    Marital status:    Tobacco Use    Smoking status: Never    Smokeless tobacco: Former     Types: Chew    Tobacco comments:     uses chewing tobacco   Vaping Use    Vaping status: Never Used   Substance and Sexual Activity    Alcohol use: No    Drug use: No    Sexual activity: Defer       Past Surgical History:   Procedure Laterality Date    CARPAL TUNNEL RELEASE Right 08/04/2016    Procedure: CARPAL TUNNEL RELEASE;  Surgeon: Yoel Lua MD;  Location: [facility] OR;  Service:     CARPAL TUNNEL RELEASE Left 11/09/2017    Procedure: CARPAL TUNNEL RELEASE;  Surgeon: Yoel Lua MD;  Location: [facility] OR;  Service:     COLONOSCOPY      ENDOSCOPY N/A 7/24/2024    Procedure: ESOPHAGOGASTRODUODENOSCOPY WITH BIOPSY;  Surgeon: Valerie Miranda MD;   "Location:  [facility] OR;  Service: Gastroenterology;  Laterality: N/A;    LUMBAR DISCECTOMY Right 09/22/2022    Procedure: LUMBAR DISCECTOMY L4-5 RIGHT;  Surgeon: Aquiles Grigsby MD;  Location:  [facility] OR;  Service: Neurosurgery;  Laterality: Right;    LUMBAR FACET INJECTION      RHIZOTOMY      SPINAL CORD STIMULATOR IMPLANT N/A 02/26/2024    Procedure: SPINAL CORD STIMULATOR INSERTION PHASE 1;  Surgeon: Aquiles Grigsby MD;  Location:  [facility] OR;  Service: Neurosurgery;  Laterality: N/A;    SPINAL CORD STIMULATOR IMPLANT N/A 03/18/2024    Procedure: SPINAL CORD STIMULATOR LEAD REVISION;  Surgeon: Aquiles Grigsby MD;  Location:  [facility] OR;  Service: Neurosurgery;  Laterality: N/A;    SPINAL CORD STIMULATOR TRIAL W/ LAMINOTOMY      TOE SURGERY Right 03/04/2015    \"flexible joint\" per pt -- no hardware    UPPER GASTROINTESTINAL ENDOSCOPY         Review of Systems   Constitutional:  Negative for activity change, appetite change, chills and fever.   HENT:  Negative for sore throat and trouble swallowing.    Eyes:  Negative for visual disturbance.   Respiratory:  Negative for cough and shortness of breath.    Cardiovascular:  Negative for chest pain and palpitations.   Gastrointestinal:  Negative for abdominal distention, abdominal pain, blood in stool, constipation, diarrhea, nausea and vomiting.   Endocrine: Negative for cold intolerance and heat intolerance.   Genitourinary:  Negative for dysuria.   Musculoskeletal:  Negative for joint swelling.   Skin:  Negative for color change, rash and wound.   Allergic/Immunologic: Negative for immunocompromised state.   Neurological:  Negative for dizziness, seizures, weakness and headaches.   Hematological:  Negative for adenopathy. Does not bruise/bleed easily.   Psychiatric/Behavioral:  Negative for agitation and confusion.        Results        /89   Ht 182.9 cm (72\")   Wt 116 kg (255 lb 6.4 oz)   BMI 34.64 kg/m²   Objective   Physical Exam  Constitutional:       " Appearance: He is well-developed.   HENT:      Head: Normocephalic and atraumatic.   Eyes:      Conjunctiva/sclera: Conjunctivae normal.      Pupils: Pupils are equal, round, and reactive to light.   Neck:      Thyroid: No thyromegaly.      Vascular: No JVD.      Trachea: No tracheal deviation.   Cardiovascular:      Rate and Rhythm: Normal rate and regular rhythm.      Heart sounds: No murmur heard.     No friction rub. No gallop.   Pulmonary:      Effort: Pulmonary effort is normal.      Breath sounds: Normal breath sounds.   Abdominal:      General: There is no distension.      Palpations: Abdomen is soft. There is no hepatomegaly or splenomegaly.      Tenderness: There is no abdominal tenderness.      Hernia: No hernia is present.   Musculoskeletal:         General: No deformity. Normal range of motion.      Cervical back: Neck supple.   Skin:     General: Skin is warm and dry.   Neurological:      Mental Status: He is alert and oriented to person, place, and time.       Physical Exam      Assessment & Plan  1. Reflux esophagitis with erosions.  The patient has a long-standing history of heartburn and acid reflux, diagnosed with reflux esophagitis with erosions. He has been on multiple medications, currently taking AcipHex and sucralfate, but continues to experience symptoms, indicating failed medical management. An esophagram has been ordered to evaluate for structural problems, narrowings, and hiatal hernias. Based on the results, an EGD will be scheduled to further assess the severity of the reflux and determine the need for dilation. If severe reflux is confirmed, surgical intervention with fundoplication may be considered to prevent further damage and potential progression to cancer. The patient has been informed about the minimally invasive nature of the surgery and the expected recovery process.    Follow-up  Return in 2 weeks for follow-up.          Assessment   Diagnoses and all orders for this  visit:    1. Gastroesophageal reflux disease with esophagitis without hemorrhage (Primary)        Assessment & Plan  1. Reflux esophagitis with erosions.  The patient has a long-standing history of heartburn and acid reflux, diagnosed with reflux esophagitis with erosions. He has been on multiple medications, currently taking AcipHex and sucralfate, but continues to experience symptoms, indicating failed medical management. An esophagram has been ordered to evaluate for structural problems, narrowings, and hiatal hernias. Based on the results, an EGD will be scheduled to further assess the severity of the reflux and determine the need for dilation. If severe reflux is confirmed, surgical intervention with fundoplication may be considered to prevent further damage and potential progression to cancer. The patient has been informed about the minimally invasive nature of the surgery and the expected recovery process.    Follow-up  Return in 2 weeks for follow-up.    Avery Watson is a 59 y.o. male with GERD refractory to medical management.  The patient has an esophagram ordered and after esophagram performed we will review results and discuss Bravo probe placement to evaluate and assess for possible antireflux procedure.                 This document has been electronically signed by Jonathan Galeana MD   October 2, 2024 11:46 EDT    Patient or patient representative verbalized consent for the use of Ambient Listening during the visit with  Jonathan Galeana MD for chart documentation. 10/2/2024  11:47 EDT

## 2024-10-08 ENCOUNTER — HOSPITAL ENCOUNTER (OUTPATIENT)
Dept: GENERAL RADIOLOGY | Facility: HOSPITAL | Age: 59
Discharge: HOME | End: 2024-10-08
Payer: COMMERCIAL

## 2024-10-08 DIAGNOSIS — K21.00 GASTROESOPHAGEAL REFLUX DISEASE WITH ESOPHAGITIS WITHOUT HEMORRHAGE: ICD-10-CM

## 2024-10-08 PROCEDURE — 74220 X-RAY XM ESOPHAGUS 1CNTRST: CPT

## 2024-10-09 ENCOUNTER — TELEPHONE (OUTPATIENT)
Dept: GASTROENTEROLOGY | Facility: CLINIC | Age: 59
End: 2024-10-09
Payer: COMMERCIAL

## 2024-10-09 NOTE — TELEPHONE ENCOUNTER
----- Message from Savannah Morales sent at 10/8/2024 10:29 AM EDT -----  Please let patient know that most recent esophagram was notable for acid reflux.

## 2024-10-16 ENCOUNTER — OFFICE VISIT (OUTPATIENT)
Dept: SURGERY | Facility: CLINIC | Age: 59
End: 2024-10-16
Payer: COMMERCIAL

## 2024-10-16 VITALS — HEIGHT: 72 IN | WEIGHT: 255 LBS | BODY MASS INDEX: 34.54 KG/M2

## 2024-10-16 DIAGNOSIS — K21.9 GASTROESOPHAGEAL REFLUX DISEASE, UNSPECIFIED WHETHER ESOPHAGITIS PRESENT: Primary | ICD-10-CM

## 2024-10-16 NOTE — H&P (VIEW-ONLY)
Subjective   Avery Watson is a 59 y.o. male who presents today for Initial Evaluation    Chief Complaint:    Chief Complaint   Patient presents with    Follow-up        History of Present Illness:    History of Present Illness Avery is a 59-year-old male who presents for evaluation for a 2-week follow-up. He recently was seen by Dr. Galeana approximately 2 weeks ago, Dr. Velez ordered an esophagram to rule out any esophageal abnormalities. Patient reports acid reflux is the same as it was 2 weeks ago. Reports that he would like to have something done as soon as possible. He did state that he had upper endoscopy with Dr. Cabrera in July.     The following portions of the patient's history were reviewed and updated as appropriate: allergies, current medications, past family history, past medical history, past social history, past surgical history and problem list.    Past Medical History:  Past Medical History:   Diagnosis Date    Acid reflux     Chronic pain disorder     Depression     Elevated cholesterol     Essential hypertension 10/27/2023    Extremity pain     Joint pain     Low back pain     Lumbosacral disc disease     Migraine     Muscle spasm     Neck pain     Osteoarthritis     PONV (postoperative nausea and vomiting)     Rheumatoid arthritis        Social History:  Social History     Socioeconomic History    Marital status:    Tobacco Use    Smoking status: Never    Smokeless tobacco: Former     Types: Chew    Tobacco comments:     uses chewing tobacco   Vaping Use    Vaping status: Never Used   Substance and Sexual Activity    Alcohol use: No    Drug use: No    Sexual activity: Defer       Family History:  Family History   Problem Relation Age of Onset    Lung cancer Mother     Cancer Mother         ovarian    Diabetes Sister     Cancer Sister         breast    Heart disease Brother     Rheum arthritis Brother     Arthritis Brother        Past Surgical History:  Past Surgical History:  "  Procedure Laterality Date    CARPAL TUNNEL RELEASE Right 08/04/2016    Procedure: CARPAL TUNNEL RELEASE;  Surgeon: Yoel Lua MD;  Location:  [facility];  Service:     CARPAL TUNNEL RELEASE Left 11/09/2017    Procedure: CARPAL TUNNEL RELEASE;  Surgeon: Yoel Lua MD;  Location:  [facility];  Service:     COLONOSCOPY      ENDOSCOPY N/A 7/24/2024    Procedure: ESOPHAGOGASTRODUODENOSCOPY WITH BIOPSY;  Surgeon: Valerie Miranda MD;  Location:  [facility];  Service: Gastroenterology;  Laterality: N/A;    LUMBAR DISCECTOMY Right 09/22/2022    Procedure: LUMBAR DISCECTOMY L4-5 RIGHT;  Surgeon: Aquiles Grigsby MD;  Location:  [facility];  Service: Neurosurgery;  Laterality: Right;    LUMBAR FACET INJECTION      RHIZOTOMY      SPINAL CORD STIMULATOR IMPLANT N/A 02/26/2024    Procedure: SPINAL CORD STIMULATOR INSERTION PHASE 1;  Surgeon: Aquiles Grigsby MD;  Location:  [facility];  Service: Neurosurgery;  Laterality: N/A;    SPINAL CORD STIMULATOR IMPLANT N/A 03/18/2024    Procedure: SPINAL CORD STIMULATOR LEAD REVISION;  Surgeon: Aquiles Grigsby MD;  Location:  [facility];  Service: Neurosurgery;  Laterality: N/A;    SPINAL CORD STIMULATOR TRIAL W/ LAMINOTOMY      TOE SURGERY Right 03/04/2015    \"flexible joint\" per pt -- no hardware    UPPER GASTROINTESTINAL ENDOSCOPY         Problem List:  Patient Active Problem List   Diagnosis    Benign prostatic hyperplasia with urinary obstruction    Fatigue    Multilevel degenerative disc disease    Migraine    Shoulder pain    Spondylosis of lumbar region without myelopathy or radiculopathy    Degeneration of lumbar or lumbosacral intervertebral disc    Myofascial pain    Bilateral carpal tunnel syndrome    Displacement of intervertebral disc of mid-cervical region    Neuroforaminal stenosis of spine, right C6-C7    Carpal tunnel syndrome, left    Chest pain    Chronic lumbar radiculopathy    Low testosterone    Dyslipidemia    Essential hypertension    " "Lumbar postlaminectomy syndrome    BMI 35.0-35.9,adult    Moderate obesity    Physical deconditioning    Prostatitis, chronic    Postlaminectomy syndrome, lumbar region    Detrusor instability    Malfunction of spinal cord stimulator, initial encounter    Elevated prostate specific antigen (PSA)    Gastroesophageal reflux disease with esophagitis without hemorrhage       Allergy:   No Known Allergies     Current Medications:   Current Outpatient Medications   Medication Sig Dispense Refill    atorvastatin (LIPITOR) 10 MG tablet Take 1 tablet by mouth Daily. 90 tablet 3    baclofen (LIORESAL) 10 MG tablet Take 2 tablets by mouth at bedtime 180 tablet 4    famotidine (Pepcid) 40 MG tablet Take 1 tablet by mouth twice daily. 180 tablet 4    finasteride (PROSCAR) 5 MG tablet Take 1 tablet by mouth Daily. 30 tablet 5    FLUoxetine (PROzac) 40 MG capsule Take 1 capsule by mouth Every Morning. 90 capsule 1    fluticasone (FLONASE) 50 MCG/ACT nasal spray Instill 2 sprays into each nostril daily as directed by provider 16 g 1    hydrocortisone (ANUSOL-HC) 25 MG suppository Use 1 suppository rectally 2 times a day  as needed. 30 suppository 11    levocetirizine (XYZAL) 5 MG tablet Take 1 tablet by mouth Every Evening. 90 tablet 0    meloxicam (MOBIC) 15 MG tablet Take 1 tablet by mouth Daily. 30 tablet 2    metoprolol tartrate (LOPRESSOR) 25 MG tablet Take 1 tablet by mouth 2 (Two) Times a Day. 60 tablet 11    nitrofurantoin, macrocrystal-monohydrate, (Macrobid) 100 MG capsule Take 1 capsule by mouth 2 (Two) Times a Day. 6 capsule 0    RABEprazole (ACIPHEX) 20 MG EC tablet Take 1 tablet by mouth 15 minutes before breakfast and supper. 60 tablet 11    sucralfate (Carafate) 1 g tablet Take 1 tablet by mouth 2 (Two) Times a Day. 28 tablet 0    Syringe 25G X 5/8\" 3 ML misc Use as directed 2 x weekly 24 each 3    tamsulosin (FLOMAX) 0.4 MG capsule 24 hr capsule Take 2 capsules by mouth Daily. 180 capsule 3    Testosterone " "Cypionate (Depo-Testosterone) 200 MG/ML injection Inject 0.5 mL subcutaneously every Monday and Thursday. 10 mL 2    topiramate (Topamax) 25 MG tablet Take 1 tablet by mouth Daily. 30 tablet 2     No current facility-administered medications for this visit.       Review of Systems:    Review of Systems   Gastrointestinal:  Positive for GERD.         Physical Exam:   Physical Exam  Constitutional:       Appearance: Normal appearance.   HENT:      Head: Normocephalic and atraumatic.      Right Ear: External ear normal.      Left Ear: External ear normal.   Eyes:      Conjunctiva/sclera: Conjunctivae normal.   Pulmonary:      Effort: Pulmonary effort is normal.   Abdominal:      General: Abdomen is flat.   Musculoskeletal:         General: Normal range of motion.      Cervical back: Normal range of motion and neck supple.   Skin:     General: Skin is warm and dry.      Capillary Refill: Capillary refill takes less than 2 seconds.   Neurological:      General: No focal deficit present.      Mental Status: He is alert and oriented to person, place, and time.   Psychiatric:         Mood and Affect: Mood normal.         Behavior: Behavior normal.         Vitals:  Height 182.9 cm (72\"), weight 116 kg (255 lb).   Body mass index is 34.58 kg/m².     Imaging:   FL ESOPHAGRAM SINGLE CONTRAST  Narrative: PROCEDURE: FL ESOPHAGRAM SINGLE CONTRAST-     HISTORY: Uncontrolled GERD; K21.95-Eeswnp-[id number] reflux disease with  esophagitis, without bleeding     PROCEDURE: The patient ingested barium. Effervescent crystals were also  administered. Spot and overhead films were obtained.     FLUORO TIME: 1.0 minutes     IMAGES: 5.     COMPARISON: None.     FINDINGS: The esophagus is normal in caliber with no evidence of a  stricture or mass. There is no evidence of a hiatal hernia. There is  gastroesophageal reflux into the distal esophagus. Peristalsis is  normal.     Impression: Gastroesophageal reflux into the distal esophagus.      "            This report was finalized on 10/8/2024 9:04 AM by Cecilia Riggnis M.D..           Assessment & Plan   Diagnoses and all orders for this visit:    1. Gastroesophageal reflux disease, unspecified whether esophagitis present (Primary)  -     Case Request; Standing  -     Case Request    Other orders  -     Follow Anesthesia Guidelines / Protocol; Future  -     Follow Anesthesia Guidelines / Protocol; Standing  -     Verify / Perform Chlorhexidine Skin Prep; Standing  -     Provide NPO Instructions to Patient; Future  -     Chlorhexidine Skin Prep; Future  -     Place Sequential Compression Device; Standing  -     Maintain Sequential Compression Device; Standing    Avery is a 59-year-old male who presents for evaluation for GERD. He recently had an esophagram that revealed no esophageal abnormalities. Recently had upper endoscopy as well with Dr. Cabrera in July that revealed a small hiatal hernia. Patient will undergo EGD with Bravo study to further evaluate. I have discussed with Dr. Galeana. Patient verbalizes understanding and agrees. He was provided Bravo study instructions.    Visit Diagnoses:    ICD-10-CM ICD-9-CM   1. Gastroesophageal reflux disease, unspecified whether esophagitis present  K21.9 530.81         MEDS ORDERED DURING VISIT:  No orders of the defined types were placed in this encounter.      Return for Follow-up postop.             This document has been electronically signed by EDDIE Moreno  October 16, 2024 11:37 EDT    Please note that portions of this note were completed with a voice recognition program.

## 2024-10-16 NOTE — PROGRESS NOTES
Subjective   Avery Watson is a 59 y.o. male who presents today for Initial Evaluation    Chief Complaint:    Chief Complaint   Patient presents with    Follow-up        History of Present Illness:    History of Present Illness Avery is a 59-year-old male who presents for evaluation for a 2-week follow-up. He recently was seen by Dr. Galeana approximately 2 weeks ago, Dr. Velez ordered an esophagram to rule out any esophageal abnormalities. Patient reports acid reflux is the same as it was 2 weeks ago. Reports that he would like to have something done as soon as possible. He did state that he had upper endoscopy with Dr. Cabrera in July.     The following portions of the patient's history were reviewed and updated as appropriate: allergies, current medications, past family history, past medical history, past social history, past surgical history and problem list.    Past Medical History:  Past Medical History:   Diagnosis Date    Acid reflux     Chronic pain disorder     Depression     Elevated cholesterol     Essential hypertension 10/27/2023    Extremity pain     Joint pain     Low back pain     Lumbosacral disc disease     Migraine     Muscle spasm     Neck pain     Osteoarthritis     PONV (postoperative nausea and vomiting)     Rheumatoid arthritis        Social History:  Social History     Socioeconomic History    Marital status:    Tobacco Use    Smoking status: Never    Smokeless tobacco: Former     Types: Chew    Tobacco comments:     uses chewing tobacco   Vaping Use    Vaping status: Never Used   Substance and Sexual Activity    Alcohol use: No    Drug use: No    Sexual activity: Defer       Family History:  Family History   Problem Relation Age of Onset    Lung cancer Mother     Cancer Mother         ovarian    Diabetes Sister     Cancer Sister         breast    Heart disease Brother     Rheum arthritis Brother     Arthritis Brother        Past Surgical History:  Past Surgical History:  "  Procedure Laterality Date    CARPAL TUNNEL RELEASE Right 08/04/2016    Procedure: CARPAL TUNNEL RELEASE;  Surgeon: Yoel Lua MD;  Location:  [facility];  Service:     CARPAL TUNNEL RELEASE Left 11/09/2017    Procedure: CARPAL TUNNEL RELEASE;  Surgeon: Yoel Lua MD;  Location:  [facility];  Service:     COLONOSCOPY      ENDOSCOPY N/A 7/24/2024    Procedure: ESOPHAGOGASTRODUODENOSCOPY WITH BIOPSY;  Surgeon: Valerie Miranda MD;  Location:  [facility];  Service: Gastroenterology;  Laterality: N/A;    LUMBAR DISCECTOMY Right 09/22/2022    Procedure: LUMBAR DISCECTOMY L4-5 RIGHT;  Surgeon: Aquiles Grigsby MD;  Location:  [facility];  Service: Neurosurgery;  Laterality: Right;    LUMBAR FACET INJECTION      RHIZOTOMY      SPINAL CORD STIMULATOR IMPLANT N/A 02/26/2024    Procedure: SPINAL CORD STIMULATOR INSERTION PHASE 1;  Surgeon: Aquiles Grigsby MD;  Location:  [facility];  Service: Neurosurgery;  Laterality: N/A;    SPINAL CORD STIMULATOR IMPLANT N/A 03/18/2024    Procedure: SPINAL CORD STIMULATOR LEAD REVISION;  Surgeon: Aquiles Grigsby MD;  Location:  [facility];  Service: Neurosurgery;  Laterality: N/A;    SPINAL CORD STIMULATOR TRIAL W/ LAMINOTOMY      TOE SURGERY Right 03/04/2015    \"flexible joint\" per pt -- no hardware    UPPER GASTROINTESTINAL ENDOSCOPY         Problem List:  Patient Active Problem List   Diagnosis    Benign prostatic hyperplasia with urinary obstruction    Fatigue    Multilevel degenerative disc disease    Migraine    Shoulder pain    Spondylosis of lumbar region without myelopathy or radiculopathy    Degeneration of lumbar or lumbosacral intervertebral disc    Myofascial pain    Bilateral carpal tunnel syndrome    Displacement of intervertebral disc of mid-cervical region    Neuroforaminal stenosis of spine, right C6-C7    Carpal tunnel syndrome, left    Chest pain    Chronic lumbar radiculopathy    Low testosterone    Dyslipidemia    Essential hypertension    " "Lumbar postlaminectomy syndrome    BMI 35.0-35.9,adult    Moderate obesity    Physical deconditioning    Prostatitis, chronic    Postlaminectomy syndrome, lumbar region    Detrusor instability    Malfunction of spinal cord stimulator, initial encounter    Elevated prostate specific antigen (PSA)    Gastroesophageal reflux disease with esophagitis without hemorrhage       Allergy:   No Known Allergies     Current Medications:   Current Outpatient Medications   Medication Sig Dispense Refill    atorvastatin (LIPITOR) 10 MG tablet Take 1 tablet by mouth Daily. 90 tablet 3    baclofen (LIORESAL) 10 MG tablet Take 2 tablets by mouth at bedtime 180 tablet 4    famotidine (Pepcid) 40 MG tablet Take 1 tablet by mouth twice daily. 180 tablet 4    finasteride (PROSCAR) 5 MG tablet Take 1 tablet by mouth Daily. 30 tablet 5    FLUoxetine (PROzac) 40 MG capsule Take 1 capsule by mouth Every Morning. 90 capsule 1    fluticasone (FLONASE) 50 MCG/ACT nasal spray Instill 2 sprays into each nostril daily as directed by provider 16 g 1    hydrocortisone (ANUSOL-HC) 25 MG suppository Use 1 suppository rectally 2 times a day  as needed. 30 suppository 11    levocetirizine (XYZAL) 5 MG tablet Take 1 tablet by mouth Every Evening. 90 tablet 0    meloxicam (MOBIC) 15 MG tablet Take 1 tablet by mouth Daily. 30 tablet 2    metoprolol tartrate (LOPRESSOR) 25 MG tablet Take 1 tablet by mouth 2 (Two) Times a Day. 60 tablet 11    nitrofurantoin, macrocrystal-monohydrate, (Macrobid) 100 MG capsule Take 1 capsule by mouth 2 (Two) Times a Day. 6 capsule 0    RABEprazole (ACIPHEX) 20 MG EC tablet Take 1 tablet by mouth 15 minutes before breakfast and supper. 60 tablet 11    sucralfate (Carafate) 1 g tablet Take 1 tablet by mouth 2 (Two) Times a Day. 28 tablet 0    Syringe 25G X 5/8\" 3 ML misc Use as directed 2 x weekly 24 each 3    tamsulosin (FLOMAX) 0.4 MG capsule 24 hr capsule Take 2 capsules by mouth Daily. 180 capsule 3    Testosterone " "Cypionate (Depo-Testosterone) 200 MG/ML injection Inject 0.5 mL subcutaneously every Monday and Thursday. 10 mL 2    topiramate (Topamax) 25 MG tablet Take 1 tablet by mouth Daily. 30 tablet 2     No current facility-administered medications for this visit.       Review of Systems:    Review of Systems   Gastrointestinal:  Positive for GERD.         Physical Exam:   Physical Exam  Constitutional:       Appearance: Normal appearance.   HENT:      Head: Normocephalic and atraumatic.      Right Ear: External ear normal.      Left Ear: External ear normal.   Eyes:      Conjunctiva/sclera: Conjunctivae normal.   Pulmonary:      Effort: Pulmonary effort is normal.   Abdominal:      General: Abdomen is flat.   Musculoskeletal:         General: Normal range of motion.      Cervical back: Normal range of motion and neck supple.   Skin:     General: Skin is warm and dry.      Capillary Refill: Capillary refill takes less than 2 seconds.   Neurological:      General: No focal deficit present.      Mental Status: He is alert and oriented to person, place, and time.   Psychiatric:         Mood and Affect: Mood normal.         Behavior: Behavior normal.         Vitals:  Height 182.9 cm (72\"), weight 116 kg (255 lb).   Body mass index is 34.58 kg/m².     Imaging:   FL ESOPHAGRAM SINGLE CONTRAST  Narrative: PROCEDURE: FL ESOPHAGRAM SINGLE CONTRAST-     HISTORY: Uncontrolled GERD; K21.65-Jrznqw-lvknzkdxgf reflux disease with  esophagitis, without bleeding     PROCEDURE: The patient ingested barium. Effervescent crystals were also  administered. Spot and overhead films were obtained.     FLUORO TIME: 1.0 minutes     IMAGES: 5.     COMPARISON: None.     FINDINGS: The esophagus is normal in caliber with no evidence of a  stricture or mass. There is no evidence of a hiatal hernia. There is  gastroesophageal reflux into the distal esophagus. Peristalsis is  normal.     Impression: Gastroesophageal reflux into the distal esophagus.      "            This report was finalized on 10/8/2024 9:04 AM by Cecilia Riggins M.D..           Assessment & Plan   Diagnoses and all orders for this visit:    1. Gastroesophageal reflux disease, unspecified whether esophagitis present (Primary)  -     Case Request; Standing  -     Case Request    Other orders  -     Follow Anesthesia Guidelines / Protocol; Future  -     Follow Anesthesia Guidelines / Protocol; Standing  -     Verify / Perform Chlorhexidine Skin Prep; Standing  -     Provide NPO Instructions to Patient; Future  -     Chlorhexidine Skin Prep; Future  -     Place Sequential Compression Device; Standing  -     Maintain Sequential Compression Device; Standing    Avery is a 59-year-old male who presents for evaluation for GERD. He recently had an esophagram that revealed no esophageal abnormalities. Recently had upper endoscopy as well with Dr. Cabrera in July that revealed a small hiatal hernia. Patient will undergo EGD with Bravo study to further evaluate. I have discussed with Dr. Galeana. Patient verbalizes understanding and agrees. He was provided Bravo study instructions.    Visit Diagnoses:    ICD-10-CM ICD-9-CM   1. Gastroesophageal reflux disease, unspecified whether esophagitis present  K21.9 530.81         MEDS ORDERED DURING VISIT:  No orders of the defined types were placed in this encounter.      Return for Follow-up postop.             This document has been electronically signed by EDDIE Moreno  October 16, 2024 11:37 EDT    Please note that portions of this note were completed with a voice recognition program.

## 2024-10-25 PROBLEM — K21.9 GASTROESOPHAGEAL REFLUX DISEASE: Status: ACTIVE | Noted: 2024-10-16

## 2024-10-28 ENCOUNTER — DOCUMENTATION (OUTPATIENT)
Dept: SURGERY | Facility: CLINIC | Age: 59
End: 2024-10-28
Payer: COMMERCIAL

## 2024-10-28 NOTE — PROGRESS NOTES
You are scheduled for an EGD with Dr. Galeana on 11/13/24 at 6:30am. Arrive at outpatient surgery on the ground floor of the hospital, opposite end of the ER location. Do not eat/drink anything after midnight the night prior to procedure. You must have a  with you on day of surgery. If you have any questions please call the office at [phone].

## 2024-10-30 ENCOUNTER — APPOINTMENT (OUTPATIENT)
Dept: GENERAL RADIOLOGY | Facility: HOSPITAL | Age: 59
End: 2024-10-30
Payer: COMMERCIAL

## 2024-10-30 ENCOUNTER — APPOINTMENT (OUTPATIENT)
Dept: CT IMAGING | Facility: HOSPITAL | Age: 59
End: 2024-10-30
Payer: COMMERCIAL

## 2024-10-30 ENCOUNTER — OFFICE VISIT (OUTPATIENT)
Dept: CARDIOLOGY | Facility: CLINIC | Age: 59
End: 2024-10-30
Payer: COMMERCIAL

## 2024-10-30 ENCOUNTER — HOSPITAL ENCOUNTER (EMERGENCY)
Facility: HOSPITAL | Age: 59
Discharge: HOME | End: 2024-10-30
Attending: EMERGENCY MEDICINE
Payer: COMMERCIAL

## 2024-10-30 VITALS
HEART RATE: 73 BPM | SYSTOLIC BLOOD PRESSURE: 124 MMHG | BODY MASS INDEX: 35.11 KG/M2 | WEIGHT: 259.2 LBS | DIASTOLIC BLOOD PRESSURE: 83 MMHG | OXYGEN SATURATION: 97 % | HEIGHT: 72 IN

## 2024-10-30 VITALS
TEMPERATURE: 97.6 F | RESPIRATION RATE: 18 BRPM | HEART RATE: 67 BPM | WEIGHT: 259.04 LBS | SYSTOLIC BLOOD PRESSURE: 125 MMHG | OXYGEN SATURATION: 96 % | DIASTOLIC BLOOD PRESSURE: 90 MMHG | BODY MASS INDEX: 35.09 KG/M2 | HEIGHT: 72 IN

## 2024-10-30 DIAGNOSIS — R07.2 PRECORDIAL PAIN: Primary | ICD-10-CM

## 2024-10-30 DIAGNOSIS — E78.5 DYSLIPIDEMIA: Chronic | ICD-10-CM

## 2024-10-30 DIAGNOSIS — I10 ESSENTIAL HYPERTENSION: Chronic | ICD-10-CM

## 2024-10-30 DIAGNOSIS — R07.9 NONSPECIFIC CHEST PAIN: Primary | ICD-10-CM

## 2024-10-30 LAB
ALBUMIN SERPL-MCNC: 4.2 G/DL (ref 3.5–5.2)
ALBUMIN/GLOB SERPL: 1.6 G/DL
ALP SERPL-CCNC: 71 U/L (ref 39–117)
ALT SERPL W P-5'-P-CCNC: 35 U/L (ref 1–41)
ANION GAP SERPL CALCULATED.3IONS-SCNC: 7.1 MMOL/L (ref 5–15)
AST SERPL-CCNC: 28 U/L (ref 1–40)
BASOPHILS # BLD AUTO: 0.03 10*3/MM3 (ref 0–0.2)
BASOPHILS NFR BLD AUTO: 0.4 % (ref 0–1.5)
BILIRUB SERPL-MCNC: 0.5 MG/DL (ref 0–1.2)
BUN SERPL-MCNC: 16 MG/DL (ref 6–20)
BUN/CREAT SERPL: 15.2 (ref 7–25)
CALCIUM SPEC-SCNC: 9.6 MG/DL (ref 8.6–10.5)
CHLORIDE SERPL-SCNC: 102 MMOL/L (ref 98–107)
CO2 SERPL-SCNC: 27.9 MMOL/L (ref 22–29)
CREAT SERPL-MCNC: 1.05 MG/DL (ref 0.76–1.27)
DEPRECATED RDW RBC AUTO: 42.5 FL (ref 37–54)
EGFRCR SERPLBLD CKD-EPI 2021: 81.8 ML/MIN/1.73
EOSINOPHIL # BLD AUTO: 0.31 10*3/MM3 (ref 0–0.4)
EOSINOPHIL NFR BLD AUTO: 4.1 % (ref 0.3–6.2)
ERYTHROCYTE [DISTWIDTH] IN BLOOD BY AUTOMATED COUNT: 13.8 % (ref 12.3–15.4)
GEN 5 2HR TROPONIN T REFLEX: <6 NG/L
GLOBULIN UR ELPH-MCNC: 2.7 GM/DL
GLUCOSE SERPL-MCNC: 89 MG/DL (ref 65–99)
HCT VFR BLD AUTO: 49.9 % (ref 37.5–51)
HGB BLD-MCNC: 16.3 G/DL (ref 13–17.7)
HOLD SPECIMEN: NORMAL
HOLD SPECIMEN: NORMAL
IMM GRANULOCYTES # BLD AUTO: 0.02 10*3/MM3 (ref 0–0.05)
IMM GRANULOCYTES NFR BLD AUTO: 0.3 % (ref 0–0.5)
LYMPHOCYTES # BLD AUTO: 1.91 10*3/MM3 (ref 0.7–3.1)
LYMPHOCYTES NFR BLD AUTO: 25.4 % (ref 19.6–45.3)
MCH RBC QN AUTO: 27.5 PG (ref 26.6–33)
MCHC RBC AUTO-ENTMCNC: 32.7 G/DL (ref 31.5–35.7)
MCV RBC AUTO: 84.3 FL (ref 79–97)
MONOCYTES # BLD AUTO: 0.83 10*3/MM3 (ref 0.1–0.9)
MONOCYTES NFR BLD AUTO: 11 % (ref 5–12)
NEUTROPHILS NFR BLD AUTO: 4.42 10*3/MM3 (ref 1.7–7)
NEUTROPHILS NFR BLD AUTO: 58.8 % (ref 42.7–76)
NRBC BLD AUTO-RTO: 0 /100 WBC (ref 0–0.2)
PLATELET # BLD AUTO: 174 10*3/MM3 (ref 140–450)
PMV BLD AUTO: 8.8 FL (ref 6–12)
POTASSIUM SERPL-SCNC: 4.5 MMOL/L (ref 3.5–5.2)
PROT SERPL-MCNC: 6.9 G/DL (ref 6–8.5)
RBC # BLD AUTO: 5.92 10*6/MM3 (ref 4.14–5.8)
SODIUM SERPL-SCNC: 137 MMOL/L (ref 136–145)
TROPONIN T DELTA: NORMAL
TROPONIN T SERPL HS-MCNC: <6 NG/L
WBC NRBC COR # BLD AUTO: 7.52 10*3/MM3 (ref 3.4–10.8)
WHOLE BLOOD HOLD COAG: NORMAL
WHOLE BLOOD HOLD SPECIMEN: NORMAL

## 2024-10-30 PROCEDURE — 71045 X-RAY EXAM CHEST 1 VIEW: CPT | Performed by: RADIOLOGY

## 2024-10-30 PROCEDURE — 25510000001 IOPAMIDOL PER 1 ML: Performed by: EMERGENCY MEDICINE

## 2024-10-30 PROCEDURE — 85025 COMPLETE CBC W/AUTO DIFF WBC: CPT | Performed by: PHYSICIAN ASSISTANT

## 2024-10-30 PROCEDURE — 99214 OFFICE O/P EST MOD 30 MIN: CPT | Performed by: NURSE PRACTITIONER

## 2024-10-30 PROCEDURE — 71275 CT ANGIOGRAPHY CHEST: CPT | Performed by: RADIOLOGY

## 2024-10-30 PROCEDURE — 80053 COMPREHEN METABOLIC PANEL: CPT | Performed by: PHYSICIAN ASSISTANT

## 2024-10-30 PROCEDURE — 36415 COLL VENOUS BLD VENIPUNCTURE: CPT

## 2024-10-30 PROCEDURE — 71045 X-RAY EXAM CHEST 1 VIEW: CPT

## 2024-10-30 PROCEDURE — 93005 ELECTROCARDIOGRAM TRACING: CPT | Performed by: EMERGENCY MEDICINE

## 2024-10-30 PROCEDURE — 93000 ELECTROCARDIOGRAM COMPLETE: CPT | Performed by: NURSE PRACTITIONER

## 2024-10-30 PROCEDURE — 71275 CT ANGIOGRAPHY CHEST: CPT

## 2024-10-30 PROCEDURE — 99285 EMERGENCY DEPT VISIT HI MDM: CPT

## 2024-10-30 PROCEDURE — 84484 ASSAY OF TROPONIN QUANT: CPT | Performed by: PHYSICIAN ASSISTANT

## 2024-10-30 RX ORDER — DEXLANSOPRAZOLE 60 MG/1
60 CAPSULE, DELAYED RELEASE ORAL DAILY
COMMUNITY
End: 2024-10-31 | Stop reason: SDUPTHER

## 2024-10-30 RX ORDER — METOPROLOL TARTRATE 25 MG/1
25 TABLET, FILM COATED ORAL 2 TIMES DAILY
Qty: 180 TABLET | Refills: 2 | Status: SHIPPED | OUTPATIENT
Start: 2024-10-30

## 2024-10-30 RX ORDER — IOPAMIDOL 755 MG/ML
100 INJECTION, SOLUTION INTRAVASCULAR
Status: COMPLETED | OUTPATIENT
Start: 2024-10-30 | End: 2024-10-30

## 2024-10-30 RX ORDER — RANOLAZINE 500 MG/1
500 TABLET, EXTENDED RELEASE ORAL 2 TIMES DAILY
Qty: 60 TABLET | Refills: 11 | Status: SHIPPED | OUTPATIENT
Start: 2024-10-30

## 2024-10-30 RX ORDER — ATORVASTATIN CALCIUM 10 MG/1
10 TABLET, FILM COATED ORAL DAILY
Qty: 90 TABLET | Refills: 2 | Status: SHIPPED | OUTPATIENT
Start: 2024-10-30

## 2024-10-30 RX ADMIN — IOPAMIDOL 80 ML: 755 INJECTION, SOLUTION INTRAVENOUS at 14:58

## 2024-10-30 NOTE — PROGRESS NOTES
"Chief Complaint  Follow-up (Complains of Active CP with chest tightness, Patient wants to get through office visit before going to ED.) and Med Management (Tolerating all current medications.)    Subjective          Avery Watson presents to [facility] for follow up.    History of Present Illness  History of Present Illness  The patient is a 59-year-old male who follows in our clinic for hypertension and dyslipidemia. He was last seen in the clinic on 01/10/2024. He is here today with complaints of chest pain and chest tightness.    He began experiencing mild chest pain yesterday, which has persisted into today. The pain is described as a tight, aching sensation on the left side of his chest, extending slightly to his left arm and neck. The onset of the pain was not associated with any specific activity. His sleep was disrupted due to the pain, but he reports no shortness of breath. The pain started when he walked out to the garage, having been inactive all morning. He has had similar episodes of chest pain in the past, including a visit to the ER about a year ago. Since then, he has experienced intermittent chest pain.    He is currently taking metoprolol and does not monitor his blood pressure at home. He has nitroglycerin at home but is unsure of its use. He reports no recent heavy lifting or carrying. The pain feels internal and cannot be reproduced. He does not take aspirin. He reports no hot, sweaty, cold, or clammy sensations when the pain first started. He has had a CT scan of his chest in the past. He has not undergone any other heart-related tests since his last visit.    Supplemental Information:  He has an EGD scheduled on 11/13/2024.    SOCIAL HISTORY  He does not smoke.         Objective     Vital Signs:   /83 (BP Location: Left arm, Patient Position: Sitting, Cuff Size: Large Adult)   Pulse 73   Ht 182.9 cm (72\")   Wt 118 kg (259 lb 3.2 oz)   SpO2 97%   " BMI 35.15 kg/m²       Physical Exam  Vitals reviewed.   Constitutional:       Appearance: Normal appearance. He is well-developed.   Cardiovascular:      Rate and Rhythm: Normal rate and regular rhythm.      Heart sounds: No murmur heard.     No friction rub. No gallop.   Pulmonary:      Effort: Pulmonary effort is normal. No respiratory distress.      Breath sounds: Normal breath sounds. No wheezing or rales.   Skin:     General: Skin is warm and dry.   Neurological:      Mental Status: He is alert and oriented to person, place, and time.   Psychiatric:         Mood and Affect: Mood normal.         Behavior: Behavior normal.          Result Review :            ECG 12 Lead    Date/Time: 10/30/2024 11:46 AM  Performed by: Marleni Martinez APRN    Authorized by: Marleni Martinez APRN  Comparison: compared with previous ECG from 10/25/2023  Similar to previous ECG  Rhythm: sinus rhythm  Rate: normal  BPM: 78    Clinical impression: normal ECG           Most recent echocardiogram  Results for orders placed during the hospital encounter of 12/06/23    Adult Transthoracic Echo Complete W/ Cont if Necessary Per Protocol    Interpretation Summary    Left ventricular systolic function is normal. Calculated left ventricular EF = 54% Left ventricular ejection fraction appears to be 51 - 55%.    Left ventricular diastolic function was normal.      Most recent Stress Test  Results for orders placed during the hospital encounter of 10/20/23    Stress test with myocardial perfusion one day    Interpretation Summary    Myocardial perfusion imaging indicates a normal myocardial perfusion study with no evidence of ischemia.    Left ventricular ejection fraction is normal (Calculated EF = 50%).    Impressions are consistent with a low risk study.    Diaphragmatic attenuation artifact is present.    Findings consistent with a normal ECG stress test.             Current Outpatient Medications   Medication Sig Dispense Refill     "atorvastatin (LIPITOR) 10 MG tablet Take 1 tablet by mouth Daily. 90 tablet 2    baclofen (LIORESAL) 10 MG tablet Take 2 tablets by mouth at bedtime 180 tablet 4    finasteride (PROSCAR) 5 MG tablet Take 1 tablet by mouth Daily. 30 tablet 5    FLUoxetine (PROzac) 40 MG capsule Take 1 capsule by mouth Every Morning. 90 capsule 1    fluticasone (FLONASE) 50 MCG/ACT nasal spray Instill 2 sprays into each nostril daily as directed by provider 16 g 1    hydrocortisone (ANUSOL-HC) 25 MG suppository Use 1 suppository rectally 2 times a day  as needed. 30 suppository 11    levocetirizine (XYZAL) 5 MG tablet Take 1 tablet by mouth Every Evening. 90 tablet 0    meloxicam (MOBIC) 15 MG tablet Take 1 tablet by mouth Daily. 30 tablet 2    metoprolol tartrate (LOPRESSOR) 25 MG tablet Take 1 tablet by mouth 2 (Two) Times a Day. 180 tablet 2    sucralfate (Carafate) 1 g tablet Take 1 tablet by mouth 2 (Two) Times a Day. 28 tablet 0    Syringe 25G X 5/8\" 3 ML misc Use as directed 2 x weekly 24 each 3    tamsulosin (FLOMAX) 0.4 MG capsule 24 hr capsule Take 2 capsules by mouth Daily. 180 capsule 3    Testosterone Cypionate (Depo-Testosterone) 200 MG/ML injection Inject 0.5 mL subcutaneously every Monday and Thursday. 10 mL 2    topiramate (Topamax) 25 MG tablet Take 1 tablet by mouth Daily. 30 tablet 2    dexlansoprazole (Dexilant) 60 MG capsule Take 1 capsule by mouth Daily. 90 capsule 0    famotidine (Pepcid) 40 MG tablet Take 1 tablet by mouth twice daily. (Patient not taking: Reported on 10/30/2024) 180 tablet 4    metoprolol tartrate (LOPRESSOR) 50 MG tablet Take 1 tablet by mouth 1 (One) Time for 1 dose. Take 50 mg One (1) Hour Prior to Coronary CTA 1 tablet 0    nitrofurantoin, macrocrystal-monohydrate, (Macrobid) 100 MG capsule Take 1 capsule by mouth 2 (Two) Times a Day. (Patient not taking: Reported on 10/30/2024) 6 capsule 0    RABEprazole (ACIPHEX) 20 MG EC tablet Take 1 tablet by mouth 15 minutes before breakfast and " supper. (Patient not taking: Reported on 10/30/2024) 60 tablet 11    ranolazine (Ranexa) 500 MG 12 hr tablet Take 1 tablet by mouth 2 (Two) Times a Day. 60 tablet 11     No current facility-administered medications for this visit.            Assessment and Plan    Problem List Items Addressed This Visit          Cardiac and Vasculature    Dyslipidemia (Chronic)    Relevant Medications    atorvastatin (LIPITOR) 10 MG tablet    Essential hypertension (Chronic)    Relevant Medications    metoprolol tartrate (LOPRESSOR) 25 MG tablet    metoprolol tartrate (LOPRESSOR) 50 MG tablet    Chest pain - Primary    Relevant Medications    ranolazine (Ranexa) 500 MG 12 hr tablet    metoprolol tartrate (LOPRESSOR) 50 MG tablet    Other Relevant Orders    ECG 12 Lead    CT Angiogram Coronary    No Caffeine or Nicotine 4 Hours Prior to CTA Appointment    Nothing to Eat or Drink 4 Hours Prior to CTA Appointment    Do Not Take Phosphodiasterase Inhibitors in the 72 Hours Prior to Coronary CTA       Assessment & Plan  1. Chest Pain.  He reports experiencing chest pain and tightness on the left side, radiating to the left arm and neck, which started yesterday. His EKG today shows normal sinus rhythm, which does not rule out a heart attack or acute coronary syndrome. The possibility of crescendo angina or residual effects from a potential heart attack yesterday cannot be excluded. He has been advised to seek immediate medical attention at the ER for active chest pain. A CT coronary will be ordered to assess for calcium buildup in the coronary arteries, provided his kidney function is adequate. To ensure optimal imaging, he will take an extra dose of metoprolol an hour before the CT to lower his heart rate to around 60. His current heart rate is 73. The CT needs to be completed before his scheduled EGD to avoid complications during anesthesia. A prescription for Ranexa, to be taken twice daily, will be provided. This medication can be  taken concurrently with his metoprolol. A one-month supply of Ranexa will be given, with refills available if needed. If he tolerates the medication well, the prescription can be adjusted to a three-month supply. If he is discharged from the ER without any findings, a CT will be ordered immediately.    2. Hypertension.  He is currently taking metoprolol tartrate and reports adherence to his medication regimen. His blood pressure today is 124/XX, which is within the normal range. He is advised to continue his current medication.    3. Dyslipidemia.  He is taking atorvastatin 10 mg. He is advised to continue his current medication. The last lipid panel date was not discussed; follow-up on lipid levels may be necessary.           Follow Up     Medications were reviewed with the patient.    Return in about 4 weeks (around 11/27/2024).    Patient was given instructions and counseling regarding his condition or for health maintenance advice. Please see specific information pulled into the AVS if appropriate.     Patient or patient representative verbalized consent for the use of Ambient Listening during the visit with  EDDIE Mcdonald for chart documentation. 11/1/2024  08:05 EDT

## 2024-10-31 LAB
QT INTERVAL: 352 MS
QTC INTERVAL: 380 MS

## 2024-10-31 RX ORDER — DEXLANSOPRAZOLE 60 MG/1
60 CAPSULE, DELAYED RELEASE ORAL DAILY
Qty: 90 CAPSULE | Refills: 0 | Status: SHIPPED | OUTPATIENT
Start: 2024-10-31

## 2024-11-01 RX ORDER — SODIUM CHLORIDE 0.9 % (FLUSH) 0.9 %
10 SYRINGE (ML) INJECTION EVERY 12 HOURS SCHEDULED
OUTPATIENT
Start: 2024-11-01

## 2024-11-01 RX ORDER — SODIUM CHLORIDE 9 MG/ML
40 INJECTION, SOLUTION INTRAVENOUS AS NEEDED
OUTPATIENT
Start: 2024-11-01

## 2024-11-01 RX ORDER — SODIUM CHLORIDE 0.9 % (FLUSH) 0.9 %
10 SYRINGE (ML) INJECTION AS NEEDED
OUTPATIENT
Start: 2024-11-01

## 2024-11-01 RX ORDER — NITROGLYCERIN 0.4 MG/1
0.4 TABLET SUBLINGUAL
OUTPATIENT
Start: 2024-11-01 | End: 2024-11-01

## 2024-11-01 RX ORDER — METOPROLOL TARTRATE 50 MG
50 TABLET ORAL ONCE
Qty: 1 TABLET | Refills: 0 | Status: SHIPPED | OUTPATIENT
Start: 2024-11-01 | End: 2024-11-02

## 2024-11-04 ENCOUNTER — TELEPHONE (OUTPATIENT)
Dept: CARDIOLOGY | Facility: CLINIC | Age: 59
End: 2024-11-04
Payer: COMMERCIAL

## 2024-11-04 ENCOUNTER — DOCUMENTATION (OUTPATIENT)
Dept: SURGERY | Facility: CLINIC | Age: 59
End: 2024-11-04
Payer: COMMERCIAL

## 2024-11-04 NOTE — ED PROVIDER NOTES
Subjective     History provided by:  Patient   used: No    Chest Pain  Pain location:  L chest and substernal area  Pain quality: aching and dull    Pain radiates to:  Does not radiate  Pain severity:  Mild  Onset quality:  Gradual  Duration: Several.  Timing:  Intermittent  Progression:  Waxing and waning  Chronicity:  Chronic  Context: not breathing, not drug use, not eating, not intercourse, not lifting, not movement, not raising an arm, not at rest, not stress and not trauma    Relieved by:  Nothing  Worsened by:  Nothing  Ineffective treatments:  None tried  Associated symptoms: no abdominal pain, no AICD problem, no altered mental status, no anorexia, no anxiety, no back pain, no claudication, no cough, no diaphoresis, no dizziness, no dysphagia, no fatigue, no fever, no headache, no heartburn, no lower extremity edema, no nausea, no near-syncope, no numbness, no orthopnea, no palpitations, no PND, no shortness of breath, no syncope, no vomiting and no weakness    Risk factors: hypertension and male sex    Risk factors: no aortic disease, no birth control, no coronary artery disease, no diabetes mellitus, no Meryl-Danlos syndrome, no high cholesterol, no immobilization, no Marfan's syndrome, not obese, no prior DVT/PE, no smoking and no surgery        Review of Systems   Constitutional:  Negative for activity change, appetite change, chills, diaphoresis, fatigue and fever.   HENT:  Negative for congestion, ear pain, sore throat and trouble swallowing.    Eyes:  Negative for redness.   Respiratory:  Negative for cough, chest tightness, shortness of breath and wheezing.    Cardiovascular:  Positive for chest pain. Negative for palpitations, orthopnea, claudication, leg swelling, syncope, PND and near-syncope.   Gastrointestinal:  Negative for abdominal pain, anorexia, diarrhea, heartburn, nausea and vomiting.   Genitourinary:  Negative for dysuria and urgency.   Musculoskeletal:  Negative  for arthralgias, back pain, myalgias and neck pain.   Skin:  Negative for pallor, rash and wound.   Neurological:  Negative for dizziness, speech difficulty, weakness, numbness and headaches.   Psychiatric/Behavioral:  Negative for agitation, behavioral problems, confusion and decreased concentration.    All other systems reviewed and are negative.      Past Medical History:   Diagnosis Date    Acid reflux     Chronic pain disorder     Depression     Elevated cholesterol     Essential hypertension 10/27/2023    Extremity pain     Joint pain     Low back pain     Lumbosacral disc disease     Migraine     Muscle spasm     Neck pain     Osteoarthritis     PONV (postoperative nausea and vomiting)     Rheumatoid arthritis        No Known Allergies    Past Surgical History:   Procedure Laterality Date    CARPAL TUNNEL RELEASE Right 08/04/2016    Procedure: CARPAL TUNNEL RELEASE;  Surgeon: Yoel Lua MD;  Location:  [facility];  Service:     CARPAL TUNNEL RELEASE Left 11/09/2017    Procedure: CARPAL TUNNEL RELEASE;  Surgeon: Yoel Lua MD;  Location:  [facility];  Service:     COLONOSCOPY      ENDOSCOPY N/A 7/24/2024    Procedure: ESOPHAGOGASTRODUODENOSCOPY WITH BIOPSY;  Surgeon: Valerie Miranda MD;  Location:  [facility];  Service: Gastroenterology;  Laterality: N/A;    LUMBAR DISCECTOMY Right 09/22/2022    Procedure: LUMBAR DISCECTOMY L4-5 RIGHT;  Surgeon: Aquiles Grigsby MD;  Location:  [facility];  Service: Neurosurgery;  Laterality: Right;    LUMBAR FACET INJECTION      RHIZOTOMY      SPINAL CORD STIMULATOR IMPLANT N/A 02/26/2024    Procedure: SPINAL CORD STIMULATOR INSERTION PHASE 1;  Surgeon: Aquiles Grigsby MD;  Location:  [facility];  Service: Neurosurgery;  Laterality: N/A;    SPINAL CORD STIMULATOR IMPLANT N/A 03/18/2024    Procedure: SPINAL CORD STIMULATOR LEAD REVISION;  Surgeon: Aquiles Grigsby MD;  Location:  [facility];  Service: Neurosurgery;  Laterality: N/A;    SPINAL CORD  "STIMULATOR TRIAL W/ LAMINOTOMY      TOE SURGERY Right 03/04/2015    \"flexible joint\" per pt -- no hardware    UPPER GASTROINTESTINAL ENDOSCOPY         Family History   Problem Relation Age of Onset    Lung cancer Mother     Cancer Mother         ovarian    Diabetes Sister     Cancer Sister         breast    Heart disease Brother     Rheum arthritis Brother     Arthritis Brother        Social History     Socioeconomic History    Marital status:    Tobacco Use    Smoking status: Never    Smokeless tobacco: Former     Types: Chew    Tobacco comments:     uses chewing tobacco   Vaping Use    Vaping status: Never Used   Substance and Sexual Activity    Alcohol use: No    Drug use: No    Sexual activity: Defer           Objective   Physical Exam  Vitals and nursing note reviewed.   Constitutional:       General: He is not in acute distress.     Appearance: Normal appearance. He is well-developed. He is not toxic-appearing or diaphoretic.   HENT:      Head: Normocephalic and atraumatic.      Right Ear: External ear normal.      Left Ear: External ear normal.      Nose: Nose normal.      Mouth/Throat:      Pharynx: No oropharyngeal exudate.      Tonsils: No tonsillar exudate.   Eyes:      General: Lids are normal.      Conjunctiva/sclera: Conjunctivae normal.      Pupils: Pupils are equal, round, and reactive to light.   Neck:      Thyroid: No thyromegaly.   Cardiovascular:      Rate and Rhythm: Normal rate and regular rhythm.      Pulses: Normal pulses.      Heart sounds: Normal heart sounds, S1 normal and S2 normal.   Pulmonary:      Effort: Pulmonary effort is normal. No tachypnea or respiratory distress.      Breath sounds: Normal breath sounds. No decreased breath sounds, wheezing or rales.   Chest:      Chest wall: No tenderness.   Abdominal:      General: Bowel sounds are normal. There is no distension.      Palpations: Abdomen is soft.      Tenderness: There is no abdominal tenderness. There is no guarding or " rebound.   Musculoskeletal:         General: No tenderness or deformity. Normal range of motion.      Cervical back: Full passive range of motion without pain, normal range of motion and neck supple.   Lymphadenopathy:      Cervical: No cervical adenopathy.   Skin:     General: Skin is warm and dry.      Coloration: Skin is not pale.      Findings: No erythema or rash.   Neurological:      Mental Status: He is alert and oriented to person, place, and time.      GCS: GCS eye subscore is 4. GCS verbal subscore is 5. GCS motor subscore is 6.      Cranial Nerves: No cranial nerve deficit.      Sensory: No sensory deficit.   Psychiatric:         Speech: Speech normal.         Behavior: Behavior normal.         Thought Content: Thought content normal.         Judgment: Judgment normal.         Procedures           ED Course  ED Course as of 11/04/24 1601   Wed Oct 30, 2024   1245 ECG 12 Lead [BC]   1618 CT Angiogram Chest Pulmonary Embolism     IMPRESSION:  1.  No pulmonary embolism.  2.  Trace pleural effusions noted and are nonloculated.  3.  Mild cardiomegaly.  4. Other nonacute findings above.      [ES]   1619 XR Chest AP  IMPRESSION:    Unremarkable exam. No acute cardiopulmonary findings identified.   [ES]   1641 ECG 12 Lead Chest Pain  Normal EKG [ES]   Mon Nov 04, 2024   1601 ECG 12 Lead Chest Pain  Vent. Rate :  70 BPM     Atrial Rate :  70 BPM     P-R Int : 148 ms          QRS Dur :  84 ms      QT Int : 352 ms       P-R-T Axes :  43  26  25 degrees     QTc Int : 380 ms     Normal sinus rhythm  Normal ECG   [ES]      ED Course User Index  [BC] Mandeep Nixon MD  [ES] Suman Stern MD                HEART Score: 2   Shared Decision Making  I discussed the findings with the patient/patient representative who is in agreement with the treatment plan and the final disposition.  Risks and benefits of discharge and/or observation/admission were discussed: Yes                                Medical Decision  Making      Final diagnoses:   Nonspecific chest pain       ED Disposition  ED Disposition       ED Disposition   Discharge    Condition   Stable    Comment   --               Giles Cain MD  [address] [place], [place] [id number]  [phone]    Schedule an appointment as soon as possible for a visit in 1 day  EVALUATE         Medication List      No changes were made to your prescriptions during this visit.            Suman Stern MD  11/04/24 8212

## 2024-11-04 NOTE — TELEPHONE ENCOUNTER
Spoke with patient he is understanding of the instructions for the CT and the metoprolol     ----- Message from Marleni Martinez sent at 11/1/2024  7:49 AM EDT -----  Carmen,  I saw this patient in clinic this week and he was having chest pain.  I sent him to the ED and he was discharged home after benign workup.  I told him that if he was not admitted I would order a CT coronary.  I have done this and I have ordered it stat because he has an EGD scheduled mid November.  Can you please do 3 things for me?    1.)  Call the patient and tell him that I have ordered the CT and that they should call him to schedule in the next few days.  I have also sent a prescription to his pharmacy for 50 mg of metoprolol.    Please give him the instructionsfor use -he is to take this 50 mg tablet of metoprolol about 1 hour before his CT.  He should also bring his prescription bottle of metoprolol with him to the procedure in case they need him to take extra metoprolol beyond this 50 mg tablet.    2.)  Give him the rest of the CT instructions: Nothing by mouth for 4 hours before test except sips with the metoprolol.  No caffeine for 4 hours before the test.  No nicotine for 4 hours before the test.    3.)  I do not know if you need to do something with a stat order but if you do, please do so.    Thanks, Marleni

## 2024-11-04 NOTE — PROGRESS NOTES
You are scheduled for an EGD with Dr. Galeana on 11/13/24 at 6:30am. Arrive at outpatient surgery on the ground floor of the hospital, opposite end of the ER location. Do not eat/drink anything after midnight the night prior to procedure. You must have a  with you on day of surgery. If you have any questions please call the office at [phone].      Patient is aware

## 2024-11-13 ENCOUNTER — ANESTHESIA EVENT (OUTPATIENT)
Dept: PERIOP | Facility: HOSPITAL | Age: 59
End: 2024-11-13
Payer: COMMERCIAL

## 2024-11-13 ENCOUNTER — HOSPITAL ENCOUNTER (OUTPATIENT)
Facility: HOSPITAL | Age: 59
Setting detail: HOSPITAL OUTPATIENT SURGERY
Discharge: HOME | End: 2024-11-13
Attending: SURGERY | Admitting: SURGERY
Payer: COMMERCIAL

## 2024-11-13 ENCOUNTER — ANESTHESIA (OUTPATIENT)
Dept: PERIOP | Facility: HOSPITAL | Age: 59
End: 2024-11-13
Payer: COMMERCIAL

## 2024-11-13 VITALS
RESPIRATION RATE: 18 BRPM | OXYGEN SATURATION: 95 % | HEART RATE: 67 BPM | WEIGHT: 260 LBS | TEMPERATURE: 97.6 F | BODY MASS INDEX: 35.21 KG/M2 | HEIGHT: 72 IN | SYSTOLIC BLOOD PRESSURE: 116 MMHG | DIASTOLIC BLOOD PRESSURE: 77 MMHG

## 2024-11-13 DIAGNOSIS — K21.9 GASTROESOPHAGEAL REFLUX DISEASE, UNSPECIFIED WHETHER ESOPHAGITIS PRESENT: ICD-10-CM

## 2024-11-13 PROCEDURE — C1889 IMPLANT/INSERT DEVICE, NOC: HCPCS | Performed by: SURGERY

## 2024-11-13 PROCEDURE — 25010000002 PROPOFOL 200 MG/20ML EMULSION: Performed by: NURSE ANESTHETIST, CERTIFIED REGISTERED

## 2024-11-13 RX ORDER — SODIUM CHLORIDE 0.9 % (FLUSH) 0.9 %
10 SYRINGE (ML) INJECTION EVERY 12 HOURS SCHEDULED
Status: DISCONTINUED | OUTPATIENT
Start: 2024-11-13 | End: 2024-11-13 | Stop reason: HOSPADM

## 2024-11-13 RX ORDER — OXYCODONE AND ACETAMINOPHEN 5; 325 MG/1; MG/1
1 TABLET ORAL ONCE AS NEEDED
Status: DISCONTINUED | OUTPATIENT
Start: 2024-11-13 | End: 2024-11-13 | Stop reason: HOSPADM

## 2024-11-13 RX ORDER — KETOROLAC TROMETHAMINE 30 MG/ML
30 INJECTION, SOLUTION INTRAMUSCULAR; INTRAVENOUS EVERY 6 HOURS PRN
Status: DISCONTINUED | OUTPATIENT
Start: 2024-11-13 | End: 2024-11-13 | Stop reason: HOSPADM

## 2024-11-13 RX ORDER — PROPOFOL 10 MG/ML
INJECTION, EMULSION INTRAVENOUS AS NEEDED
Status: DISCONTINUED | OUTPATIENT
Start: 2024-11-13 | End: 2024-11-13 | Stop reason: SURG

## 2024-11-13 RX ORDER — DEXLANSOPRAZOLE 60 MG/1
60 CAPSULE, DELAYED RELEASE ORAL DAILY
Start: 2024-11-16

## 2024-11-13 RX ORDER — MIDAZOLAM HYDROCHLORIDE 1 MG/ML
1 INJECTION, SOLUTION INTRAMUSCULAR; INTRAVENOUS
Status: DISCONTINUED | OUTPATIENT
Start: 2024-11-13 | End: 2024-11-13 | Stop reason: HOSPADM

## 2024-11-13 RX ORDER — MEPERIDINE HYDROCHLORIDE 25 MG/ML
12.5 INJECTION INTRAMUSCULAR; INTRAVENOUS; SUBCUTANEOUS
Status: DISCONTINUED | OUTPATIENT
Start: 2024-11-13 | End: 2024-11-13 | Stop reason: HOSPADM

## 2024-11-13 RX ORDER — SODIUM CHLORIDE 0.9 % (FLUSH) 0.9 %
10 SYRINGE (ML) INJECTION AS NEEDED
Status: DISCONTINUED | OUTPATIENT
Start: 2024-11-13 | End: 2024-11-13 | Stop reason: HOSPADM

## 2024-11-13 RX ORDER — ONDANSETRON 2 MG/ML
4 INJECTION INTRAMUSCULAR; INTRAVENOUS AS NEEDED
Status: DISCONTINUED | OUTPATIENT
Start: 2024-11-13 | End: 2024-11-13 | Stop reason: HOSPADM

## 2024-11-13 RX ORDER — IPRATROPIUM BROMIDE AND ALBUTEROL SULFATE 2.5; .5 MG/3ML; MG/3ML
3 SOLUTION RESPIRATORY (INHALATION) ONCE AS NEEDED
Status: DISCONTINUED | OUTPATIENT
Start: 2024-11-13 | End: 2024-11-13 | Stop reason: HOSPADM

## 2024-11-13 RX ADMIN — PROPOFOL 100 MG: 10 INJECTION, EMULSION INTRAVENOUS at 07:51

## 2024-11-13 RX ADMIN — PROPOFOL 20 MG: 10 INJECTION, EMULSION INTRAVENOUS at 07:53

## 2024-11-13 RX ADMIN — PROPOFOL 20 MG: 10 INJECTION, EMULSION INTRAVENOUS at 07:55

## 2024-11-13 RX ADMIN — PROPOFOL 30 MG: 10 INJECTION, EMULSION INTRAVENOUS at 07:56

## 2024-11-13 NOTE — ANESTHESIA POSTPROCEDURE EVALUATION
Patient: Avery Watson    Procedure Summary       Date: 11/13/24 Room / Location: [facility] OR [address] [place] [address] [facility]    Anesthesia Start: 0746 Anesthesia Stop: 0759    Procedure: ESOPHAGOGASTRODUODENOSCOPY AND BRAVO (Esophagus) Diagnosis:       Gastroesophageal reflux disease, unspecified whether esophagitis present      (Gastroesophageal reflux disease, unspecified whether esophagitis present [K21.9])    Surgeons: Jonathan Galeana MD Provider: All Parsons MD    Anesthesia Type: general ASA Status: 3            Anesthesia Type: general    Vitals  Vitals Value Taken Time   /77 11/13/24 0830   Temp 97.6 °F (36.4 °C) 11/13/24 0800   Pulse 67 11/13/24 0830   Resp 18 11/13/24 0830   SpO2 95 % 11/13/24 0830           Post Anesthesia Care and Evaluation    Patient location during evaluation: PACU  Patient participation: complete - patient participated  Level of consciousness: awake  Pain score: 0  Pain management: satisfactory to patient    Airway patency: patent  Anesthetic complications: No anesthetic complications  PONV Status: none  Cardiovascular status: hemodynamically stable  Respiratory status: nasal cannula  Hydration status: acceptable

## 2024-11-13 NOTE — ANESTHESIA PREPROCEDURE EVALUATION
Anesthesia Evaluation     Patient summary reviewed and Nursing notes reviewed   history of anesthetic complications:  PONV  NPO Solid Status: > 8 hours  NPO Liquid Status: > 8 hours           Airway   Mallampati: II  TM distance: >3 FB  Neck ROM: full  No difficulty expected  Dental - normal exam     Pulmonary - negative pulmonary ROS and normal exam    breath sounds clear to auscultation  (-) not a smoker  Cardiovascular - normal exam  Exercise tolerance: good (4-7 METS)    NYHA Classification: II  ECG reviewed  Rhythm: regular  Rate: normal    (+) hypertension, angina at rest, hyperlipidemia    ROS comment: ·  Myocardial perfusion imaging indicates a normal myocardial perfusion study with no evidence of ischemia.  ·  Left ventricular ejection fraction is normal (Calculated EF = 50%).  ·  Impressions are consistent with a low risk study.  ·  Diaphragmatic attenuation artifact is present.  ·  Findings consistent with a normal ECG stress test.      Neuro/Psych  (+) headaches, numbness, psychiatric history  GI/Hepatic/Renal/Endo    (+) obesity, morbid obesity, GERD    Musculoskeletal     (+) neck pain  Abdominal  - normal exam    Abdomen: soft.   Substance History - negative use     OB/GYN negative ob/gyn ROS         Other   arthritis,     ROS/Med Hx Other: 12/6/23       ·  Left ventricular systolic function is normal. Calculated left ventricular EF = 54% Left ventricular ejection fraction appears to be 51 - 55%.  ·  Left ventricular diastolic function was normal.     Patient to have cardiac cath later.              Anesthesia Plan    ASA 3     general     intravenous induction     Anesthetic plan, risks, benefits, and alternatives have been provided, discussed and informed consent has been obtained with: patient.  Pre-procedure education provided  Use of blood products discussed with  Consented to blood products.    Plan discussed with CRNA.    CODE STATUS:

## 2024-11-14 LAB — REF LAB TEST METHOD: NORMAL

## 2024-11-26 ENCOUNTER — HOSPITAL ENCOUNTER (OUTPATIENT)
Dept: CT IMAGING | Facility: HOSPITAL | Age: 59
Discharge: HOME | End: 2024-11-26
Admitting: NURSE PRACTITIONER
Payer: COMMERCIAL

## 2024-11-26 VITALS — HEART RATE: 62 BPM | DIASTOLIC BLOOD PRESSURE: 65 MMHG | SYSTOLIC BLOOD PRESSURE: 104 MMHG

## 2024-11-26 DIAGNOSIS — R07.2 PRECORDIAL PAIN: ICD-10-CM

## 2024-11-26 PROCEDURE — 82565 ASSAY OF CREATININE: CPT

## 2024-11-26 PROCEDURE — 75574 CT ANGIO HRT W/3D IMAGE: CPT

## 2024-11-26 PROCEDURE — 25510000001 IOPAMIDOL PER 1 ML: Performed by: NURSE PRACTITIONER

## 2024-11-26 RX ORDER — NITROGLYCERIN 0.4 MG/1
0.4 TABLET SUBLINGUAL
Status: DISCONTINUED | OUTPATIENT
Start: 2024-11-26 | End: 2024-11-27 | Stop reason: HOSPADM

## 2024-11-26 RX ORDER — IOPAMIDOL 755 MG/ML
100 INJECTION, SOLUTION INTRAVASCULAR
Status: COMPLETED | OUTPATIENT
Start: 2024-11-26 | End: 2024-11-26

## 2024-11-26 RX ADMIN — NITROGLYCERIN 0.4 MG: 0.4 TABLET, ORALLY DISINTEGRATING SUBLINGUAL at 13:35

## 2024-11-26 RX ADMIN — IOPAMIDOL 100 ML: 755 INJECTION, SOLUTION INTRAVENOUS at 13:45

## 2024-11-27 ENCOUNTER — OFFICE VISIT (OUTPATIENT)
Dept: SURGERY | Facility: CLINIC | Age: 59
End: 2024-11-27
Payer: COMMERCIAL

## 2024-11-27 ENCOUNTER — TELEPHONE (OUTPATIENT)
Dept: CARDIOLOGY | Facility: CLINIC | Age: 59
End: 2024-11-27
Payer: COMMERCIAL

## 2024-11-27 VITALS — BODY MASS INDEX: 35.21 KG/M2 | WEIGHT: 260 LBS | HEIGHT: 72 IN

## 2024-11-27 DIAGNOSIS — H65.93 MIDDLE EAR EFFUSION, BILATERAL: ICD-10-CM

## 2024-11-27 DIAGNOSIS — K21.00 GASTROESOPHAGEAL REFLUX DISEASE WITH ESOPHAGITIS WITHOUT HEMORRHAGE: Primary | ICD-10-CM

## 2024-11-27 LAB — CREAT BLDA-MCNC: 1.5 MG/DL (ref 0.6–1.3)

## 2024-11-27 PROCEDURE — 99212 OFFICE O/P EST SF 10 MIN: CPT | Performed by: SURGERY

## 2024-11-27 RX ORDER — SUCRALFATE 1 G/1
1 TABLET ORAL 4 TIMES DAILY
Qty: 90 TABLET | Refills: 2 | Status: SHIPPED | OUTPATIENT
Start: 2024-11-27

## 2024-11-27 NOTE — PROGRESS NOTES
Subjective   Avery Watson is a 59 y.o. male  is here today for follow-up.         Avery Watson is a 59 y.o. male Here for follow-up after EGD with Bravo probe placement for GERD refractory to medical management.  Patient continues daily Dexilant.  Patient had no significant reflux changes though mild reflux changes were noted in the esophagus.  He did have significant erosive duodenitis but no H. pylori is noted on pathology report.  Patient continues his Dexilant with occasional symptomatology.  Bravo probe study showed normal DeMeester score and he has no significant hiatal hernia noted.  History of Present Illness         Physical Exam  Abdomen soft nontender nondistended  Physical Exam              Assessment     Diagnoses and all orders for this visit:    1. Gastroesophageal reflux disease with esophagitis without hemorrhage (Primary)    Other orders  -     sucralfate (CARAFATE) 1 g tablet; Take 1 tablet by mouth 4 (Four) Times a Day.  Dispense: 90 tablet; Refill: 2      Avery Watson is a 59 y.o. male GERD and abdominal discomfort refractory to medical management.  He will continue PPI therapy.  Due to his Erosive duodenitis sucralfate will be added to his regimen and he will initiate lifestyle dietary modification for reflux.  Follow-up in 3 weeks for continued monitoring.  Assessment & Plan          This document has been electronically signed by Jonathan Galeana MD   November 27, 2024 10:11 EST

## 2024-11-27 NOTE — TELEPHONE ENCOUNTER
----- Message from Marleni Martinez sent at 11/27/2024 11:05 AM EST -----  Regarding: FW:  Please call the patient.  The CT coronary does not show any evidence of coronary artery disease.  ----- Message -----  From: Sherrie, Rad Results [place]  Sent: 11/27/2024  10:02 AM EST  To: EDDIE Mcdonald

## 2024-11-27 NOTE — TELEPHONE ENCOUNTER
Spoke to patient regarding normal cta results;. Patient voiced understanding and will keep follow up appointments.

## 2024-11-29 RX ORDER — DEXLANSOPRAZOLE 60 MG/1
60 CAPSULE, DELAYED RELEASE ORAL DAILY
Start: 2024-11-29

## 2024-12-02 RX ORDER — LEVOCETIRIZINE DIHYDROCHLORIDE 5 MG/1
5 TABLET, FILM COATED ORAL EVERY EVENING
Qty: 90 TABLET | Refills: 0 | Status: SHIPPED | OUTPATIENT
Start: 2024-12-02 | End: 2024-12-04 | Stop reason: SDUPTHER

## 2024-12-03 ENCOUNTER — TELEPHONE (OUTPATIENT)
Dept: GASTROENTEROLOGY | Facility: CLINIC | Age: 59
End: 2024-12-03

## 2024-12-03 DIAGNOSIS — K21.00 GASTROESOPHAGEAL REFLUX DISEASE WITH ESOPHAGITIS WITHOUT HEMORRHAGE: Primary | ICD-10-CM

## 2024-12-03 RX ORDER — DEXLANSOPRAZOLE 60 MG/1
60 CAPSULE, DELAYED RELEASE ORAL DAILY
Start: 2024-12-03 | End: 2024-12-05

## 2024-12-03 NOTE — TELEPHONE ENCOUNTER
Spoke with patient and let him know medication was sent to pharmacy. We do not do that testing in the office.

## 2024-12-03 NOTE — TELEPHONE ENCOUNTER
Caller: AGUILA SEQUEIRA    Relationship: SELF    Best call back number: [phone]    Requested Prescriptions: dexlansoprazole (Dexilant) 60 MG capsule        Pharmacy where request should be sent:    [facility]     Last office visit with prescribing clinician: 9/30/2024   Last telemedicine visit with prescribing clinician: Visit date not found   Next office visit with prescribing clinician: Visit date not found     Additional details provided by patient: COMPLETELY OUT OF THIS MEDICATION    Does the patient have less than a 3 day supply:  [x] Yes  [] No    Would you like a call back once the refill request has been completed: [x] Yes [] No    If the office needs to give you a call back, can they leave a voicemail: [x] Yes [] No    Nehemiah Ahuja Rep   12/03/24 10:04 EST

## 2024-12-04 ENCOUNTER — LAB (OUTPATIENT)
Dept: FAMILY MEDICINE CLINIC | Facility: CLINIC | Age: 59
End: 2024-12-04
Payer: COMMERCIAL

## 2024-12-04 ENCOUNTER — OFFICE VISIT (OUTPATIENT)
Dept: FAMILY MEDICINE CLINIC | Facility: CLINIC | Age: 59
End: 2024-12-04
Payer: COMMERCIAL

## 2024-12-04 VITALS
HEART RATE: 78 BPM | BODY MASS INDEX: 35.08 KG/M2 | DIASTOLIC BLOOD PRESSURE: 80 MMHG | HEIGHT: 72 IN | WEIGHT: 259 LBS | TEMPERATURE: 98 F | SYSTOLIC BLOOD PRESSURE: 130 MMHG | OXYGEN SATURATION: 99 %

## 2024-12-04 DIAGNOSIS — E78.5 DYSLIPIDEMIA: Primary | Chronic | ICD-10-CM

## 2024-12-04 DIAGNOSIS — H65.93 MIDDLE EAR EFFUSION, BILATERAL: ICD-10-CM

## 2024-12-04 DIAGNOSIS — F41.9 ANXIETY: ICD-10-CM

## 2024-12-04 DIAGNOSIS — N41.1 PROSTATITIS, CHRONIC: ICD-10-CM

## 2024-12-04 DIAGNOSIS — E78.5 DYSLIPIDEMIA: Chronic | ICD-10-CM

## 2024-12-04 LAB
ALBUMIN SERPL-MCNC: 4.3 G/DL (ref 3.5–5.2)
ALBUMIN/GLOB SERPL: 1.4 G/DL
ALP SERPL-CCNC: 62 U/L (ref 39–117)
ALT SERPL W P-5'-P-CCNC: 28 U/L (ref 1–41)
ANION GAP SERPL CALCULATED.3IONS-SCNC: 8.2 MMOL/L (ref 5–15)
AST SERPL-CCNC: 27 U/L (ref 1–40)
BASOPHILS # BLD AUTO: 0.05 10*3/MM3 (ref 0–0.2)
BASOPHILS NFR BLD AUTO: 0.7 % (ref 0–1.5)
BILIRUB SERPL-MCNC: 0.7 MG/DL (ref 0–1.2)
BUN SERPL-MCNC: 15 MG/DL (ref 6–20)
BUN/CREAT SERPL: 11.1 (ref 7–25)
CALCIUM SPEC-SCNC: 9.2 MG/DL (ref 8.6–10.5)
CHLORIDE SERPL-SCNC: 105 MMOL/L (ref 98–107)
CHOLEST SERPL-MCNC: 151 MG/DL (ref 0–200)
CO2 SERPL-SCNC: 26.8 MMOL/L (ref 22–29)
CREAT SERPL-MCNC: 1.35 MG/DL (ref 0.76–1.27)
DEPRECATED RDW RBC AUTO: 44.4 FL (ref 37–54)
EGFRCR SERPLBLD CKD-EPI 2021: 60.5 ML/MIN/1.73
EOSINOPHIL # BLD AUTO: 0.27 10*3/MM3 (ref 0–0.4)
EOSINOPHIL NFR BLD AUTO: 4 % (ref 0.3–6.2)
ERYTHROCYTE [DISTWIDTH] IN BLOOD BY AUTOMATED COUNT: 14.9 % (ref 12.3–15.4)
GLOBULIN UR ELPH-MCNC: 3.1 GM/DL
GLUCOSE SERPL-MCNC: 90 MG/DL (ref 65–99)
HBA1C MFR BLD: 5.7 % (ref 4.8–5.6)
HCT VFR BLD AUTO: 52 % (ref 37.5–51)
HDLC SERPL-MCNC: 32 MG/DL (ref 40–60)
HGB BLD-MCNC: 17.7 G/DL (ref 13–17.7)
IMM GRANULOCYTES # BLD AUTO: 0.03 10*3/MM3 (ref 0–0.05)
IMM GRANULOCYTES NFR BLD AUTO: 0.4 % (ref 0–0.5)
LDLC SERPL CALC-MCNC: 98 MG/DL (ref 0–100)
LDLC/HDLC SERPL: 3 {RATIO}
LYMPHOCYTES # BLD AUTO: 1.85 10*3/MM3 (ref 0.7–3.1)
LYMPHOCYTES NFR BLD AUTO: 27.7 % (ref 19.6–45.3)
MCH RBC QN AUTO: 28.5 PG (ref 26.6–33)
MCHC RBC AUTO-ENTMCNC: 34 G/DL (ref 31.5–35.7)
MCV RBC AUTO: 83.6 FL (ref 79–97)
MONOCYTES # BLD AUTO: 0.64 10*3/MM3 (ref 0.1–0.9)
MONOCYTES NFR BLD AUTO: 9.6 % (ref 5–12)
NEUTROPHILS NFR BLD AUTO: 3.84 10*3/MM3 (ref 1.7–7)
NEUTROPHILS NFR BLD AUTO: 57.6 % (ref 42.7–76)
NRBC BLD AUTO-RTO: 0 /100 WBC (ref 0–0.2)
PLATELET # BLD AUTO: 183 10*3/MM3 (ref 140–450)
PMV BLD AUTO: 9.1 FL (ref 6–12)
POTASSIUM SERPL-SCNC: 4.8 MMOL/L (ref 3.5–5.2)
PROT SERPL-MCNC: 7.4 G/DL (ref 6–8.5)
PSA SERPL-MCNC: 3.63 NG/ML (ref 0–4)
RBC # BLD AUTO: 6.22 10*6/MM3 (ref 4.14–5.8)
SODIUM SERPL-SCNC: 140 MMOL/L (ref 136–145)
T4 FREE SERPL-MCNC: 1.2 NG/DL (ref 0.92–1.68)
TRIGL SERPL-MCNC: 115 MG/DL (ref 0–150)
TSH SERPL DL<=0.05 MIU/L-ACNC: 1.91 UIU/ML (ref 0.27–4.2)
VLDLC SERPL-MCNC: 21 MG/DL (ref 5–40)
WBC NRBC COR # BLD AUTO: 6.68 10*3/MM3 (ref 3.4–10.8)

## 2024-12-04 PROCEDURE — 80050 GENERAL HEALTH PANEL: CPT | Performed by: FAMILY MEDICINE

## 2024-12-04 PROCEDURE — 84153 ASSAY OF PSA TOTAL: CPT | Performed by: FAMILY MEDICINE

## 2024-12-04 PROCEDURE — 83036 HEMOGLOBIN GLYCOSYLATED A1C: CPT | Performed by: FAMILY MEDICINE

## 2024-12-04 PROCEDURE — 80061 LIPID PANEL: CPT | Performed by: FAMILY MEDICINE

## 2024-12-04 PROCEDURE — 84681 ASSAY OF C-PEPTIDE: CPT | Performed by: FAMILY MEDICINE

## 2024-12-04 PROCEDURE — 84439 ASSAY OF FREE THYROXINE: CPT | Performed by: FAMILY MEDICINE

## 2024-12-04 PROCEDURE — 99214 OFFICE O/P EST MOD 30 MIN: CPT | Performed by: FAMILY MEDICINE

## 2024-12-04 RX ORDER — LEVOCETIRIZINE DIHYDROCHLORIDE 5 MG/1
5 TABLET, FILM COATED ORAL EVERY EVENING
Qty: 90 TABLET | Refills: 0 | Status: SHIPPED | OUTPATIENT
Start: 2024-12-04

## 2024-12-04 RX ORDER — FLUTICASONE PROPIONATE 50 MCG
SPRAY, SUSPENSION (ML) NASAL
Qty: 16 G | Refills: 1 | Status: SHIPPED | OUTPATIENT
Start: 2024-12-04

## 2024-12-04 RX ORDER — FLUOXETINE 40 MG/1
40 CAPSULE ORAL EVERY MORNING
Qty: 90 CAPSULE | Refills: 1 | Status: SHIPPED | OUTPATIENT
Start: 2024-12-04

## 2024-12-05 ENCOUNTER — TELEPHONE (OUTPATIENT)
Dept: FAMILY MEDICINE CLINIC | Facility: CLINIC | Age: 59
End: 2024-12-05
Payer: COMMERCIAL

## 2024-12-05 LAB — C PEPTIDE SERPL-MCNC: 2.8 NG/ML (ref 1.1–4.4)

## 2024-12-05 NOTE — TELEPHONE ENCOUNTER
----- Message from Luly Jimenez sent at 12/5/2024  4:21 PM EST -----  Please call the patient regarding his abnormal result.  His creatinine came back down to 1.35.  Recommend him increase water intake follow-up in 1 month we will repeat this.  All other labs stable.

## 2024-12-05 NOTE — PROGRESS NOTES
"Chief Complaint  Fatigue    Subjective          Avery Watson presents to [facility]    Symptoms include fatigue.        Here for refills on medication.  Said he is doing well with current medications at this time.  States he also would like to be checked for diabetes he is felt more fatigued.    Review of Systems   Constitutional:  Positive for fatigue.         Objective   Vital Signs:   /80 (BP Location: Right arm, Patient Position: Sitting, Cuff Size: Large Adult)   Pulse 78   Temp 98 °F (36.7 °C) (Temporal)   Ht 182.9 cm (72.01\")   Wt 117 kg (259 lb)   SpO2 99%   BMI 35.12 kg/m²     Physical Exam  Constitutional:       General: He is not in acute distress.     Appearance: Normal appearance. He is well-developed and well-groomed. He is not ill-appearing, toxic-appearing or diaphoretic.   HENT:      Head: Normocephalic.      Nose: Nose normal. No congestion or rhinorrhea.      Mouth/Throat:      Mouth: Mucous membranes are moist.      Pharynx: Oropharynx is clear. No oropharyngeal exudate or posterior oropharyngeal erythema.   Eyes:      General: Lids are normal.         Right eye: No discharge.         Left eye: No discharge.      Extraocular Movements: Extraocular movements intact.      Pupils: Pupils are equal, round, and reactive to light.   Neck:      Vascular: No carotid bruit.   Cardiovascular:      Rate and Rhythm: Normal rate and regular rhythm.      Pulses: Normal pulses.      Heart sounds: Normal heart sounds. No murmur heard.     No friction rub. No gallop.   Pulmonary:      Effort: Pulmonary effort is normal. No respiratory distress.      Breath sounds: Normal breath sounds. No stridor. No wheezing, rhonchi or rales.   Chest:      Chest wall: No tenderness.   Abdominal:      General: Bowel sounds are normal. There is no distension.      Palpations: Abdomen is soft. There is no mass.      Tenderness: There is no abdominal tenderness. There is no " right CVA tenderness, left CVA tenderness, guarding or rebound.      Hernia: No hernia is present.   Musculoskeletal:         General: No swelling or tenderness. Normal range of motion.      Cervical back: Normal range of motion and neck supple. No rigidity or tenderness.      Right lower leg: No edema.      Left lower leg: No edema.   Lymphadenopathy:      Cervical: No cervical adenopathy.   Skin:     General: Skin is warm.      Capillary Refill: Capillary refill takes less than 2 seconds.      Coloration: Skin is not jaundiced.      Findings: No bruising, erythema or rash.   Neurological:      General: No focal deficit present.      Mental Status: He is alert and oriented to person, place, and time.      Motor: Motor function is intact. No weakness.      Coordination: Coordination is intact.      Gait: Gait is intact. Gait normal.   Psychiatric:         Attention and Perception: Attention normal.         Mood and Affect: Mood normal.         Speech: Speech normal.         Behavior: Behavior normal.         Cognition and Memory: Cognition normal.         Judgment: Judgment normal.        Result Review :                 Assessment and Plan    Diagnoses and all orders for this visit:    1. Dyslipidemia (Primary)  -     CBC Auto Differential; Future  -     Comprehensive Metabolic Panel; Future  -     Hemoglobin A1c; Future  -     Lipid Panel; Future  -     TSH; Future  -     T4, Free; Future  -     C-Peptide; Future    2. Anxiety  -     FLUoxetine (PROzac) 40 MG capsule; Take 1 capsule by mouth Every Morning.  Dispense: 90 capsule; Refill: 1    3. Middle ear effusion, bilateral  -     levocetirizine (XYZAL) 5 MG tablet; Take 1 tablet by mouth Every Evening.  Dispense: 90 tablet; Refill: 0    4. Prostatitis, chronic  -     PSA DIAGNOSTIC; Future    Other orders  -     fluticasone (FLONASE) 50 MCG/ACT nasal spray; Instill 2 sprays into each nostril daily as directed by provider  Dispense: 16 g; Refill: 1      Patient's  Body mass index is 35.12 kg/m². indicating that he is obese (BMI >30). Obesity-related health conditions include the following: dyslipidemias. Obesity is unchanged. BMI is is above average; BMI management plan is completed. We discussed low calorie, low carb based diet program, portion control, and increasing exercise..    Follow Up   Return in about 3 months (around 3/4/2025), or labs today.  Patient was given instructions and counseling regarding his condition or for health maintenance advice. Please see specific information pulled into the AVS if appropriate.     This document has been electronically signed by EDDIE Mead  December 5, 2024 16:03 EST

## 2024-12-09 DIAGNOSIS — R97.20 BPH WITH ELEVATED PSA: ICD-10-CM

## 2024-12-09 DIAGNOSIS — N40.0 BPH WITH ELEVATED PSA: ICD-10-CM

## 2024-12-10 ENCOUNTER — TELEPHONE (OUTPATIENT)
Dept: UROLOGY | Facility: CLINIC | Age: 59
End: 2024-12-10
Payer: COMMERCIAL

## 2024-12-10 RX ORDER — TESTOSTERONE CYPIONATE 200 MG/ML
INJECTION, SOLUTION INTRAMUSCULAR
Qty: 10 ML | Refills: 2 | OUTPATIENT
Start: 2024-12-10

## 2024-12-10 NOTE — TELEPHONE ENCOUNTER
Caller: Avery Watson    Relationship: Self    Best call back number: [phone]    Requested Prescriptions:   Testosterone Cypionate (Depo-Testosterone) 200 MG/ML injection        Pharmacy where request should be sent:    [facility] [phone]       Last office visit with prescribing clinician: 9/3/2024   Last telemedicine visit with prescribing clinician: Visit date not found   Next office visit with prescribing clinician: Visit date not found     Does the patient have less than a 3 day supply:  [x] Yes  [] No    Would you like a call back once the refill request has been completed: [x] Yes [] No    If the office needs to give you a call back, can they leave a voicemail: [x] Yes [] No    Nehemiah Hayes Rep   12/10/24 10:29 EST

## 2025-01-06 ENCOUNTER — TELEPHONE (OUTPATIENT)
Dept: FAMILY MEDICINE CLINIC | Facility: CLINIC | Age: 60
End: 2025-01-06
Payer: COMMERCIAL

## 2025-01-06 NOTE — TELEPHONE ENCOUNTER
Caller: Avery Watson    Relationship: Self    Best call back number: [phone]     What is the best time to reach you: ANYTIME, OK TO LEAVE VOICEMAIL.    Who are you requesting to speak with (clinical staff, provider,  specific staff member): CLINICAL STAFF    Do you know the name of the person who called: PATIENT    What was the call regarding: PATIENT WOULD LIKE A CALL BACK TO DISCUSS HIS REFERRAL TO UROLOGY. PATIENT ADVISES THAT HE WOULD LIKE THE REFERRAL TO GO TO [place] AS A SECOND OPINION.    Is it okay if the provider responds through MyChart: NO CALL ONLY

## 2025-01-07 DIAGNOSIS — N41.1 PROSTATITIS, CHRONIC: Primary | ICD-10-CM

## 2025-02-06 ENCOUNTER — OFFICE VISIT (OUTPATIENT)
Dept: UROLOGY | Facility: CLINIC | Age: 60
End: 2025-02-06
Payer: COMMERCIAL

## 2025-02-06 VITALS — DIASTOLIC BLOOD PRESSURE: 85 MMHG | OXYGEN SATURATION: 97 % | HEART RATE: 63 BPM | SYSTOLIC BLOOD PRESSURE: 131 MMHG

## 2025-02-06 DIAGNOSIS — N40.1 BPH WITH OBSTRUCTION/LOWER URINARY TRACT SYMPTOMS: ICD-10-CM

## 2025-02-06 DIAGNOSIS — E29.1 TESTOSTERONE DEFICIENCY IN MALE: Primary | ICD-10-CM

## 2025-02-06 DIAGNOSIS — N40.0 BPH WITH ELEVATED PSA: ICD-10-CM

## 2025-02-06 DIAGNOSIS — N13.8 BPH WITH OBSTRUCTION/LOWER URINARY TRACT SYMPTOMS: ICD-10-CM

## 2025-02-06 DIAGNOSIS — R97.20 BPH WITH ELEVATED PSA: ICD-10-CM

## 2025-02-06 DIAGNOSIS — R97.20 ELEVATED PROSTATE SPECIFIC ANTIGEN (PSA): ICD-10-CM

## 2025-02-06 DIAGNOSIS — N52.01 ERECTILE DYSFUNCTION DUE TO ARTERIAL INSUFFICIENCY: ICD-10-CM

## 2025-02-06 RX ORDER — TESTOSTERONE CYPIONATE 200 MG/ML
INJECTION, SOLUTION INTRAMUSCULAR
Qty: 10 ML | Refills: 2 | Status: SHIPPED | OUTPATIENT
Start: 2025-02-06

## 2025-02-06 RX ORDER — TADALAFIL 5 MG/1
5 TABLET ORAL DAILY PRN
Qty: 90 TABLET | Refills: 3 | Status: SHIPPED | OUTPATIENT
Start: 2025-02-06

## 2025-02-06 NOTE — PROGRESS NOTES
"       Office Visit New Urology      Patient Name: Avery Watson  : 1965   MRN: [id number]     Chief Complaint:    Chief Complaint   Patient presents with    Chronic prostatitis       Referring Provider: Luly Jimenez APRN    History of Present Illness: Avery Watson is a 59 y.o. male who presents to Urology today for numerous urologic concerns.  Transitioning care from [facility].    History of elevated PSA.     Lab Results   Component Value Date    PSA 3.630 2024    PSA 3.570 2024    PSA 6.280 (H) 2024    PSA 5.300 (H) 2024    PSA 4.450 (H) 2023    PSA 0.669 2022       History of testosterone deficiency, 100 mg twice a week.   Hgb is stable however Hct is rising, currently 52.     History of chronic prostatitis vs BPH.     Prostate MRI performed 2024 demonstrates \"low risk of prostate cancer\". He has transitional zone nodularity consistent with BPH.  For this reason he has not undergone a prostate biopsy.    He's been on Testosterone injections primary symptoms included fatigue, low libido and erectile dysfunction.  Ran out of testosterone in late December.  This has not been refilled.    Previously on daily Cialis 5 mg for BPH and ED and would like to continue.     Is on daily Flomax and Finasteride chronically. Reports dysuria.     Discussed based on chronic finasteride use suggested PSA is roughly 7.2.    I reviewed his previous MRI prostate with him.  His prostate volume is roughly 62+ grams.    He has some change in his renal function recently.  Creatinine 1.35, previous baseline around 1.1 last year.  GFR is roughly 60.5.  Lipid panel demonstrates pretty stable cholesterol levels, HDL slightly low.  Denies prior history of heart attack or MI.      Subjective      Review of System: Review of Systems   Genitourinary:  Positive for difficulty urinating and frequency. Negative for decreased urine volume, dysuria, enuresis, flank pain, " hematuria and urgency.      I have reviewed the ROS documented by my clinical staff, I have updated appropriately and I agree. Raghavendra Pacheco MD    Past Medical History:   Past Medical History:   Diagnosis Date    Acid reflux     Chronic pain disorder     Depression     Elevated cholesterol     Essential hypertension 10/27/2023    Extremity pain     Joint pain     Low back pain     Lumbosacral disc disease     Migraine     Muscle spasm     Neck pain     Osteoarthritis     PONV (postoperative nausea and vomiting)     Rheumatoid arthritis        Past Surgical History:   Past Surgical History:   Procedure Laterality Date    BRAVO PROCEDURE N/A 11/13/2024    Procedure: ESOPHAGOGASTRODUODENOSCOPY AND MADDOX;  Surgeon: Jonathan Galeana MD;  Location:  [facility];  Service: Gastroenterology;  Laterality: N/A;  bravo place at 38cm    CARPAL TUNNEL RELEASE Right 08/04/2016    Procedure: CARPAL TUNNEL RELEASE;  Surgeon: Yoel Lua MD;  Location:  [facility];  Service:     CARPAL TUNNEL RELEASE Left 11/09/2017    Procedure: CARPAL TUNNEL RELEASE;  Surgeon: Yoel Lua MD;  Location:  [facility];  Service:     COLONOSCOPY      ENDOSCOPY N/A 7/24/2024    Procedure: ESOPHAGOGASTRODUODENOSCOPY WITH BIOPSY;  Surgeon: Valerie Miranda MD;  Location:  [facility];  Service: Gastroenterology;  Laterality: N/A;    LUMBAR DISCECTOMY Right 09/22/2022    Procedure: LUMBAR DISCECTOMY L4-5 RIGHT;  Surgeon: Aquiles Grigsby MD;  Location:  [facility];  Service: Neurosurgery;  Laterality: Right;    LUMBAR FACET INJECTION      RHIZOTOMY      SPINAL CORD STIMULATOR IMPLANT N/A 02/26/2024    Procedure: SPINAL CORD STIMULATOR INSERTION PHASE 1;  Surgeon: Aquiles Grigsby MD;  Location:  [facility];  Service: Neurosurgery;  Laterality: N/A;    SPINAL CORD STIMULATOR IMPLANT N/A 03/18/2024    Procedure: SPINAL CORD STIMULATOR LEAD REVISION;  Surgeon: Aquiles Grigsby MD;  Location:  [facility];  Service: Neurosurgery;   "Laterality: N/A;    SPINAL CORD STIMULATOR TRIAL W/ LAMINOTOMY      TOE SURGERY Right 03/04/2015    \"flexible joint\" per pt -- no hardware    UPPER GASTROINTESTINAL ENDOSCOPY         Family History:   Family History   Problem Relation Age of Onset    Lung cancer Mother     Cancer Mother         ovarian    Diabetes Sister     Cancer Sister         breast    Heart disease Brother     Rheum arthritis Brother     Arthritis Brother        Social History:   Social History     Socioeconomic History    Marital status:    Tobacco Use    Smoking status: Never     Passive exposure: Never    Smokeless tobacco: Former     Types: Chew     Quit date: 2020    Tobacco comments:     uses chewing tobacco   Vaping Use    Vaping status: Never Used   Substance and Sexual Activity    Alcohol use: No    Drug use: No    Sexual activity: Not Currently     Partners: Female       Medications:     Current Outpatient Medications:     atorvastatin (LIPITOR) 10 MG tablet, Take 1 tablet by mouth Daily., Disp: 90 tablet, Rfl: 2    dexlansoprazole (DEXILANT) 60 MG capsule, Take 1 capsule by mouth Daily., Disp: 30 capsule, Rfl: 5    FLUoxetine (PROzac) 40 MG capsule, Take 1 capsule by mouth Every Morning., Disp: 90 capsule, Rfl: 1    fluticasone (FLONASE) 50 MCG/ACT nasal spray, Instill 2 sprays into each nostril daily as directed by provider, Disp: 16 g, Rfl: 1    hydrocortisone (ANUSOL-HC) 25 MG suppository, Use 1 suppository rectally 2 times a day  as needed., Disp: 30 suppository, Rfl: 11    levocetirizine (XYZAL) 5 MG tablet, Take 1 tablet by mouth Every Evening., Disp: 90 tablet, Rfl: 0    metoprolol tartrate (LOPRESSOR) 25 MG tablet, Take 1 tablet by mouth 2 (Two) Times a Day., Disp: 180 tablet, Rfl: 2    sucralfate (CARAFATE) 1 g tablet, Take 1 tablet by mouth 4 (Four) Times a Day., Disp: 90 tablet, Rfl: 2    tamsulosin (FLOMAX) 0.4 MG capsule 24 hr capsule, Take 2 capsules by mouth Daily., Disp: 180 capsule, Rfl: 3    Testosterone " Cypionate (Depo-Testosterone) 200 MG/ML injection, Inject 0.5 mL subcutaneously every Monday and Thursday., Disp: 10 mL, Rfl: 2    baclofen (LIORESAL) 10 MG tablet, Take 2 tablets by mouth at bedtime (Patient not taking: Reported on 2/6/2025), Disp: 180 tablet, Rfl: 4    finasteride (PROSCAR) 5 MG tablet, Take 1 tablet by mouth Daily. (Patient not taking: Reported on 2/6/2025), Disp: 30 tablet, Rfl: 5    tadalafil (Cialis) 5 MG tablet, Take 1 tablet by mouth Daily As Needed for Erectile Dysfunction (for BPH symptoms and ED)., Disp: 90 tablet, Rfl: 3    Allergies:   No Known Allergies    IPSS Questionnaire (AUA-7):  Over the past month…    1)  Incomplete Emptying:       How often have you had a sensation of not emptying you had the sensation of not emptying your bladder completely after you finished urinating?  1 - Less than 1 time in 5   2)  Frequency:       How often have you had the urinate again less than two hours after you finished urinating?  2 - Less than half the time   3)  Intermittency:       How often have you found you stopped and started again several times when you urinated?   4 - More than half the time   4) Urgency:      How often have you found it difficult to postpone urination?  2 - Less than half the time   5) Weak Stream:      How often have you had a weak urinary stream?  2 - Less than half the time   6) Straining:       How often have you had to push or strain to begin urination?  2 - Less than half the time   7) Nocturia:      How many times did you most typically get up to urinate from the time you went to bed at night until the time you got up in the morning?  2 - 2 times   Total Score:  14   The International Prostate Symptom Score (IPSS) is used to screen, diagnose, track symptoms of benign prostatic hyperplasia (BPH).   0-7 (Mild Symptoms) 8-19 (Moderate) 20-35 (Severe)   Quality of Life (QoL):  If you were to spend the rest of your life with your urinary condition just the way it is  now, how would you feel about that? 3-Mixed   Urine Leakage (Incontinence) 0-No Leakage           Objective     Physical Exam:   Vital Signs:   Vitals:    02/06/25 1049   BP: 131/85   Pulse: 63   SpO2: 97%     There is no height or weight on file to calculate BMI.     Physical Exam  Vitals and nursing note reviewed.   Constitutional:       Appearance: Normal appearance.   HENT:      Head: Normocephalic and atraumatic.      Mouth/Throat:      Mouth: Mucous membranes are moist.      Pharynx: Oropharynx is clear.   Eyes:      Extraocular Movements: Extraocular movements intact.      Conjunctiva/sclera: Conjunctivae normal.   Cardiovascular:      Rate and Rhythm: Normal rate and regular rhythm.   Pulmonary:      Effort: Pulmonary effort is normal. No respiratory distress.   Abdominal:      Palpations: Abdomen is soft.      Tenderness: There is no abdominal tenderness. There is no right CVA tenderness or left CVA tenderness.   Genitourinary:     Comments:  Circumcised phallus, orthotopic meatus, bilaterally descended testicles without masses, or lesions.     NAINA: Normal rectal tone, no blood, estimated 60 gram prostate without nodularity or firmness.   Musculoskeletal:         General: Normal range of motion.      Cervical back: Normal range of motion.   Skin:     General: Skin is warm and dry.   Neurological:      General: No focal deficit present.      Mental Status: He is alert and oriented to person, place, and time.   Psychiatric:         Mood and Affect: Mood normal.         Behavior: Behavior normal.         Labs:   Brief Urine Lab Results  (Last result in the past 365 days)        Color   Clarity   Blood   Leuk Est   Nitrite   Protein   CREAT   Urine HCG        09/17/24 0918 Yellow   Clear   Negative   Negative   Negative   Negative                   Urine Culture          9/17/2024    11:03   Urine Culture   Urine Culture No growth         Lab Results   Component Value Date    GLUCOSE 90 12/04/2024    CALCIUM  9.2 12/04/2024     12/04/2024    K 4.8 12/04/2024    CO2 26.8 12/04/2024     12/04/2024    BUN 15 12/04/2024    CREATININE 1.35 (H) 12/04/2024    EGFRIFNONA 71 05/03/2021    BCR 11.1 12/04/2024    ANIONGAP 8.2 12/04/2024       Lab Results   Component Value Date    WBC 6.68 12/04/2024    HGB 17.7 12/04/2024    HCT 52.0 (H) 12/04/2024    MCV 83.6 12/04/2024     12/04/2024       Images:   CT Angiogram Coronary    Result Date: 11/27/2024  1. Unremarkable coronary CTA with no segments of significant stenosis, plaque, or occlusion. 2. RCA supplies PDA. LCx supplies L-PLB. 3. Total calcium score: 0; corresponds to 0-25th percentile for patient age/demographic. 4. 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 No aneurysm. 5. Other nonacute findings above.  ASSESSMENT: CAD-RADS 0   This report was finalized on 11/27/2024 10:00 AM by Dr. Avery Duggan MD.      CT Angiogram Chest Pulmonary Embolism    Result Date: 10/30/2024  1.  No pulmonary embolism. 2.  Trace pleural effusions noted and are nonloculated. 3.  Mild cardiomegaly. 4. Other nonacute findings above.   This report was finalized on 10/30/2024 3:33 PM by Dr. Avery Duggan MD.      XR Chest AP    Result Date: 10/30/2024    Unremarkable exam. No acute cardiopulmonary findings identified.   This report was finalized on 10/30/2024 1:22 PM by Dr. Avery Duggan MD.        Measures:   Tobacco:   Avery Watson  reports that he has never smoked. He has never been exposed to tobacco smoke. He quit smokeless tobacco use about 5 years ago.  His smokeless tobacco use included chew.      Assessment / Plan      Assessment/Plan:   Avery Watson is a 59 y.o. male who presented today for numerous urologic issues.  History of elevated PSA.  Has not had a prostate biopsy.  MRI prostate 1 year prior demonstrated no significant concerns.  PSA has been variably  elevated.  Adjusted PSA accounting for finasteride use is roughly in the 7 range.  We discussed this could be related to chronic prostatitis, BPH or malignancy.  I recommend repeat MRI to establish new baseline.  Will restart daily 5 mg Cialis for both baseline BPH and ED.  We will resume testosterone injections and monitor his hemoglobin and hematocrit.  We will repeat his labs prior to next follow-up.  I will recheck a PSA with total and free values.  Digital rectal examination largely benign.  The patient has moderate BPH but stable symptoms currently on tamsulosin and finasteride.  We discussed the relevant risk benefits alternatives to daily Cialis in addition to ongoing testosterone use.  We may ask him to stop the testosterone if MRI suggest concern for prostate cancer which may require biopsy.  He reports understanding.    Diagnoses and all orders for this visit:    1. Testosterone deficiency in male (Primary)  -     Hemoglobin & Hematocrit, Blood; Future  -     Testosterone, Free, Total; Future    2. Elevated prostate specific antigen (PSA)  -     PSA Total+% Free (Serial); Future  -     MRI Prostate With & Without Contrast; Future    3. BPH with obstruction/lower urinary tract symptoms  -     MRI Prostate With & Without Contrast; Future  -     tadalafil (Cialis) 5 MG tablet; Take 1 tablet by mouth Daily As Needed for Erectile Dysfunction (for BPH symptoms and ED).  Dispense: 90 tablet; Refill: 3    4. Erectile dysfunction due to arterial insufficiency  -     tadalafil (Cialis) 5 MG tablet; Take 1 tablet by mouth Daily As Needed for Erectile Dysfunction (for BPH symptoms and ED).  Dispense: 90 tablet; Refill: 3    5. BPH with elevated PSA  -     Testosterone Cypionate (Depo-Testosterone) 200 MG/ML injection; Inject 0.5 mL subcutaneously every Monday and Thursday.  Dispense: 10 mL; Refill: 2       Follow Up:   Return in about 4 weeks (around 3/6/2025) for 4-6 wks to review labs and MRI prostate.    I spent  approximately 60 minutes providing clinical care for this patient; including review of patient's chart and provider documentation, face to face time spent with patient in examination room (obtaining history, performing physical exam, discussing diagnosis and management options), placing orders, and completing patient documentation.  Extensive review of outside records given transition of care.    Raghavendra Pacheco MD  [facility] [place]

## 2025-02-24 ENCOUNTER — LAB (OUTPATIENT)
Dept: LAB | Facility: HOSPITAL | Age: 60
End: 2025-02-24
Payer: COMMERCIAL

## 2025-02-24 ENCOUNTER — HOSPITAL ENCOUNTER (OUTPATIENT)
Dept: MRI IMAGING | Facility: HOSPITAL | Age: 60
Discharge: HOME | End: 2025-02-24
Payer: COMMERCIAL

## 2025-02-24 DIAGNOSIS — R97.20 ELEVATED PROSTATE SPECIFIC ANTIGEN (PSA): ICD-10-CM

## 2025-02-24 DIAGNOSIS — N40.1 BPH WITH OBSTRUCTION/LOWER URINARY TRACT SYMPTOMS: ICD-10-CM

## 2025-02-24 DIAGNOSIS — N13.8 BPH WITH OBSTRUCTION/LOWER URINARY TRACT SYMPTOMS: ICD-10-CM

## 2025-02-24 DIAGNOSIS — E29.1 TESTOSTERONE DEFICIENCY IN MALE: ICD-10-CM

## 2025-02-24 LAB
HCT VFR BLD AUTO: 47.9 % (ref 37.5–51)
HGB BLD-MCNC: 16.5 G/DL (ref 13–17.7)

## 2025-02-24 PROCEDURE — 84154 ASSAY OF PSA FREE: CPT

## 2025-02-24 PROCEDURE — 84403 ASSAY OF TOTAL TESTOSTERONE: CPT

## 2025-02-24 PROCEDURE — 72197 MRI PELVIS W/O & W/DYE: CPT

## 2025-02-24 PROCEDURE — 85018 HEMOGLOBIN: CPT

## 2025-02-24 PROCEDURE — A9577 INJ MULTIHANCE: HCPCS | Performed by: STUDENT IN AN ORGANIZED HEALTH CARE EDUCATION/TRAINING PROGRAM

## 2025-02-24 PROCEDURE — 36415 COLL VENOUS BLD VENIPUNCTURE: CPT

## 2025-02-24 PROCEDURE — 85014 HEMATOCRIT: CPT

## 2025-02-24 PROCEDURE — 25510000002 GADOBENATE DIMEGLUMINE 529 MG/ML SOLUTION: Performed by: STUDENT IN AN ORGANIZED HEALTH CARE EDUCATION/TRAINING PROGRAM

## 2025-02-24 PROCEDURE — 84402 ASSAY OF FREE TESTOSTERONE: CPT

## 2025-02-24 PROCEDURE — 84153 ASSAY OF PSA TOTAL: CPT

## 2025-02-24 RX ADMIN — GADOBENATE DIMEGLUMINE 20 ML: 529 INJECTION, SOLUTION INTRAVENOUS at 10:15

## 2025-02-25 LAB
PSA FREE MFR SERPL: 17 %
PSA FREE SERPL-MCNC: 0.73 NG/ML
PSA SERPL-MCNC: 4.3 NG/ML (ref 0–4)

## 2025-03-01 LAB
TESTOST FREE SERPL-MCNC: 23.9 PG/ML (ref 7.2–24)
TESTOST SERPL-MCNC: 1175 NG/DL (ref 264–916)

## 2025-03-06 ENCOUNTER — OFFICE VISIT (OUTPATIENT)
Dept: UROLOGY | Facility: CLINIC | Age: 60
End: 2025-03-06
Payer: COMMERCIAL

## 2025-03-06 DIAGNOSIS — N40.1 BPH WITH OBSTRUCTION/LOWER URINARY TRACT SYMPTOMS: ICD-10-CM

## 2025-03-06 DIAGNOSIS — N13.8 BPH WITH OBSTRUCTION/LOWER URINARY TRACT SYMPTOMS: ICD-10-CM

## 2025-03-06 DIAGNOSIS — N41.1 CHRONIC PROSTATITIS: Primary | ICD-10-CM

## 2025-03-06 DIAGNOSIS — E29.1 TESTOSTERONE DEFICIENCY IN MALE: ICD-10-CM

## 2025-03-06 DIAGNOSIS — R97.20 ELEVATED PROSTATE SPECIFIC ANTIGEN (PSA): ICD-10-CM

## 2025-03-06 PROCEDURE — 99214 OFFICE O/P EST MOD 30 MIN: CPT | Performed by: STUDENT IN AN ORGANIZED HEALTH CARE EDUCATION/TRAINING PROGRAM

## 2025-03-06 RX ORDER — DOXYCYCLINE 100 MG/1
100 CAPSULE ORAL 2 TIMES DAILY
Qty: 28 CAPSULE | Refills: 0 | Status: SHIPPED | OUTPATIENT
Start: 2025-03-06 | End: 2025-03-20

## 2025-03-06 NOTE — PROGRESS NOTES
Follow Up Office Visit      Patient Name: Avery Watson  : 1965   MRN: [id number]     Chief Complaint:    Chief Complaint   Patient presents with    Testosterone deficiency in male       Referring Provider: No ref. provider found    History of Present Illness: Avery Watson is a 59 y.o. male who presents today for follow up of elevated PSA, testosterone deficiency, BPH, LUTS, chronic prostatitis, numerous urologic concerns.  Recently restarted testosterone injections, 100 mg twice a week injections.  His testosterone has normalized to greater than 1000.  His hematocrit is normal, hematocrit is 49.  No evidence of polycythemia.  Feeling better.  Continues to complain of weak stream, urgency, frequency, stop taking finasteride due to side effects, he states he believes this is worsening his urinary burning concerns and pelvic discomfort.  He complains of genital warmth for and sensation and chronic pelvic discomfort.  He has been diagnosed and treated for previous prostatitis concerns.  His IPSS score is 13 with a mixed quality of life.  This is despite taking tamsulosin.  Has been off finasteride for some time.  Returns today for MRI prostate evaluation due to history of persistently elevated PSA.  This demonstrates no malignant concerns but does demonstrate signs of prostatitis.    IMPRESSION:  Impression:  1. Grossly stable T2 appearance of the prostate since 2024, without suspicious lesion to suggest clinically significant prostate malignancy.  2. Mild prostamegaly (59 cc gland) with BPH change.  3. Stable peripheral zone signal changes favoring sequelae of prior prostatitis.     PI-RADS 2 exam.    The patient's prostate volume is enlarged at 60+ grams.  He does have signs of intravesical protrusion of his lateral lobes consistent with BPH.    Lab Results   Component Value Date    PSA 4.3 (H) 2025    PSA 3.630 2024    PSA 3.570 2024    PSA 6.280 (H) 2024     PSA 5.300 (H) 02/08/2024    PSA 4.450 (H) 08/29/2023         Subjective      Review of System: Review of Systems   Genitourinary:  Positive for frequency and urgency. Negative for decreased urine volume, difficulty urinating, dysuria, enuresis, flank pain and hematuria.      I have reviewed the ROS documented by my clinical staff, I have updated appropriately and I agree. Raghavendra Pacheco MD    I have reviewed and the following portions of the patient's history were updated as appropriate: past family history, past medical history, past social history, past surgical history and problem list.    Medications:     Current Outpatient Medications:     atorvastatin (LIPITOR) 10 MG tablet, Take 1 tablet by mouth Daily., Disp: 90 tablet, Rfl: 2    dexlansoprazole (DEXILANT) 60 MG capsule, Take 1 capsule by mouth Daily., Disp: 30 capsule, Rfl: 5    FLUoxetine (PROzac) 40 MG capsule, Take 1 capsule by mouth Every Morning., Disp: 90 capsule, Rfl: 1    fluticasone (FLONASE) 50 MCG/ACT nasal spray, Instill 2 sprays into each nostril daily as directed by provider, Disp: 16 g, Rfl: 1    hydrocortisone (ANUSOL-HC) 25 MG suppository, Use 1 suppository rectally 2 times a day  as needed., Disp: 30 suppository, Rfl: 11    levocetirizine (XYZAL) 5 MG tablet, Take 1 tablet by mouth Every Evening., Disp: 90 tablet, Rfl: 0    metoprolol tartrate (LOPRESSOR) 25 MG tablet, Take 1 tablet by mouth 2 (Two) Times a Day., Disp: 180 tablet, Rfl: 2    sucralfate (CARAFATE) 1 g tablet, Take 1 tablet by mouth 4 (Four) Times a Day., Disp: 90 tablet, Rfl: 2    tadalafil (Cialis) 5 MG tablet, Take 1 tablet by mouth Daily As Needed for Erectile Dysfunction (for BPH symptoms and ED)., Disp: 90 tablet, Rfl: 3    tamsulosin (FLOMAX) 0.4 MG capsule 24 hr capsule, Take 2 capsules by mouth Daily., Disp: 180 capsule, Rfl: 3    Testosterone Cypionate (Depo-Testosterone) 200 MG/ML injection, Inject 0.5 mL subcutaneously every Monday and Thursday., Disp: 10 mL,  Rfl: 2    baclofen (LIORESAL) 10 MG tablet, Take 2 tablets by mouth at bedtime (Patient not taking: Reported on 3/6/2025), Disp: 180 tablet, Rfl: 4    doxycycline (VIBRAMYCIN) 100 MG capsule, Take 1 capsule by mouth 2 (Two) Times a Day for 14 days., Disp: 28 capsule, Rfl: 0    Allergies:   No Known Allergies    IPSS Questionnaire (AUA-7):  Over the past month…    1)  Incomplete Emptying:       How often have you had a sensation of not emptying you had the sensation of not emptying your bladder completely after you finished urinating?  0 - Not at all   2)  Frequency:       How often have you had the urinate again less than two hours after you finished urinating?  5 - Almost always   3)  Intermittency:       How often have you found you stopped and started again several times when you urinated?   2 - Less than half the time   4) Urgency:      How often have you found it difficult to postpone urination?  3 - About half the time   5) Weak Stream:      How often have you had a weak urinary stream?  1 - Less than 1 time in 5   6) Straining:       How often have you had to push or strain to begin urination?  0 - Not at all   7) Nocturia:      How many times did you most typically get up to urinate from the time you went to bed at night until the time you got up in the morning?  2 - 2 times   Total Score:  13   The International Prostate Symptom Score (IPSS) is used to screen, diagnose, track symptoms of benign prostatic hyperplasia (BPH).   0-7 (Mild Symptoms) 8-19 (Moderate) 20-35 (Severe)   Quality of Life (QoL):  If you were to spend the rest of your life with your urinary condition just the way it is now, how would you feel about that? 3-Mixed   Urine Leakage (Incontinence) 0-No Leakage       Objective     Physical Exam:   Vital Signs: There were no vitals filed for this visit.  There is no height or weight on file to calculate BMI.     Physical Exam  Constitutional:       Appearance: Normal appearance.   HENT:       Head: Normocephalic and atraumatic.      Nose: Nose normal.   Eyes:      Extraocular Movements: Extraocular movements intact.      Conjunctiva/sclera: Conjunctivae normal.      Pupils: Pupils are equal, round, and reactive to light.   Musculoskeletal:         General: Normal range of motion.      Cervical back: Normal range of motion and neck supple.   Skin:     General: Skin is warm and dry.      Findings: No lesion or rash.   Neurological:      General: No focal deficit present.      Mental Status: He is alert and oriented to person, place, and time. Mental status is at baseline.   Psychiatric:         Mood and Affect: Mood normal.         Behavior: Behavior normal.         Labs:   Brief Urine Lab Results  (Last result in the past 365 days)        Color   Clarity   Blood   Leuk Est   Nitrite   Protein   CREAT   Urine HCG        09/17/24 0918 Yellow   Clear   Negative   Negative   Negative   Negative                   Urine Culture          9/17/2024    11:03   Urine Culture   Urine Culture No growth         Lab Results   Component Value Date    GLUCOSE 90 12/04/2024    CALCIUM 9.2 12/04/2024     12/04/2024    K 4.8 12/04/2024    CO2 26.8 12/04/2024     12/04/2024    BUN 15 12/04/2024    CREATININE 1.35 (H) 12/04/2024    EGFRIFNONA 71 05/03/2021    BCR 11.1 12/04/2024    ANIONGAP 8.2 12/04/2024       Lab Results   Component Value Date    WBC 6.68 12/04/2024    HGB 16.5 02/24/2025    HCT 47.9 02/24/2025    MCV 83.6 12/04/2024     12/04/2024       Images:   MRI Prostate With & Without Contrast    Result Date: 2/25/2025  Impression: 1. Grossly stable T2 appearance of the prostate since 2/20/2024, without suspicious lesion to suggest clinically significant prostate malignancy. 2. Mild prostamegaly (59 cc gland) with BPH change. 3. Stable peripheral zone signal changes favoring sequelae of prior prostatitis. PI-RADS 2 exam. Electronically Signed: Robert Pardo MD  2/25/2025 4:12 PM EST  Workstation  ID: [id number]    CT Angiogram Coronary    Result Date: 11/27/2024  1. Unremarkable coronary CTA with no segments of significant stenosis, plaque, or occlusion. 2. RCA supplies PDA. LCx supplies L-PLB. 3. Total calcium score: 0; corresponds to 0-25th percentile for patient age/demographic. 4. 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 No aneurysm. 5. Other nonacute findings above.  ASSESSMENT: CAD-RADS 0   This report was finalized on 11/27/2024 10:00 AM by Dr. Avery Duggan MD.        Measures:   Tobacco:   Avery Watson  reports that he has never smoked. He has never been exposed to tobacco smoke. He quit smokeless tobacco use about 5 years ago.  His smokeless tobacco use included chew.     Assessment / Plan      Assessment/Plan:   59 y.o. male who presented today for follow up of testosterone efficiency, chronic LUTS, chronic prostatitis, elevated PSA in the setting of BPH.  MRI prostate is reassuring.  He does have significant BPH.  He would likely benefit from bladder outlet surgery.  We discussed the most common etiology of chronic prostatitis is obstructive BPH.  I recommend a workup including cystoscopy and uroflow rate measurement.  Greenlight PVP and UroLift discussed his options for management.  No real role for biopsy at this moment.  No obvious findings on his MRI prostate to suggest malignancy however it does suggest possible prostatitis.  I will treat him empirically with doxycycline for 2 weeks and return to the office in a few weeks for cystoscopy and uroflow.  He reports understanding.  Testosterone management appears stable.  He is a bit supratherapeutic but we will repeat labs likely in 3 months.  No evidence of polycythemia at this time.  Does not need to have a phlebotomy at this time. Risks of cystoscopy include bleeding, dysuria, infection.    Diagnoses and all orders for this visit:    1. Chronic  prostatitis (Primary)  -     doxycycline (VIBRAMYCIN) 100 MG capsule; Take 1 capsule by mouth 2 (Two) Times a Day for 14 days.  Dispense: 28 capsule; Refill: 0    2. Elevated prostate specific antigen (PSA)    3. Testosterone deficiency in male    4. BPH with obstruction/lower urinary tract symptoms           Follow Up:   Return in about 19 days (around 3/25/2025) for Cysto, Uroflow in procedure clinic.    I spent approximately 34 minutes providing clinical care for this patient; including review of patient's chart and provider documentation, face to face time spent with patient in examination room (obtaining history, performing physical exam, discussing diagnosis and management options), placing orders, and completing patient documentation.     Raghavendra Pacheco MD  [facility]

## 2025-03-24 ENCOUNTER — PROCEDURE VISIT (OUTPATIENT)
Age: 60
End: 2025-03-24
Payer: COMMERCIAL

## 2025-03-24 DIAGNOSIS — N40.1 BPH WITH OBSTRUCTION/LOWER URINARY TRACT SYMPTOMS: Primary | ICD-10-CM

## 2025-03-24 DIAGNOSIS — N13.8 BPH WITH OBSTRUCTION/LOWER URINARY TRACT SYMPTOMS: Primary | ICD-10-CM

## 2025-03-24 PROCEDURE — 52000 CYSTOURETHROSCOPY: CPT | Performed by: STUDENT IN AN ORGANIZED HEALTH CARE EDUCATION/TRAINING PROGRAM

## 2025-03-24 PROCEDURE — 76872 US TRANSRECTAL: CPT | Performed by: STUDENT IN AN ORGANIZED HEALTH CARE EDUCATION/TRAINING PROGRAM

## 2025-03-24 PROCEDURE — 51798 US URINE CAPACITY MEASURE: CPT | Performed by: STUDENT IN AN ORGANIZED HEALTH CARE EDUCATION/TRAINING PROGRAM

## 2025-03-24 PROCEDURE — 51741 ELECTRO-UROFLOWMETRY FIRST: CPT | Performed by: STUDENT IN AN ORGANIZED HEALTH CARE EDUCATION/TRAINING PROGRAM

## 2025-03-24 NOTE — PROGRESS NOTES
Preprocedure diagnosis  BPH with LUTS    Postprocedure diagnosis  BPH with LUTS    Procedure  Flexible Cystourethroscopy   Uroflowmetry     Attending surgeon  Raghavendra Pacheco MD    Anesthesia  2% lidocaine jelly intraurethrally    Complications  None    Indications  59 y.o. male undergoing a flexible cystoscopy for the above mentioned indications.  Informed consent was obtained.      Findings  No obvious urethral stricture, bladder tumor, bladder stones, or urothelial lesions identified.    Severe lateral lobe hyperplasia, no intravesical median lobe.       MRI imaging reviewed and prostate sizing personally performed (height (cm) × length (cm) × width (cm) × ?/6) = 59 cc/mL.     Procedure  The patient was placed in supine position and prepped and draped in sterile fashion with lidocaine jelly instilled per the urethra for local anesthesia 5 minutes prior to procedure start.  A brief timeout was performed including available nursing staff and the patient.      The 16 Fr digital flexible cystoscope was lubricated and gently advanced into the urethral meatus.     Penile and bulbar urethra appeared widely patent without visible lesion or stricture.     Prostatic urethra demonstrates severe lateral lobe coaptation, with no median lobe component.     Scope then advanced into the bladder. Bladder was completely visualized starting with the trigone.   There were bilateral orthotopic ureteral orifices effluxing clear urine.   Posterior, lateral, anterior walls, and dome completely visualized revealing NO obvious mucosal lesions, tumors, or bladder stones.    There is mild trabeculation along the posterior and lateral walls with no bladder divericuli.     Cystoscope was retroflexed and bladder neck and prostate further visualized confirming intravesical lateral lobe impinging on bladder basin.     The bladder was left filled, and the cystoscope was then gently withdrawn while visualizing the urethra and the procedure was  then terminated.  The patient tolerated the procedure well.      He then voided for uroflowmetry measurement and PVR bladder scan was performed afterwards.     Discussion:  The patient was moved from the procedure room to a clinic examination room to review results of BPH workup today.     Uroflow   Avg flow: 3.5  ml/sec  Max flow: 8.1 ml/sec  Time to max flow: 18.9 sec  Voided volume: 221 mL     PVR bladder scan: 0 mL     I have evaluated the patient's ongoing urinary symptoms and options for management with the patient today.     The patient's cystoscopy and uroflow results indicate likely bladder obstruction     Prostate volume is enlarged.     The patient was counseled regarding options regarding his continued BPH with lower urinary tract symptoms. He is currently medically managed with Tamsulosin, and previously did not tolerate Finasteride.     Medical Options vs Continued Observation  Continued monitoring may be appropriate but this is a shared decision made with patient based on assumed risk.     Continued alpha blocker therapy, the addition of 5-alpha reductase inhibitors (if not already prescribed), or additional anticholinergic/beta 3-agonist medications discussed as options.    Surgical Options  Decision to proceed with surgical intervention is multi-factorial and based on patient degree of bother, presumed risk of bladder health decline over time, desire to limit or reduce medication usage (polypharmacy), and avoidance of potential side effects of BPH medication usage (light headedness, hypotension, sexual dysfunction, retrograde ejaculation, dry eye/mouth, memory changes, etc).      Surgical options include transurethral resection of prostate, greenlight photo vaporization of prostate, Urolift (prostatic urethral lift), Aquablation, robotic simple prostatectomy (for extreme BPH).     Downsides to TURP include: likely need for overnight inpatient admission, higher bleeding risk, need for bladder  irrigation.     Greenlight PVP benefits include: reduced bleeding risk, possible outpatient procedure but does require catheter placement for 48 hours or more.     Urolift benefits include: typically outpatient, often no catheter required post-op, preserves ejaculation without ED risk, prostate volume must be less than 100 cc, good data out to 5 years+ currently, may need additional more aggressive surgery long term if re-growth/re-obstruction, re-treatment rate typically listed as 15% or more long term.     I discussed that both TURP and Greenlight PVP will result in retrograde ejaculation or possibly anejaculation depending on aggressiveness of tissue removal, small risk of transient vs long term (rare) stress incontinence, small risk of urethral stricture or bladder neck contracture, ureteral injury, tissue regrowth requiring additional procedure long term, bladder perforation requiring repair, infection, hematuria, and anesthetic related complications not withstanding. Greenlight PVP and TURP may result in possible better long term symptom reduction vs Urolift however long term data is still accumulating regarding Urolift.     Aquablation is a new BPH treatment option available at [facility]. This accomplishes the same goals as TURP, Greenlight PVP, or robotic simple prostatectomy with efficient tissue removal with pressurized waterjet therapy followed by transurethral fulguration of bleeding vessels with a cautery loop.  Benefit of this operation is that according to recent literature has less risk of long-term irreversible side effects such as retrograde ejaculation or urinary incontinence while achieving significant and efficient prostate tissue removal.  I still  patients regarding the classic risks of any bladder outlet surgery similar to greenlight and TURP listed above.       PLAN  -After thorough discussion patient is interested in proceeding with greenlight PVP.  Risks discussed.  Proceed  next available    Raghavendra Pacheco MD

## 2025-05-09 ENCOUNTER — PRE-ADMISSION TESTING (OUTPATIENT)
Dept: PREADMISSION TESTING | Facility: HOSPITAL | Age: 60
End: 2025-05-09
Payer: COMMERCIAL

## 2025-05-09 VITALS — HEIGHT: 72 IN | BODY MASS INDEX: 33.83 KG/M2 | WEIGHT: 249.78 LBS

## 2025-05-09 DIAGNOSIS — N40.1 BPH WITH OBSTRUCTION/LOWER URINARY TRACT SYMPTOMS: ICD-10-CM

## 2025-05-09 DIAGNOSIS — N13.8 BPH WITH OBSTRUCTION/LOWER URINARY TRACT SYMPTOMS: ICD-10-CM

## 2025-05-09 LAB
ANION GAP SERPL CALCULATED.3IONS-SCNC: 10 MMOL/L (ref 5–15)
BILIRUB UR QL STRIP: NEGATIVE
BUN SERPL-MCNC: 11 MG/DL (ref 6–20)
BUN/CREAT SERPL: 10 (ref 7–25)
CALCIUM SPEC-SCNC: 9.2 MG/DL (ref 8.6–10.5)
CHLORIDE SERPL-SCNC: 103 MMOL/L (ref 98–107)
CLARITY UR: CLEAR
CO2 SERPL-SCNC: 25 MMOL/L (ref 22–29)
COLOR UR: YELLOW
CREAT SERPL-MCNC: 1.1 MG/DL (ref 0.76–1.27)
DEPRECATED RDW RBC AUTO: 39.2 FL (ref 37–54)
EGFRCR SERPLBLD CKD-EPI 2021: 77.3 ML/MIN/1.73
ERYTHROCYTE [DISTWIDTH] IN BLOOD BY AUTOMATED COUNT: 12.8 % (ref 12.3–15.4)
GLUCOSE SERPL-MCNC: 91 MG/DL (ref 65–99)
GLUCOSE UR STRIP-MCNC: NEGATIVE MG/DL
HBA1C MFR BLD: 5.3 % (ref 4.8–5.6)
HCT VFR BLD AUTO: 48.7 % (ref 37.5–51)
HGB BLD-MCNC: 16.4 G/DL (ref 13–17.7)
HGB UR QL STRIP.AUTO: NEGATIVE
KETONES UR QL STRIP: NEGATIVE
LEUKOCYTE ESTERASE UR QL STRIP.AUTO: NEGATIVE
MCH RBC QN AUTO: 28.3 PG (ref 26.6–33)
MCHC RBC AUTO-ENTMCNC: 33.7 G/DL (ref 31.5–35.7)
MCV RBC AUTO: 84.1 FL (ref 79–97)
NITRITE UR QL STRIP: NEGATIVE
PH UR STRIP.AUTO: 6.5 [PH] (ref 5–8)
PLATELET # BLD AUTO: 182 10*3/MM3 (ref 140–450)
PMV BLD AUTO: 8.6 FL (ref 6–12)
POTASSIUM SERPL-SCNC: 4.4 MMOL/L (ref 3.5–5.2)
PROT UR QL STRIP: NEGATIVE
QT INTERVAL: 378 MS
QTC INTERVAL: 389 MS
RBC # BLD AUTO: 5.79 10*6/MM3 (ref 4.14–5.8)
SODIUM SERPL-SCNC: 138 MMOL/L (ref 136–145)
SP GR UR STRIP: 1.01 (ref 1–1.03)
UROBILINOGEN UR QL STRIP: NORMAL
WBC NRBC COR # BLD AUTO: 7.35 10*3/MM3 (ref 3.4–10.8)

## 2025-05-09 PROCEDURE — 83036 HEMOGLOBIN GLYCOSYLATED A1C: CPT

## 2025-05-09 PROCEDURE — 85027 COMPLETE CBC AUTOMATED: CPT

## 2025-05-09 PROCEDURE — 93010 ELECTROCARDIOGRAM REPORT: CPT | Performed by: INTERNAL MEDICINE

## 2025-05-09 PROCEDURE — 81003 URINALYSIS AUTO W/O SCOPE: CPT

## 2025-05-09 PROCEDURE — 80048 BASIC METABOLIC PNL TOTAL CA: CPT

## 2025-05-09 PROCEDURE — 93005 ELECTROCARDIOGRAM TRACING: CPT

## 2025-05-09 PROCEDURE — 36415 COLL VENOUS BLD VENIPUNCTURE: CPT

## 2025-05-09 NOTE — PAT
Patient wants to use meds to beds.     Patient instructed to drink 20 ounces of Gatorade or Gatorlyte (if diabetic) and it needs to be completed 1 hour (for Main OR patients) or 2 hours (scheduled  section & [facility] patients) before given arrival time for procedure (NO RED Gatorade and NO Gatorade Zero).    Patient verbalized understanding.    EKG done - best attempt- pt has scs in place- pt to bring remote dos.    patient stated

## 2025-05-16 ENCOUNTER — HOSPITAL ENCOUNTER (OUTPATIENT)
Facility: HOSPITAL | Age: 60
Setting detail: HOSPITAL OUTPATIENT SURGERY
Discharge: HOME | End: 2025-05-16
Attending: STUDENT IN AN ORGANIZED HEALTH CARE EDUCATION/TRAINING PROGRAM | Admitting: STUDENT IN AN ORGANIZED HEALTH CARE EDUCATION/TRAINING PROGRAM
Payer: COMMERCIAL

## 2025-05-16 ENCOUNTER — ANESTHESIA (OUTPATIENT)
Dept: PERIOP | Facility: HOSPITAL | Age: 60
End: 2025-05-16
Payer: COMMERCIAL

## 2025-05-16 ENCOUNTER — ANESTHESIA EVENT (OUTPATIENT)
Dept: PERIOP | Facility: HOSPITAL | Age: 60
End: 2025-05-16
Payer: COMMERCIAL

## 2025-05-16 VITALS
WEIGHT: 249.78 LBS | HEART RATE: 75 BPM | HEIGHT: 72 IN | SYSTOLIC BLOOD PRESSURE: 117 MMHG | RESPIRATION RATE: 14 BRPM | BODY MASS INDEX: 33.83 KG/M2 | TEMPERATURE: 97.6 F | OXYGEN SATURATION: 98 % | DIASTOLIC BLOOD PRESSURE: 83 MMHG

## 2025-05-16 DIAGNOSIS — N13.8 BPH WITH OBSTRUCTION/LOWER URINARY TRACT SYMPTOMS: ICD-10-CM

## 2025-05-16 DIAGNOSIS — N40.1 BPH WITH OBSTRUCTION/LOWER URINARY TRACT SYMPTOMS: ICD-10-CM

## 2025-05-16 PROCEDURE — 25010000002 FENTANYL CITRATE (PF) 100 MCG/2ML SOLUTION

## 2025-05-16 PROCEDURE — 52648 LASER SURGERY OF PROSTATE: CPT | Performed by: STUDENT IN AN ORGANIZED HEALTH CARE EDUCATION/TRAINING PROGRAM

## 2025-05-16 PROCEDURE — 25010000002 ONDANSETRON PER 1 MG

## 2025-05-16 PROCEDURE — 25010000002 HYDROMORPHONE 1 MG/ML SOLUTION

## 2025-05-16 PROCEDURE — 25810000003 LACTATED RINGERS PER 1000 ML: Performed by: ANESTHESIOLOGY

## 2025-05-16 PROCEDURE — 25010000002 FENTANYL CITRATE (PF) 50 MCG/ML SOLUTION

## 2025-05-16 PROCEDURE — 25010000002 LIDOCAINE PF 1% 1 % SOLUTION: Performed by: ANESTHESIOLOGY

## 2025-05-16 PROCEDURE — 25010000002 LIDOCAINE PF 1% 1 % SOLUTION

## 2025-05-16 PROCEDURE — 25010000002 SUGAMMADEX 200 MG/2ML SOLUTION

## 2025-05-16 PROCEDURE — 25010000002 DEXAMETHASONE PER 1 MG

## 2025-05-16 PROCEDURE — 25010000002 PROPOFOL 10 MG/ML EMULSION

## 2025-05-16 PROCEDURE — 25010000002 CEFAZOLIN PER 500 MG: Performed by: STUDENT IN AN ORGANIZED HEALTH CARE EDUCATION/TRAINING PROGRAM

## 2025-05-16 RX ORDER — PHENAZOPYRIDINE HYDROCHLORIDE 100 MG/1
200 TABLET, FILM COATED ORAL ONCE
Status: COMPLETED | OUTPATIENT
Start: 2025-05-16 | End: 2025-05-16

## 2025-05-16 RX ORDER — ROCURONIUM BROMIDE 10 MG/ML
INJECTION, SOLUTION INTRAVENOUS AS NEEDED
Status: DISCONTINUED | OUTPATIENT
Start: 2025-05-16 | End: 2025-05-16 | Stop reason: SURG

## 2025-05-16 RX ORDER — HYDROCODONE BITARTRATE AND ACETAMINOPHEN 5; 325 MG/1; MG/1
1 TABLET ORAL EVERY 6 HOURS PRN
Qty: 8 TABLET | Refills: 0 | Status: SHIPPED | OUTPATIENT
Start: 2025-05-16

## 2025-05-16 RX ORDER — LIDOCAINE HYDROCHLORIDE 10 MG/ML
INJECTION, SOLUTION EPIDURAL; INFILTRATION; INTRACAUDAL; PERINEURAL AS NEEDED
Status: DISCONTINUED | OUTPATIENT
Start: 2025-05-16 | End: 2025-05-16 | Stop reason: SURG

## 2025-05-16 RX ORDER — FAMOTIDINE 10 MG/ML
20 INJECTION, SOLUTION INTRAVENOUS ONCE
Status: CANCELLED | OUTPATIENT
Start: 2025-05-16 | End: 2025-05-16

## 2025-05-16 RX ORDER — SODIUM CHLORIDE 0.9 % (FLUSH) 0.9 %
10 SYRINGE (ML) INJECTION AS NEEDED
Status: DISCONTINUED | OUTPATIENT
Start: 2025-05-16 | End: 2025-05-16 | Stop reason: HOSPADM

## 2025-05-16 RX ORDER — PHENAZOPYRIDINE HYDROCHLORIDE 100 MG/1
TABLET, FILM COATED ORAL
Status: COMPLETED
Start: 2025-05-16 | End: 2025-05-16

## 2025-05-16 RX ORDER — OXYBUTYNIN CHLORIDE 10 MG/1
10 TABLET, EXTENDED RELEASE ORAL DAILY
Qty: 30 TABLET | Refills: 0 | Status: SHIPPED | OUTPATIENT
Start: 2025-05-16

## 2025-05-16 RX ORDER — FENTANYL CITRATE 50 UG/ML
INJECTION, SOLUTION INTRAMUSCULAR; INTRAVENOUS
Status: COMPLETED
Start: 2025-05-16 | End: 2025-05-16

## 2025-05-16 RX ORDER — HYOSCYAMINE SULFATE 0.12 MG/1
125 TABLET SUBLINGUAL ONCE
Status: COMPLETED | OUTPATIENT
Start: 2025-05-16 | End: 2025-05-16

## 2025-05-16 RX ORDER — DEXAMETHASONE SODIUM PHOSPHATE 4 MG/ML
INJECTION, SOLUTION INTRA-ARTICULAR; INTRALESIONAL; INTRAMUSCULAR; INTRAVENOUS; SOFT TISSUE AS NEEDED
Status: DISCONTINUED | OUTPATIENT
Start: 2025-05-16 | End: 2025-05-16 | Stop reason: SURG

## 2025-05-16 RX ORDER — MIDAZOLAM HYDROCHLORIDE 1 MG/ML
1 INJECTION, SOLUTION INTRAMUSCULAR; INTRAVENOUS
Status: DISCONTINUED | OUTPATIENT
Start: 2025-05-16 | End: 2025-05-16 | Stop reason: HOSPADM

## 2025-05-16 RX ORDER — FENTANYL CITRATE 50 UG/ML
50 INJECTION, SOLUTION INTRAMUSCULAR; INTRAVENOUS
Status: DISCONTINUED | OUTPATIENT
Start: 2025-05-16 | End: 2025-05-16 | Stop reason: HOSPADM

## 2025-05-16 RX ORDER — FENTANYL CITRATE 50 UG/ML
INJECTION, SOLUTION INTRAMUSCULAR; INTRAVENOUS AS NEEDED
Status: DISCONTINUED | OUTPATIENT
Start: 2025-05-16 | End: 2025-05-16 | Stop reason: SURG

## 2025-05-16 RX ORDER — SCOPOLAMINE 1 MG/3D
1 PATCH, EXTENDED RELEASE TRANSDERMAL ONCE
Status: DISCONTINUED | OUTPATIENT
Start: 2025-05-16 | End: 2025-05-16 | Stop reason: HOSPADM

## 2025-05-16 RX ORDER — NITROFURANTOIN 25; 75 MG/1; MG/1
100 CAPSULE ORAL 2 TIMES DAILY
Qty: 8 CAPSULE | Refills: 0 | Status: SHIPPED | OUTPATIENT
Start: 2025-05-16

## 2025-05-16 RX ORDER — SODIUM CHLORIDE, SODIUM LACTATE, POTASSIUM CHLORIDE, CALCIUM CHLORIDE 600; 310; 30; 20 MG/100ML; MG/100ML; MG/100ML; MG/100ML
9 INJECTION, SOLUTION INTRAVENOUS CONTINUOUS
Status: DISCONTINUED | OUTPATIENT
Start: 2025-05-17 | End: 2025-05-16 | Stop reason: HOSPADM

## 2025-05-16 RX ORDER — PHENAZOPYRIDINE HYDROCHLORIDE 200 MG/1
200 TABLET, FILM COATED ORAL 3 TIMES DAILY PRN
Qty: 20 TABLET | Refills: 0 | Status: SHIPPED | OUTPATIENT
Start: 2025-05-16

## 2025-05-16 RX ORDER — PROPOFOL 10 MG/ML
VIAL (ML) INTRAVENOUS AS NEEDED
Status: DISCONTINUED | OUTPATIENT
Start: 2025-05-16 | End: 2025-05-16 | Stop reason: SURG

## 2025-05-16 RX ORDER — HYDROMORPHONE HYDROCHLORIDE 1 MG/ML
0.5 INJECTION, SOLUTION INTRAMUSCULAR; INTRAVENOUS; SUBCUTANEOUS
Status: DISCONTINUED | OUTPATIENT
Start: 2025-05-16 | End: 2025-05-16 | Stop reason: HOSPADM

## 2025-05-16 RX ORDER — SODIUM CHLORIDE 0.9 % (FLUSH) 0.9 %
10 SYRINGE (ML) INJECTION EVERY 12 HOURS SCHEDULED
Status: CANCELLED | OUTPATIENT
Start: 2025-05-16

## 2025-05-16 RX ORDER — EPHEDRINE SULFATE 50 MG/ML
INJECTION INTRAVENOUS AS NEEDED
Status: DISCONTINUED | OUTPATIENT
Start: 2025-05-16 | End: 2025-05-16 | Stop reason: SURG

## 2025-05-16 RX ORDER — FAMOTIDINE 20 MG/1
20 TABLET, FILM COATED ORAL ONCE
Status: COMPLETED | OUTPATIENT
Start: 2025-05-16 | End: 2025-05-16

## 2025-05-16 RX ORDER — LIDOCAINE HYDROCHLORIDE 10 MG/ML
0.5 INJECTION, SOLUTION EPIDURAL; INFILTRATION; INTRACAUDAL; PERINEURAL ONCE AS NEEDED
Status: COMPLETED | OUTPATIENT
Start: 2025-05-16 | End: 2025-05-16

## 2025-05-16 RX ORDER — HYOSCYAMINE SULFATE 0.12 MG/1
0.12 TABLET SUBLINGUAL EVERY 4 HOURS PRN
Qty: 30 TABLET | Refills: 1 | Status: SHIPPED | OUTPATIENT
Start: 2025-05-16

## 2025-05-16 RX ORDER — ONDANSETRON 2 MG/ML
INJECTION INTRAMUSCULAR; INTRAVENOUS AS NEEDED
Status: DISCONTINUED | OUTPATIENT
Start: 2025-05-16 | End: 2025-05-16 | Stop reason: SURG

## 2025-05-16 RX ORDER — MAGNESIUM HYDROXIDE 1200 MG/15ML
LIQUID ORAL AS NEEDED
Status: DISCONTINUED | OUTPATIENT
Start: 2025-05-16 | End: 2025-05-16 | Stop reason: HOSPADM

## 2025-05-16 RX ORDER — ONDANSETRON 2 MG/ML
4 INJECTION INTRAMUSCULAR; INTRAVENOUS ONCE AS NEEDED
Status: DISCONTINUED | OUTPATIENT
Start: 2025-05-16 | End: 2025-05-16 | Stop reason: HOSPADM

## 2025-05-16 RX ADMIN — FENTANYL CITRATE 50 MCG: 50 INJECTION, SOLUTION INTRAMUSCULAR; INTRAVENOUS at 12:35

## 2025-05-16 RX ADMIN — LIDOCAINE HYDROCHLORIDE 50 MG: 10 INJECTION, SOLUTION EPIDURAL; INFILTRATION; INTRACAUDAL; PERINEURAL at 11:03

## 2025-05-16 RX ADMIN — FENTANYL CITRATE 50 MCG: 50 INJECTION, SOLUTION INTRAMUSCULAR; INTRAVENOUS at 13:10

## 2025-05-16 RX ADMIN — SODIUM CHLORIDE, POTASSIUM CHLORIDE, SODIUM LACTATE AND CALCIUM CHLORIDE 9 ML/HR: 600; 310; 30; 20 INJECTION, SOLUTION INTRAVENOUS at 10:29

## 2025-05-16 RX ADMIN — DEXAMETHASONE SODIUM PHOSPHATE 4 MG: 4 INJECTION INTRA-ARTICULAR; INTRALESIONAL; INTRAMUSCULAR; INTRAVENOUS; SOFT TISSUE at 11:03

## 2025-05-16 RX ADMIN — PROPOFOL 200 MG: 10 INJECTION, EMULSION INTRAVENOUS at 11:03

## 2025-05-16 RX ADMIN — HYOSCYAMINE SULFATE 125 MCG: 0.12 TABLET SUBLINGUAL at 12:44

## 2025-05-16 RX ADMIN — EPHEDRINE SULFATE 5 MG: 50 INJECTION INTRAVENOUS at 11:13

## 2025-05-16 RX ADMIN — HYDROMORPHONE HYDROCHLORIDE 0.5 MG: 1 INJECTION, SOLUTION INTRAMUSCULAR; INTRAVENOUS; SUBCUTANEOUS at 12:42

## 2025-05-16 RX ADMIN — SODIUM CHLORIDE 2000 MG: 900 INJECTION INTRAVENOUS at 11:07

## 2025-05-16 RX ADMIN — FAMOTIDINE 20 MG: 20 TABLET, FILM COATED ORAL at 10:29

## 2025-05-16 RX ADMIN — SUGAMMADEX 200 MG: 100 INJECTION, SOLUTION INTRAVENOUS at 12:08

## 2025-05-16 RX ADMIN — ONDANSETRON 4 MG: 2 INJECTION INTRAMUSCULAR; INTRAVENOUS at 11:03

## 2025-05-16 RX ADMIN — PHENAZOPYRIDINE 200 MG: 100 TABLET ORAL at 12:29

## 2025-05-16 RX ADMIN — SUGAMMADEX 200 MG: 100 INJECTION, SOLUTION INTRAVENOUS at 11:50

## 2025-05-16 RX ADMIN — ROCURONIUM BROMIDE 50 MG: 10 INJECTION INTRAVENOUS at 11:03

## 2025-05-16 RX ADMIN — PHENAZOPYRIDINE HYDROCHLORIDE 200 MG: 100 TABLET, FILM COATED ORAL at 12:29

## 2025-05-16 RX ADMIN — LIDOCAINE HYDROCHLORIDE 0.5 ML: 10 INJECTION, SOLUTION EPIDURAL; INFILTRATION; INTRACAUDAL; PERINEURAL at 10:29

## 2025-05-16 RX ADMIN — FENTANYL CITRATE 100 MCG: 50 INJECTION, SOLUTION INTRAMUSCULAR; INTRAVENOUS at 11:03

## 2025-05-16 NOTE — OP NOTE
CYSTOSCOPY TRANSURETHRAL RESECTION OF PROSTATE GREENLIGHT  Procedure Report    Patient Name:  Avery Watson  YOB: 1965    Date of Surgery:  5/16/2025     Indications:  58 yo M with significant BPH and concurrent bladder outlet obstruction (noted on prior office workup including cysto, prostate sizing, and Uroflow/PVR measurements), presents for Greenlight laser ablation of prostate. Informed consent was reviewed and signed after discussion of risks, benefits, and alternatives.     Pre-op Diagnosis:   BPH with obstruction/lower urinary tract symptoms [N40.1, N13.8]       Post-Op Diagnosis Codes:     * BPH with obstruction/lower urinary tract symptoms [N40.1, N13.8]    Procedure/CPT® Codes:  [place] LASER VAPORIZATION OF PROSTATE FOR URINE FLOW [41914]    Procedure(s):  CYSTOSCOPY TRANSURETHRAL RESECTION OF PROSTATE GREENLIGHT  NAVARRO CATHETER PLACEMENT      Staff:  Surgeon(s):  Raghavendra Pacheco MD         Anesthesia: General    Estimated Blood Loss: minimal    Implants:    Nothing was implanted during the procedure    Specimen:          None        Findings:   No bladder tumors or lesions noted  Significant lateral lobe hyperplasia  Elevated bladder neck  Uncomplicated Greenlight PVP with widely patent prostatic fossa and bladder outlet at conclusion of procedure    Complications: None    Catheter: 20 Fr 3-way, CBI port plugged    Description of Procedure:   After informed consent was obtained, the patient was taken to the operating room and placed supine on the operating table and general anesthesia was induced.     He was repositioned to the dorsal lithotomy position and prepped and draped in a standard sterile fashion.     Preoperative antibiotics were given and a multidisciplinary time-out was taken.      Next, a 24-[place] continuous flow [employer] Greenlight laser scope with a visual obturator was advanced through the urethra and into the bladder.     The prostatic urethra was  notable for significant lateral lobe coaptation at midline and a elevated bladder neck with no obvious median lobe.     Panendoscopy revealed no bladder tumors or lesions and both ureteral orifices were identified.     The visual obturator was removed and replaced with a laser bridge. A Greenlight laser fiber was advanced through the working channel of the scope, and two vaporization incisions were made at the 5 and 7 o'clock positions at the bladder neck, and then taken down to just proximal to the verumontanum, sparing the urethral sphincter.     The median lobe was then vaporized to prostatic capsule fibers while avoiding undermining the bladder neck.     Next, the right lateral lobe was vaporized until widely patent, followed by the left. Bleeding vessels and open venous channels encountered were coagulated with laser with excellent hemostasis.     There was no significant prostatic specimen available to send to pathology after completion of vaporization. The bladder was emptied.     A 20 Fr 3-way flores catheter was then inserted into the bladder without difficulty and 40 ml of sterile water was used to fill the balloon.     CBI port was capped given excellent hemostasis and clear urine noted in catheter tubing. The catheter was secured to the patient's thigh.     The patient was brought to PACU in stable condition. The patient tolerated the procedure well and there were no immediate complications.      Disposition  Follow-up on Monday for cath removal and voiding trial, see me back in 6-week      Raghavendra Pacheco MD     Date: 5/16/2025  Time: 11:59 EDT

## 2025-05-16 NOTE — DISCHARGE INSTRUCTIONS
Laser Ablation of Prostate Post-Operative Care/Expectations     Please follow these guidelines after your procedure in order to assist with your recovery.     Anesthesia Precautions and Expectations  - Rest for 24 hours after receiving general anesthesia, make sure you have someone at home with you that can monitor you  - Do not operate a vehicle, drink alcohol, or make 'important decisions'/sign legal documentation during the immediate recovery period if you received sedation for your procedure  - You may experience a sore throat, jaw discomfort, or muscle aches related to anesthesia, these symptoms may last a few days    Activity  - Light activity is encouraged for 1 week to prevent urinary bleeding  - Avoid lifting heavy objects more than 20 lbs, running, or endurance exercise for 1 week   - Do not operate a vehicle or drink alcohol if you were prescribed narcotic pain medications     Bathing/Showering  - You may resume normal bathing and showering post-procedure    Pain/Urinary Symptoms  - You may experience burning urinary pain for a few days, and/or increased urinary urgency/frequency post-procedure for a few weeks which is expected  - A medication to treat burning urinary pain (Phenazopyridine) may be prescribed by your doctor, take as directed  - A medication to prevent urinary urgency/frequency (sometimes referred to as “bladder spasms”) (Hyoscyamine, Oxybutynin, Mirabegron, Solifenacin, etc.) may be prescribed by your doctor for up to 1 month, take as directed     Urinary Bleeding (Hematuria)   - Some degree of light urinary bleeding (hematuria) is expected for up to 1-2 weeks (this may be as light as pink lemonade or somewhat darker like clear/pale red Gatorade); a good rule of thumb is that your urine should remain see-through    - If you experience heavy urinary bleeding (like the color and consistency of tomato juice, or red wine), large blood clots, or you are unable to urinate for more than 8 hours  you should contact your doctor and present to the nearest Emergency Department  - Drink plenty of water at home and stay hydrated, as this will help naturally flush out your bladder and urethra    Sexual Activity  - Refrain from sexual activity and ejaculation for 1 week while you are healing which may help prevent pain and bleeding    Tavarez Catheter   - You may be sent home with a urethral catheter sometimes for up to 1 week post-procedure; catheter specific instructions are as follows:   - Do not attempt to remove your urinary catheter (unless explicitly instructed by your doctor with at home catheter removal instructions/equipment)  - If you feel that your catheter is not working the right way, call your doctor   - Keep your skin and catheter clean, clean the skin around your catheter at least two times each day, clean your skin  and catheter after every bowel movement  - Always keep your urine collection bag below the level of your bladder; keeping the bag below your bladder will help keep your urine from flowing back into your bladder from urine collection bag, which may cause infection     - Drink plenty of liquids, at least 6-8 glasses of healthy liquids or water each day which will help flush out your bladder  - Do not attempt sexual intercourse while you have a catheter in place  - Do not tug or pull on your catheter tubing. This can cause you to bleed and hurt your urethra. Do not step on the tubing when you walk. Always keep the catheter secured to your inner leg with either leg-tape provided, the special catheter sticker/securing device, or leg strap            When to Call Your Doctor  - Severe pain not controlled by oral medications  - Temperature above 101 F degrees  - Severe urinary bleeding or large blood clots in urine  - Inability to urinate for more than 8 hours post-surgery

## 2025-05-16 NOTE — H&P
[facility]   HISTORY AND PHYSICAL    Patient Name: Avery Watson  : 1965  MRN: [id number]  Primary Care Physician:  Luly Jimenez APRN  Date of admission: 2025    Subjective   Subjective     Chief Complaint: BPH with LUTS    HPI:    Avery Watson is a 59 y.o. male with a 59 g prostate with significant lateral lobe hyperplasia and weak urinary flow, this is refractory despite medical therapy.  He elects to proceed with greenlight PVP today.    Review of Systems   All systems were reviewed and negative except for: None    Personal History     Past Medical History:   Diagnosis Date    Acid reflux     Chronic pain disorder     DDD (degenerative disc disease), lumbar     Depression     Dizzy     Elevated cholesterol     Essential hypertension 10/27/2023    Extremity pain     Joint pain     Low back pain     Lumbosacral disc disease     Migraine     Muscle spasm     Neck pain     Osteoarthritis     PONV (postoperative nausea and vomiting)     Rheumatoid arthritis     Spinal cord stimulator status     active - pt has remote- lumbar    Tinnitus        Past Surgical History:   Procedure Laterality Date    BRAVO PROCEDURE N/A 2024    Procedure: ESOPHAGOGASTRODUODENOSCOPY AND MADDOX;  Surgeon: Jonathan Galeana MD;  Location: [facility];  Service: Gastroenterology;  Laterality: N/A;  bravo place at 38cm    CARPAL TUNNEL RELEASE Right 2016    Procedure: CARPAL TUNNEL RELEASE;  Surgeon: Yoel Lua MD;  Location: [facility] OR;  Service:     CARPAL TUNNEL RELEASE Left 2017    Procedure: CARPAL TUNNEL RELEASE;  Surgeon: Yoel Lua MD;  Location: [facility] OR;  Service:     COLONOSCOPY      ENDOSCOPY N/A 2024    Procedure: ESOPHAGOGASTRODUODENOSCOPY WITH BIOPSY;  Surgeon: Valerie Miranda MD;  Location: [facility] OR;  Service: Gastroenterology;  Laterality: N/A;    LUMBAR DISCECTOMY Right 2022    Procedure: LUMBAR DISCECTOMY L4-5 RIGHT;   "Surgeon: Aquiles Grigsby MD;  Location:  [facility] OR;  Service: Neurosurgery;  Laterality: Right;    LUMBAR FACET INJECTION      xback- coritsone- about 4 times    RHIZOTOMY      SPINAL CORD STIMULATOR IMPLANT N/A 02/26/2024    Procedure: SPINAL CORD STIMULATOR INSERTION PHASE 1;  Surgeon: Aquiles Grigsby MD;  Location:  [facility] OR;  Service: Neurosurgery;  Laterality: N/A;    SPINAL CORD STIMULATOR IMPLANT N/A 03/18/2024    Procedure: SPINAL CORD STIMULATOR LEAD REVISION;  Surgeon: Aquiles Grigsby MD;  Location:  [facility] OR;  Service: Neurosurgery;  Laterality: N/A;    SPINAL CORD STIMULATOR TRIAL W/ LAMINOTOMY      TOE SURGERY Right 03/04/2015    \"flexible joint\" per pt -- no hardware- big toe    UPPER GASTROINTESTINAL ENDOSCOPY         Family History: family history includes Arthritis in his brother; Cancer in his mother and sister; Diabetes in his sister; Heart disease in his brother; Lung cancer in his mother; Rheum arthritis in his brother. Otherwise pertinent FHx was reviewed and not pertinent to current issue.    Social History:  reports that he has never smoked. He has never been exposed to tobacco smoke. He quit smokeless tobacco use about 5 years ago.  His smokeless tobacco use included chew. He reports that he does not drink alcohol and does not use drugs.    Home Medications:  FLUoxetine, Testosterone Cypionate, atorvastatin, dexlansoprazole, fluticasone, hydrocortisone, levocetirizine, metoprolol tartrate, tadalafil, tamsulosin, and triamcinolone      Allergies:  No Known Allergies    Objective   Objective     Vitals:   Temp:  [98 °F (36.7 °C)] 98 °F (36.7 °C)  Heart Rate:  [69] 69  Resp:  [18] 18  BP: (127)/(86) 127/86  Physical Exam    Constitutional: Awake in bed, alert    Eyes: PERRLA, sclerae anicteric, no conjunctival injection   HENT: Normocephalic, atraumatic, mucous membranes moist   Neck: Supple, trachea midline   Respiratory:Equal chest rise, non-labored respirations    Musculoskeletal: No " bilateral ankle edema, no clubbing or cyanosis to extremities   Psychiatric: Appropriate affect, cooperative   Neurologic: Oriented x 3, Cranial Nerves grossly intact, speech clear   Skin: No rashes     Result Review    Result Review:  I have personally reviewed the results from the time of this admission to 5/16/2025 10:43 EDT and agree with these findings:  [x]  Laboratory  []  Microbiology  []  Radiology  []  EKG/Telemetry   []  Cardiology/Vascular   []  Pathology  []  Old records  []  Other:    Most notable findings include: PAT labs normal    Assessment & Plan   Assessment / Plan     Brief Patient Summary:  Avery Watson is a 59 y.o. male who has a 59 g obstructing prostate with lateral lobe hyperplasia, elects to proceed with greenlight PVP today.  All questions answered.  Risk discussed.  He elects to proceed.    Active Hospital Problems:  Active Hospital Problems    Diagnosis     **BPH with obstruction/lower urinary tract symptoms      Plan:   - Proceed with greenlight PVP in operating room today, preop Ancef    VTE Prophylaxis:  Mechanical VTE prophylaxis orders are present.        CODE STATUS:       Admission Status:  I believe this patient meets discharge postop status.    Electronically signed by Raghavendra Pacheco MD, 05/16/25, 10:43 AM EDT.

## 2025-05-16 NOTE — NURSING NOTE
Caregiver Telephone Number    Phone Number for Ride/Caregiver: MOIZ [phone] SPOUSE     Who will be staying with you post procedure for the next 24 hours? Name and Phone Number?: MOIZ [phone] SPOUSE

## 2025-05-16 NOTE — ANESTHESIA POSTPROCEDURE EVALUATION
Patient: Avery Watson    Procedure Summary       Date: 05/16/25 Room / Location:  [facility] /  [facility]    Anesthesia Start: 1100 Anesthesia Stop: 1211    Procedure: CYSTOSCOPY TRANSURETHRAL RESECTION OF PROSTATE GREENLIGHT (Bladder) Diagnosis:       BPH with obstruction/lower urinary tract symptoms      (BPH with obstruction/lower urinary tract symptoms [N40.1, N13.8])    Surgeons: Raghavendra Pacheco MD Provider: Ramesh Mary MD    Anesthesia Type: general ASA Status: 3            Anesthesia Type: general    Vitals  Vitals Value Taken Time   /88 05/16/25 12:11   Temp 97 °F (36.1 °C) 05/16/25 12:11   Pulse 81 05/16/25 12:11   Resp 14 05/16/25 12:11   SpO2 97 % 05/16/25 12:11           Post Anesthesia Care and Evaluation    Patient location during evaluation: PACU  Patient participation: complete - patient participated  Level of consciousness: awake and alert  Pain management: adequate    Airway patency: patent  Anesthetic complications: No anesthetic complications  PONV Status: none  Cardiovascular status: hemodynamically stable and acceptable  Respiratory status: nonlabored ventilation, acceptable and nasal cannula  Hydration status: acceptable

## 2025-05-16 NOTE — ANESTHESIA PREPROCEDURE EVALUATION
Anesthesia Evaluation     history of anesthetic complications:  PONV               Airway   Mallampati: I  TM distance: >3 FB  Neck ROM: full  No difficulty expected  Dental      Pulmonary    Cardiovascular     ECG reviewed    (+) hypertension    ROS comment: Neg cardiac studies    Neuro/Psych  (+) headaches, dizziness/light headedness, numbness, psychiatric history  GI/Hepatic/Renal/Endo    (+) obesity, GERD    Musculoskeletal     (+) neck pain  Abdominal    Substance History      OB/GYN          Other   arthritis,                 Anesthesia Plan    ASA 3     general     intravenous induction     Anesthetic plan, risks, benefits, and alternatives have been provided, discussed and informed consent has been obtained with: patient.    Plan discussed with CRNA.    CODE STATUS:

## 2025-05-19 ENCOUNTER — CLINICAL SUPPORT (OUTPATIENT)
Age: 60
End: 2025-05-19
Payer: COMMERCIAL

## 2025-05-23 ENCOUNTER — TELEPHONE (OUTPATIENT)
Dept: UROLOGY | Facility: CLINIC | Age: 60
End: 2025-05-23
Payer: COMMERCIAL

## 2025-05-23 NOTE — TELEPHONE ENCOUNTER
Hub staff attempted to follow warm transfer process and was unsuccessful     Caller: AGUILA SEQUEIRA    Relationship to patient: SELF    Best call back number: [phone]    Patient is needing: PT HAD SURGERY ON 5/16/25 AND HAS BEEN HAVING VERY PAINFUL URINATION. PLEASE CALL PT BACK TO ADVISE ON WHAT HE SHOULD DO. THANK YOU

## 2025-05-23 NOTE — TELEPHONE ENCOUNTER
Avery called to let us know that he is having extreme urgency, frequency and burning when urinating. He is taking all the medications that were prescribed for him post op. He states that it is not helping the symptoms at all. I asked if he could come in and leave a urine sample for us at either location, or go to his PCP or [facility] and he said he cannot leave his house because of the fear of urinating on himself. He would like to know if he needs an abx, or if there is anything that can be called in to help relieve his pain.

## 2025-05-27 DIAGNOSIS — H65.93 MIDDLE EAR EFFUSION, BILATERAL: ICD-10-CM

## 2025-05-27 RX ORDER — DEXLANSOPRAZOLE 60 MG/1
60 CAPSULE, DELAYED RELEASE ORAL DAILY
Qty: 30 CAPSULE | Refills: 5 | OUTPATIENT
Start: 2025-05-27

## 2025-05-27 RX ORDER — LEVOCETIRIZINE DIHYDROCHLORIDE 5 MG/1
5 TABLET, FILM COATED ORAL EVERY EVENING
Qty: 90 TABLET | Refills: 0 | Status: SHIPPED | OUTPATIENT
Start: 2025-05-27

## 2025-05-29 ENCOUNTER — TELEPHONE (OUTPATIENT)
Dept: UROLOGY | Facility: CLINIC | Age: 60
End: 2025-05-29
Payer: COMMERCIAL

## 2025-05-29 DIAGNOSIS — N41.1 CHRONIC PROSTATITIS: Primary | ICD-10-CM

## 2025-05-29 DIAGNOSIS — N40.1 BPH WITH OBSTRUCTION/LOWER URINARY TRACT SYMPTOMS: ICD-10-CM

## 2025-05-29 DIAGNOSIS — N13.8 BPH WITH OBSTRUCTION/LOWER URINARY TRACT SYMPTOMS: ICD-10-CM

## 2025-05-29 RX ORDER — CIPROFLOXACIN 500 MG/1
500 TABLET, FILM COATED ORAL 2 TIMES DAILY
Qty: 28 TABLET | Refills: 0 | Status: SHIPPED | OUTPATIENT
Start: 2025-05-29

## 2025-05-29 RX ORDER — PHENAZOPYRIDINE HYDROCHLORIDE 200 MG/1
200 TABLET, FILM COATED ORAL 3 TIMES DAILY PRN
Qty: 21 TABLET | Refills: 2 | Status: SHIPPED | OUTPATIENT
Start: 2025-05-29

## 2025-05-29 NOTE — TELEPHONE ENCOUNTER
Patient has been calling and messaging in with persistent dysuria since his greenlight PVP.  He has been utilizing Levsin, oxybutynin, Pyridium.  He was treated with Macrobid after his procedure.  He had chronic dysuria but this is worsened since his procedure.  I called to discuss the situation with the patient.  I will send in 2 weeks of ciprofloxacin to cover for any possible prostatitis and give him refills of Pyridium.  Symptoms should improve further out he gets from surgery.  If any concerning progressive infection symptoms or side effects he should be letting me know.  Patient reports understanding.    Raghavendra Pacheco MD

## 2025-06-02 RX ORDER — DEXLANSOPRAZOLE 60 MG/1
60 CAPSULE, DELAYED RELEASE ORAL DAILY
Qty: 30 CAPSULE | Refills: 5 | Status: SHIPPED | OUTPATIENT
Start: 2025-06-02

## 2025-06-07 ENCOUNTER — APPOINTMENT (OUTPATIENT)
Dept: CT IMAGING | Facility: HOSPITAL | Age: 60
End: 2025-06-07
Payer: COMMERCIAL

## 2025-06-07 ENCOUNTER — HOSPITAL ENCOUNTER (EMERGENCY)
Facility: HOSPITAL | Age: 60
Discharge: HOME | End: 2025-06-07
Attending: EMERGENCY MEDICINE
Payer: COMMERCIAL

## 2025-06-07 VITALS
DIASTOLIC BLOOD PRESSURE: 80 MMHG | TEMPERATURE: 98.2 F | RESPIRATION RATE: 17 BRPM | OXYGEN SATURATION: 99 % | SYSTOLIC BLOOD PRESSURE: 130 MMHG | HEART RATE: 70 BPM | BODY MASS INDEX: 33.86 KG/M2 | WEIGHT: 250 LBS | HEIGHT: 72 IN

## 2025-06-07 DIAGNOSIS — G89.18 POSTOPERATIVE PAIN: Primary | ICD-10-CM

## 2025-06-07 LAB
ALBUMIN SERPL-MCNC: 4.2 G/DL (ref 3.5–5.2)
ALBUMIN/GLOB SERPL: 1.7 G/DL
ALP SERPL-CCNC: 76 U/L (ref 39–117)
ALT SERPL W P-5'-P-CCNC: 28 U/L (ref 1–41)
ANION GAP SERPL CALCULATED.3IONS-SCNC: 11.2 MMOL/L (ref 5–15)
AST SERPL-CCNC: 30 U/L (ref 1–40)
BACTERIA UR QL AUTO: ABNORMAL /HPF
BASOPHILS # BLD AUTO: 0.06 10*3/MM3 (ref 0–0.2)
BASOPHILS NFR BLD AUTO: 0.7 % (ref 0–1.5)
BILIRUB SERPL-MCNC: 0.3 MG/DL (ref 0–1.2)
BILIRUB UR QL STRIP: NEGATIVE
BUN SERPL-MCNC: 11.1 MG/DL (ref 6–20)
BUN/CREAT SERPL: 9.9 (ref 7–25)
C TRACH DNA SPEC QL NAA+PROBE: NOT DETECTED
CALCIUM SPEC-SCNC: 9.2 MG/DL (ref 8.6–10.5)
CHLORIDE SERPL-SCNC: 102 MMOL/L (ref 98–107)
CLARITY UR: CLEAR
CO2 SERPL-SCNC: 22.8 MMOL/L (ref 22–29)
COLOR UR: YELLOW
CREAT SERPL-MCNC: 1.12 MG/DL (ref 0.76–1.27)
CRP SERPL-MCNC: 0.32 MG/DL (ref 0–0.5)
DEPRECATED RDW RBC AUTO: 40.2 FL (ref 37–54)
EGFRCR SERPLBLD CKD-EPI 2021: 75.7 ML/MIN/1.73
EOSINOPHIL # BLD AUTO: 0.34 10*3/MM3 (ref 0–0.4)
EOSINOPHIL NFR BLD AUTO: 4 % (ref 0.3–6.2)
ERYTHROCYTE [DISTWIDTH] IN BLOOD BY AUTOMATED COUNT: 13.2 % (ref 12.3–15.4)
GLOBULIN UR ELPH-MCNC: 2.5 GM/DL
GLUCOSE SERPL-MCNC: 91 MG/DL (ref 65–99)
GLUCOSE UR STRIP-MCNC: NEGATIVE MG/DL
HCT VFR BLD AUTO: 50.1 % (ref 37.5–51)
HGB BLD-MCNC: 16.1 G/DL (ref 13–17.7)
HGB UR QL STRIP.AUTO: ABNORMAL
HOLD SPECIMEN: NORMAL
HOLD SPECIMEN: NORMAL
HYALINE CASTS UR QL AUTO: ABNORMAL /LPF
IMM GRANULOCYTES # BLD AUTO: 0.02 10*3/MM3 (ref 0–0.05)
IMM GRANULOCYTES NFR BLD AUTO: 0.2 % (ref 0–0.5)
KETONES UR QL STRIP: NEGATIVE
LEUKOCYTE ESTERASE UR QL STRIP.AUTO: ABNORMAL
LYMPHOCYTES # BLD AUTO: 2 10*3/MM3 (ref 0.7–3.1)
LYMPHOCYTES NFR BLD AUTO: 23.4 % (ref 19.6–45.3)
MCH RBC QN AUTO: 27 PG (ref 26.6–33)
MCHC RBC AUTO-ENTMCNC: 32.1 G/DL (ref 31.5–35.7)
MCV RBC AUTO: 83.9 FL (ref 79–97)
MONOCYTES # BLD AUTO: 0.98 10*3/MM3 (ref 0.1–0.9)
MONOCYTES NFR BLD AUTO: 11.5 % (ref 5–12)
N GONORRHOEA RRNA SPEC QL NAA+PROBE: NOT DETECTED
NEUTROPHILS NFR BLD AUTO: 5.14 10*3/MM3 (ref 1.7–7)
NEUTROPHILS NFR BLD AUTO: 60.2 % (ref 42.7–76)
NITRITE UR QL STRIP: NEGATIVE
NRBC BLD AUTO-RTO: 0 /100 WBC (ref 0–0.2)
PH UR STRIP.AUTO: 7 [PH] (ref 5–8)
PLATELET # BLD AUTO: 192 10*3/MM3 (ref 140–450)
PMV BLD AUTO: 8.7 FL (ref 6–12)
POTASSIUM SERPL-SCNC: 3.9 MMOL/L (ref 3.5–5.2)
PROT SERPL-MCNC: 6.7 G/DL (ref 6–8.5)
PROT UR QL STRIP: NEGATIVE
RBC # BLD AUTO: 5.97 10*6/MM3 (ref 4.14–5.8)
RBC # UR STRIP: ABNORMAL /HPF
REF LAB TEST METHOD: ABNORMAL
SODIUM SERPL-SCNC: 136 MMOL/L (ref 136–145)
SP GR UR STRIP: <=1.005 (ref 1–1.03)
SQUAMOUS #/AREA URNS HPF: ABNORMAL /HPF
TRICHOMONAS VAGINALIS PCR: NOT DETECTED
UROBILINOGEN UR QL STRIP: ABNORMAL
WBC # UR STRIP: ABNORMAL /HPF
WBC NRBC COR # BLD AUTO: 8.54 10*3/MM3 (ref 3.4–10.8)
WHOLE BLOOD HOLD COAG: NORMAL
WHOLE BLOOD HOLD SPECIMEN: NORMAL

## 2025-06-07 PROCEDURE — 25810000003 SODIUM CHLORIDE 0.9 % SOLUTION: Performed by: EMERGENCY MEDICINE

## 2025-06-07 PROCEDURE — 96374 THER/PROPH/DIAG INJ IV PUSH: CPT

## 2025-06-07 PROCEDURE — 74177 CT ABD & PELVIS W/CONTRAST: CPT

## 2025-06-07 PROCEDURE — 81001 URINALYSIS AUTO W/SCOPE: CPT | Performed by: EMERGENCY MEDICINE

## 2025-06-07 PROCEDURE — 86140 C-REACTIVE PROTEIN: CPT | Performed by: EMERGENCY MEDICINE

## 2025-06-07 PROCEDURE — 25010000002 HYDROMORPHONE 1 MG/ML SOLUTION: Performed by: EMERGENCY MEDICINE

## 2025-06-07 PROCEDURE — 74177 CT ABD & PELVIS W/CONTRAST: CPT | Performed by: RADIOLOGY

## 2025-06-07 PROCEDURE — 85025 COMPLETE CBC W/AUTO DIFF WBC: CPT | Performed by: EMERGENCY MEDICINE

## 2025-06-07 PROCEDURE — 25510000001 IOPAMIDOL 61 % SOLUTION: Performed by: EMERGENCY MEDICINE

## 2025-06-07 PROCEDURE — 96375 TX/PRO/DX INJ NEW DRUG ADDON: CPT

## 2025-06-07 PROCEDURE — 87491 CHLMYD TRACH DNA AMP PROBE: CPT | Performed by: EMERGENCY MEDICINE

## 2025-06-07 PROCEDURE — 87591 N.GONORRHOEAE DNA AMP PROB: CPT | Performed by: EMERGENCY MEDICINE

## 2025-06-07 PROCEDURE — 87661 TRICHOMONAS VAGINALIS AMPLIF: CPT | Performed by: EMERGENCY MEDICINE

## 2025-06-07 PROCEDURE — 87086 URINE CULTURE/COLONY COUNT: CPT | Performed by: EMERGENCY MEDICINE

## 2025-06-07 PROCEDURE — 25010000002 KETOROLAC TROMETHAMINE PER 15 MG: Performed by: EMERGENCY MEDICINE

## 2025-06-07 PROCEDURE — 99285 EMERGENCY DEPT VISIT HI MDM: CPT

## 2025-06-07 PROCEDURE — 25010000002 PROCHLORPERAZINE 10 MG/2ML SOLUTION: Performed by: EMERGENCY MEDICINE

## 2025-06-07 PROCEDURE — 80053 COMPREHEN METABOLIC PANEL: CPT | Performed by: EMERGENCY MEDICINE

## 2025-06-07 RX ORDER — OXYCODONE AND ACETAMINOPHEN 5; 325 MG/1; MG/1
1 TABLET ORAL EVERY 6 HOURS PRN
Refills: 0 | Status: DISCONTINUED | OUTPATIENT
Start: 2025-06-07 | End: 2025-06-08 | Stop reason: HOSPADM

## 2025-06-07 RX ORDER — PHENAZOPYRIDINE HYDROCHLORIDE 200 MG/1
200 TABLET, FILM COATED ORAL ONCE
Status: COMPLETED | OUTPATIENT
Start: 2025-06-07 | End: 2025-06-07

## 2025-06-07 RX ORDER — KETOROLAC TROMETHAMINE 30 MG/ML
30 INJECTION, SOLUTION INTRAMUSCULAR; INTRAVENOUS ONCE
Status: COMPLETED | OUTPATIENT
Start: 2025-06-07 | End: 2025-06-07

## 2025-06-07 RX ORDER — SULFAMETHOXAZOLE AND TRIMETHOPRIM 800; 160 MG/1; MG/1
1 TABLET ORAL ONCE
Status: COMPLETED | OUTPATIENT
Start: 2025-06-07 | End: 2025-06-07

## 2025-06-07 RX ORDER — PROCHLORPERAZINE EDISYLATE 5 MG/ML
5 INJECTION INTRAMUSCULAR; INTRAVENOUS ONCE
Status: COMPLETED | OUTPATIENT
Start: 2025-06-07 | End: 2025-06-07

## 2025-06-07 RX ORDER — SULFAMETHOXAZOLE AND TRIMETHOPRIM 800; 160 MG/1; MG/1
1 TABLET ORAL 2 TIMES DAILY
Qty: 19 TABLET | Refills: 0 | Status: SHIPPED | OUTPATIENT
Start: 2025-06-07 | End: 2025-06-17

## 2025-06-07 RX ORDER — OXYCODONE AND ACETAMINOPHEN 5; 325 MG/1; MG/1
1 TABLET ORAL EVERY 6 HOURS PRN
Qty: 10 TABLET | Refills: 0 | Status: SHIPPED | OUTPATIENT
Start: 2025-06-07

## 2025-06-07 RX ORDER — IOPAMIDOL 612 MG/ML
100 INJECTION, SOLUTION INTRAVASCULAR
Status: COMPLETED | OUTPATIENT
Start: 2025-06-07 | End: 2025-06-07

## 2025-06-07 RX ADMIN — SULFAMETHOXAZOLE AND TRIMETHOPRIM 1 TABLET: 800; 160 TABLET ORAL at 22:56

## 2025-06-07 RX ADMIN — PROCHLORPERAZINE EDISYLATE 5 MG: 5 INJECTION INTRAMUSCULAR; INTRAVENOUS at 19:04

## 2025-06-07 RX ADMIN — HYDROMORPHONE HYDROCHLORIDE 1 MG: 1 INJECTION, SOLUTION INTRAMUSCULAR; INTRAVENOUS; SUBCUTANEOUS at 19:05

## 2025-06-07 RX ADMIN — KETOROLAC TROMETHAMINE 30 MG: 30 INJECTION, SOLUTION INTRAMUSCULAR; INTRAVENOUS at 18:01

## 2025-06-07 RX ADMIN — PHENAZOPYRIDINE HYDROCHLORIDE 200 MG: 200 TABLET ORAL at 17:59

## 2025-06-07 RX ADMIN — SODIUM CHLORIDE 1000 ML: 9 INJECTION, SOLUTION INTRAVENOUS at 17:59

## 2025-06-07 RX ADMIN — IOPAMIDOL 80 ML: 612 INJECTION, SOLUTION INTRAVENOUS at 19:25

## 2025-06-08 NOTE — DISCHARGE INSTRUCTIONS
Take the Bactrim instead of Cipro.  Drink lots of water with this antibiotic.  Percocet as directed as needed for pain.  Rest.  See your urologist in [place] on Monday.  Return to the emergency department right away if symptoms worsen or any problems

## 2025-06-08 NOTE — ED PROVIDER NOTES
"Subjective   History of Present Illness  Patient is a 59-year-old male who had prostate surgery secondary to BPH approximately 3 weeks ago in [place].  He presents complaining that he continues to have severe pain in his penis, in his perineal area and in his anus.  He states it is \"burning like fire\", has been ever since his surgery, and he just cannot stand it anymore.  He states that earlier this evening he passed some bloody purulent appearing material from his urethral meatus.  He has been taking Cipro and his symptoms or not improving.  He denies fever, chills, chest pain, shortness of breath, abdominal pain, vomiting, syncope or near syncope, focal numbness or weakness, other symptoms or other complaints      Review of Systems   All other systems reviewed and are negative.      Past Medical History:   Diagnosis Date    Acid reflux     Chronic pain disorder     DDD (degenerative disc disease), lumbar     Depression     Dizzy     Elevated cholesterol     Essential hypertension 10/27/2023    Extremity pain     Joint pain     Low back pain     Lumbosacral disc disease     Migraine     Muscle spasm     Neck pain     Osteoarthritis     PONV (postoperative nausea and vomiting)     Rheumatoid arthritis     Spinal cord stimulator status     active - pt has remote- lumbar    Tinnitus        No Known Allergies    Past Surgical History:   Procedure Laterality Date    BRAVO PROCEDURE N/A 11/13/2024    Procedure: ESOPHAGOGASTRODUODENOSCOPY AND MADDOX;  Surgeon: Jonathan Galeana MD;  Location: [facility];  Service: Gastroenterology;  Laterality: N/A;  bravo place at 38cm    CARPAL TUNNEL RELEASE Right 08/04/2016    Procedure: CARPAL TUNNEL RELEASE;  Surgeon: Yoel Lua MD;  Location: [facility] OR;  Service:     CARPAL TUNNEL RELEASE Left 11/09/2017    Procedure: CARPAL TUNNEL RELEASE;  Surgeon: Yoel Lua MD;  Location: [facility] OR;  Service:     COLONOSCOPY      CYSTOSCOPY TRANSURETHRAL RESECTION OF " "PROSTATE N/A 5/16/2025    Procedure: CYSTOSCOPY TRANSURETHRAL RESECTION OF PROSTATE GREENLIGHT;  Surgeon: Raghavendra Pacheco MD;  Location:  [facility] OR;  Service: Urology;  Laterality: N/A;    ENDOSCOPY N/A 07/24/2024    Procedure: ESOPHAGOGASTRODUODENOSCOPY WITH BIOPSY;  Surgeon: Valerie Miranda MD;  Location:  [facility] OR;  Service: Gastroenterology;  Laterality: N/A;    LUMBAR DISCECTOMY Right 09/22/2022    Procedure: LUMBAR DISCECTOMY L4-5 RIGHT;  Surgeon: Aquiles Grigsby MD;  Location:  [facility] OR;  Service: Neurosurgery;  Laterality: Right;    LUMBAR FACET INJECTION      xback- coritsone- about 4 times    RHIZOTOMY      SPINAL CORD STIMULATOR IMPLANT N/A 02/26/2024    Procedure: SPINAL CORD STIMULATOR INSERTION PHASE 1;  Surgeon: Aquiles Grigsby MD;  Location:  [facility] OR;  Service: Neurosurgery;  Laterality: N/A;    SPINAL CORD STIMULATOR IMPLANT N/A 03/18/2024    Procedure: SPINAL CORD STIMULATOR LEAD REVISION;  Surgeon: Aquiles Grigsby MD;  Location:  [facility] OR;  Service: Neurosurgery;  Laterality: N/A;    SPINAL CORD STIMULATOR TRIAL W/ LAMINOTOMY      TOE SURGERY Right 03/04/2015    \"flexible joint\" per pt -- no hardware- big toe    UPPER GASTROINTESTINAL ENDOSCOPY         Family History   Problem Relation Age of Onset    Lung cancer Mother     Cancer Mother         ovarian    Diabetes Sister     Cancer Sister         breast    Heart disease Brother     Rheum arthritis Brother     Arthritis Brother        Social History     Socioeconomic History    Marital status:    Tobacco Use    Smoking status: Never     Passive exposure: Never    Smokeless tobacco: Former     Types: Chew     Quit date: 2020    Tobacco comments:     uses chewing tobacco   Vaping Use    Vaping status: Never Used   Substance and Sexual Activity    Alcohol use: No    Drug use: No    Sexual activity: Not Currently     Partners: Female           Objective   Physical Exam  Vitals reviewed.   Constitutional:       General: He " is not in acute distress.     Appearance: He is well-developed. He is not toxic-appearing or diaphoretic.      Comments: Will willfulness male, alert and well-oriented, appears uncomfortable but not in acute distress.   HENT:      Head: Normocephalic and atraumatic.   Eyes:      General: No scleral icterus.     Pupils: Pupils are equal, round, and reactive to light.   Neck:      Trachea: No tracheal deviation.      Comments: No meningeal signs.  Cardiovascular:      Rate and Rhythm: Normal rate and regular rhythm.   Pulmonary:      Effort: Pulmonary effort is normal. No respiratory distress.      Breath sounds: Normal breath sounds.   Abdominal:      General: Bowel sounds are normal. There is no distension.      Palpations: Abdomen is soft.      Tenderness: There is no abdominal tenderness.   Genitourinary:     Comments:  exam is normal to inspection/palpation.  Testicles are normal in size and nontender.  There is no redness or swelling.  The perineal area appears normal, no redness, no swelling, no signs of infection.  Digital rectal exam reveals no stool in the vault, prostate enlarged and tender but not boggy.  Musculoskeletal:         General: No tenderness. Normal range of motion.      Cervical back: Normal range of motion and neck supple.   Skin:     General: Skin is warm and dry.      Coloration: Skin is not pale.   Neurological:      Mental Status: He is alert and oriented to person, place, and time.      Motor: No abnormal muscle tone.      Coordination: Coordination normal.   Psychiatric:         Behavior: Behavior normal.         Procedures  CT Abdomen Pelvis With Contrast   Final Result   1. Postsurgical changes within the prostate as above.   2. Marked wall thickening within the urinary bladder. This may simply be   related to chronic outlet obstruction. However, cystitis could result in   a similar appearance.   3. No bowel obstruction or perforation.   4. Diverticulosis without acute  diverticulitis.       This report was finalized on 6/7/2025 9:59 PM by Tez Catalan MD.            Results for orders placed or performed during the hospital encounter of 06/07/25   Chlamydia trachomatis, Neisseria gonorrhoeae, Trichomonas vaginalis, PCR - Swab, Urine, Clean Catch    Collection Time: 06/07/25  5:55 PM    Specimen: Urine, Clean Catch; Swab   Result Value Ref Range    Chlamydia by PCR Not Detected Not Detected    Neisseria gonorrhoeae by PCR Not Detected Not Detected    Trichomonas vaginalis PCR Not Detected Not Detected, Invalid   Urinalysis With Culture If Indicated - Urine, Clean Catch    Collection Time: 06/07/25  5:55 PM    Specimen: Urine, Clean Catch   Result Value Ref Range    Color, UA Yellow Yellow, Straw    Appearance, UA Clear Clear    pH, UA 7.0 5.0 - 8.0    Specific Gravity, UA <=1.005 1.005 - 1.030    Glucose, UA Negative Negative    Ketones, UA Negative Negative    Bilirubin, UA Negative Negative    Blood, UA Large (3+) (A) Negative    Protein, UA Negative Negative    Leuk Esterase, UA Moderate (2+) (A) Negative    Nitrite, UA Negative Negative    Urobilinogen, UA 0.2 E.U./dL 0.2 - 1.0 E.U./dL   Comprehensive Metabolic Panel    Collection Time: 06/07/25  5:55 PM    Specimen: Arm, Left; Blood   Result Value Ref Range    Glucose 91 65 - 99 mg/dL    BUN 11.1 6.0 - 20.0 mg/dL    Creatinine 1.12 0.76 - 1.27 mg/dL    Sodium 136 136 - 145 mmol/L    Potassium 3.9 3.5 - 5.2 mmol/L    Chloride 102 98 - 107 mmol/L    CO2 22.8 22.0 - 29.0 mmol/L    Calcium 9.2 8.6 - 10.5 mg/dL    Total Protein 6.7 6.0 - 8.5 g/dL    Albumin 4.2 3.5 - 5.2 g/dL    ALT (SGPT) 28 1 - 41 U/L    AST (SGOT) 30 1 - 40 U/L    Alkaline Phosphatase 76 39 - 117 U/L    Total Bilirubin 0.3 0.0 - 1.2 mg/dL    Globulin 2.5 gm/dL    A/G Ratio 1.7 g/dL    BUN/Creatinine Ratio 9.9 7.0 - 25.0    Anion Gap 11.2 5.0 - 15.0 mmol/L    eGFR 75.7 >60.0 mL/min/1.73   C-reactive Protein    Collection Time: 06/07/25  5:55 PM    Specimen: Arm,  Left; Blood   Result Value Ref Range    C-Reactive Protein 0.32 0.00 - 0.50 mg/dL   CBC Auto Differential    Collection Time: 06/07/25  5:55 PM    Specimen: Arm, Left; Blood   Result Value Ref Range    WBC 8.54 3.40 - 10.80 10*3/mm3    RBC 5.97 (H) 4.14 - 5.80 10*6/mm3    Hemoglobin 16.1 13.0 - 17.7 g/dL    Hematocrit 50.1 37.5 - 51.0 %    MCV 83.9 79.0 - 97.0 fL    MCH 27.0 26.6 - 33.0 pg    MCHC 32.1 31.5 - 35.7 g/dL    RDW 13.2 12.3 - 15.4 %    RDW-SD 40.2 37.0 - 54.0 fl    MPV 8.7 6.0 - 12.0 fL    Platelets 192 140 - 450 10*3/mm3    Neutrophil % 60.2 42.7 - 76.0 %    Lymphocyte % 23.4 19.6 - 45.3 %    Monocyte % 11.5 5.0 - 12.0 %    Eosinophil % 4.0 0.3 - 6.2 %    Basophil % 0.7 0.0 - 1.5 %    Immature Grans % 0.2 0.0 - 0.5 %    Neutrophils, Absolute 5.14 1.70 - 7.00 10*3/mm3    Lymphocytes, Absolute 2.00 0.70 - 3.10 10*3/mm3    Monocytes, Absolute 0.98 (H) 0.10 - 0.90 10*3/mm3    Eosinophils, Absolute 0.34 0.00 - 0.40 10*3/mm3    Basophils, Absolute 0.06 0.00 - 0.20 10*3/mm3    Immature Grans, Absolute 0.02 0.00 - 0.05 10*3/mm3    nRBC 0.0 0.0 - 0.2 /100 WBC   Urinalysis, Microscopic Only - Urine, Clean Catch    Collection Time: 06/07/25  5:55 PM    Specimen: Urine, Clean Catch   Result Value Ref Range    RBC, UA 11-20 (A) None Seen, 0-2 /HPF    WBC, UA 6-10 (A) None Seen, 0-2 /HPF    Bacteria, UA None Seen None Seen /HPF    Squamous Epithelial Cells, UA 0-2 None Seen, 0-2 /HPF    Hyaline Casts, UA None Seen None Seen /LPF    Methodology Automated Microscopy    Green Top (Gel)    Collection Time: 06/07/25  5:55 PM   Result Value Ref Range    Extra Tube Hold for add-ons.    Lavender Top    Collection Time: 06/07/25  5:55 PM   Result Value Ref Range    Extra Tube hold for add-on    Gold Top - SST    Collection Time: 06/07/25  5:55 PM   Result Value Ref Range    Extra Tube Hold for add-ons.    Light Blue Top    Collection Time: 06/07/25  5:55 PM   Result Value Ref Range    Extra Tube Hold for add-ons.                  ED Course  ED Course as of 06/07/25 2355   Sat Jun 07, 2025   1942 CT has been done, awaiting radiologist interpretation. [CM]   2050 Still awaiting CT results. [CM]   2246 Patient's emergency department stay has not been complicated.  Patient, his wife and I discussed all of his test results and his plan of care.  They voiced understanding and agreement.  We are changing his antibiotic to Bactrim.  He has to follow-up closely with his urologist in [place], to return to the emergency department right away if symptoms worsen or any problems [CM]      ED Course User Index  [CM] Soto Bedoya MD                                                       Medical Decision Making  Problems Addressed:  Postoperative pain: complicated acute illness or injury    Amount and/or Complexity of Data Reviewed  Labs: ordered.  Radiology: ordered.    Risk  Prescription drug management.        Final diagnoses:   Postoperative pain       ED Disposition  ED Disposition       ED Disposition   Discharge    Condition   Stable    Comment   --               Your urologist in [place]    Go to   Monday    [facility] EMERGENCY DEPARTMENT  [address] [place] [id number] [phone]  Go to   If symptoms worsen         Medication List        New Prescriptions      oxyCODONE-acetaminophen 5-325 MG per tablet  Commonly known as: PERCOCET  Take 1 tablet by mouth Every 6 (Six) Hours As Needed for Severe Pain.     sulfamethoxazole-trimethoprim 800-160 MG per tablet  Commonly known as: BACTRIM DS,SEPTRA DS  Take 1 tablet by mouth 2 (Two) Times a Day for 10 days. Drink lots of water with this antibiotic            Changed      fluticasone 50 MCG/ACT nasal spray  Commonly known as: FLONASE  Instill 2 sprays into each nostril daily as directed by provider  What changed:   when to take this  reasons to take this     hydrocortisone 25 MG suppository  Commonly known as: ANUSOL-HC  Use 1 suppository rectally 2 times a day   as needed.  What changed:   how much to take  when to take this  reasons to take this     triamcinolone 0.1 % cream  Commonly known as: KENALOG  Apply to affected areas twice daily for 2 weeks. Take a one week break, use as needed for flares.  What changed:   when to take this  reasons to take this  additional instructions            Stop      ciprofloxacin 500 MG tablet  Commonly known as: Cipro     HYDROcodone-acetaminophen 5-325 MG per tablet  Commonly known as: NORCO     nitrofurantoin (macrocrystal-monohydrate) 100 MG capsule  Commonly known as: MACROBID               Where to Get Your Medications        These medications were sent to [facility] #[id number] - [address] [facility] [address] - [phone]  - [phone] [address] [facility] [address]      Phone: [phone]   oxyCODONE-acetaminophen 5-325 MG per tablet  sulfamethoxazole-trimethoprim 800-160 MG per tablet       Please note that portions of this note were completed with a voice recognition program.        Soot Bedoya MD  06/07/25 9006

## 2025-06-09 LAB — BACTERIA SPEC AEROBE CULT: NO GROWTH

## 2025-06-17 ENCOUNTER — OFFICE VISIT (OUTPATIENT)
Dept: GASTROENTEROLOGY | Facility: CLINIC | Age: 60
End: 2025-06-17
Payer: COMMERCIAL

## 2025-06-17 VITALS
WEIGHT: 251 LBS | HEART RATE: 74 BPM | HEIGHT: 72 IN | DIASTOLIC BLOOD PRESSURE: 78 MMHG | BODY MASS INDEX: 34 KG/M2 | SYSTOLIC BLOOD PRESSURE: 131 MMHG

## 2025-06-17 DIAGNOSIS — K21.00 GASTROESOPHAGEAL REFLUX DISEASE WITH ESOPHAGITIS WITHOUT HEMORRHAGE: Primary | ICD-10-CM

## 2025-06-17 DIAGNOSIS — R10.13 EPIGASTRIC PAIN: ICD-10-CM

## 2025-06-17 RX ORDER — FAMOTIDINE 40 MG/1
40 TABLET, FILM COATED ORAL 2 TIMES DAILY
Qty: 180 TABLET | Refills: 3 | Status: SHIPPED | OUTPATIENT
Start: 2025-06-17

## 2025-06-17 RX ORDER — DEXLANSOPRAZOLE 60 MG/1
60 CAPSULE, DELAYED RELEASE ORAL DAILY
Qty: 90 CAPSULE | Refills: 3 | Status: SHIPPED | OUTPATIENT
Start: 2025-06-17

## 2025-06-17 NOTE — PROGRESS NOTES
Chief Complaint  Heartburn    Avrey Watson is a 59 y.o. male who presents today to [facility] for Heartburn.    HPI:   58-year-old male who presents today for evaluation of GERD.  Patient is previously been followed by Dr. Pascual.  He had an EGD performed on 1/20/2024.  Was found to have reflux esophagitis with extensive erosions extending to-3 cm proximal to the Z-line, biopsies taken from the GE junction were suggestive of erosive esophagitis negative for intestinal metaplasia.  There was mild erosive antral gastritis and duodenitis with superficial duodenal ulcerations, biopsies taken from the antrum were negative for H. pylori.  Patient was initiated on Vonoprazan 20 mg once daily.  Unfortunately, this did not help.  Eventually he was initiated on Aciphex 20 mg daily and famotidine 40 mg twice daily.  Initial plan was to have a follow-up EGD to evaluate for healing of extensive esophageal erosions.  However, he states that his insurance would not for a follow-up appointment with Dr. Pascual.  States that his EGD will be covered if it is performed at [facility].     Today, he reports that he has been compliant with Aciphex and famotidine.  He is having multiple flares each day.  States that there are no certain times that the acid is worse.  He does drink 1-2 sodas and drinks coffee each morning.  States that he has tried to avoid trigger foods.  In the past, he has tried Protonix, omeprazole, and Dexilant all of which do not seem to help.  States that he will have a burning epigastric pain occasionally 1-2 times each week.  This pain is not correlated with food.  He denies any changes in his bowel movements.  He denies unintentional weight loss, nausea, vomiting, melena, and hematochezia.     Of note, he reports colonoscopy performed by Dr. Pascual earlier this year or last year.  He reports that it was normal.  Will obtain these records.     9/30/2024:   "patient reports that he is not doing any better.  He reports that he is continued to have \"hourly acid reflux flares.\"  States that occurs at different times and feels like \"my stomach is on fire.\"  The acid often comes up into his throat close to his pharynx.  He states that he feels that tickled the back of his throat and often has to clear his throat to have this sensation alleviated.  He is currently on Aciphex and Pepcid daily.  He states this is not helping.  He was started on baclofen at bedtime.  He has not noticed a change.  He had an EGD performed on 7/24/2024 that was largely unremarkable.  Sequently, gastric emptying study was performed on 8/12/2024 and was within normal limits.  Patient reports in the past he has tried Protonix 40 mg twice daily, omeprazole twice daily, Nexium, Prevacid, Dexilant, Voquezna, Pepcid, and now Aciphex.  All of which poorly controlled his acid reflux.  He denies unintentional weight loss, nausea, vomiting, hematemesis, changes in bowel habits, melena, and hematochezia.  Patient was initiated on Carafate 1 g twice daily x 2 weeks.  H. pylori stool antigen was never collected.  Esophagram performed on 10/8/2024 was notable for normal esophagus, no evidence of hiatal hernia, normal peristalsis, GERD into the distal esophagus.  Patient was referred to general surgery.  However, he was lost to follow-up.           Interval History:    Today, patient presents for follow-up.  Patient states that he met with general surgery and was not a candidate for Niesen fundoplication.  Unfortunately, do not have these records for review.  He reports daily acid reflux flares on Dexilant 60 mg daily.  He has modified his diet to exclude fatty, fried, greasy foods.  He reports having 1 soda per day and 2 cups of coffee per day.  He has used NSAIDs excessively in the past.  However, he does not currently use these.  He notes having occasional nausea.  He has no other complaints " today.          Previous History:   Past Medical History:   Diagnosis Date    Acid reflux     Chronic pain disorder     DDD (degenerative disc disease), lumbar     Depression     Dizzy     Elevated cholesterol     Essential hypertension 10/27/2023    Extremity pain     Joint pain     Low back pain     Lumbosacral disc disease     Migraine     Muscle spasm     Neck pain     Osteoarthritis     PONV (postoperative nausea and vomiting)     Rheumatoid arthritis     Spinal cord stimulator status     active - pt has remote- lumbar    Tinnitus       Past Surgical History:   Procedure Laterality Date    BRAVO PROCEDURE N/A 11/13/2024    Procedure: ESOPHAGOGASTRODUODENOSCOPY AND MADDOX;  Surgeon: Jonathan Galeana MD;  Location:  [facility];  Service: Gastroenterology;  Laterality: N/A;  bravo place at 38cm    CARPAL TUNNEL RELEASE Right 08/04/2016    Procedure: CARPAL TUNNEL RELEASE;  Surgeon: Yoel Lua MD;  Location:  [facility];  Service:     CARPAL TUNNEL RELEASE Left 11/09/2017    Procedure: CARPAL TUNNEL RELEASE;  Surgeon: Yoel Lua MD;  Location:  [facility];  Service:     COLONOSCOPY      CYSTOSCOPY TRANSURETHRAL RESECTION OF PROSTATE N/A 5/16/2025    Procedure: CYSTOSCOPY TRANSURETHRAL RESECTION OF PROSTATE GREENLIGHT;  Surgeon: Raghavendra Pacheco MD;  Location:  [facility];  Service: Urology;  Laterality: N/A;    ENDOSCOPY N/A 07/24/2024    Procedure: ESOPHAGOGASTRODUODENOSCOPY WITH BIOPSY;  Surgeon: Valerie Miranda MD;  Location:  [facility];  Service: Gastroenterology;  Laterality: N/A;    LUMBAR DISCECTOMY Right 09/22/2022    Procedure: LUMBAR DISCECTOMY L4-5 RIGHT;  Surgeon: Auqiles Grigsby MD;  Location:  [facility];  Service: Neurosurgery;  Laterality: Right;    LUMBAR FACET INJECTION      xback- coritsone- about 4 times    RHIZOTOMY      SPINAL CORD STIMULATOR IMPLANT N/A 02/26/2024    Procedure: SPINAL CORD STIMULATOR INSERTION PHASE 1;  Surgeon: Aquiles Grigsby MD;   "Location:  [facility];  Service: Neurosurgery;  Laterality: N/A;    SPINAL CORD STIMULATOR IMPLANT N/A 03/18/2024    Procedure: SPINAL CORD STIMULATOR LEAD REVISION;  Surgeon: Aquiles Grigsby MD;  Location:  [facility];  Service: Neurosurgery;  Laterality: N/A;    SPINAL CORD STIMULATOR TRIAL W/ LAMINOTOMY      TOE SURGERY Right 03/04/2015    \"flexible joint\" per pt -- no hardware- big toe    UPPER GASTROINTESTINAL ENDOSCOPY        Social History     Socioeconomic History    Marital status:    Tobacco Use    Smoking status: Never     Passive exposure: Never    Smokeless tobacco: Former     Types: Chew     Quit date: 2020    Tobacco comments:     uses chewing tobacco   Vaping Use    Vaping status: Never Used   Substance and Sexual Activity    Alcohol use: No    Drug use: No    Sexual activity: Not Currently     Partners: Female        Current Medications:  Current Outpatient Medications   Medication Sig Dispense Refill    dexlansoprazole (DEXILANT) 60 MG capsule Take 1 capsule by mouth Daily. 90 capsule 3    atorvastatin (LIPITOR) 10 MG tablet Take 1 tablet by mouth Daily. 90 tablet 2    famotidine (PEPCID) 40 MG tablet Take 1 tablet by mouth 2 (Two) Times a Day. 180 tablet 3    FLUoxetine (PROzac) 40 MG capsule Take 1 capsule by mouth Every Morning. 90 capsule 1    fluticasone (FLONASE) 50 MCG/ACT nasal spray Instill 2 sprays into each nostril daily as directed by provider (Patient taking differently: As Needed.) 16 g 1    hydrocortisone (ANUSOL-HC) 25 MG suppository Use 1 suppository rectally 2 times a day  as needed. (Patient taking differently: Insert 1 suppository into the rectum 2 (Two) Times a Day As Needed.) 30 suppository 11    hyoscyamine (Levsin/SL) 0.125 MG SL tablet Place 1 tablet under the tongue Every 4 (Four) Hours As Needed (bladder spasms). 30 tablet 1    levocetirizine (XYZAL) 5 MG tablet Take 1 tablet by mouth Every Evening. 90 tablet 0    metoprolol tartrate (LOPRESSOR) 25 MG tablet Take 1 " "tablet by mouth 2 (Two) Times a Day. 180 tablet 2    oxybutynin XL (Ditropan XL) 10 MG 24 hr tablet Take 1 tablet by mouth Daily. Take daily to prevent bladder spasms 30 tablet 0    oxyCODONE-acetaminophen (PERCOCET) 5-325 MG per tablet Take 1 tablet by mouth Every 6 (Six) Hours As Needed for Severe Pain. 10 tablet 0    phenazopyridine (Pyridium) 200 MG tablet Take 1 tablet by mouth 3 (Three) Times a Day As Needed for Dysuria (For burning with urination). 21 tablet 2    tadalafil (Cialis) 5 MG tablet Take 1 tablet by mouth Daily As Needed for Erectile Dysfunction (for BPH symptoms and ED). 90 tablet 3    Testosterone Cypionate (Depo-Testosterone) 200 MG/ML injection Inject 0.5 mL subcutaneously every Monday and Thursday. Discard vial after single use. 10 mL 2    triamcinolone (KENALOG) 0.1 % cream Apply to affected areas twice daily for 2 weeks. Take a one week break, use as needed for flares. (Patient taking differently: Apply  topically to the appropriate area as directed As Needed. Currently on off week) 454 g 1     No current facility-administered medications for this visit.       Allergies:   No Known Allergies    Vitals:   /78 (BP Location: Left arm, Patient Position: Sitting, Cuff Size: Large Adult)   Pulse 74   Ht 182.9 cm (72\")   Wt 114 kg (251 lb)   BMI 34.04 kg/m²   Estimated body mass index is 34.04 kg/m² as calculated from the following:    Height as of this encounter: 182.9 cm (72\").    Weight as of this encounter: 114 kg (251 lb).    ROS:   Review of Systems   Constitutional: Negative.    HENT: Negative.     Respiratory: Negative.     Cardiovascular: Negative.    Gastrointestinal:  Positive for abdominal pain and nausea. Negative for abdominal distention, anal bleeding, blood in stool, constipation, diarrhea, rectal pain and vomiting.   All other systems reviewed and are negative.       Physical Exam:   Physical Exam  Vitals reviewed.   Constitutional:       General: He is not in acute " distress.     Appearance: He is well-groomed. He is not toxic-appearing.   HENT:      Head: Normocephalic and atraumatic.   Eyes:      Extraocular Movements: Extraocular movements intact.   Cardiovascular:      Rate and Rhythm: Normal rate and regular rhythm.      Heart sounds: Normal heart sounds. No murmur heard.  Pulmonary:      Effort: Pulmonary effort is normal. No respiratory distress.      Breath sounds: Normal breath sounds. No stridor. No wheezing or rales.   Abdominal:      General: Bowel sounds are normal. There is no distension.      Palpations: Abdomen is soft. There is no mass.      Tenderness: There is abdominal tenderness (epigastric). There is no guarding or rebound.      Hernia: No hernia is present.   Skin:     General: Skin is warm.      Coloration: Skin is not jaundiced.      Findings: No rash.   Neurological:      Mental Status: He is alert and oriented to person, place, and time.   Psychiatric:         Mood and Affect: Mood and affect normal.         Speech: Speech normal.         Behavior: Behavior normal. Behavior is cooperative.         Thought Content: Thought content normal.          Lab Results:   Lab Results   Component Value Date    WBC 8.54 06/07/2025    HGB 16.1 06/07/2025    HCT 50.1 06/07/2025    MCV 83.9 06/07/2025    RDW 13.2 06/07/2025     06/07/2025    NEUTRORELPCT 60.2 06/07/2025    LYMPHORELPCT 23.4 06/07/2025    MONORELPCT 11.5 06/07/2025    EOSRELPCT 4.0 06/07/2025    BASORELPCT 0.7 06/07/2025    NEUTROABS 5.14 06/07/2025    LYMPHSABS 2.00 06/07/2025       Lab Results   Component Value Date     06/07/2025    K 3.9 06/07/2025    CO2 22.8 06/07/2025     06/07/2025    BUN 11.1 06/07/2025    CREATININE 1.12 06/07/2025    EGFRIFNONA 71 05/03/2021    GLUCOSE 91 06/07/2025    CALCIUM 9.2 06/07/2025    ALKPHOS 76 06/07/2025    AST 30 06/07/2025    ALT 28 06/07/2025    BILITOT 0.3 06/07/2025    ALBUMIN 4.2 06/07/2025    PROTEINTOT 6.7 06/07/2025    MG 2.3  08/13/2020       Pathology:        Endoscopy:        Imaging:   CT Abdomen Pelvis With Contrast  Result Date: 6/7/2025  1. Postsurgical changes within the prostate as above. 2. Marked wall thickening within the urinary bladder. This may simply be related to chronic outlet obstruction. However, cystitis could result in a similar appearance. 3. No bowel obstruction or perforation. 4. Diverticulosis without acute diverticulitis.  This report was finalized on 6/7/2025 9:59 PM by Tez Catalan MD.      MRI Prostate With & Without Contrast  Result Date: 2/25/2025  Impression: 1. Grossly stable T2 appearance of the prostate since 2/20/2024, without suspicious lesion to suggest clinically significant prostate malignancy. 2. Mild prostamegaly (59 cc gland) with BPH change. 3. Stable peripheral zone signal changes favoring sequelae of prior prostatitis. PI-RADS 2 exam. Electronically Signed: Robert Pardo MD  2/25/2025 4:12 PM EST  Workstation ID: [id number]        Results review: During today's encounter, all relevant clinical data has been reviewed.      Assessment and Plan  1. Gastroesophageal reflux disease with esophagitis without hemorrhage (Primary)  Discussed management for GERD: encouraged weight loss, HOB elevation at night, avoidance of meals 2-3 hours before bedtime, avoid trigger foods (caffeine, alcohol, chocolate, acidic/spicy foods e.g oranges/tomatoes), and encouraged smoking cessation  We will trial Dexilant 60 mg daily.  Will supplement with Pepcid 40 mg 2 times daily as needed.  -     dexlansoprazole (DEXILANT) 60 MG capsule; Take 1 capsule by mouth Daily.  Dispense: 90 capsule; Refill: 3  -     famotidine (PEPCID) 40 MG tablet; Take 1 tablet by mouth 2 (Two) Times a Day.  Dispense: 180 tablet; Refill: 3    2. Epigastric pain  Will obtain HIDA scan for further evaluation of possible biliary dyskinesia.  -     NM HIDA SCAN WITH PHARMACOLOGICAL INTERVENTION; Future      New Medications:   New Medications  Ordered This Visit   Medications    dexlansoprazole (DEXILANT) 60 MG capsule     Sig: Take 1 capsule by mouth Daily.     Dispense:  90 capsule     Refill:  3    famotidine (PEPCID) 40 MG tablet     Sig: Take 1 tablet by mouth 2 (Two) Times a Day.     Dispense:  180 tablet     Refill:  3       Discontinued Medications:   Medications Discontinued During This Encounter   Medication Reason    sulfamethoxazole-trimethoprim (BACTRIM DS,SEPTRA DS) 800-160 MG per tablet Patient Reported Not Taking    dexlansoprazole (DEXILANT) 60 MG capsule Reorder        Visit Diagnoses:    ICD-10-CM ICD-9-CM   1. Gastroesophageal reflux disease with esophagitis without hemorrhage  K21.00 530.81     530.10   2. Epigastric pain  R10.13 789.06            Follow Up:   Return in about 3 months (around 9/17/2025).    The patient was in agreement with the plan and all questions were answered to patient's satisfaction.        This document has been electronically signed by Savannah Bennett PA-C   June 19, 2025 13:43 EDT    Dictated Utilizing Dragon Dictation: Part of this note may be an electronic transcription/translation of spoken language to printed text using the Dragon Dictation System.    CC:  No ref. provider found  Luly Jimenez APRN

## 2025-06-20 ENCOUNTER — TELEPHONE (OUTPATIENT)
Dept: UROLOGY | Facility: CLINIC | Age: 60
End: 2025-06-20
Payer: COMMERCIAL

## 2025-06-20 DIAGNOSIS — G89.18 POSTOPERATIVE PAIN: ICD-10-CM

## 2025-06-20 RX ORDER — OXYCODONE AND ACETAMINOPHEN 5; 325 MG/1; MG/1
1 TABLET ORAL EVERY 6 HOURS PRN
Qty: 12 TABLET | Refills: 0 | Status: SHIPPED | OUTPATIENT
Start: 2025-06-20

## 2025-06-20 RX ORDER — OXYCODONE AND ACETAMINOPHEN 5; 325 MG/1; MG/1
1 TABLET ORAL EVERY 6 HOURS PRN
Qty: 12 TABLET | Refills: 0 | Status: SHIPPED | OUTPATIENT
Start: 2025-06-20 | End: 2025-06-20

## 2025-06-20 NOTE — TELEPHONE ENCOUNTER
Called PT and relayed Dr. Pacheco's message. I asked PT if he was trying the ibuprofen/tylenol and he said he had been and it was not helping. I informed him Dr. Pacheco offered a course of narcotic pain medication and he would like this sent in.

## 2025-06-20 NOTE — TELEPHONE ENCOUNTER
Pt called the clinic today to let us know that the dysuria he has been experiencing is still there. The pyridium did not resolve it. He did present to the [place] [facility] on 6/7 and was placed on abx at that time. He does have an appointment 6/30 with our office. Would it be appropriate to get him in sooner for evaluation?

## 2025-06-23 ENCOUNTER — TELEPHONE (OUTPATIENT)
Dept: GASTROENTEROLOGY | Facility: CLINIC | Age: 60
End: 2025-06-23

## 2025-06-23 NOTE — TELEPHONE ENCOUNTER
PATIENT CALLED IN. HE IS WANTING A REFERRAL TO GENERAL SURGERY TO TAKE CARE OF HEMORRHOIDS THAT HAVE GOTTEN WORSE SINCE RECENT PROSTATE SURGERY.

## 2025-06-25 DIAGNOSIS — K64.9 HEMORRHOIDS, UNSPECIFIED HEMORRHOID TYPE: Primary | ICD-10-CM

## 2025-06-30 ENCOUNTER — OFFICE VISIT (OUTPATIENT)
Age: 60
End: 2025-06-30
Payer: COMMERCIAL

## 2025-06-30 DIAGNOSIS — N41.1 CHRONIC PROSTATITIS: Primary | ICD-10-CM

## 2025-06-30 DIAGNOSIS — G89.18 POSTOPERATIVE PAIN: ICD-10-CM

## 2025-06-30 DIAGNOSIS — N39.41 URGENCY INCONTINENCE: ICD-10-CM

## 2025-06-30 DIAGNOSIS — R39.82 CHRONIC BLADDER PAIN: ICD-10-CM

## 2025-06-30 DIAGNOSIS — K62.89 PROCTITIS: ICD-10-CM

## 2025-06-30 LAB
BILIRUB BLD-MCNC: NEGATIVE MG/DL
CLARITY, POC: CLEAR
COLOR UR: YELLOW
EXPIRATION DATE: ABNORMAL
GLUCOSE UR STRIP-MCNC: NEGATIVE MG/DL
KETONES UR QL: NEGATIVE
LEUKOCYTE EST, POC: ABNORMAL
Lab: ABNORMAL
NITRITE UR-MCNC: NEGATIVE MG/ML
PH UR: 6 [PH] (ref 5–8)
PROT UR STRIP-MCNC: NEGATIVE MG/DL
RBC # UR STRIP: ABNORMAL /UL
SP GR UR: 1.01 (ref 1–1.03)
UROBILINOGEN UR QL: NORMAL

## 2025-06-30 PROCEDURE — 81003 URINALYSIS AUTO W/O SCOPE: CPT | Performed by: STUDENT IN AN ORGANIZED HEALTH CARE EDUCATION/TRAINING PROGRAM

## 2025-06-30 PROCEDURE — 51798 US URINE CAPACITY MEASURE: CPT | Performed by: STUDENT IN AN ORGANIZED HEALTH CARE EDUCATION/TRAINING PROGRAM

## 2025-06-30 PROCEDURE — 99024 POSTOP FOLLOW-UP VISIT: CPT | Performed by: STUDENT IN AN ORGANIZED HEALTH CARE EDUCATION/TRAINING PROGRAM

## 2025-06-30 RX ORDER — METHYLPREDNISOLONE 4 MG/1
TABLET ORAL
Qty: 21 TABLET | Refills: 0 | Status: SHIPPED | OUTPATIENT
Start: 2025-06-30

## 2025-06-30 RX ORDER — OXYCODONE AND ACETAMINOPHEN 5; 325 MG/1; MG/1
1 TABLET ORAL EVERY 6 HOURS PRN
Qty: 20 TABLET | Refills: 0 | Status: SHIPPED | OUTPATIENT
Start: 2025-06-30

## 2025-06-30 RX ORDER — VIBEGRON 75 MG/1
75 TABLET, FILM COATED ORAL DAILY
Qty: 28 TABLET | Refills: 0 | COMMUNITY
Start: 2025-06-30

## 2025-06-30 NOTE — PROGRESS NOTES
"     Follow Up Office Visit      Patient Name: Avery Watson  : 1965   MRN: [id number]     Chief Complaint:    Chief Complaint   Patient presents with    Chronic prostatitis       Referring Provider: Luly Jimenez APRN    History of Present Illness: Avery Watson is a 59 y.o. male who presents today for follow up of BPH status post greenlight PVP with severe urinary symptoms postoperatively.  The patient underwent workup for progressive lower urinary tract symptoms and was found to have a 60 g prostate with reduced flow on uroflow.  He elects proceed with a greenlight PVP.  His procedure was performed on 2025.  His procedure was uncomplicated.  Immediately after his procedure he had progressive worsening of his dysuria which is a chronic concern.  He has also messaged him with ongoing issues and he has been prescribed Levsin, Ditropan, Pyridium, Macrobid, ciprofloxacin and most recently Bactrim for the symptoms.  Primary concern is severe pelvic discomfort, burning, urgency, frequency and new onset fecal incontinence.  We discussed his fecal incontinence likely related to diarrhea is most likely as a result of numerous courses of antibiotics recently.  He states he is eating yogurt every night.  He presented to the emergency department on .  He states he was \"burning like fire\" throughout his perineum, anus and penis urine culture ultimately was negative from that visit.  Urinalysis today demonstrates trace leukocytes and blood.  He does have a history of mildly elevated PSA presumably related to BPH this will be rechecked after he is healed from his greenlight.  CT was performed at his emergency department visit, this demonstrates a thickened bladder wall with some stranding inflammation likely consistent with cystitis.  He has a large defect through the prostate consistent with greenlight PVP recently.      Subjective      Review of System: Review of Systems   Genitourinary:  " Positive for difficulty urinating, dysuria, enuresis, frequency and penile pain.      I have reviewed the ROS documented by my clinical staff, I have updated appropriately and I agree. Raghavendra Pacheco MD    I have reviewed and the following portions of the patient's history were updated as appropriate: past family history, past medical history, past social history, past surgical history and problem list.    Medications:     Current Outpatient Medications:     oxyCODONE-acetaminophen (PERCOCET) 5-325 MG per tablet, Take 1 tablet by mouth Every 6 (Six) Hours As Needed for Severe Pain., Disp: 20 tablet, Rfl: 0    atorvastatin (LIPITOR) 10 MG tablet, Take 1 tablet by mouth Daily., Disp: 90 tablet, Rfl: 2    dexlansoprazole (DEXILANT) 60 MG capsule, Take 1 capsule by mouth Daily., Disp: 90 capsule, Rfl: 3    famotidine (PEPCID) 40 MG tablet, Take 1 tablet by mouth 2 (Two) Times a Day., Disp: 180 tablet, Rfl: 3    FLUoxetine (PROzac) 40 MG capsule, Take 1 capsule by mouth Every Morning., Disp: 90 capsule, Rfl: 1    fluticasone (FLONASE) 50 MCG/ACT nasal spray, Instill 2 sprays into each nostril daily as directed by provider (Patient taking differently: As Needed.), Disp: 16 g, Rfl: 1    hydrocortisone (ANUSOL-HC) 25 MG suppository, Use 1 suppository rectally 2 times a day  as needed. (Patient taking differently: Insert 1 suppository into the rectum 2 (Two) Times a Day As Needed.), Disp: 30 suppository, Rfl: 11    hyoscyamine (Levsin/SL) 0.125 MG SL tablet, Place 1 tablet under the tongue Every 4 (Four) Hours As Needed (bladder spasms)., Disp: 30 tablet, Rfl: 1    levocetirizine (XYZAL) 5 MG tablet, Take 1 tablet by mouth Every Evening., Disp: 90 tablet, Rfl: 0    methylPREDNISolone (MEDROL) 4 MG dose pack, Take by mouth as directed on package instructions., Disp: 21 tablet, Rfl: 0    metoprolol tartrate (LOPRESSOR) 25 MG tablet, Take 1 tablet by mouth 2 (Two) Times a Day., Disp: 180 tablet, Rfl: 2    phenazopyridine  (Pyridium) 200 MG tablet, Take 1 tablet by mouth 3 (Three) Times a Day As Needed for Dysuria (For burning with urination)., Disp: 21 tablet, Rfl: 2    tadalafil (Cialis) 5 MG tablet, Take 1 tablet by mouth Daily As Needed for Erectile Dysfunction (for BPH symptoms and ED)., Disp: 90 tablet, Rfl: 3    Testosterone Cypionate (Depo-Testosterone) 200 MG/ML injection, Inject 0.5 mL subcutaneously every Monday and Thursday. Discard vial after single use., Disp: 10 mL, Rfl: 2    triamcinolone (KENALOG) 0.1 % cream, Apply to affected areas twice daily for 2 weeks. Take a one week break, use as needed for flares. (Patient taking differently: Apply  topically to the appropriate area as directed As Needed. Currently on off week), Disp: 454 g, Rfl: 1    Vibegron (Gemtesa) 75 MG tablet, Take 1 tablet by mouth Daily., Disp: 28 tablet, Rfl: 0    Allergies:   No Known Allergies    IPSS Questionnaire (AUA-7):  Over the past month…    1)  Incomplete Emptying:       How often have you had a sensation of not emptying you had the sensation of not emptying your bladder completely after you finished urinating?  5 - Almost always   2)  Frequency:       How often have you had the urinate again less than two hours after you finished urinating?  5 - Almost always   3)  Intermittency:       How often have you found you stopped and started again several times when you urinated?   3 - About half the time   4) Urgency:      How often have you found it difficult to postpone urination?  5 - Almost always   5) Weak Stream:      How often have you had a weak urinary stream?  0 - Not at all   6) Straining:       How often have you had to push or strain to begin urination?  4 - More than half the time   7) Nocturia:      How many times did you most typically get up to urinate from the time you went to bed at night until the time you got up in the morning?  4 - 4 times   Total Score:  30   The International Prostate Symptom Score (IPSS) is used to  screen, diagnose, track symptoms of benign prostatic hyperplasia (BPH).   0-7 (Mild Symptoms) 8-19 (Moderate) 20-35 (Severe)   Quality of Life (QoL):  If you were to spend the rest of your life with your urinary condition just the way it is now, how would you feel about that? 6-Terrible   Urine Leakage (Incontinence) 0-No Leakage     PVR: 0mL    Objective     Physical Exam:   Vital Signs: There were no vitals filed for this visit.  There is no height or weight on file to calculate BMI.     Physical Exam  Constitutional:       Appearance: Normal appearance.   HENT:      Head: Normocephalic and atraumatic.      Nose: Nose normal.   Eyes:      Extraocular Movements: Extraocular movements intact.      Conjunctiva/sclera: Conjunctivae normal.      Pupils: Pupils are equal, round, and reactive to light.   Musculoskeletal:         General: Normal range of motion.      Cervical back: Normal range of motion and neck supple.   Skin:     General: Skin is warm and dry.      Findings: No lesion or rash.   Neurological:      General: No focal deficit present.      Mental Status: He is alert and oriented to person, place, and time. Mental status is at baseline.   Psychiatric:         Mood and Affect: Mood normal.         Behavior: Behavior normal.         Labs:   Brief Urine Lab Results  (Last result in the past 365 days)        Color   Clarity   Blood   Leuk Est   Nitrite   Protein   CREAT   Urine HCG        06/30/25 1140 Yellow   Clear   3+   Trace   Negative   Negative                   Urine Culture          9/17/2024    11:03 6/7/2025    17:55   Urine Culture   Urine Culture No growth  No growth         Lab Results   Component Value Date    GLUCOSE 91 06/07/2025    CALCIUM 9.2 06/07/2025     06/07/2025    K 3.9 06/07/2025    CO2 22.8 06/07/2025     06/07/2025    BUN 11.1 06/07/2025    CREATININE 1.12 06/07/2025    EGFRIFNONA 71 05/03/2021    BCR 9.9 06/07/2025    ANIONGAP 11.2 06/07/2025       Lab Results    Component Value Date    WBC 8.54 06/07/2025    HGB 16.1 06/07/2025    HCT 50.1 06/07/2025    MCV 83.9 06/07/2025     06/07/2025       Images:   CT Abdomen Pelvis With Contrast  Result Date: 6/7/2025  1. Postsurgical changes within the prostate as above. 2. Marked wall thickening within the urinary bladder. This may simply be related to chronic outlet obstruction. However, cystitis could result in a similar appearance. 3. No bowel obstruction or perforation. 4. Diverticulosis without acute diverticulitis.  This report was finalized on 6/7/2025 9:59 PM by Tez Catalan MD.        Measures:   Tobacco:   Avery Watson  reports that he has never smoked. He has never been exposed to tobacco smoke. He quit smokeless tobacco use about 5 years ago.  His smokeless tobacco use included chew.      Assessment / Plan      Assessment/Plan:   59 y.o. male who presented today for follow up of severe postoperative complaints after undergoing a greenlight PVP in mid May.  He is roughly 6 weeks out from his procedure and is still having severe symptoms.  He has had recently negative urine culture and has developed some fecal urgency incontinence diarrhea issues probably related to multiple antibiotic courses.  He has an inflamed appearing bladder on CT.  Discussed with patient his symptoms are outside the norm for the vast majority of patients who undergo greenlight PVP.  He states he is not getting any relief from any medication therapy.  He reports his pain is severe.  He requests a refill of narcotic therapy due to his severe pain.  I will prescribe Percocet to use sparingly as needed, and I would also recommend starting a methylprednisolone taper course for his likely severe inflammation driving his symptoms.  I will also give him Gemtesa to help with some of his frequency urgency complaints likely as a result of inflammatory healing.  I also recommend sending a guidance PCR urine culture to rule out a smoldering  infection and at this point given numerous antibiotic courses with unclear benefit I would avoid empiric antibiotic therapy until culture demonstrates real infection    Diagnoses and all orders for this visit:    1. Chronic prostatitis [N41.1] (Primary)  -     POC Urinalysis Dipstick, Automated    2. Postoperative pain  -     oxyCODONE-acetaminophen (PERCOCET) 5-325 MG per tablet; Take 1 tablet by mouth Every 6 (Six) Hours As Needed for Severe Pain.  Dispense: 20 tablet; Refill: 0  -     methylPREDNISolone (MEDROL) 4 MG dose pack; Take by mouth as directed on package instructions.  Dispense: 21 tablet; Refill: 0    3. Proctitis  -     methylPREDNISolone (MEDROL) 4 MG dose pack; Take by mouth as directed on package instructions.  Dispense: 21 tablet; Refill: 0    4. Chronic bladder pain  -     methylPREDNISolone (MEDROL) 4 MG dose pack; Take by mouth as directed on package instructions.  Dispense: 21 tablet; Refill: 0    5. Urgency incontinence  -     Vibegron (Gemtesa) 75 MG tablet; Take 1 tablet by mouth Daily.  Dispense: 28 tablet; Refill: 0           Follow Up:   Return in about 3 weeks (around 7/21/2025).    I spent approximately 30 minutes providing clinical care for this patient; including review of patient's chart and provider documentation, face to face time spent with patient in examination room (obtaining history, performing physical exam, discussing diagnosis and management options), placing orders, and completing patient documentation.     Raghavendra Pacheco MD  [facility] [place]

## 2025-07-28 ENCOUNTER — LAB (OUTPATIENT)
Facility: HOSPITAL | Age: 60
End: 2025-07-28
Payer: COMMERCIAL

## 2025-07-28 ENCOUNTER — OFFICE VISIT (OUTPATIENT)
Age: 60
End: 2025-07-28
Payer: COMMERCIAL

## 2025-07-28 VITALS — HEIGHT: 72 IN | BODY MASS INDEX: 34 KG/M2 | WEIGHT: 251 LBS

## 2025-07-28 DIAGNOSIS — N40.0 BPH WITH ELEVATED PSA: ICD-10-CM

## 2025-07-28 DIAGNOSIS — R97.20 BPH WITH ELEVATED PSA: ICD-10-CM

## 2025-07-28 DIAGNOSIS — G89.29 PERINEAL PAIN IN MALE, CHRONIC: ICD-10-CM

## 2025-07-28 DIAGNOSIS — E29.1 TESTOSTERONE DEFICIENCY IN MALE: ICD-10-CM

## 2025-07-28 DIAGNOSIS — M62.89 HIGH-TONE PELVIC FLOOR DYSFUNCTION: ICD-10-CM

## 2025-07-28 DIAGNOSIS — E29.1 TESTOSTERONE DEFICIENCY IN MALE: Primary | ICD-10-CM

## 2025-07-28 DIAGNOSIS — R10.2 PERINEAL PAIN IN MALE, CHRONIC: ICD-10-CM

## 2025-07-28 DIAGNOSIS — R30.0 DYSURIA: ICD-10-CM

## 2025-07-28 LAB
HCT VFR BLD AUTO: 52 % (ref 37.5–51)
HGB BLD-MCNC: 17.2 G/DL (ref 13–17.7)

## 2025-07-28 PROCEDURE — 84402 ASSAY OF FREE TESTOSTERONE: CPT

## 2025-07-28 PROCEDURE — 85018 HEMOGLOBIN: CPT

## 2025-07-28 PROCEDURE — 84403 ASSAY OF TOTAL TESTOSTERONE: CPT

## 2025-07-28 PROCEDURE — 36415 COLL VENOUS BLD VENIPUNCTURE: CPT

## 2025-07-28 PROCEDURE — 99024 POSTOP FOLLOW-UP VISIT: CPT | Performed by: STUDENT IN AN ORGANIZED HEALTH CARE EDUCATION/TRAINING PROGRAM

## 2025-07-28 PROCEDURE — 85014 HEMATOCRIT: CPT

## 2025-07-28 RX ORDER — TESTOSTERONE CYPIONATE 200 MG/ML
INJECTION, SOLUTION INTRAMUSCULAR
Qty: 10 ML | Refills: 2 | Status: SHIPPED | OUTPATIENT
Start: 2025-07-28

## 2025-07-28 NOTE — PROGRESS NOTES
Follow Up Office Visit      Patient Name: Avery Watson  : 1965   MRN: [id number]     Chief Complaint:    Chief Complaint   Patient presents with    Chronic prostatitis       Referring Provider: No ref. provider found    History of Present Illness: Avery Watson is a 60 y.o. male who presents today for follow up of severe postoperative symptoms after undergoing a greenlight PVP for obstructive BPH.  He has had baseline dysuria prior to surgery but this worsened after his procedure.  Greenlight PVP occurred on May 16.  He reports a moderately improved strength of stream since this time however his IPSS score is still listed as 18 with an unhappy quality of life.  I have treated him with antibiotics, Gemtesa, steroids, narcotics for his ongoing perineal and urethral discomfort.  He is not sure if anything is help.  At this point I will send him to a pelvic for therapist to work on pelvic floor stretching and relaxation techniques.    He was previously being managed for testosterone deficiency with a urologist in [facility] who has since retired.  He is due for repeat labs.  PSA has been mildly elevated but likely attributable to BPH.  He did undergo an MRI prostate on 2025 demonstrating a PI-RADS 2 exam.     He reports mild and intermittent dysuria at this time.  He believes Gemtesa or the steroid may have reduced his dysuria somewhat.  He still has some burning in his perineum when he is sitting    Subjective      Review of System: Review of Systems   Genitourinary:  Positive for difficulty urinating and frequency.      I have reviewed the ROS documented by my clinical staff, I have updated appropriately and I agree. Raghavendra Pacheco MD    I have reviewed and the following portions of the patient's history were updated as appropriate: past family history, past medical history, past social history, past surgical history and problem list.    Medications:     Current  Outpatient Medications:     atorvastatin (LIPITOR) 10 MG tablet, Take 1 tablet by mouth Daily., Disp: 90 tablet, Rfl: 2    dexlansoprazole (DEXILANT) 60 MG capsule, Take 1 capsule by mouth Daily., Disp: 90 capsule, Rfl: 3    famotidine (PEPCID) 40 MG tablet, Take 1 tablet by mouth 2 (Two) Times a Day., Disp: 180 tablet, Rfl: 3    FLUoxetine (PROzac) 40 MG capsule, Take 1 capsule by mouth Every Morning., Disp: 90 capsule, Rfl: 1    fluticasone (FLONASE) 50 MCG/ACT nasal spray, Instill 2 sprays into each nostril daily as directed by provider (Patient taking differently: As Needed.), Disp: 16 g, Rfl: 1    hydrocortisone (ANUSOL-HC) 25 MG suppository, Use 1 suppository rectally 2 times a day  as needed. (Patient taking differently: Insert 1 suppository into the rectum 2 (Two) Times a Day As Needed.), Disp: 30 suppository, Rfl: 11    hyoscyamine (Levsin/SL) 0.125 MG SL tablet, Place 1 tablet under the tongue Every 4 (Four) Hours As Needed (bladder spasms)., Disp: 30 tablet, Rfl: 1    levocetirizine (XYZAL) 5 MG tablet, Take 1 tablet by mouth Every Evening., Disp: 90 tablet, Rfl: 0    metoprolol tartrate (LOPRESSOR) 25 MG tablet, Take 1 tablet by mouth 2 (Two) Times a Day., Disp: 180 tablet, Rfl: 2    oxyCODONE-acetaminophen (PERCOCET) 5-325 MG per tablet, Take 1 tablet by mouth Every 6 (Six) Hours As Needed for Severe Pain., Disp: 20 tablet, Rfl: 0    phenazopyridine (Pyridium) 200 MG tablet, Take 1 tablet by mouth 3 (Three) Times a Day As Needed for Dysuria (For burning with urination)., Disp: 21 tablet, Rfl: 2    tadalafil (Cialis) 5 MG tablet, Take 1 tablet by mouth Daily As Needed for Erectile Dysfunction (for BPH symptoms and ED)., Disp: 90 tablet, Rfl: 3    Testosterone Cypionate (Depo-Testosterone) 200 MG/ML injection, Inject 0.5 mL subcutaneously every Monday and Thursday. Discard vial after single use., Disp: 10 mL, Rfl: 2    triamcinolone (KENALOG) 0.1 % cream, Apply to affected areas twice daily for 2 weeks.  "Take a one week break, use as needed for flares. (Patient taking differently: Apply  topically to the appropriate area as directed As Needed. Currently on off week), Disp: 454 g, Rfl: 1    Vibegron (Gemtesa) 75 MG tablet, Take 1 tablet by mouth Daily., Disp: 28 tablet, Rfl: 0    Allergies:   No Known Allergies    IPSS Questionnaire (AUA-7):  Over the past month…    1)  Incomplete Emptying:       How often have you had a sensation of not emptying you had the sensation of not emptying your bladder completely after you finished urinating?  2 - Less than half the time   2)  Frequency:       How often have you had the urinate again less than two hours after you finished urinating?  4 - More than half the time   3)  Intermittency:       How often have you found you stopped and started again several times when you urinated?   3 - About half the time   4) Urgency:      How often have you found it difficult to postpone urination?  2 - Less than half the time   5) Weak Stream:      How often have you had a weak urinary stream?  4 - More than half the time   6) Straining:       How often have you had to push or strain to begin urination?  1 - Less than 1 time in 5   7) Nocturia:      How many times did you most typically get up to urinate from the time you went to bed at night until the time you got up in the morning?  2 - 2 times   Total Score:  18   The International Prostate Symptom Score (IPSS) is used to screen, diagnose, track symptoms of benign prostatic hyperplasia (BPH).   0-7 (Mild Symptoms) 8-19 (Moderate) 20-35 (Severe)   Quality of Life (QoL):  If you were to spend the rest of your life with your urinary condition just the way it is now, how would you feel about that? 5-Unhappy   Urine Leakage (Incontinence) 0-No Leakage          Objective     Physical Exam:   Vital Signs:   Vitals:    07/28/25 1054   Weight: 114 kg (251 lb)   Height: 182.9 cm (72.01\")   PainSc: 0-No pain     Body mass index is 34.03 kg/m². "     Physical Exam  Constitutional:       Appearance: Normal appearance.   HENT:      Head: Normocephalic and atraumatic.      Nose: Nose normal.   Eyes:      Extraocular Movements: Extraocular movements intact.      Conjunctiva/sclera: Conjunctivae normal.      Pupils: Pupils are equal, round, and reactive to light.   Musculoskeletal:         General: Normal range of motion.      Cervical back: Normal range of motion and neck supple.   Skin:     General: Skin is warm and dry.      Findings: No lesion or rash.   Neurological:      General: No focal deficit present.      Mental Status: He is alert and oriented to person, place, and time. Mental status is at baseline.   Psychiatric:         Mood and Affect: Mood normal.         Behavior: Behavior normal.         Labs:   Brief Urine Lab Results  (Last result in the past 365 days)        Color   Clarity   Blood   Leuk Est   Nitrite   Protein   CREAT   Urine HCG        06/30/25 1140 Yellow   Clear   3+   Trace   Negative   Negative                   Urine Culture          9/17/2024    11:03 6/7/2025    17:55   Urine Culture   Urine Culture No growth  No growth         Lab Results   Component Value Date    GLUCOSE 91 06/07/2025    CALCIUM 9.2 06/07/2025     06/07/2025    K 3.9 06/07/2025    CO2 22.8 06/07/2025     06/07/2025    BUN 11.1 06/07/2025    CREATININE 1.12 06/07/2025    EGFRIFNONA 71 05/03/2021    BCR 9.9 06/07/2025    ANIONGAP 11.2 06/07/2025       Lab Results   Component Value Date    WBC 8.54 06/07/2025    HGB 16.1 06/07/2025    HCT 50.1 06/07/2025    MCV 83.9 06/07/2025     06/07/2025       Images:   CT Abdomen Pelvis With Contrast  Result Date: 6/7/2025  1. Postsurgical changes within the prostate as above. 2. Marked wall thickening within the urinary bladder. This may simply be related to chronic outlet obstruction. However, cystitis could result in a similar appearance. 3. No bowel obstruction or perforation. 4. Diverticulosis without  acute diverticulitis.  This report was finalized on 6/7/2025 9:59 PM by Tez Catalan MD.        Measures:   Tobacco:   Avery Watson  reports that he has never smoked. He has never been exposed to tobacco smoke. He quit smokeless tobacco use about 5 years ago.  His smokeless tobacco use included chew.       Assessment / Plan      Assessment/Plan:   60 y.o. male who presented today for follow up of numerous urologic concerns.  BPH status post greenlight PVP.  Refractory postoperative urinary concerns.  He has trialed multiple medications including methylprednisolone steroids, Gemtesa, narcotics, Pyridium etc.  Mild improvement in pain since last seen.  Referring to pelvic floor physical therapy for likely high tone pelvic floor dysfunction.  We offered him a repeat cystoscopy to assess for scar tissue or poorly healing prostate tissue that may be causing his symptoms.  He will consider this option.  He is due for repeat testosterone and CBC labs downstairs today, he was previously being managed with testosterone cypionate twice weekly for a total of 100 mg a week by a previous urologist who has since retired.  He would like to continue this regimen.    Diagnoses and all orders for this visit:    1. Testosterone deficiency in male (Primary)  -     Testosterone, Free, Total; Future  -     Hemoglobin & Hematocrit, Blood; Future  -     Cancel: Ambulatory Referral to Physical Therapy for Evaluation & Treatment    2. BPH with elevated PSA  -     Testosterone Cypionate (Depo-Testosterone) 200 MG/ML injection; Inject 0.5 mL subcutaneously every Monday and Thursday. Discard vial after single use.  Dispense: 10 mL; Refill: 2    3. High-tone pelvic floor dysfunction  -     Ambulatory Referral to Physical Therapy for Evaluation & Treatment    4. Dysuria  -     Ambulatory Referral to Physical Therapy for Evaluation & Treatment    5. Perineal pain in male, chronic  -     Ambulatory Referral to Physical Therapy for  Evaluation & Treatment           Follow Up:   Return in about 3 months (around 10/28/2025) for  Send downstairs to lab for blood draw today .    I spent approximately 30 minutes providing clinical care for this patient; including review of patient's chart and provider documentation, face to face time spent with patient in examination room (obtaining history, performing physical exam, discussing diagnosis and management options), placing orders, and completing patient documentation.     Raghavendra Pacheco MD  [facility] [place]

## 2025-07-29 ENCOUNTER — OFFICE VISIT (OUTPATIENT)
Dept: SURGERY | Facility: CLINIC | Age: 60
End: 2025-07-29
Payer: COMMERCIAL

## 2025-07-29 VITALS — WEIGHT: 255 LBS | HEIGHT: 72 IN | BODY MASS INDEX: 34.54 KG/M2

## 2025-07-29 DIAGNOSIS — K64.1 GRADE II HEMORRHOIDS: Primary | ICD-10-CM

## 2025-07-29 NOTE — PROGRESS NOTES
Subjective   Avery Watson is a 60 y.o. male.     Chief Complaint: hemorrhoids    History of Present Illness He is a 61 yo who has had hemorrhoids for several years, but more discomfort lately. They rarely bleed. His bowels are regular and no abdominal issues. He had a colonoscopy that was ok a couple of years ago.     The following portions of the patient's history were reviewed and updated as appropriate: current medications, past family history, past medical history, past social history, past surgical history and problem list.    Review of Systems   Constitutional:  Negative for activity change, appetite change, chills, fever and unexpected weight change.   HENT:  Negative for congestion, facial swelling and sore throat.    Eyes:  Negative for photophobia and visual disturbance.   Respiratory:  Negative for chest tightness, shortness of breath and wheezing.    Cardiovascular:  Negative for chest pain, palpitations and leg swelling.   Gastrointestinal:  Positive for anal bleeding and rectal pain. Negative for abdominal distention, abdominal pain, blood in stool, constipation, diarrhea, nausea and vomiting.   Endocrine: Negative for cold intolerance, heat intolerance, polydipsia and polyuria.   Genitourinary:  Negative for difficulty urinating, dysuria, flank pain and urgency.   Musculoskeletal:  Negative for back pain and myalgias.   Skin:  Negative for rash and wound.   Allergic/Immunologic: Negative for immunocompromised state.   Neurological:  Negative for dizziness, seizures, syncope, light-headedness, numbness and headaches.   Hematological:  Negative for adenopathy. Does not bruise/bleed easily.   Psychiatric/Behavioral:  Negative for behavioral problems and confusion. The patient is not nervous/anxious.        Objective   Physical Exam  Vitals reviewed.   Constitutional:       General: He is not in acute distress.     Appearance: He is well-developed. He is not ill-appearing.      Comments: Internal  and external hemorrhoids   HENT:      Head: Normocephalic. No laceration. Hair is normal.      Right Ear: Hearing and ear canal normal.      Left Ear: Hearing and ear canal normal.      Nose: Nose normal.      Right Sinus: No maxillary sinus tenderness or frontal sinus tenderness.      Left Sinus: No maxillary sinus tenderness or frontal sinus tenderness.   Eyes:      General: Lids are normal.      Conjunctiva/sclera: Conjunctivae normal.      Pupils: Pupils are equal, round, and reactive to light.   Neck:      Thyroid: No thyroid mass or thyromegaly.      Vascular: No JVD.      Trachea: No tracheal tenderness or tracheal deviation.   Cardiovascular:      Rate and Rhythm: Normal rate and regular rhythm.      Heart sounds: No murmur heard.     No gallop.   Pulmonary:      Effort: Pulmonary effort is normal.      Breath sounds: Normal breath sounds. No stridor. No wheezing.   Chest:      Chest wall: No tenderness.   Abdominal:      General: Bowel sounds are normal. There is no distension.      Palpations: Abdomen is soft. There is no mass.      Tenderness: There is no abdominal tenderness. There is no guarding or rebound.      Hernia: No hernia is present.   Musculoskeletal:         General: No deformity.      Cervical back: Normal range of motion.   Lymphadenopathy:      Cervical: No cervical adenopathy.      Upper Body:      Right upper body: No supraclavicular adenopathy.      Left upper body: No supraclavicular adenopathy.   Skin:     General: Skin is warm and dry.      Coloration: Skin is not pale.      Findings: No erythema or rash.   Neurological:      Mental Status: He is alert and oriented to person, place, and time.      Motor: No abnormal muscle tone.   Psychiatric:         Behavior: Behavior normal.         Thought Content: Thought content normal.         Past Medical History:   Diagnosis Date    Acid reflux     Chronic pain disorder     DDD (degenerative disc disease), lumbar     Depression     Dizzy      Elevated cholesterol     Essential hypertension 10/27/2023    Extremity pain     Joint pain     Low back pain     Lumbosacral disc disease     Migraine     Muscle spasm     Neck pain     Osteoarthritis     PONV (postoperative nausea and vomiting)     Rheumatoid arthritis     Spinal cord stimulator status     active - pt has remote- lumbar    Tinnitus        Family History   Problem Relation Age of Onset    Lung cancer Mother     Cancer Mother         ovarian    Diabetes Sister     Cancer Sister         breast    Heart disease Brother     Rheum arthritis Brother     Arthritis Brother        Social History     Tobacco Use    Smoking status: Never     Passive exposure: Never    Smokeless tobacco: Former     Types: Chew     Quit date: 2020    Tobacco comments:     uses chewing tobacco   Vaping Use    Vaping status: Never Used   Substance Use Topics    Alcohol use: No    Drug use: No       Past Surgical History:   Procedure Laterality Date    BRAVO PROCEDURE N/A 11/13/2024    Procedure: ESOPHAGOGASTRODUODENOSCOPY AND MADDOX;  Surgeon: Jonathan Galeana MD;  Location: [facility] OR;  Service: Gastroenterology;  Laterality: N/A;  bravo place at 38cm    CARPAL TUNNEL RELEASE Right 08/04/2016    Procedure: CARPAL TUNNEL RELEASE;  Surgeon: Yoel Lua MD;  Location: [facility] OR;  Service:     CARPAL TUNNEL RELEASE Left 11/09/2017    Procedure: CARPAL TUNNEL RELEASE;  Surgeon: Yoel Lua MD;  Location: [facility] OR;  Service:     COLONOSCOPY      CYSTOSCOPY TRANSURETHRAL RESECTION OF PROSTATE N/A 5/16/2025    Procedure: CYSTOSCOPY TRANSURETHRAL RESECTION OF PROSTATE GREENLIGHT;  Surgeon: Raghavendra Pacheco MD;  Location: [facility] OR;  Service: Urology;  Laterality: N/A;    ENDOSCOPY N/A 07/24/2024    Procedure: ESOPHAGOGASTRODUODENOSCOPY WITH BIOPSY;  Surgeon: Valerie Miranda MD;  Location: [facility] OR;  Service: Gastroenterology;  Laterality: N/A;    LUMBAR DISCECTOMY Right 09/22/2022    Procedure:  "LUMBAR DISCECTOMY L4-5 RIGHT;  Surgeon: Aquiles Grigsby MD;  Location:  [facility] OR;  Service: Neurosurgery;  Laterality: Right;    LUMBAR FACET INJECTION      xback- coritsone- about 4 times    RHIZOTOMY      SPINAL CORD STIMULATOR IMPLANT N/A 02/26/2024    Procedure: SPINAL CORD STIMULATOR INSERTION PHASE 1;  Surgeon: Aquiles Grigsby MD;  Location:  [facility] OR;  Service: Neurosurgery;  Laterality: N/A;    SPINAL CORD STIMULATOR IMPLANT N/A 03/18/2024    Procedure: SPINAL CORD STIMULATOR LEAD REVISION;  Surgeon: Aquiles Grigsby MD;  Location:  [facility] OR;  Service: Neurosurgery;  Laterality: N/A;    SPINAL CORD STIMULATOR TRIAL W/ LAMINOTOMY      TOE SURGERY Right 03/04/2015    \"flexible joint\" per pt -- no hardware- big toe    UPPER GASTROINTESTINAL ENDOSCOPY         Current Outpatient Medications   Medication Instructions    atorvastatin (LIPITOR) 10 mg, Oral, Daily    dexlansoprazole (DEXILANT) 60 mg, Oral, Daily    famotidine (PEPCID) 40 mg, Oral, 2 Times Daily    FLUoxetine (PROZAC) 40 mg, Oral, Every Morning    fluticasone (FLONASE) 50 MCG/ACT nasal spray Instill 2 sprays into each nostril daily as directed by provider    Gemtesa 75 mg, Oral, Daily    hydrocortisone (ANUSOL-HC) 25 MG suppository Use 1 suppository rectally 2 times a day  as needed.    hyoscyamine (LEVSIN/SL) 0.125 mg, Sublingual, Every 4 Hours PRN    levocetirizine (XYZAL) 5 mg, Oral, Every Evening    metoprolol tartrate (LOPRESSOR) 25 mg, Oral, 2 Times Daily    oxyCODONE-acetaminophen (PERCOCET) 5-325 MG per tablet 1 tablet, Oral, Every 6 Hours PRN    phenazopyridine (PYRIDIUM) 200 mg, Oral, 3 Times Daily PRN    tadalafil (CIALIS) 5 mg, Oral, Daily PRN    Testosterone Cypionate (Depo-Testosterone) 200 MG/ML injection Inject 0.5 mL subcutaneously every Monday and Thursday. Discard vial after single use.    triamcinolone (KENALOG) 0.1 % cream Apply to affected areas twice daily for 2 weeks. Take a one week break, use as needed for flares. "         Assessment & Plan   Diagnoses and all orders for this visit:    1. Grade II hemorrhoids (Primary)    Hemorrhoid stapling             This document has been electronically signed by Ramesh Pitts MD   July 29, 2025 11:05 EDT

## 2025-08-01 LAB
TESTOST FREE SERPL-MCNC: 20 PG/ML (ref 6.6–18.1)
TESTOST SERPL-MCNC: 902 NG/DL (ref 264–916)

## 2025-08-13 DIAGNOSIS — H65.93 MIDDLE EAR EFFUSION, BILATERAL: ICD-10-CM

## 2025-08-13 DIAGNOSIS — E78.5 DYSLIPIDEMIA: Chronic | ICD-10-CM

## 2025-08-13 DIAGNOSIS — F41.9 ANXIETY: ICD-10-CM

## 2025-08-13 DIAGNOSIS — I10 ESSENTIAL HYPERTENSION: Chronic | ICD-10-CM

## 2025-08-13 RX ORDER — ATORVASTATIN CALCIUM 10 MG/1
10 TABLET, FILM COATED ORAL DAILY
Qty: 90 TABLET | Refills: 2 | Status: CANCELLED | OUTPATIENT
Start: 2025-08-13

## 2025-08-13 RX ORDER — METOPROLOL TARTRATE 25 MG/1
25 TABLET, FILM COATED ORAL 2 TIMES DAILY
Qty: 180 TABLET | Refills: 2 | Status: CANCELLED | OUTPATIENT
Start: 2025-08-13

## 2025-08-14 DIAGNOSIS — I10 ESSENTIAL HYPERTENSION: Chronic | ICD-10-CM

## 2025-08-14 DIAGNOSIS — E78.5 DYSLIPIDEMIA: Chronic | ICD-10-CM

## 2025-08-14 RX ORDER — FLUOXETINE HYDROCHLORIDE 40 MG/1
40 CAPSULE ORAL EVERY MORNING
Qty: 90 CAPSULE | Refills: 1 | OUTPATIENT
Start: 2025-08-14

## 2025-08-14 RX ORDER — LEVOCETIRIZINE DIHYDROCHLORIDE 5 MG/1
5 TABLET, FILM COATED ORAL EVERY EVENING
Qty: 90 TABLET | Refills: 0 | OUTPATIENT
Start: 2025-08-14

## 2025-08-18 ENCOUNTER — OFFICE VISIT (OUTPATIENT)
Dept: FAMILY MEDICINE CLINIC | Facility: CLINIC | Age: 60
End: 2025-08-18
Payer: COMMERCIAL

## 2025-08-18 VITALS
WEIGHT: 251.2 LBS | HEIGHT: 72 IN | OXYGEN SATURATION: 96 % | SYSTOLIC BLOOD PRESSURE: 132 MMHG | TEMPERATURE: 98 F | DIASTOLIC BLOOD PRESSURE: 80 MMHG | BODY MASS INDEX: 34.02 KG/M2 | HEART RATE: 76 BPM

## 2025-08-18 DIAGNOSIS — F41.9 ANXIETY: ICD-10-CM

## 2025-08-18 DIAGNOSIS — H65.93 MIDDLE EAR EFFUSION, BILATERAL: ICD-10-CM

## 2025-08-18 PROCEDURE — 99214 OFFICE O/P EST MOD 30 MIN: CPT | Performed by: FAMILY MEDICINE

## 2025-08-18 RX ORDER — FLUOXETINE HYDROCHLORIDE 40 MG/1
40 CAPSULE ORAL EVERY MORNING
Qty: 90 CAPSULE | Refills: 1 | Status: SHIPPED | OUTPATIENT
Start: 2025-08-18

## 2025-08-18 RX ORDER — QUETIAPINE FUMARATE 25 MG/1
25 TABLET, FILM COATED ORAL NIGHTLY
Qty: 90 TABLET | Refills: 0 | Status: SHIPPED | OUTPATIENT
Start: 2025-08-18

## 2025-08-18 RX ORDER — METOPROLOL TARTRATE 25 MG/1
25 TABLET, FILM COATED ORAL 2 TIMES DAILY
Qty: 180 TABLET | Refills: 2 | Status: SHIPPED | OUTPATIENT
Start: 2025-08-18

## 2025-08-18 RX ORDER — ATORVASTATIN CALCIUM 10 MG/1
10 TABLET, FILM COATED ORAL DAILY
Qty: 90 TABLET | Refills: 2 | Status: SHIPPED | OUTPATIENT
Start: 2025-08-18

## 2025-08-18 RX ORDER — LEVOCETIRIZINE DIHYDROCHLORIDE 5 MG/1
5 TABLET, FILM COATED ORAL EVERY EVENING
Qty: 90 TABLET | Refills: 1 | Status: SHIPPED | OUTPATIENT
Start: 2025-08-18

## (undated) DEVICE — PK NEURO DISC 10

## (undated) DEVICE — Device: Brand: DEFENDO AIR/WATER/SUCTION AND BIOPSY VALVE

## (undated) DEVICE — SUT VIC PLS CTD ANTIB BR 3/0 8/18IN 45CM

## (undated) DEVICE — PAD GRND E/S MEGADYNE MONOPLR 2/PLT W/CORD A/ DISP

## (undated) DEVICE — BNDG ELAS ELITE V/CLOSE 3IN 5YD LF STRL

## (undated) DEVICE — FIBR LASR MOXY XPS GREENLIGHT

## (undated) DEVICE — CAPS TRANSMTR ENDOSC PH MONTR BRAVO CALIB/FREE

## (undated) DEVICE — DRAINBAG,ANTI-REFLUX TOWER,L/F,2000ML,LL: Brand: MEDLINE

## (undated) DEVICE — STRAP POSTN KN/BDY FM 5X72IN DISP

## (undated) DEVICE — TOOL MR8-14MH30D MR8 14CM MATCH DMD 3MM: Brand: MIDAS REX MR8

## (undated) DEVICE — ST PRIM GRVTY NDLESS 3 INJ PORT 105IN

## (undated) DEVICE — SYR LL TP 10ML STRL

## (undated) DEVICE — DRSNG ADAPTIC 3X8

## (undated) DEVICE — BLANKT WARM UPPR/BDY ARM/OUT 57X196CM

## (undated) DEVICE — PATIENT RETURN ELECTRODE, SINGLE-USE, CONTACT QUALITY MONITORING, ADULT, WITH 9FT CORD, FOR PATIENTS WEIGING OVER 33LBS. (15KG): Brand: MEGADYNE

## (undated) DEVICE — Device

## (undated) DEVICE — SPNG GZ WOVN 4X4IN 12PLY 10/BX STRL

## (undated) DEVICE — HDRST INTUB GENTLETOUCH SLOT 7IN RT

## (undated) DEVICE — SUT VIC PLS CTD ANTIB BR 0 18IN 45CM

## (undated) DEVICE — IRRIGATOR BULB ASEPTO 60CC STRL

## (undated) DEVICE — IMPLANTABLE DEVICE: Type: IMPLANTABLE DEVICE | Site: SPINE THORACIC | Status: NON-FUNCTIONAL

## (undated) DEVICE — GOWN,SIRUS,NONRNF,SETINSLV,2XL,18/CS: Brand: MEDLINE

## (undated) DEVICE — ANTIBACTERIAL UNDYED BRAIDED (POLYGLACTIN 910), SYNTHETIC ABSORBABLE SUTURE: Brand: COATED VICRYL

## (undated) DEVICE — FRCP BX RADJAW4 NDL 2.8 240CM LG OG BX40

## (undated) DEVICE — SUT SILK 2/0 SH CR8 18IN CR8 C012D

## (undated) DEVICE — ADHS SKIN PREMIERPRO EXOFIN TOPICAL HI/VISC .5ML

## (undated) DEVICE — GLV SURG BIOGEL LTX PF 8

## (undated) DEVICE — APPL CHLORAPREP W/TINT 26ML ORNG

## (undated) DEVICE — CABL BIPOL MEGADYNE 12FT DISP

## (undated) DEVICE — HLDR CATH FOLEY STATLOCK TRICOT PED 3WY

## (undated) DEVICE — APPL CHLORAPREP TINTED 26ML TEAL

## (undated) DEVICE — CAPS TRANSMTR ENDOSC PH MONTR BRAVO

## (undated) DEVICE — ELECTRD BLD EZ CLN MOD 4IN

## (undated) DEVICE — SNAP KOVER: Brand: UNBRANDED

## (undated) DEVICE — DISPOSABLE TOURNIQUET CUFF SINGLE BLADDER, DUAL PORT AND QUICK CONNECT CONNECTOR: Brand: COLOR CUFF

## (undated) DEVICE — BG BND CVR CAM 36X28

## (undated) DEVICE — DRSNG WND BORDR/ADHS NONADHR/GZ LF 4X4IN STRL

## (undated) DEVICE — SUT VIC 3/0 PS2 27IN UNDYE VCP427H

## (undated) DEVICE — CATH FOLEY LUBRICATH 3WY 20F 30CC

## (undated) DEVICE — GLV SURG PREMIERPRO MIC LTX PF SZ7.5 BRN

## (undated) DEVICE — GLV SURG PREMIERPRO MIC LTX PF SZ6.5 BRN

## (undated) DEVICE — DISPOSABLE IRRIGATION BIPOLAR CORD, M1000 TYPE: Brand: KIRWAN

## (undated) DEVICE — THE BITE BLOCK MAXI, LATEX FREE STRAP IS USED TO PROTECT THE ENDOSCOPE INSERTION TUBE FROM BEING BITTEN BY THE PATIENT.

## (undated) DEVICE — CONN TBG Y 5 IN 1 LF STRL

## (undated) DEVICE — HOLDER: Brand: DEROYAL

## (undated) DEVICE — BNDG ELAS MATRX V/CLS 3INX5YD LF

## (undated) DEVICE — PLUG,CATHETER,DRAINAGE PROTECTOR,TUBE: Brand: MEDLINE

## (undated) DEVICE — ELECTRD BLD EZ CLN STD 2.5IN

## (undated) DEVICE — SYR LUERLOK 30CC

## (undated) DEVICE — CVR FTSWITCH UNIV

## (undated) DEVICE — BRAIN SPATULA, 7 7/8", (200 MM), DOUBLE ENDED, MALLEABLE, WIDTH: 10 MM, SILICONE, STERILE, DISPOSABLE, PACKAGE OF 10 PIECES: Brand: AESCULAP

## (undated) DEVICE — ENCORE® LATEX MICRO SIZE 7.5, STERILE LATEX POWDER-FREE SURGICAL GLOVE: Brand: ENCORE

## (undated) DEVICE — SUT ETHLN 3/0 FS1 663G

## (undated) DEVICE — PK CYSTO-TUR BASIC 10

## (undated) DEVICE — SUT VIC 2/0  CT1 CR8 18IN VCP839D

## (undated) DEVICE — PENCL ROCKRSWCH MEGADYNE W/HOLSTR 10FT SS

## (undated) DEVICE — C-ARM DRAPE: Brand: DEROYAL

## (undated) DEVICE — WEBRIL* CAST PADDING: Brand: DEROYAL

## (undated) DEVICE — 4-PORT MANIFOLD: Brand: NEPTUNE 2

## (undated) DEVICE — PK EXTREM UPPR 70

## (undated) DEVICE — PAD GRND REM POLYHESIVE A/ DISP